# Patient Record
Sex: MALE | Race: WHITE | Employment: UNEMPLOYED | URBAN - METROPOLITAN AREA
[De-identification: names, ages, dates, MRNs, and addresses within clinical notes are randomized per-mention and may not be internally consistent; named-entity substitution may affect disease eponyms.]

---

## 2024-03-31 ENCOUNTER — APPOINTMENT (OUTPATIENT)
Dept: GENERAL RADIOLOGY | Facility: HOSPITAL | Age: 69
End: 2024-03-31
Attending: STUDENT IN AN ORGANIZED HEALTH CARE EDUCATION/TRAINING PROGRAM
Payer: MEDICARE

## 2024-03-31 ENCOUNTER — APPOINTMENT (OUTPATIENT)
Dept: CT IMAGING | Facility: HOSPITAL | Age: 69
End: 2024-03-31
Attending: STUDENT IN AN ORGANIZED HEALTH CARE EDUCATION/TRAINING PROGRAM
Payer: MEDICARE

## 2024-03-31 ENCOUNTER — HOSPITAL ENCOUNTER (INPATIENT)
Facility: HOSPITAL | Age: 69
LOS: 22 days | Discharge: NURSING, REHAB OR LONG-TERM CARE | End: 2024-04-22
Attending: STUDENT IN AN ORGANIZED HEALTH CARE EDUCATION/TRAINING PROGRAM | Admitting: HOSPITALIST
Payer: MEDICARE

## 2024-03-31 ENCOUNTER — HOSPITAL ENCOUNTER (INPATIENT)
Facility: HOSPITAL | Age: 69
End: 2024-03-31
Attending: STUDENT IN AN ORGANIZED HEALTH CARE EDUCATION/TRAINING PROGRAM | Admitting: HOSPITALIST
Payer: MEDICARE

## 2024-03-31 DIAGNOSIS — R29.818 NEUROLOGICAL DEFICIT PRESENT: ICD-10-CM

## 2024-03-31 DIAGNOSIS — R41.82 ALTERED MENTAL STATUS, UNSPECIFIED ALTERED MENTAL STATUS TYPE: Primary | ICD-10-CM

## 2024-03-31 PROBLEM — D72.829 LEUKOCYTOSIS: Status: RESOLVED | Noted: 2023-02-19 | Resolved: 2024-03-31

## 2024-03-31 PROBLEM — G40.89 OTHER SEIZURES (HCC): Status: RESOLVED | Noted: 2023-02-22 | Resolved: 2024-03-31

## 2024-03-31 PROBLEM — M1A.9XX0 GOUT, CHRONIC: Chronic | Status: RESOLVED | Noted: 2024-03-31 | Resolved: 2024-03-31

## 2024-03-31 PROBLEM — R31.0 GROSS HEMATURIA: Status: RESOLVED | Noted: 2024-03-31 | Resolved: 2024-03-31

## 2024-03-31 PROBLEM — F03.90 DEMENTIA (HCC): Chronic | Status: RESOLVED | Noted: 2023-02-01 | Resolved: 2024-03-31

## 2024-03-31 PROBLEM — G45.4 TRANSIENT GLOBAL AMNESIA: Status: RESOLVED | Noted: 2017-01-10 | Resolved: 2024-03-31

## 2024-03-31 PROBLEM — L40.8 OTHER PSORIASIS: Status: RESOLVED | Noted: 2023-02-22 | Resolved: 2024-03-31

## 2024-03-31 PROBLEM — D72.829 ELEVATED WHITE BLOOD CELL COUNT, UNSPECIFIED: Status: RESOLVED | Noted: 2023-02-22 | Resolved: 2024-03-31

## 2024-03-31 PROBLEM — R29.898 WEAKNESS OF LEFT UPPER EXTREMITY: Status: RESOLVED | Noted: 2022-08-20 | Resolved: 2024-03-31

## 2024-03-31 PROBLEM — G93.41 METABOLIC ENCEPHALOPATHY: Status: RESOLVED | Noted: 2023-02-22 | Resolved: 2024-03-31

## 2024-03-31 PROBLEM — F03.90 UNSPECIFIED DEMENTIA, UNSPECIFIED SEVERITY, WITHOUT BEHAVIORAL DISTURBANCE, PSYCHOTIC DISTURBANCE, MOOD DISTURBANCE, AND ANXIETY (HCC): Status: RESOLVED | Noted: 2023-02-22 | Resolved: 2024-03-31

## 2024-03-31 PROBLEM — K76.89 LIVER CYST: Status: RESOLVED | Noted: 2022-08-30 | Resolved: 2024-03-31

## 2024-03-31 PROBLEM — R41.841 COGNITIVE COMMUNICATION DEFICIT: Status: RESOLVED | Noted: 2023-03-14 | Resolved: 2024-03-31

## 2024-03-31 PROBLEM — C7A.1 NEUROENDOCRINE CARCINOMA, HIGH GRADE (HCC): Status: RESOLVED | Noted: 2019-04-17 | Resolved: 2024-03-31

## 2024-03-31 PROBLEM — R53.1 WEAKNESS: Status: RESOLVED | Noted: 2023-03-12 | Resolved: 2024-03-31

## 2024-03-31 PROBLEM — R26.89 OTHER ABNORMALITIES OF GAIT AND MOBILITY: Status: RESOLVED | Noted: 2023-03-12 | Resolved: 2024-03-31

## 2024-03-31 PROBLEM — E78.5 DYSLIPIDEMIA: Chronic | Status: RESOLVED | Noted: 2023-02-01 | Resolved: 2024-03-31

## 2024-03-31 PROBLEM — F06.1 CATATONIA: Status: RESOLVED | Noted: 2024-03-31 | Resolved: 2024-03-31

## 2024-03-31 PROBLEM — T88.4XXA DIFFICULT AIRWAY: Status: RESOLVED | Noted: 2023-02-02 | Resolved: 2024-03-31

## 2024-03-31 PROBLEM — N28.89 OTHER SPECIFIED DISORDERS OF KIDNEY AND URETER: Status: RESOLVED | Noted: 2023-03-11 | Resolved: 2024-03-31

## 2024-03-31 PROBLEM — G92.8 TOXIC METABOLIC ENCEPHALOPATHY: Status: RESOLVED | Noted: 2022-08-20 | Resolved: 2024-03-31

## 2024-03-31 PROBLEM — Q44.6 CYSTIC DISEASE OF LIVER: Status: RESOLVED | Noted: 2023-02-22 | Resolved: 2024-03-31

## 2024-03-31 PROBLEM — C07 CANCER OF PAROTID GLAND (HCC): Chronic | Status: RESOLVED | Noted: 2022-08-23 | Resolved: 2024-03-31

## 2024-03-31 PROBLEM — D49.0 PAROTID NEOPLASM: Status: RESOLVED | Noted: 2024-03-31 | Resolved: 2024-03-31

## 2024-03-31 PROBLEM — J45.909 ASTHMA IN ADULT (HCC): Chronic | Status: RESOLVED | Noted: 2023-02-22 | Resolved: 2024-03-31

## 2024-03-31 PROBLEM — K21.00 GASTRO-ESOPHAGEAL REFLUX DISEASE WITH ESOPHAGITIS, WITHOUT BLEEDING: Status: RESOLVED | Noted: 2023-02-22 | Resolved: 2024-03-31

## 2024-03-31 PROBLEM — F01.50 VASCULAR DEMENTIA, UNSPECIFIED SEVERITY, WITHOUT BEHAVIORAL DISTURBANCE, PSYCHOTIC DISTURBANCE, MOOD DISTURBANCE, AND ANXIETY (HCC): Status: RESOLVED | Noted: 2023-02-22 | Resolved: 2024-03-31

## 2024-03-31 PROBLEM — L40.9 PSORIASIS, UNSPECIFIED: Chronic | Status: RESOLVED | Noted: 2022-09-09 | Resolved: 2024-03-31

## 2024-03-31 PROBLEM — F01.50 VASCULAR DEMENTIA (HCC): Chronic | Status: RESOLVED | Noted: 2022-09-11 | Resolved: 2024-03-31

## 2024-03-31 PROBLEM — R93.89 ABNORMAL CT SCAN: Status: RESOLVED | Noted: 2024-03-31 | Resolved: 2024-03-31

## 2024-03-31 PROBLEM — M10.9 ACUTE GOUT INVOLVING TOE OF LEFT FOOT: Status: RESOLVED | Noted: 2023-02-22 | Resolved: 2024-03-31

## 2024-03-31 LAB
ANION GAP SERPL CALC-SCNC: 11 MMOL/L (ref 0–18)
ATRIAL RATE: 89 BPM
BASOPHILS # BLD AUTO: 0.04 X10(3) UL (ref 0–0.2)
BASOPHILS NFR BLD AUTO: 0.4 %
BILIRUB UR QL: NEGATIVE
BUN BLD-MCNC: 11 MG/DL (ref 9–23)
BUN/CREAT SERPL: 9.6 (ref 10–20)
CALCIUM BLD-MCNC: 9.8 MG/DL (ref 8.7–10.4)
CHLORIDE SERPL-SCNC: 105 MMOL/L (ref 98–112)
CLARITY UR: CLEAR
CO2 SERPL-SCNC: 24 MMOL/L (ref 21–32)
COLOR UR: YELLOW
CREAT BLD-MCNC: 1.15 MG/DL
DEPRECATED RDW RBC AUTO: 41.8 FL (ref 35.1–46.3)
EGFRCR SERPLBLD CKD-EPI 2021: 69 ML/MIN/1.73M2 (ref 60–?)
EOSINOPHIL # BLD AUTO: 0.05 X10(3) UL (ref 0–0.7)
EOSINOPHIL NFR BLD AUTO: 0.5 %
ERYTHROCYTE [DISTWIDTH] IN BLOOD BY AUTOMATED COUNT: 12.9 % (ref 11–15)
FLUAV + FLUBV RNA SPEC NAA+PROBE: NEGATIVE
FLUAV + FLUBV RNA SPEC NAA+PROBE: NEGATIVE
GLUCOSE BLD-MCNC: 86 MG/DL (ref 70–99)
GLUCOSE BLDC GLUCOMTR-MCNC: 89 MG/DL (ref 70–99)
GLUCOSE BLDC GLUCOMTR-MCNC: 91 MG/DL (ref 70–99)
GLUCOSE UR-MCNC: NORMAL MG/DL
HCT VFR BLD AUTO: 52.9 %
HGB BLD-MCNC: 17.5 G/DL
HGB UR QL STRIP.AUTO: NEGATIVE
IMM GRANULOCYTES # BLD AUTO: 0.03 X10(3) UL (ref 0–1)
IMM GRANULOCYTES NFR BLD: 0.3 %
KETONES UR-MCNC: 60 MG/DL
LEUKOCYTE ESTERASE UR QL STRIP.AUTO: NEGATIVE
LYMPHOCYTES # BLD AUTO: 1.01 X10(3) UL (ref 1–4)
LYMPHOCYTES NFR BLD AUTO: 9.1 %
MCH RBC QN AUTO: 29.4 PG (ref 26–34)
MCHC RBC AUTO-ENTMCNC: 33.1 G/DL (ref 31–37)
MCV RBC AUTO: 88.8 FL
MONOCYTES # BLD AUTO: 0.53 X10(3) UL (ref 0.1–1)
MONOCYTES NFR BLD AUTO: 4.8 %
NEUTROPHILS # BLD AUTO: 9.4 X10 (3) UL (ref 1.5–7.7)
NEUTROPHILS # BLD AUTO: 9.4 X10(3) UL (ref 1.5–7.7)
NEUTROPHILS NFR BLD AUTO: 84.9 %
NITRITE UR QL STRIP.AUTO: NEGATIVE
OSMOLALITY SERPL CALC.SUM OF ELEC: 289 MOSM/KG (ref 275–295)
P AXIS: 46 DEGREES
P-R INTERVAL: 178 MS
PH UR: 6 [PH] (ref 5–8)
PLATELET # BLD AUTO: 286 10(3)UL (ref 150–450)
POTASSIUM SERPL-SCNC: 3.4 MMOL/L (ref 3.5–5.1)
PROT UR-MCNC: 30 MG/DL
Q-T INTERVAL: 366 MS
QRS DURATION: 96 MS
QTC CALCULATION (BEZET): 445 MS
R AXIS: -21 DEGREES
RBC # BLD AUTO: 5.96 X10(6)UL
RSV RNA SPEC NAA+PROBE: NEGATIVE
SARS-COV-2 RNA RESP QL NAA+PROBE: NOT DETECTED
SODIUM SERPL-SCNC: 140 MMOL/L (ref 136–145)
SP GR UR STRIP: 1.03 (ref 1–1.03)
T AXIS: 38 DEGREES
TROPONIN I SERPL HS-MCNC: 8 NG/L
UROBILINOGEN UR STRIP-ACNC: NORMAL
VENTRICULAR RATE: 89 BPM
WBC # BLD AUTO: 11.1 X10(3) UL (ref 4–11)

## 2024-03-31 PROCEDURE — 99223 1ST HOSP IP/OBS HIGH 75: CPT | Performed by: OTHER

## 2024-03-31 PROCEDURE — 71045 X-RAY EXAM CHEST 1 VIEW: CPT | Performed by: STUDENT IN AN ORGANIZED HEALTH CARE EDUCATION/TRAINING PROGRAM

## 2024-03-31 PROCEDURE — 70450 CT HEAD/BRAIN W/O DYE: CPT | Performed by: STUDENT IN AN ORGANIZED HEALTH CARE EDUCATION/TRAINING PROGRAM

## 2024-03-31 PROCEDURE — 70496 CT ANGIOGRAPHY HEAD: CPT | Performed by: STUDENT IN AN ORGANIZED HEALTH CARE EDUCATION/TRAINING PROGRAM

## 2024-03-31 PROCEDURE — 70498 CT ANGIOGRAPHY NECK: CPT | Performed by: STUDENT IN AN ORGANIZED HEALTH CARE EDUCATION/TRAINING PROGRAM

## 2024-03-31 RX ORDER — SODIUM CHLORIDE 9 MG/ML
INJECTION, SOLUTION INTRAVENOUS CONTINUOUS
Status: DISCONTINUED | OUTPATIENT
Start: 2024-03-31 | End: 2024-04-05

## 2024-03-31 RX ORDER — ENOXAPARIN SODIUM 100 MG/ML
40 INJECTION SUBCUTANEOUS DAILY
Status: DISCONTINUED | OUTPATIENT
Start: 2024-03-31 | End: 2024-04-22

## 2024-03-31 RX ORDER — WATER 10 ML/10ML
INJECTION INTRAMUSCULAR; INTRAVENOUS; SUBCUTANEOUS
Status: DISPENSED
Start: 2024-03-31 | End: 2024-03-31

## 2024-03-31 RX ORDER — HALOPERIDOL 5 MG/ML
1 INJECTION INTRAMUSCULAR EVERY 4 HOURS PRN
Status: DISCONTINUED | OUTPATIENT
Start: 2024-03-31 | End: 2024-04-02

## 2024-03-31 RX ORDER — SODIUM CHLORIDE 9 MG/ML
125 INJECTION, SOLUTION INTRAVENOUS CONTINUOUS
Status: DISCONTINUED | OUTPATIENT
Start: 2024-03-31 | End: 2024-03-31

## 2024-03-31 RX ORDER — ACETAMINOPHEN 500 MG
500 TABLET ORAL EVERY 4 HOURS PRN
Status: DISCONTINUED | OUTPATIENT
Start: 2024-03-31 | End: 2024-04-02

## 2024-03-31 RX ORDER — OLANZAPINE 10 MG/2ML
INJECTION, POWDER, FOR SOLUTION INTRAMUSCULAR
Status: DISPENSED
Start: 2024-03-31 | End: 2024-03-31

## 2024-03-31 RX ORDER — LABETALOL HYDROCHLORIDE 5 MG/ML
10 INJECTION, SOLUTION INTRAVENOUS ONCE
Status: COMPLETED | OUTPATIENT
Start: 2024-03-31 | End: 2024-03-31

## 2024-03-31 RX ORDER — AMLODIPINE BESYLATE 5 MG/1
5 TABLET ORAL DAILY
Status: DISCONTINUED | OUTPATIENT
Start: 2024-03-31 | End: 2024-04-04

## 2024-03-31 RX ORDER — QUETIAPINE FUMARATE 25 MG/1
25 TABLET, FILM COATED ORAL NIGHTLY
Status: DISCONTINUED | OUTPATIENT
Start: 2024-03-31 | End: 2024-04-02

## 2024-03-31 RX ORDER — HYDRALAZINE HYDROCHLORIDE 20 MG/ML
10 INJECTION INTRAMUSCULAR; INTRAVENOUS EVERY 6 HOURS PRN
Status: DISCONTINUED | OUTPATIENT
Start: 2024-03-31 | End: 2024-04-01

## 2024-03-31 RX ORDER — RISPERIDONE 0.5 MG/1
0.5 TABLET ORAL 2 TIMES DAILY PRN
Status: DISCONTINUED | OUTPATIENT
Start: 2024-03-31 | End: 2024-04-02

## 2024-03-31 RX ORDER — HYDRALAZINE HYDROCHLORIDE 20 MG/ML
10 INJECTION INTRAMUSCULAR; INTRAVENOUS ONCE
Status: COMPLETED | OUTPATIENT
Start: 2024-03-31 | End: 2024-03-31

## 2024-03-31 NOTE — H&P
[facility] Hospitalist H&P       CC: left sided facial droop, weakness, agitation     PCP: No primary care provider on file.    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  Patient arrives via EMS from his independent living facility.  It appears that the patient had a visiting nurse and noted him to be sitting at the edge of the bed, confused.  EMS arrived and they found the patient to have left-sided weakness and left-sided facial droop.  The patient was not answering questions appropriately and was altered.  He was last known well sometime yesterday. Normally gets his care at [facility]. Has had admissions for altered mental status as well.     Review of Systems  Comprehensive ROS reviewed and negative except for what's stated above.     PMH  Metastatic small cell cancer with parotid involvment  Asthma  Dementia  Gout  Hypertension  Hx of Seizure  Hyperlipidemia    PSH  Unavailable at this time     ALL:  No major drug allergies noted    Home Medications:  Unclear medication list  Reviewed care everywhere records but unclear what is updated list  No list in ER via EMS    Soc Hx  Lives in independent living facility  Former smoker  Lives alone,   Patient was a     Fam Hx  Unavailable at this time     OBJECTIVE:  BP (!) 175/89   Pulse 79   Temp 98.1 °F (36.7 °C) (Oral)   Resp 22   Ht 5' 8\" (1.727 m)   Wt 177 lb 0.5 oz (80.3 kg)   SpO2 93%   BMI 26.92 kg/m²   General: Awake, dishevled, in restraints  Heart: RRR  Lungs: Clear  Extremities: both upper and lower in restraints  Neuro: does not follow commands but is awake, spontaneously moving all extremities. Speech seems to be dysarthric but not giving much in terms of speech, sensation intact in face and  extremities.     Diagnostic Data:    CBC/Chem  Recent Labs   Lab 03/31/24  1059   WBC 11.1*   HGB 17.5   MCV 88.8   .0       Recent Labs   Lab 03/31/24  1058      K 3.4*      CO2 24.0   BUN 11   CREATSERUM 1.15   GLU 86   CA 9.8     Radiology: XR CHEST AP PORTABLE  (CPT=71045)    Result Date: 3/31/2024  CONCLUSION: Linear opacities in both lung bases suggesting atelectasis.  Otherwise no acute cardiopulmonary abnormality.   Dictated by (CST): Jhoan Nunn MD on 3/31/2024 at 11:36 AM     Finalized by (CST): Jhoan Nunn MD on 3/31/2024 at 11:37 AM          CT STROKE CTA BRAIN/CTA NECK (W IV)(CPT=70496/60338)    Result Date: 3/31/2024  CONCLUSION:  1. CTA head: No hemodynamically significant stenosis, occlusion, or gross aneurysm in the anterior or posterior circulation. 2. CTA neck: No hemodynamically significant stenosis, occlusion, or dissection of the carotids or vertebrals. 3.  Advanced chronic appearing paranasal sinus inflammation.  Dictated by (CST): Jhoan Nunn MD on 3/31/2024 at 11:18 AM     Finalized by (CST): Jhoan Nunn MD on 3/31/2024 at 11:22 AM          CT STROKE BRAIN (NO IV)(CPT=70450)    Result Date: 3/31/2024  CONCLUSION:  1.  No acute intracranial process. 2.  There are moderate microvascular white matter ischemic changes, likely related to long-standing hypertension and/or diabetes.  Interval development of multiple lacunar infarcts most notably in the right caudate head and right basal ganglia, new since  2012 imaging. 3.  Atherosclerosis in the anterior and posterior circulations.  Exam discussed with Dr. Lynn on 3/31/24 at 11:17 a.m.  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     Altered mental status   Possible delirium and/or progression of dementia   Agitation   -unclear, ddx includes metabolic encephalopathy, infection, stroke, post ictal state? And more.   -neurology consulted  -initial CT imaging stable  -may need MRI and stroke work up   -monitor for fever or infection   -will need anti-psychotics PRN for agitation, will order IV haldol 1mg [id number] for now and schedule seroquel 25mg HS tonight.     Hx of Hypertension  -unclear which meds  -renal function stable  -can add oral meds once cleared from stroke standpoint     Possible left sided facial droop and weakness  -neurology consulted and stroke work up if needed based on their evaluation   -anti-platelets if recommended by neuro     Hx of seizure? Noted in outside records, unclear if on anti-epileptics     Gout  -likely on allopurinol but unclear    Fluids: low rate of fluids   Diet: NPO until cleared from speech/swallow  DVT prophylaxis: lovenox daily   Code status: unclear, not on record    Yobany Christianson [facility] Hospitalist

## 2024-03-31 NOTE — SLP NOTE
ADULT SWALLOWING EVALUATION    ASSESSMENT    ASSESSMENT/OVERALL IMPRESSION:    Orders received, chart reviewed, clinical bedside swallow evaluation complete as patient did not pass RN swallow screen due to possible L facial droop. Patient admitted from ILF/HA with AMS and PMH dementia. CXR and CTH 3/31/24 reviewed as below. No hx SLP service per this EMR review. RN authorizes visit, endorses patient received sedative medications due to agitation which may be acutely worsening AMS at this time.     Patient positioned upright in bed, afebrile, in NAD, flat affect, inconsistently responds to Y/N questions with head nods/shakes, states name as \"Jeremy\" but otherwise unable to elicit any verbal output; patient rolls eyes at me when instructed for oral motor commands with visual models and does not attempt. Patient self-administered two cup sips thin liquid water with oropharyngeal timing and control which appears fair and without overt signs aspiration/distress. Patient then declined/refused all other PO trials including water, juice, pudding, applesauce and solid crackers despite gentle encouragement. Patient remained with mouth clamped shut and head turned away.      At this time, unable to make accurate assessment of patient dysphagia and overall swallow safety due to limited patient participation. Anticipate patient will tolerate a diet (perhaps with some modifications) when mentation is clear/improved to allow improved participation in evaluation. Given this presentation, unable to make safe PO diet recommendation, will return 4/1/24 for re-assessment as available/appropriate. Defer to primary service to initiate PO diet if prior to re-evaluation. Plan and recommendations reviewed with patient and RN at bedside.         RECOMMENDATIONS   Diet Recommendations - Solids: NPO  Diet Recommendations - Liquids: NPO  **Or defer to primary service for diet due to limited patient participation today**  Medication Administration  Recommendations: One pill at a time with small sips of liquid as appropriate  Treatment Plan/Recommendations: Aspiration precautions;SLP to reassess    HISTORY   MEDICAL HISTORY  Reason for Referral: RN dysphagia screen    Problem List  Principal Problem:    Altered mental status, unspecified altered mental status type  Active Problems:    Neurological deficit present    Past Medical History  No past medical history on file.    Prior Living Situation: Assisted living;Independent living  Diet Prior to Admission: Unknown  Precautions: Aspiration    Patient/Family Goals: Not stated    SWALLOWING HISTORY  Current Diet Consistency: NPO  Dysphagia History: As above    Imaging Results:     [facility] 3/31/24:  CONCLUSION:   1.  No acute intracranial process.   2.  There are moderate microvascular white matter ischemic changes, likely related to long-standing hypertension and/or diabetes.  Interval development of multiple lacunar infarcts most notably in the right caudate head and right basal ganglia, new since    2012 imaging.   3.  Atherosclerosis in the anterior and posterior circulations.     CXR 3/31/24:  CONCLUSION: Linear opacities in both lung bases suggesting atelectasis. Otherwise no acute cardiopulmonary abnormality.     OBJECTIVE     Dentition: Natural  Symmetry: Unable to assess  Strength: Unable to assess  Tone: Unable to assess  Range of Motion: Unable to assess  Rate of Motion: Unable to assess    Voice Quality: Weak (Minimal verbal output)  Respiratory Status: Unlabored  Consistencies Trialed: Thin liquids  Method of Presentation: Self presentation;Cup  Patient Positioning: Upright;Midline    Oral Phase of Swallow: Within Functional Limits - timing and control appears adequate for two small sips of thin liquid via cup    Pharyngeal Phase of Swallow: Within Functional Limits - no overt signs aspiration/distress noted at bedside with two small cup sips thin liquid  (Please note: Silent aspiration cannot be evaluated  clinically. Videofluoroscopic Swallow Study is required to rule-out silent aspiration.)    Esophageal Phase of Swallow: No complaints consistent with possible esophageal involvement      GOALS  Goal #1 Patient will participate in ongoing clinical swallow evaluation as indicated/appropriate.     In Progress     FOLLOW UP  Treatment Plan/Recommendations: Aspiration precautions;SLP to reassess  Number of Visits to Meet Established Goals: 1  Follow Up Needed (Documentation Required): Yes  SLP Follow-up Date: 04/01/24    Thank you for your referral.   If you have any questions, please contact Apoorva Blanc, SLP  Apoorva Blanc M.S. CCC-SLP  Speech-Language Pathologist  [phone]

## 2024-03-31 NOTE — ED PROVIDER NOTES
History     Chief Complaint   Patient presents with    Stroke       HPI    68 year old male presents from independent living for evaluation of possible stroke.  Stroke alert called from the field.  EMS reports that patient had a visiting nurse check patient about 30 minutes prior to arrival and noted him to be sitting at the edge of the bed, confused, EMS found the patient to have left-sided weakness and left-sided facial droop.  Patient is not answering questions appropriately, seems confused.  EMS notes last known well sometime yesterday.    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  Seems patient has had prior admissions to [facility] for altered mentation, MRI showing possible normal pressure hydrocephalus    Patient review patient was Plavix, no other anticoagulation.  Does not appear patient is on antiepileptics          No past medical history on file.    No past surgical history on file.    No pertinent social history.                 Physical Exam     ED Triage Vitals   BP 03/31/24 1053 (!) 176/89   Pulse 03/31/24 1053 93   Resp 03/31/24 1053 24   Temp 03/31/24 1055 98.1 °F (36.7 °C)   Temp src 03/31/24 1055 Oral   SpO2 03/31/24 1053 94 %   O2 Device 03/31/24 1130 None (Room air)       Physical Exam  Constitutional:       General: He is not in acute distress.     Comments: Confused, looking about, intermittently answers questions   Eyes:      Extraocular Movements: Extraocular movements intact.   Cardiovascular:      Rate and Rhythm: Normal rate.      Pulses: Normal pulses.   Pulmonary:      Effort: Pulmonary effort is normal. No respiratory distress.   Musculoskeletal:      Cervical back: Normal range of motion.   Neurological:      Comments: Left-sided facial droop, left lower extremity weakness, there is drift of the left upper extremity with dysmetria.  Unable to assess visual field deficits.   Patient has an intention, not able to follow through with neurologic examination              ED Course     Labs Reviewed   BASIC METABOLIC PANEL (8) - Abnormal; Notable for the following components:       Result Value    Potassium 3.4 (*)     BUN/CREA Ratio 9.6 (*)     All other components within normal limits   URINALYSIS, ROUTINE - Abnormal; Notable for the following components:    Ketones Urine 60 (*)     Protein Urine 30 (*)     Bacteria Urine Rare (*)     Squamous Epi. Cells Few (*)     Transitional Cells Few (*)     All other components within normal limits   CBC W/ DIFFERENTIAL - Abnormal; Notable for the following components:    WBC 11.1 (*)     RBC 5.96 (*)     Neutrophil Absolute Prelim 9.40 (*)     Neutrophil Absolute 9.40 (*)     All other components within normal limits   TROPONIN I HIGH SENSITIVITY - Normal   POCT GLUCOSE - Normal   POCT GLUCOSE - Normal   SARS-COV-2/FLU A AND B/RSV BY PCR (GENEXPERT) - Normal    Narrative:     This test is intended for the qualitative detection and differentiation of SARS-CoV-2, influenza A, influenza B, and respiratory syncytial virus (RSV) viral RNA in nasopharyngeal or nares swabs from individuals suspected of respiratory viral infection consistent with COVID-19 by their healthcare provider. Signs and symptoms of respiratory viral infection due to SARS-CoV-2, influenza, and RSV can be similar.    Test performed using the Xpert Xpress SARS-CoV-2/FLU/RSV (real time RT-PCR)  assay on the GeneXpert instrument, [employer], [place] [address].   This test is being used under the Food and Drug Administration's Emergency Use Authorization.    The authorized Fact Sheet for Healthcare Providers for this assay is available upon request from the laboratory.   CBC WITH DIFFERENTIAL WITH PLATELET    Narrative:     The following orders were created for panel order CBC With Differential With Platelet.  Procedure                               Abnormality         Status                      ---------                               -----------         ------                     CBC W/ DIFFERENTIAL[[id number]]          Abnormal            Final result                 Please view results for these tests on the individual orders.   RAINBOW DRAW LAVENDER   RAINBOW DRAW LIGHT GREEN   RAINBOW DRAW GOLD   RAINBOW DRAW BLUE     XR CHEST AP PORTABLE  (CPT=71045)    Result Date: 3/31/2024  CONCLUSION: Linear opacities in both lung bases suggesting atelectasis.  Otherwise no acute cardiopulmonary abnormality.   Dictated by (CST): Jhoan Nunn MD on 3/31/2024 at 11:36 AM     Finalized by (CST): Jhoan Nunn MD on 3/31/2024 at 11:37 AM          CT STROKE CTA BRAIN/CTA NECK (W IV)(CPT=70496/23817)    Result Date: 3/31/2024  CONCLUSION:  1. CTA head: No hemodynamically significant stenosis, occlusion, or gross aneurysm in the anterior or posterior circulation. 2. CTA neck: No hemodynamically significant stenosis, occlusion, or dissection of the carotids or vertebrals. 3.  Advanced chronic appearing paranasal sinus inflammation.  Dictated by (CST): Jhoan Nunn MD on 3/31/2024 at 11:18 AM     Finalized by (CST): Jhoan Nunn MD on 3/31/2024 at 11:22 AM          CT STROKE BRAIN (NO IV)(CPT=70450)    Result Date: 3/31/2024  CONCLUSION:  1.  No acute intracranial process. 2.  There are moderate microvascular white matter ischemic changes, likely related to long-standing hypertension and/or diabetes.  Interval development of multiple lacunar infarcts most notably in the right caudate head and right basal ganglia, new since  2012 imaging. 3.  Atherosclerosis in the anterior and posterior circulations.  Exam discussed with Dr. Lynn on 3/31/24 at 11:17 a.m.  Dictated by (CST): Jhoan Nunn MD on 3/31/2024 at 11:14 AM     Finalized by (CST): Jhoan Nunn MD on 3/31/2024 at 11:18 AM               MDM     Vitals:    03/31/24 1130 03/31/24 1215 03/31/24 1241 03/31/24 1311   BP: (!) 175/89 (!) 177/97 (!) 176/89    BP  Location:   Right arm    Pulse: 79 73 80    Resp: 22 20 18    Temp:   98.8 °F (37.1 °C)    TempSrc:   Oral    SpO2: 93% 94% 95%    Weight:    82.1 kg   Height:           Large NIH stroke scale, unknown last normal, patient is not a tnk candidate.  Will proceed with CT stroke protocol to evaluate for large vessel occlusion.  Consulted with neurology, evaluated patient at bedside  Will allow for permissive hypertension at this time.  Differential could also include metabolic encephalopathy, delirium, dementia    ED Course as of 03/31/24 1329  ------------------------------------------------------------  Time: 03/31 1102  Comment: EKG interpretation by me: EKG sinus rhythm at a rate of 89, axis nomal, no concerning acute ischemic ST changes  ------------------------------------------------------------  Time: 03/31 1116  Comment: My interpretation of CT head without hemorrhage, large ventricles are noted  ------------------------------------------------------------  Time: 03/31 1128  Comment: CTA neg for LVO  ------------------------------------------------------------  Time: 03/31 1139  Comment: Xr read with atelectasis  ------------------------------------------------------------  Time: 03/31 1139  Comment: Patient becoming increasingly agitated and aggressive, punching and kicking staff.  Requiring sedation and restraints.  ------------------------------------------------------------  Time: 03/31 1210  Comment: Labs are unrevealing.  Urinalysis without evidence of infection.  Patient will be admitted for further workup and care     Critical Care Note  30 minutes of my time was spent engaged in work directly related to patient care, exclusive of other procedures, to prevent further deterioration of patient's condition.  Addressed impending deterioration including: airway, respiratory, cardiovascular, metabolic  Interpretation of CXR, cardiac output, EKG, BP  Response to treatments and reassessment of patient  Speaking  with consultants  Performed by self      Disposition and Plan     Clinical Impression:  1. Altered mental status, unspecified altered mental status type    2. Neurological deficit present        Disposition:  Admit    Follow-up:  No follow-up provider specified.    Medications Prescribed:  There are no discharge medications for this patient.      Hospital Problems       Present on Admission             ICD-10-CM Noted POA    * (Principal) Altered mental status, unspecified altered mental status type R41.82 3/31/2024 Yes    Neurological deficit present R29.818 3/31/2024 Yes

## 2024-03-31 NOTE — ED QUICK NOTES
Orders for admission, patient is aware of plan and ready to go upstairs. Any questions, please call ED RN Kasandra at extension [phone].     Patient Covid vaccination status: Fully vaccinated     COVID Test Ordered in ED: SARS-CoV-2/Flu A and B/RSV by PCR (GeneXpert)    COVID Suspicion at Admission: N/A    Running Infusions:    sodium chloride      None    Mental Status/LOC at time of transport: A&Ox2, combative with staff.     Other pertinent information:   CIWA score: N/A   NIH score:  8

## 2024-03-31 NOTE — ED INITIAL ASSESSMENT (HPI)
Arrived via ems from independent living for AMS , left sided facial droop/weakness, and increased agitation. Baseline is A&Ox4. Currently A&Ox2.

## 2024-03-31 NOTE — CONSULTS
[facility]  [address] [place] [address], [address]  [facility] [address]  [phone]          STROKE  CONSULTATION NOTE  [facility]  part of [facility]    Report of Consultation    Carlo Ng Patient Status:  Inpatient     1955 MRN [id number]    Location [facility] 3W/SW Attending Yobany Huggins DO    Hosp Day # 0 PCP No primary care provider on file.      Date of Admission:  3/31/2024  Date of Consult:  3/31/2024  Reason for Admission/Consultation:  stroke alert     Requested by: Yobany Huggins DO  Name of Outside Hospital if Transferred: N/A  Time of patient arrival: 10:49 AM   on 3/31/24      _________________________________________________________________________________________    Chief Complaint:   Chief Complaint   Patient presents with    Stroke       HPI:  Carlo Ng is a 68 year old  male w/ a pmhx sig. 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  He was  not answering  questions appropriately and seemed confused.  Per EMS last known well was reportedly yesterday.  It is unclear where they obtain this information from.    Patient was seen in the emergency department with a stroke alert was called.  He was noted to have cognitive slowing/bradyphrenia.  He would regard.  He had an increased response latency.  + Abulia.  He could name.  He could repeat.   + Hypophonic.  He was disoriented.  When asked his age he said he was \"in my early 30s.\"  At first he was able to tell us that he was at [facility].  Later when asked location again he said he was in [place]\" [place].  Then appeared  embarrassed and said [facility]..\"    Majority of the history is obtained through chart review, particularly [facility] messages that were sent to the patient's neurologist at [facility].  The patient has a guardian.  Her guardian sent a message to his neurologist with concerns that the patient's executive function and cognition was worsening.  Then after reviewing all of the outside documentation from his admissions to [facility] and admissions to outside care facility/assisted living facilities it appears that patient has had a progressive decline since the fall 2023.  It is in the background of this global decline the patient has had intermittent episodes of significant decompensation with return to his new baseline.    Patient is awaiting his follow-up visit with Dr. Cabrera at [facility].  He is scheduled to see him in May 2024.  Update: Patient's daughter states that the patient is getting his care at [facility] and follows with a neurologist there, Dr. Ramirez Keith.  She specifically wanted to know if the patient can get an MRI with CSF flow studies as well as a high-volume lumbar puncture with opening pressure to evaluate for normal pressure hydrocephalus.    Prior stroke/TIAs (date, description):       Date  Topography/symptoms  Etiology  9/10/12  left hemisphere        Review and summation of prior records  Carlo Ng is a 66yo male who neurology was consulted for new onset aphasia and R sided weakness. PMHx notable for metastatic small cell cancer w/ parotid involvement, asthma, possible dementia, gout, HTN, seizures who presented with AMS and is being treated for suspected gout flair     Impression:  Given patient's rapid return to baseline with focal symptoms at  the time of examination being related to pain, low suspicion for vascular etiology of patient's presentation. Given AMS, prolonged hospital stay, and fluctuating mental status, suspect toxic metabolic encephalopathy with superimposed pain limited examination may have led to patient's presentation.     RECOMMENDATIONS:  -No need for MRI brain unless patient's symptoms persist  -Consider restarting home clopidogrel and rosuvastatin for secondary prevention if no contraindication from upcoming procedure or high bleeding risk     Thank you for allowing us to assist in the care of your patient. Please let us know if you have any additional questions or concerns.     We will sign off    CURRENT ISSUES (or current complaints)    Seen then in hospital by Neurology With last note (by Dr Angelo and Dr Lacey as below)  ASSESSMENT AND PLAN   Carlo Ng is a 68yo male who neurology was consulted for new onset aphasia and R sided weakness. PMHx notable for metastatic small cell cancer w/ parotid involvement, asthma, possible dementia, gout, HTN, seizures who presented with AMS and is being treated for suspected gout flair     Impression:  Given patient's rapid return to baseline with focal symptoms at the time of examination being related to pain, low suspicion for vascular etiology of patient's presentation. Given AMS, prolonged hospital stay, and fluctuating mental status, suspect toxic metabolic encephalopathy with superimposed pain limited examination may have led to patient's presentation.     RECOMMENDATIONS:  -No need for MRI brain unless patient's symptoms persist  -Consider restarting home clopidogrel and rosuvastatin for secondary prevention if no contraindication from upcoming procedure or high bleeding risk     Thank you for allowing us to assist in the care of your patient. Please let us know if you have any additional questions or concerns.     We will sign off    Patient not sure why he is here> MRI shows some  signs of ventriculomegaly but also atrophy.  Accompanied by a friend but no family members or POA with him  No complaints of a=gait dysfunction or urinary dysfunction  Very irritable and poor historian mood and memory are poor    Medications:   Current Outpatient Medications   Medication    acetaminophen (TYLENOL) 325 MG tablet    albuterol 90 mcg base/puff Aero Soln    colchicine 0.6 MG tablet    fluticasone-salmeterol (Wixela Inhub) 250-50 MCG/ACT diskus inhaler    rosuvastatin (CRESTOR) 20 MG tablet    Spacer/Aero-Holding Chambers (VALVED HOLDING CHAMBER) Device     No current facility-administered medications for this visit.     Allergies:     Allergies   Allergen Reactions    Codeine Nausea     Other medical/surgical/family/social issues since last visit: None known    ROS:  Constitution no new complaints  Hematologic/Lymphatic/Immunologic no new complaints   Eyes:no new complaints  Ears/Nose/Throat: no new complaints  Respiratory:no new complaints  Cardiac: no new complaints  Gastrointestinal:no new complaints  Genitourinary:no new complaints  Musculoskeletal: no new complaints  Neurologic/Psychiatric: no new complaints or as per HPI    Physical examination:   /90 (BP Location: Left arm, BP Pt Position: Sitting, BP Cuff: adult large) Comment: AOBP  Pulse 76  Temp 99.5 °F (37.5 °C) (Temporal) Comment: pt denies any symptoms  Resp 20  Ht 1.778 m (5' 10\")  Wt 77.3 kg (170 lb 6.4 oz)  SpO2 96%  BMI 24.45 kg/m²     No cervical bruits and normal rate and rhythm. No gross murmurs     On neurological examination, the patient was alert and oriented to person,place but not time. Tangential thinking. Irritable and uncooperative to some degree with exam. The discs were poorly visualized. The patient had normal extraocular movements, pupil symmetry size and reaction and normal lid position. Visual fields were normal. There was normal facial strength and sensation. There was normal hearing to voice and low  frequency sounds. There was normal palate, pharynx, tongue and shoulder movement/function. The patient also had normal extremity strength, tone, coordination and bulk. The patient had normal gait There was no tremor or involuntary movements. There were normal reflexes with downgoing toes. There was normal sensation to pin, temperature, position, light touch, vibration and double simultaneous stimulation     Test Results    Results  MRI HEAD W AND WO IV CONTRAST (Accession [id number]) (Order [id number])  Result Information    Status: Final result (Exam End: 2023 22:02) Provider Status: Open     Signed by    Signed Time Phone Pager   Henna Ramos MD 2023 23:01 [phone]      Neurology note from 9/10/2012: \"DATE OF CONSULTATION:  09/10/12     This patient is a 56-year-old right-handed white male with a   history of  hypertension and hypercholesterolemia who complains of confusion   and  aphasia.  The patient was out with friends in the evening two days   ago, but  then friends were unable to contact him yesterday.  He sent a   garbled text  message at 4 a.m. today, but then he did not show up for work or   show up  for appearance in court as a , which is his profession.    Coworkers  went to his home and found him to be confused with inability to  communicate.  He was transported to the emergency room and he   continues to  have moderate receptive and expressive aphasia....FAMILY HISTORY:  The patient's father had hypertension and  of   renal  insufficiency due to hypertension. The patient's mother had a   stroke.  Patient's brother has had multiple strokes from the age of 50.    Another  brother has leukemia.     REVIEW OF SYSTEMS:  As stated above, including mild confusion and  expressive receptive aphasia.  He denied upper respiratory   complaints of  sinus congestion, sore throat, rhinorrhea, sore throat, or cough.    He  denied pulmonary complaints of chest congestion, shortness of    breath, chest  pain, or productive cough.  He denied cardiovascular complaints of   chest  pain, shortness of breath, leg swelling.  He denied abdominal   complaints of  nausea, emesis, diarrhea, constipation, or abdominal pain.    Neurological  complaints are as described above, but he denied difficulty with   vision,  denied weakness, denied tingling, denied numbness and denied   balance  difficulties.     EXAMINATION:   Patient was a comfortable-appearing middle-aged   white male  with moderate language difficulties.     VITAL SIGNS:  Temperature was 99.3 degrees, respirations were 16   per  minute, pulse was 87 per minute, and blood pressure was 163/90.     HEENT:   Normocephalic, atraumatic, his pupils were equally round   and  reactive to light and his extraocular movements were intact.  His   ocular  fundi were fairly normal with sharp discs and normal cup-to-disc   ratios  bilaterally, but he had a moderate degree of copper wiring   appearance to  the retinal arteries.  His oropharynx was normal.     NECK:  Supple, without lymph nodes, without masses, and without   bruits.     LUNGS:  Clear to auscultation with good breath sounds throughout   and no  rales, rhonchi, or wheezes.     HEART:  Normal respiratory rate and rhythm with normal S1 and S2   sounds and  no appreciable murmur.     ABDOMEN:  Soft, nontender, nondistended, normally active bowel   sounds, and  no palpable masses.     EXTREMITIES:  Normal peripheral pulses in all extremities without   cyanosis,  clubbing, or edema.     NEUROLOGICAL:     MENTAL STATUS:  The patient was stable, he appeared to be alert   and  oriented to person, place, and time.  He had moderate difficulty  understanding and following commands, he had very subfluent speech   with  moderate to severe word finding difficulty.  He did not have any  dysarthria.     CRANIAL NERVES:  The patient had minimal right sided facial droop,   but  otherwise had full visual fields  bilaterally, normal facial   sensation, good  palatopharyngeal elevation, midline tongue, good strength on head   turning  bilaterally, and equal bilateral shoulder shrugs.     MOTOR:  The patient had minimal degree of right upper extremity   weakness or  4+/5 on hand , elbow flexion, elbow extension, and shoulder   abduction.  Strength was otherwise 5/5 throughout the left upper extremity and  bilateral lower extremities.  He had mild right sided pronator   drift and he  had a positive Babinski's sign on the right foot.     SENSATION:  Intact to touch, light touch, pinprick, temperature   and  vibratory sensations throughout, although it was questionable   whether the  patient completely understood the questions he was being asked   about  sensory stimulation.     COORDINATION:  Patient had mild ataxia and dysmetria in the right   upper  extremity on finger-to-nose testing.  He had no ataxia or   dysmetria on left  finger-to-nose testing or on bilateral heel-to-shin.  He stood up   easily  and he had no Romberg sign.  He was able to perform a fair tandem   gait.     LABORATORIES:  CBC revealed a mildly elevated white blood count of   12.5,  normal hemoglobin 13.6, normal hematocrit 40.8, and normal   platelet count  of 276.  Electrolyte panel revealed normal sodium of 137, mildly   low  potassium of 3.8, normal chloride of 103, bicarbonate 24, BUN 9,   creatinine  1.1 and nonfasting glucose of 136.  Metabolic panel was   unremarkable with  AST of 32, ALT of 23, alkaline phosphatase 49, troponin 0.01.  PT   was  normal at 12.8 seconds and PTT was normal at 26.4 seconds.     Chest x-ray performed on September 10, 2012 was unremarkable.     CT scan of the brain performed on September 10, 2012 revealed   marked  cerebral atrophy and volume loss that was very advanced for the   patient's  age.  There were moderate to severe chronic deep white matter   ischemic  changes.     MRI scan of the brain performed on  September 10, 2012 once again   revealed  advanced chronic ischemic changes of the deep cerebral white   matter.  Personal observation revealed that this was worse in regions near   Broca's  area and frontal and temporal regions.  There was no acute infarct   or  hemorrhage demonstrated.     ASSESSMENT:  This patient has had a subacute stroke with increased subcortical   damage in  the left frontal and temporal regions against a background of   advanced  small vessel ischemic changes and atrophy and a family history of   early  onset strokes.     RECOMMENDATIONS AND PLAN:  1. Aspirin 81 milligrams per day.  2. MRA of the brain and neck.  3. Laboratory workup including sedimentation rate, B12 levels,   folate,     protein C, protein S, factor V Leiden, and other   hypercoagulability     factors.\"      3.  MyChart message from patient's power of  to [facility] neurologist: \"Good afternoon, Dr. Cabrera.      This is Carlo Sher's Power of .  I am reaching out to you following your appointment with Jeremy on 5/30.      I am concerned that Jeremy is regressing in the past few weeks.  As recent as three weeks ago, he was able to coherently discuss his healthcare, previous work as an , and certain cases that we worked on together.  However, it seems that in these past few weeks, he has become increasingly confused and incoherent.  This resembles his state the few weeks prior to his most recent admission to the hospital in February of 2023.         I am available to discuss with you at any time, I apologize I was not able to attend his 5/30 doctors appointment.      Thank you,   Pat Reddy - [phone]     guardianship might be appropriate  Testing pending     Ramirez Cabrera MD  Professor of Neurology and Neurosurgery\"      4.  Reviewed general surgery note from 9/12/2012.  After the patient was admitted for his left MCA stroke.  He had a seizure.  He was being evaluated by  general surgery to treat his bleeding hemorrhoids.  He then had a seizure the night before.  He was seen by Dr. Gallagher.  He was started on Dilantin.  He has been seizure-free since then.  He did have a routine EEG.    ROS:  Pertinent positive and negatives per HPI.  All others were reviewed and negative.    Past Medical History:   Diagnosis Date    Abnormal CT scan 03/31/2024    Acute gout involving toe of left foot 02/22/2023    Altered mental status 03/31/2024    Anemia 11/18/2009    Asthma in adult (HCC) 02/22/2023    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   Resection 2019    Catatonia 03/31/2024    Cognitive communication deficit 03/14/2023    Cystic disease of liver 02/22/2023    Dementia (HCC) 02/01/2023    Difficult airway 02/02/2023    Dyslipidemia 02/01/2023    Elevated white blood cell count, unspecified 02/22/2023    Encephalopathy 09/19/2012    Essential hypertension     Gastro-esophageal reflux disease with esophagitis, without bleeding 02/22/2023    Gout, chronic 03/31/2024    Gross hematuria 03/31/2024    Hypercholesterolemia 06/25/2015    Hyperlipidemia 09/19/2012    Leukocytosis 02/19/2023    Liver cyst 08/30/2022    Metabolic encephalopathy 02/22/2023    Neuroendocrine carcinoma, 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   Poorly differentiated small cell carcinoma R tail of parotid gland:   1)  3/6/19 - FNA tail of R parotid gland -> poorly differentiated carcinoma with neuroendocrine features   2)  3/28/19 - R total parotidectomy with R neck dissection -> poorly differentiated small cell carcinoma involving intraparotid LNs x 3 and extending into surr    Primary  hypertension 11/18/2009    Psoriasis, unspecified 09/09/2022    Seizure (HCC) 09/19/2012    Formatting of this note might be different from the original.   Keppra    Seizure disorder ([facility])     Toxic metabolic encephalopathy 08/20/2022    Transient global amnesia 01/10/2017    Formatting of this note might be different from the original.   Brief -Plavix    Unspecified dementia, unspecified severity, without behavioral disturbance, psychotic disturbance, mood disturbance, and anxiety ([facility]) 02/22/2023    Vascular dementia ([facility]) 09/11/2022    Vascular dementia, unspecified severity, without behavioral disturbance, psychotic disturbance, mood disturbance, and anxiety ([facility]) 02/22/2023    Weakness 03/12/2023    Weakness of left upper extremity 08/20/2022       No past surgical history on file.    No family history on file.    Social History     Socioeconomic History    Marital status: Single     Spouse name: Not on file    Number of children: Not on file    Years of education: Not on file    Highest education level: Not on file   Occupational History    Not on file   Tobacco Use    Smoking status: Not on file    Smokeless tobacco: Not on file   Substance and Sexual Activity    Alcohol use: Not on file    Drug use: Not on file    Sexual activity: Not on file   Other Topics Concern    Not on file   Social History Narrative    Not on file     Social Determinants of Health     Financial Resource Strain: Not on file   Food Insecurity: Not on file   Transportation Needs: Not on file   Physical Activity: Not on file   Stress: Not on file   Social Connections: Not on file   Housing Stability: Not on file       Outpatient Medications  No current outpatient medications     Inpatient Medications  No current outpatient medications on file.        OLANZapine        sterile water for injection (PF)        enoxaparin  40 mg Subcutaneous Daily    QUEtiapine  25 mg Oral Nightly       OLANZapine, sterile water for injection (PF),  acetaminophen, haloperidol lactate    Objective:  Last vitals and weight :  Vitals:    03/31/24 1241   BP: (!) 176/89   Pulse: 80   Resp: 18   Temp: 98.8 °F (37.1 °C)       Exam:  - General: appears stated age and no distress  - CV: symmetric pulses   Carotids:   - Pulmonary: no signs of respiratory distress. Normal excursion of the chest.   Neurologic Exam  - Mental Status: Alert and attentive. person.       cognitive slowing/bradyphrenia.  He would regard.  He had an increased response latency.  + Abulia.  He could name.  He could repeat.  + Hypophonic.  He was disoriented.  When asked his age he said he was \"in my early 30s.\"  At first he was able to tell us that he was at [facility].  Later when asked location again he said he was in [place]\" repeatedly.  Then appeared  embarrassed and said [facility]..\"  May have had?  Left hemispatial neglect.  Initially was interacting with examiner stimulus of the bed.  However he then took his left arm extended out to the right side of the bed, as if he was shaking someone's hand.  Kept his hand out in extension, and began speaking to this author as if I was on the other side of the bed   - Cranial Nerves: ? R gaze preference.  Blink to threat is intact.  No nystagmus. No ptosis.  .No pathological facial asymmetry. No flattening of the nasolabial fold. .  Hearing grossly intact.     Hypomimia.  VF.  Hypophonia.Hypomimia     - Motor:  increased tone,  decreased l bulk. ? interosseous wasting.         Right Left     Motor Strength      Knee extensors 3 5      Pronator drift: No pronator drift   Index Rolling: No orbiting.   Finger Taps: Finger taps are symmetric in rate and amplitude.    Rapid movements: Rapid/fine movements are symmetric. As expected their dominant hand is slightly faster.   Leg Drift: R LE drifts    Foot Taps: Foot taps are symmetric.      Asterixis: No asterixis noted.   Tremor:      - Sensory:   Light touch: normal     - Cerebellum: No truncal  ataxia. No titubations. No dysmetria, no dysdiadochokinesis. No overshoot.        NIH Stroke Scale  Person Administering Scale: Blayne Peguero DO  1a  Level of consciousness: 0 = Alert keenly responsive    1b. LOC questions:  2 = Answers neither questions correctly   1c. LOC commands: 1 = Performs one task correctly     2.  Best Gaze: 0 = Normal    3.  Visual: 0 = No visual loss     4. Facial Palsy: 0 = Normal symmetrical movement     5a.  Motor left arm: 0 = No drift; limb holds 90º(or 45º) for full 10 seconds   5b.  Motor right arm: 0 = No drift; limb holds 90º(or 45º) for full 10 seconds   6a. motor left le = No drift; leg holds 30º for full 5 seconds     6b  Motor right le = Some effort against gravity, falls to bed w/in 5 sec     7. Limb Ataxia: 0 = Absent     8.  Sensory: 0 = Normal, no sensory loss     9. Best Language:  1 = Mild / moderate aphasia; some loss of fluency / comprehension, without limitation of expression of ideas. (can identify what is happening in picture)     10. Dysarthria: 0 = Normal articulation   11. Extinction and Inattention: 0 = No abnormality     Total:   6     Modified Erna Score: 3 - Moderate disability. Requires some help, but able to walk unassisted.                                Data reviewed    ECG findings by monitor or 12-lead:  Ecg:   Results for orders placed or performed during the hospital encounter of 24   EKG 12 Lead   Result Value Ref Range    Ventricular rate 89 BPM    Atrial rate 89 BPM    P-R Interval 178 ms    QRS Duration 96 ms    Q-T Interval 366 ms    QTC Calculation (Bezet) 445 ms    P Axis 46 degrees    R Axis -21 degrees    T Axis 38 degrees       Test results/Imaging:   No results found for: \"CHOL\", \"TRIG\", \"HDL\", \"LDL\", \"CHOLHDL\"  Recent Labs   Lab 24  1059   WBC 11.1*   HGB 17.5   HCT 52.9   .0     Recent Labs   Lab 24  1058      K 3.4*      CO2 24.0   BUN 11     No results found for: \"A1C\"  Last A1c value was   % done  .           CT STROKE CTA BRAIN/CTA NECK (W IV)(CPT=70496/51941)    Result Date: 3/31/2024  CONCLUSION:  1. CTA head: No hemodynamically significant stenosis, occlusion, or gross aneurysm in the anterior or posterior circulation. 2. CTA neck: No hemodynamically significant stenosis, occlusion, or dissection of the carotids or vertebrals. 3.  Advanced chronic appearing paranasal sinus inflammation.  Dictated by (CST): Jhoan Nunn MD on 3/31/2024 at 11:18 AM     Finalized by (CST): Jhoan Nunn MD on 3/31/2024 at 11:22 AM          CT STROKE BRAIN (NO IV)(CPT=70450)    Result Date: 3/31/2024  CONCLUSION:  1.  No acute intracranial process. 2.  There are moderate microvascular white matter ischemic changes, likely related to long-standing hypertension and/or diabetes.  Interval development of multiple lacunar infarcts most notably in the right caudate head and right basal ganglia, new since  2012 imaging. 3.  Atherosclerosis in the anterior and posterior circulations.  Exam discussed with Dr. Lynn on 3/31/24 at 11:17 a.m.  Dictated by (CST): Jhoan Nunn MD on 3/31/2024 at 11:14 AM     Finalized by (CST): Jhoan Nunn MD on 3/31/2024 at 11:18 AM           Performed an independent visualization of: CT brain WO and CTA head/neck    Imaging revealed: Agree with radiology read.         Carlo Ng is a 68 year old  male w/ a pmhx sig. 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 seizures previously on Keppra, suspected vascular dementia.recent hospitalization at [facility] complicated by catatonia, and progressive neurological decline ongoing since the fall 2023, and suspected NPH,  who was brought in by EMS after the patient's visiting nurse found him sitting at the edge of the bed, encephalopathic, and with a left faciobrachial hemiparesis.  He was  not answering  questions  appropriately and seemed confused.  Per EMS last known well was reportedly yesterday.  It is unclear where they obtain this information from.    NIH stroke scale is 6.  Patient appears profoundly impaired/encephalopathic.  His cognition is clearly impaired.  He does have some insight into his deficits .low suspicion that this is an ischemic stroke.  I do not appreciate a left facial droop or any drift in his left arm at all, which were the original presenting symptoms.  Carlo kept his left arm up in the air for almost a minute.  He has a reported history of seizures.    Suspect his symptoms are due to   behavioral disturbances due to dementia.  Patient has not had a seizures in many years per chart review.  He has not been on antiseizure medication in years either.  It appears that the last antiseizure medication he was around was levetiracetam.  His last stroke was in    Not a candidate for tPA because of the long last known well and not a stroke.  No need for thrombectomy because no LVO.    Update: After extensive chart review it appears that the patient has progressively been declining.  His imaging is concerning for normal pressure hydrocephalus.  The patient's  daughter requests that he have an MRI with CSF flow studies and high-volume tap to evaluate for normal pressure hydrocephalus.  Unfortunately, the imaging study that she request is not available at this institution  as far as this author is aware.  Before the patient has a lumbar puncture, he needs to have his gait evaluated by physical therapy.  Ideally, this would be recorded.  Would need to see the patient make 3 consecutive 360 degree turns.  Ideally would count the number of steps he took back-and-forth as well as the time it took for him to complete the exercise.  Then after large-volume tap would repeat the study with physical therapy again.    Shunt placement for normal pressure hydrocephalus is only successful in patients who do not have  profound cognitive impairment, and has had gait  abnormalities/deficits for no more than 2 years.    1. Encephalopathy, reported left facial droop, left arm droop, progressive cognitive decline, episodes of fluctuating cognition and reported motor deficits  Differential Diagnosis:  Neurodegenerative process  this is  NOT an acute ischemic stroke or TIA.  Most likely due to dementia and behavioral disturbances          Reperfusion therapy eligibility: patient is not eligible because: out of the  time window  not a candidate for thrombectomy because no large vessel occlusion  not a candidate for thrombectomy because of premorbid level of disability.  Agent and time of first antithrombotic administration (or contraindication):   Aspirin 325 once or rectal aspirin 300 once. Starting tomorrow Aspirin 81 mg daily or rectal aspirin 300 mg daily if still n.p.o.  BP goal:   Trend towards normotension  Additional brain and vascular imaging ordered:   MRI brain WO -MRI brain to evaluate for any other process i.e. signs of?autoimmune encephalitis although this is also very unlikely.  Most likely answer is dementia  Cardiac imaging: Telemetry   Additional labs ordered:   Labs:   Dysphagia status:   DysphagiaNo acute facial droop; per RN swallow evaluation.  Fluids/nutrition:    Delirium precautions.      2.  Concern for normal pressure hydrocephalus  MRI at this institution was ordered.  However, patient's daughter is very specific requests about an MRI being done at [place] with CSF flow studies.  She states that the patient has established care with Dr. Ramirez Keith.  Carlo could follow-up with his neurologist at [place].  If his daughter wants this completed now, could consider transfer to [place].  Before the patient has a large-volume lumbar puncture, he will need a gait evaluation by physical therapy.      .3.?  Single symptomatic seizure on 9/11/2012 after patient had a left MCA stroke;  Unclear if patient has had a  seizure in years.  He is not on any antiseizure medications.     Education/Instructions given to: {Persons; family members:patient   Barriers to Learning:None  Content: Refer to note above.  Also provided education on recurrent stroke signs and symptoms, including but not limited to a facial droop, dysarthria, difficulty talking, word finding difficulties, weakness, falls, change in sensation. Pt should call 911 or be taken to the nearest emergency department if sx occur.  Evaluation/Outcome: Verbalized understanding    This document is not intended to support charting by exception.  Sections left blank in a completed note should be presumed not to have been done.    Level of complexity was based on - interviewing, examining the patient, establishing a plan of care, as well as review of all neuroimaging and cardiovascular studies.    Disclaimer:   This record was dictated using Dragon software. There may be errors due to voice recognition problems that were not realized and corrected during the completion of the note.       Thank you.  Blayne Peguero DO   [facility]

## 2024-03-31 NOTE — ED QUICK NOTES
Pt became combative with writer during straight cath. Pt was kicking and punching staff. Public safety called. MD at bedside. See new orders.

## 2024-03-31 NOTE — PLAN OF CARE
Pt alert and oriented x1. Max assist. IVF running at 75. NPO. Pt unable to follow commands. Potassium replaced per protocol. PRN Hydralazine given. Safety precautions in place. Call light within reach.    Problem: Delirium  Goal: Minimize duration of delirium  Description: Interventions:  - Encourage use of hearing aids, eye glasses  - Promote highest level of mobility daily  - Provide frequent reorientation  - Promote wakefulness i.e. lights on, blinds open  - Promote sleep, encourage patient's normal rest cycle i.e. lights off, TV off, minimize noise and interruptions  - Encourage family to assist in orientation and promotion of home routines  3/31/2024 1602 by Whit Maldonado RN  Outcome: Progressing  3/31/2024 1601 by Whit Maldonado RN  Outcome: Progressing     Problem: METABOLIC/FLUID AND ELECTROLYTES - ADULT  Goal: Electrolytes maintained within normal limits  Description: INTERVENTIONS:  - Monitor labs and rhythm and assess patient for signs and symptoms of electrolyte imbalances  - Administer electrolyte replacement as ordered  - Monitor response to electrolyte replacements, including rhythm and repeat lab results as appropriate  - Fluid restriction as ordered  - Instruct patient on fluid and nutrition restrictions as appropriate  3/31/2024 1602 by Whit Maldonado RN  Outcome: Progressing  3/31/2024 1601 by Whit Maldonado RN  Outcome: Progressing     Problem: MUSCULOSKELETAL - ADULT  Goal: Return mobility to safest level of function  Description: INTERVENTIONS:  - Assess patient stability and activity tolerance for standing, transferring and ambulating w/ or w/o assistive devices  - Assist with transfers and ambulation using safe patient handling equipment as needed  - Ensure adequate protection for wounds/incisions during mobilization  - Obtain PT/OT consults as needed  - Advance activity as appropriate  - Communicate ordered activity level and limitations with patient/family  3/31/2024 1602 by Whit Maldonado  RN  Outcome: Progressing  3/31/2024 1601 by Whit Maldonado RN  Outcome: Progressing     Problem: NEUROLOGICAL - ADULT  Goal: Achieves stable or improved neurological status  Description: INTERVENTIONS  - Assess for and report changes in neurological status  - Initiate measures to prevent increased intracranial pressure  - Maintain blood pressure and fluid volume within ordered parameters to optimize cerebral perfusion and minimize risk of hemorrhage  - Monitor temperature, glucose, and sodium. Initiate appropriate interventions as ordered  3/31/2024 1602 by Whit Maldonado RN  Outcome: Progressing  3/31/2024 1601 by Whit Maldonado RN  Outcome: Progressing  Goal: Absence of seizures  Description: INTERVENTIONS  - Monitor for seizure activity  - Administer anti-seizure medications as ordered  - Monitor neurological status  3/31/2024 1602 by Whit Maldonado RN  Outcome: Progressing  3/31/2024 1601 by Whit Maldonado RN  Outcome: Progressing  Goal: Remains free of injury related to seizure activity  Description: INTERVENTIONS:  - Maintain airway, patient safety  and administer oxygen as ordered  - Monitor patient for seizure activity, document and report duration and description of seizure to MD/LIP  - If seizure occurs, turn patient to side and suction secretions as needed  - Reorient patient post seizure  - Seizure pads on all 4 side rails  - Instruct patient/family to notify RN of any seizure activity  - Instruct patient/family to call for assistance with activity based on assessment  3/31/2024 1602 by Whit Maldonado RN  Outcome: Progressing  3/31/2024 1601 by Whit Maldonado RN  Outcome: Progressing  Goal: Achieves maximal functionality and self care  Description: INTERVENTIONS  - Monitor swallowing and airway patency with patient fatigue and changes in neurological status  - Encourage and assist patient to increase activity and self care with guidance from PT/OT  - Encourage visually impaired, hearing impaired and  aphasic patients to use assistive/communication devices  3/31/2024 1602 by Whit Maldonado, RN  Outcome: Progressing  3/31/2024 1601 by Whit Maldonado, RN  Outcome: Progressing

## 2024-03-31 NOTE — PROGRESS NOTES
Orders received and chart reviewed.  Pt receiving work up for stroke alert.  Pt is currently combative and agitated.  Will cont to follow once cleared by neuro

## 2024-04-01 ENCOUNTER — APPOINTMENT (OUTPATIENT)
Dept: CV DIAGNOSTICS | Facility: HOSPITAL | Age: 69
End: 2024-04-01
Attending: Other
Payer: MEDICARE

## 2024-04-01 PROBLEM — F01.C11 SEVERE VASCULAR DEMENTIA WITH AGITATION (HCC): Status: ACTIVE | Noted: 2024-04-01

## 2024-04-01 PROBLEM — R29.818 PROGRESSIVE NEUROLOGIC DECLINE: Status: ACTIVE | Noted: 2024-03-31

## 2024-04-01 PROBLEM — R29.90 STROKE-LIKE SYMPTOM: Status: ACTIVE | Noted: 2024-04-01

## 2024-04-01 LAB
ANION GAP SERPL CALC-SCNC: 9 MMOL/L (ref 0–18)
BASOPHILS # BLD AUTO: 0.05 X10(3) UL (ref 0–0.2)
BASOPHILS NFR BLD AUTO: 0.5 %
BUN BLD-MCNC: 12 MG/DL (ref 9–23)
BUN/CREAT SERPL: 11.3 (ref 10–20)
CALCIUM BLD-MCNC: 9.3 MG/DL (ref 8.7–10.4)
CHLORIDE SERPL-SCNC: 111 MMOL/L (ref 98–112)
CHOLEST SERPL-MCNC: 103 MG/DL (ref ?–200)
CO2 SERPL-SCNC: 20 MMOL/L (ref 21–32)
CREAT BLD-MCNC: 1.06 MG/DL
DEPRECATED RDW RBC AUTO: 40.8 FL (ref 35.1–46.3)
EGFRCR SERPLBLD CKD-EPI 2021: 76 ML/MIN/1.73M2 (ref 60–?)
EOSINOPHIL # BLD AUTO: 0.09 X10(3) UL (ref 0–0.7)
EOSINOPHIL NFR BLD AUTO: 0.8 %
ERYTHROCYTE [DISTWIDTH] IN BLOOD BY AUTOMATED COUNT: 13.1 % (ref 11–15)
EST. AVERAGE GLUCOSE BLD GHB EST-MCNC: 103 MG/DL (ref 68–126)
GLUCOSE BLD-MCNC: 75 MG/DL (ref 70–99)
HBA1C MFR BLD: 5.2 % (ref ?–5.7)
HCT VFR BLD AUTO: 47.1 %
HDLC SERPL-MCNC: 34 MG/DL (ref 40–59)
HGB BLD-MCNC: 16.4 G/DL
IMM GRANULOCYTES # BLD AUTO: 0.04 X10(3) UL (ref 0–1)
IMM GRANULOCYTES NFR BLD: 0.4 %
LDLC SERPL CALC-MCNC: 51 MG/DL (ref ?–100)
LYMPHOCYTES # BLD AUTO: 1.19 X10(3) UL (ref 1–4)
LYMPHOCYTES NFR BLD AUTO: 10.7 %
MCH RBC QN AUTO: 29.8 PG (ref 26–34)
MCHC RBC AUTO-ENTMCNC: 34.8 G/DL (ref 31–37)
MCV RBC AUTO: 85.5 FL
MONOCYTES # BLD AUTO: 0.91 X10(3) UL (ref 0.1–1)
MONOCYTES NFR BLD AUTO: 8.2 %
NEUTROPHILS # BLD AUTO: 8.83 X10 (3) UL (ref 1.5–7.7)
NEUTROPHILS # BLD AUTO: 8.83 X10(3) UL (ref 1.5–7.7)
NEUTROPHILS NFR BLD AUTO: 79.4 %
NONHDLC SERPL-MCNC: 69 MG/DL (ref ?–130)
OSMOLALITY SERPL CALC.SUM OF ELEC: 288 MOSM/KG (ref 275–295)
PLATELET # BLD AUTO: 290 10(3)UL (ref 150–450)
POTASSIUM SERPL-SCNC: 3.4 MMOL/L (ref 3.5–5.1)
POTASSIUM SERPL-SCNC: 3.4 MMOL/L (ref 3.5–5.1)
RBC # BLD AUTO: 5.51 X10(6)UL
SODIUM SERPL-SCNC: 140 MMOL/L (ref 136–145)
TRIGL SERPL-MCNC: 92 MG/DL (ref 30–149)
VLDLC SERPL CALC-MCNC: 13 MG/DL (ref 0–30)
WBC # BLD AUTO: 11.1 X10(3) UL (ref 4–11)

## 2024-04-01 PROCEDURE — 99232 SBSQ HOSP IP/OBS MODERATE 35: CPT | Performed by: OTHER

## 2024-04-01 RX ORDER — ATORVASTATIN CALCIUM 80 MG/1
80 TABLET, FILM COATED ORAL NIGHTLY
Status: DISCONTINUED | OUTPATIENT
Start: 2024-04-01 | End: 2024-04-22

## 2024-04-01 RX ORDER — ONDANSETRON 2 MG/ML
4 INJECTION INTRAMUSCULAR; INTRAVENOUS EVERY 6 HOURS PRN
Status: DISCONTINUED | OUTPATIENT
Start: 2024-04-01 | End: 2024-04-22

## 2024-04-01 RX ORDER — LABETALOL HYDROCHLORIDE 5 MG/ML
10 INJECTION, SOLUTION INTRAVENOUS EVERY 10 MIN PRN
Status: DISCONTINUED | OUTPATIENT
Start: 2024-04-01 | End: 2024-04-01

## 2024-04-01 RX ORDER — ACETAMINOPHEN 325 MG/1
650 TABLET ORAL EVERY 4 HOURS PRN
Status: DISCONTINUED | OUTPATIENT
Start: 2024-04-01 | End: 2024-04-22

## 2024-04-01 RX ORDER — ACETAMINOPHEN 650 MG/1
650 SUPPOSITORY RECTAL EVERY 4 HOURS PRN
Status: DISCONTINUED | OUTPATIENT
Start: 2024-04-01 | End: 2024-04-09

## 2024-04-01 RX ORDER — HYDRALAZINE HYDROCHLORIDE 20 MG/ML
10 INJECTION INTRAMUSCULAR; INTRAVENOUS EVERY 2 HOUR PRN
Status: DISCONTINUED | OUTPATIENT
Start: 2024-04-01 | End: 2024-04-01

## 2024-04-01 RX ORDER — ASPIRIN 81 MG/1
81 TABLET, CHEWABLE ORAL DAILY
Status: DISCONTINUED | OUTPATIENT
Start: 2024-04-01 | End: 2024-04-22

## 2024-04-01 NOTE — PLAN OF CARE
Problem: Risk for Violence-Violent Restraints/Seclusion  Goal: Patient will not express any violent or self-destructive behaviors  Description: Interventions:  - Apply the least restrictive restraint to prevent harm if patient strikes out and is not responding to redirection  - Notify patient and family of reasons restraints applied  - Assist the patient to identify the precipitating event  - Assist the patient to identify alternatives to physically acting out  - Assess for any contributing factors to violent behaviors (substances,  medications, history of trauma)  - Assess the patient's physical comfort, circulation, skin condition, hydration, nutrition and elimination needs   - Assess for the need to continue restraints  - Evaluate the need for an emergency medication  Outcome: Progressing     Problem: Patient Centered Care  Goal: Patient preferences are identified and integrated in the patient's plan of care  Description: Interventions:  - What would you like us to know as we care for you?   - Provide timely, complete, and accurate information to patient/family  - Incorporate patient and family knowledge, values, beliefs, and cultural backgrounds into the planning and delivery of care  - Encourage patient/family to participate in care and decision-making at the level they choose  - Honor patient and family perspectives and choices  Outcome: Progressing     Problem: Patient/Family Goals  Goal: Patient/Family Long Term Goal  Description: Patient's Long Term Goal: discharge    Interventions:  - Monitor vital signs  - Monitor neurological status  - Ambulate as tolerated  - See additional Care Plan goals for specific interventions  Outcome: Progressing  Goal: Patient/Family Short Term Goal  Description: Patient's Short Term Goal: feel better    Interventions:   - Verbalize needs and wants  - Antianxiety medications as needed  - See additional Care Plan goals for specific interventions  Outcome: Progressing      Problem: Delirium  Goal: Minimize duration of delirium  Description: Interventions:  - Encourage use of hearing aids, eye glasses  - Promote highest level of mobility daily  - Provide frequent reorientation  - Promote wakefulness i.e. lights on, blinds open  - Promote sleep, encourage patient's normal rest cycle i.e. lights off, TV off, minimize noise and interruptions  - Encourage family to assist in orientation and promotion of home routines  Outcome: Progressing     Problem: METABOLIC/FLUID AND ELECTROLYTES - ADULT  Goal: Electrolytes maintained within normal limits  Description: INTERVENTIONS:  - Monitor labs and rhythm and assess patient for signs and symptoms of electrolyte imbalances  - Administer electrolyte replacement as ordered  - Monitor response to electrolyte replacements, including rhythm and repeat lab results as appropriate  - Fluid restriction as ordered  - Instruct patient on fluid and nutrition restrictions as appropriate  Outcome: Progressing     Problem: MUSCULOSKELETAL - ADULT  Goal: Return mobility to safest level of function  Description: INTERVENTIONS:  - Assess patient stability and activity tolerance for standing, transferring and ambulating w/ or w/o assistive devices  - Assist with transfers and ambulation using safe patient handling equipment as needed  - Ensure adequate protection for wounds/incisions during mobilization  - Obtain PT/OT consults as needed  - Advance activity as appropriate  - Communicate ordered activity level and limitations with patient/family  Outcome: Progressing     Problem: NEUROLOGICAL - ADULT  Goal: Achieves stable or improved neurological status  Description: INTERVENTIONS  - Assess for and report changes in neurological status  - Initiate measures to prevent increased intracranial pressure  - Maintain blood pressure and fluid volume within ordered parameters to optimize cerebral perfusion and minimize risk of hemorrhage  - Monitor temperature, glucose, and  sodium. Initiate appropriate interventions as ordered  Outcome: Progressing  Goal: Absence of seizures  Description: INTERVENTIONS  - Monitor for seizure activity  - Administer anti-seizure medications as ordered  - Monitor neurological status  Outcome: Progressing  Goal: Remains free of injury related to seizure activity  Description: INTERVENTIONS:  - Maintain airway, patient safety  and administer oxygen as ordered  - Monitor patient for seizure activity, document and report duration and description of seizure to MD/LIP  - If seizure occurs, turn patient to side and suction secretions as needed  - Reorient patient post seizure  - Seizure pads on all 4 side rails  - Instruct patient/family to notify RN of any seizure activity  - Instruct patient/family to call for assistance with activity based on assessment  Outcome: Progressing  Goal: Achieves maximal functionality and self care  Description: INTERVENTIONS  - Monitor swallowing and airway patency with patient fatigue and changes in neurological status  - Encourage and assist patient to increase activity and self care with guidance from PT/OT  - Encourage visually impaired, hearing impaired and aphasic patients to use assistive/communication devices  Outcome: Progressing

## 2024-04-01 NOTE — PLAN OF CARE
Second attempt made to call POA to complete MRI checklist. No answer, unable to leave voicemail due to mailbox being full.

## 2024-04-01 NOTE — SLP NOTE
ADULT SWALLOWING EVALUATION    ASSESSMENT    ASSESSMENT/OVERALL IMPRESSION:  PPE REQUIRED. THIS THERAPIST WORE GLOVES, DROPLET MASK, AND GOGGLES FOR DURATION OF EVALUATION. HANDS WASHED UPON ENTRANCE/EXIT.    A repeat swallow evaluation warranted as pt remains NPO following failed BSSE on 3/31. Pt afebrile with weak vocal quality, on room air, with oxygen saturation at 97. Pt with no prior hx of dysphagia at [facility]. Pt positioned upright in bed. Pt with no complaints of pain, RN aware. Oral [facility] exam attempted, however, pt unable to follow commands. Pt with adequate oral acceptance and bilabial seal across all trials. Pt with increased DARYN. Pt's swallow response appears timely with reduced hyolaryngeal elevation/excursion. No clinical signs of aspiration (e.g., immediate/delayed throat clear, immediate/delayed cough, wet vocal quality, increased O2 effort) observed across all trials of puree and thin liquids. Pt declined advanced solid trials. Oral/buccal cavities clear of residue following all trials. Oxygen status remained >96 t/o the entire evaluation.     At this time, pt presents with mild-moderate oral dysphagia and probable pharyngeal dysfunction. Recommend a puree diet and thin liquids with strict adherence to safe swallowing compensatory strategies. Results and recommendations reviewed with RN, pt, and family. Pt's RN and family v/u to all results/recommendations. Recommendations remain written on whiteboard.     PLAN: SLP to f/u x3 meal assessments, monitor CXR, and VFSS if clinically warranted.     RECOMMENDATIONS   Diet Recommendations - Solids: Puree  Diet Recommendations - Liquids: Thin Liquids      Compensatory Strategies Recommended: No straws;Slow rate;Alternate consistencies;Small bites and sips  Aspiration Precautions: Upright position;Slow rate;Small bites and sips;No straw  Medication Administration Recommendations: Crushed in puree  Treatment Plan/Recommendations: Aspiration precautions    HISTORY    MEDICAL HISTORY  Reason for Referral: RN dysphagia screen    Problem List  Principal Problem:    Altered mental status, unspecified altered mental status type  Active Problems:    Neurological deficit present      Past Medical History  Past Medical History:   Diagnosis Date    Abnormal CT scan 03/31/2024    Acute gout involving toe of left foot 02/22/2023    Altered mental status 03/31/2024    Anemia 11/18/2009    Asthma in adult (HCC) 02/22/2023    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   Resection 2019    Catatonia 03/31/2024    Cognitive communication deficit 03/14/2023    Cystic disease of liver 02/22/2023    Dementia (HCC) 02/01/2023    Difficult airway 02/02/2023    Dyslipidemia 02/01/2023    Elevated white blood cell count, unspecified 02/22/2023    Encephalopathy 09/19/2012    Essential hypertension     Gastro-esophageal reflux disease with esophagitis, without bleeding 02/22/2023    Gout, chronic 03/31/2024    Gross hematuria 03/31/2024    Hypercholesterolemia 06/25/2015    Hyperlipidemia 09/19/2012    Leukocytosis 02/19/2023    Liver cyst 08/30/2022    Metabolic encephalopathy 02/22/2023    Neuroendocrine carcinoma, 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   Keppra    Seizure disorder (HCC)     Toxic metabolic encephalopathy 08/20/2022    Transient global amnesia 01/10/2017    Formatting of this note might be different from the original.   Brief -Plavix    Unspecified dementia, unspecified severity, without behavioral disturbance, psychotic disturbance, mood disturbance, and anxiety (HCC) 02/22/2023    Vascular dementia (HCC) 09/11/2022    Vascular dementia, unspecified severity, without behavioral disturbance, psychotic disturbance, mood disturbance, and anxiety (HCC) 02/22/2023    Weakness 03/12/2023    Weakness of left upper extremity 08/20/2022       Prior Living Situation: Assisted living;Independent living  Diet Prior to Admission: Unknown  Precautions: Aspiration    Patient/Family Goals: None stated    SWALLOWING HISTORY  Current Diet Consistency: NPO  Dysphagia History: None at [facility]    Imaging Results:     CXR 3/31/24:  CONCLUSION: Linear opacities in both lung bases suggesting atelectasis.  Otherwise no acute cardiopulmonary abnormality.         Dictated by (CST): Jhoan Nunn MD on 3/31/2024 at 11:36 AM       Finalized by (CST): Jhoan Nunn MD on 3/31/2024 at 11:37 AM     CT BRAIN 3/31/24:  CONCLUSION:   1.  No acute intracranial process.   2.  There are moderate microvascular white matter ischemic changes, likely related to long-standing hypertension and/or diabetes.  Interval development of multiple lacunar infarcts most notably in the right caudate head and right basal ganglia, new since    2012 imaging.   3.  Atherosclerosis in the anterior and posterior circulations.      Exam discussed with Dr. Lynn on 3/31/24 at 11:17 a.m.      Dictated by (CST): Jhoan Nunn MD on 3/31/2024 at 11:14 AM       Finalized by (CST): Jhoan Nunn MD on 3/31/2024 at 11:18 AM          OBJECTIVE   ORAL MOTOR EXAMINATION  Dentition: Natural  Symmetry: Unable to assess  Strength: Unable to  assess  Tone: Unable to assess  Range of Motion: Unable to assess  Rate of Motion: Unable to assess    Voice Quality: Weak (Minimal verbal output)  Respiratory Status: Unlabored  Consistencies Trialed: Thin liquids;Puree  Method of Presentation: Self presentation;Staff/Clinician assistance;Spoon;Single sips  Patient Positioning: Upright;Midline    Oral Phase of Swallow: Impaired  Bolus Retrieval: Intact  Bilabial Seal: Intact  Bolus Formation: Impaired  Bolus Propulsion: Impaired          Pharyngeal Phase of Swallow: Impaired  Laryngeal Elevation Timing: Appears intact  Laryngeal Elevation Strength: Appears impaired     (Please note: Silent aspiration cannot be evaluated clinically. Videofluoroscopic Swallow Study is required to rule-out silent aspiration.)    Esophageal Phase of Swallow: No complaints consistent with possible esophageal involvement    GOALS  Goal #1 Patient will participate in ongoing clinical swallow evaluation as indicated/appropriate.   Met   Goal #2 The patient will tolerate puree consistency and thin liquids without overt signs or symptoms of aspiration with 100 % accuracy over 2-3 session(s).    In Progress   Goal #3 The patient/family/caregiver will demonstrate understanding and implementation of aspiration precautions and swallow strategies independently over 3 session(s).  In Progress   Goal #4 The patient will tolerate trial upgrade of minced and moist consistency and thin liquids without overt signs or symptoms of aspiration with 100 % accuracy over 2-3 session(s).    In Progress   Goal #5 The patient will utilize compensatory strategies as outlined by  BSSE (clinical evaluation) including Slow rate, Small bites, Small sips, Alternate liquids/solids, No straws, Upright 90 degrees, Eliminate distractions, Feed patient with 1:1 feed assistance 100 % of the time across 2 sessions.    In Progress       FOLLOW UP  Treatment Plan/Recommendations: Aspiration precautions  Number of Visits to  Meet Established Goals: 3  Follow Up Needed (Documentation Required): Yes  SLP Follow-up Date: 04/02/24    Thank you for your referral.   If you have any questions, please contact ELVA Castañeda M.S. CCC-SLP  Speech Language Pathologist  Phone Number [phone]

## 2024-04-01 NOTE — PHYSICAL THERAPY NOTE
PT orders recd, chart reviewed, spoke with rn. Attempted to see for PT evaluation, Pt remains combative, easily agitated, yelling out at therapists \"what are you doing\" with any attempt at movement, very sensitive to touch.    Discussed with rn, will hold for today as pt not appropriate for therapy, will re attempt 4/2/24.

## 2024-04-01 NOTE — PROGRESS NOTES
[facility]  [address], [address]  [place] [address]  [phone]          INPATIENT NEUROLOGY   FOLLOW UP CONSULT NOTE       [facility]  part of [facility]    Report of Consultation    Carlo Ng Patient Status:  Inpatient     1955 MRN [id number]    Location [facility] 5SW/SE Attending Yobany Huggins,     Hosp Day # 1 PCP No primary care provider on file.      Date of Admission:  3/31/2024  Date of Consult Follow Up:  2024        INTERVAL HPI:   -EEG was attempted but patient kept grabbing the tech's hands and calling her profane names.         ?PHYSICAL EXAM:   BP (!) 114/91 (BP Location: Left arm)   Pulse 80   Temp 99 °F (37.2 °C) (Oral)   Resp 18   Ht 68\"   Wt 180 lb 14.4 oz (82.1 kg)   SpO2 97%   BMI 27.51 kg/m²   General appearance: Well appearing, and in no acute distress  Skin: skin color, texture normal.  No rashes or lesions.    Head: Normocephalic, atraumatic.    Neurological exam:  Sleeping but woke up to name.  Said his birthday was .  Did not know current date.  Guarded, flat affect, parse speech.       LABS/DATA:    Lab Results   Component Value Date    WBC 11.1 2024    HGB 16.4 2024    HCT 47.1 2024    .0 2024    CREATSERUM 1.06 2024    BUN 12 2024     2024    K 3.4 2024    K 3.4 2024     2024    CO2 20.0 2024    GLU 75 2024    CA 9.3 2024       HGBA1C:  No results found for: \"A1C\", \"EAG\"           IMAGING:  XR CHEST AP PORTABLE  (CPT=71045)    Result Date: 3/31/2024  CONCLUSION: Linear opacities in both lung bases suggesting atelectasis.  Otherwise no acute cardiopulmonary abnormality.   Dictated by (CST): Jhoan Nunn MD on 3/31/2024 at 11:36 AM     Finalized by (CST): Jhoan Nunn MD on 3/31/2024 at 11:37 AM          CT STROKE CTA BRAIN/CTA NECK (W IV)(CPT=70496/27344)    Result Date:  3/31/2024  CONCLUSION:  1. CTA head: No hemodynamically significant stenosis, occlusion, or gross aneurysm in the anterior or posterior circulation. 2. CTA neck: No hemodynamically significant stenosis, occlusion, or dissection of the carotids or vertebrals. 3.  Advanced chronic appearing paranasal sinus inflammation.  Dictated by (CST): Jhoan Nunn MD on 3/31/2024 at 11:18 AM     Finalized by (CST): Jhoan Nunn MD on 3/31/2024 at 11:22 AM          CT STROKE BRAIN (NO IV)(CPT=70450)    Result Date: 3/31/2024  CONCLUSION:  1.  No acute intracranial process. 2.  There are moderate microvascular white matter ischemic changes, likely related to long-standing hypertension and/or diabetes.  Interval development of multiple lacunar infarcts most notably in the right caudate head and right basal ganglia, new since  2012 imaging. 3.  Atherosclerosis in the anterior and posterior circulations.  Exam discussed with Dr. Lynn on 3/31/24 at 11:17 a.m.  Dictated by (CST): Jhoan Nunn MD on 3/31/2024 at 11:14 AM     Finalized by (CST): Jhoan Nunn MD on 3/31/2024 at 11:18 AM              ASSESSMENT:  The patient is a 68 year old man with past medical history of suspected left MCA stroke, vascular dementia, single seizure, recent admission for catatonia, parotid small cell carcinoma, tobacco use disorder in the past, hypertension, hyperlipidemia who is being worked up for progressive neurological decline and was found encephalopathic with a left face and upper extremity weakness.    Stroke-like symptoms ?TIA vs seizure   -Continue Aspirin 81 mg daily   -Continue Atorvastatin 80 mg daily   -MRI brain if able   -Echocardiogram if able   -LDL and hemoglobin A1c  -PT, OT and speech therapy   -Blood pressure goals: Normotension    -Nof further EEG attempts due to patient being profane and attempting to hurt EEG technician   -Due to risk posed to healthcare staff, the patient cannot have neuro checks done (also attempting to grab  RN's arms), so he can remain on remote telemetry off neuro stroke floor     STROKE RISK FACTORS:   *Hypertension - Target blood pressure <140/90, <130/85 for high risk, normal 120/80  *Physical inactivity - target exercise at least 3 times per week  *Obesity - target ideal body weight and girth <35\" for women, <40\" for men  *Diabetes - Target <6.5-7%   *Smoking - Target smoking cessation  *Hyperlipidemia - Target total cholesterol < 200, Target LDL <100, < 70 for high risk, Target HDL >45 for men, >55 for women, Target triglycerides <150    Progressive neurological decline and concern for normal pressure hydrocephalus  Vascular dementia with behavioral changes  - Recommend outpatient evaluation with his established neurologist - it is likely the patient would not be able to tolerate high volume LP and gait assessment due to aggressive behavior with healthcare assessments including testing and examinations     This note was prepared using Dragon Medical voice recognition dictation software and as a result, errors may occur. When identified, these errors have been corrected. While every attempt is made to correct errors during dictation, discrepancies may still exist    VITALY Barrett DO   Staff Neurologist   4/1/2024  1:00 PM

## 2024-04-01 NOTE — PLAN OF CARE
Monitoring vital signs- stable at this time. No acute changes noted throughout shift. Tolerating puree cardiac diet. Voiding with male purewick. Lovenox for DVT prophylaxis. Max assist. Fall precautions maintained- bed alarm on, bed locked in lowest position, call light and personal belongings within reach, non-skid socks in place to bilateral feet. Frequent rounding by nursing staff. Plan for discharge is Ind living once medically cleared by MD.    Quinndol @ 1602 patient trying to get out of bed agitation resolved once dose given.       Problem: Risk for Violence-Violent Restraints/Seclusion  Goal: Patient will not express any violent or self-destructive behaviors  Description: Interventions:  - Apply the least restrictive restraint to prevent harm if patient strikes out and is not responding to redirection  - Notify patient and family of reasons restraints applied  - Assist the patient to identify the precipitating event  - Assist the patient to identify alternatives to physically acting out  - Assess for any contributing factors to violent behaviors (substances,  medications, history of trauma)  - Assess the patient's physical comfort, circulation, skin condition, hydration, nutrition and elimination needs   - Assess for the need to continue restraints  - Evaluate the need for an emergency medication  Outcome: Progressing     Problem: Patient Centered Care  Goal: Patient preferences are identified and integrated in the patient's plan of care  Description: Interventions:  - What would you like us to know as we care for you?   - Provide timely, complete, and accurate information to patient/family  - Incorporate patient and family knowledge, values, beliefs, and cultural backgrounds into the planning and delivery of care  - Encourage patient/family to participate in care and decision-making at the level they choose  - Honor patient and family perspectives and choices  Outcome: Progressing     Problem:  Patient/Family Goals  Goal: Patient/Family Long Term Goal  Description: Patient's Long Term Goal: discharge    Interventions:  - Monitor vital signs  - Monitor neurological status  - Ambulate as tolerated  - See additional Care Plan goals for specific interventions  Outcome: Progressing  Goal: Patient/Family Short Term Goal  Description: Patient's Short Term Goal: feel better    Interventions:   - Verbalize needs and wants  - Antianxiety medications as needed  - See additional Care Plan goals for specific interventions  Outcome: Progressing     Problem: Delirium  Goal: Minimize duration of delirium  Description: Interventions:  - Encourage use of hearing aids, eye glasses  - Promote highest level of mobility daily  - Provide frequent reorientation  - Promote wakefulness i.e. lights on, blinds open  - Promote sleep, encourage patient's normal rest cycle i.e. lights off, TV off, minimize noise and interruptions  - Encourage family to assist in orientation and promotion of home routines  Outcome: Progressing     Problem: METABOLIC/FLUID AND ELECTROLYTES - ADULT  Goal: Electrolytes maintained within normal limits  Description: INTERVENTIONS:  - Monitor labs and rhythm and assess patient for signs and symptoms of electrolyte imbalances  - Administer electrolyte replacement as ordered  - Monitor response to electrolyte replacements, including rhythm and repeat lab results as appropriate  - Fluid restriction as ordered  - Instruct patient on fluid and nutrition restrictions as appropriate  Outcome: Progressing     Problem: MUSCULOSKELETAL - ADULT  Goal: Return mobility to safest level of function  Description: INTERVENTIONS:  - Assess patient stability and activity tolerance for standing, transferring and ambulating w/ or w/o assistive devices  - Assist with transfers and ambulation using safe patient handling equipment as needed  - Ensure adequate protection for wounds/incisions during mobilization  - Obtain PT/OT  consults as needed  - Advance activity as appropriate  - Communicate ordered activity level and limitations with patient/family  Outcome: Progressing     Problem: NEUROLOGICAL - ADULT  Goal: Achieves stable or improved neurological status  Description: INTERVENTIONS  - Assess for and report changes in neurological status  - Initiate measures to prevent increased intracranial pressure  - Maintain blood pressure and fluid volume within ordered parameters to optimize cerebral perfusion and minimize risk of hemorrhage  - Monitor temperature, glucose, and sodium. Initiate appropriate interventions as ordered  Outcome: Progressing  Goal: Absence of seizures  Description: INTERVENTIONS  - Monitor for seizure activity  - Administer anti-seizure medications as ordered  - Monitor neurological status  Outcome: Progressing  Goal: Remains free of injury related to seizure activity  Description: INTERVENTIONS:  - Maintain airway, patient safety  and administer oxygen as ordered  - Monitor patient for seizure activity, document and report duration and description of seizure to MD/LIP  - If seizure occurs, turn patient to side and suction secretions as needed  - Reorient patient post seizure  - Seizure pads on all 4 side rails  - Instruct patient/family to notify RN of any seizure activity  - Instruct patient/family to call for assistance with activity based on assessment  Outcome: Progressing  Goal: Achieves maximal functionality and self care  Description: INTERVENTIONS  - Monitor swallowing and airway patency with patient fatigue and changes in neurological status  - Encourage and assist patient to increase activity and self care with guidance from PT/OT  - Encourage visually impaired, hearing impaired and aphasic patients to use assistive/communication devices  Outcome: Progressing

## 2024-04-01 NOTE — PLAN OF CARE
Spoke with Dr. Barrett. Patient no longer transferring to stroke floor, patient to remain on unit. Discontinue all stroke protocol orders per Dr. Barrett. Plan for MRI and echo if patient able to tolerate. Apply remote tele per Dr. Barrett.    Dr. Huggins notified about high blood pressure (180/81) and agitation this evening. No PRNs for blood pressure at the moment per Dr. Huggins, ok to give PRN haldol and recheck blood pressure when patient settles down.

## 2024-04-01 NOTE — CM/SW NOTE
04/01/24 1600   CM/SW Referral Data   Referral Source Physician   Reason for Referral Discharge planning   Informant Daughter   Medical Hx   Does patient have an established PCP? No   Patient Info   Patient's Current Mental Status at Time of Assessment Confused or unable to complete assessment   Patient's Home Environment Condo/Apt with elevator   Patient lives with Alone   Patient Status Prior to Admission   Independent with ADLs and Mobility No   Pt. requires assistance with Housework;Driving;Meals;Medications;Finances   Discharge Needs   Anticipated D/C needs To be determined     Pt discussed during nursing rounds. Dxs AMS, agitation. From [facility], independent w/walker at baseline per CAROLYN Calderon. Pt does not have family per Yesenia Calderon who used to work with patient. PT/OT evals needed for dc recommendation once appropriate.    Plan: TBD    / to remain available for support and/or discharge planning.     JOSE MARIA Cornejo    [phone]

## 2024-04-01 NOTE — PLAN OF CARE
Attempted to call Yesenia LEON to fill out MRI checklist. No answer, voicemail box was full and unable to leave voicemail. Will reattempt.

## 2024-04-01 NOTE — OCCUPATIONAL THERAPY NOTE
OT order received, chart reviewed. RN stated patient okay to attempt. Patient received while lying in bed. Not appropriate for OT evaluation at this time s/t agitation and unable to consistently follow commands. Will re-attempt tomorrow.    Anita Black OTR/L  [facility]  #[id number]

## 2024-04-01 NOTE — PROGRESS NOTES
[facility]     CC: Hospital Follow up    PCP: No primary care provider on file.       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      Altered mental status   Possible delirium and/or progression of dementia   Agitation  -unclear, ddx includes metabolic encephalopathy, infection, stroke, post ictal state?   -neurology consulted  -initial CT imaging stable  -MRI brain pending   -monitor for fever or infection   -will need anti-psychotics PRN for agitation, IV haldol ordered if needed, as well as risperidone BIDPRN. Seroquel scheduled at night   -monitor off restraints today   -start PT/OT    Hx of Hypertension  -unclear which meds  -renal function stable  -started on amlodipine      Possible left sided facial droop and weakness  -neurology consulted and stroke work up if needed based on their evaluation   -anti-platelets if recommended by neuro   -discussed with neurology. Low suspicion for acute CVA. MRI pedning      Hx of seizure? Noted in outside records, unclear if on anti-epileptics      Gout  -likely on allopurinol but unclear     Fluids: low rate of fluids   DVT prophylaxis: lovenox daily   Code status: FULL   Dispo planning. Also need to monitor here today still for worsening symptoms, fever, etc. Unclear exactly what caused his presentation      Yobany Christianson [facility] Hospitalist        Subjective:     Resting in bed.     OBJECTIVE:    Blood pressure (!) 114/91, pulse 80, temperature 99 °F (37.2 °C), temperature source Oral, resp. rate 18, height 5' 8\" (1.727 m), weight 180 lb 14.4 oz (82.1 kg), SpO2 97%.    Temp:  [97.6 °F (36.4 °C)-99 °F (37.2 °C)] 99 °F (37.2 °C)  Pulse:  [60-93] 80  Resp:  [18-20] 18  BP: (114-191)/(73-99)  114/91  SpO2:  [94 %-100 %] 97 %      Intake/Output:    Intake/Output Summary (Last 24 hours) at 4/1/2024 1212  Last data filed at 4/1/2024 0708  Gross per 24 hour   Intake 1471 ml   Output 700 ml   Net 771 ml       Last 3 Weights   03/31/24 1311 180 lb 14.4 oz (82.1 kg)   03/31/24 1117 177 lb 0.5 oz (80.3 kg)       BP (!) 114/91 (BP Location: Left arm)   Pulse 80   Temp 99 °F (37.2 °C) (Oral)   Resp 18   Ht 5' 8\" (1.727 m)   Wt 180 lb 14.4 oz (82.1 kg)   SpO2 97%   BMI 27.51 kg/m²   General: Awake. Not agitated today when seen, off restraints  Heart: RRR  Lungs: Clear  Extremities: both upper and lower in restraints  Neuro: awake, following more commands today, spontaneously moving all extremities. speech is low volume, sensation intact in face and extremities.       Data Review:       Labs:     Recent Labs   Lab 03/31/24  1059 04/01/24  0601   RBC 5.96* 5.51   HGB 17.5 16.4   HCT 52.9 47.1   MCV 88.8 85.5   MCH 29.4 29.8   MCHC 33.1 34.8   RDW 12.9 13.1   NEPRELIM 9.40* 8.83*   WBC 11.1* 11.1*   .0 290.0         Recent Labs   Lab 03/31/24  1058 04/01/24  0601   GLU 86 75   BUN 11 12   CREATSERUM 1.15 1.06   EGFRCR 69 76   CA 9.8 9.3    140   K 3.4* 3.4*  3.4*    111   CO2 24.0 20.0*       No results for input(s): \"ALT\", \"AST\", \"ALB\", \"AMYLASE\", \"LIPASE\", \"LDH\" in the last 168 hours.    Invalid input(s): \"ALPHOS\", \"TBIL\", \"DBIL\", \"TPROT\"      Imaging:  XR CHEST AP PORTABLE  (CPT=71045)    Result Date: 3/31/2024  CONCLUSION: Linear opacities in both lung bases suggesting atelectasis.  Otherwise no acute cardiopulmonary abnormality.   Dictated by (CST): Jhoan Nunn MD on 3/31/2024 at 11:36 AM     Finalized by (CST): Jhoan Nunn MD on 3/31/2024 at 11:37 AM          CT STROKE CTA BRAIN/CTA NECK (W IV)(CPT=70496/52221)    Result Date: 3/31/2024  CONCLUSION:  1. CTA head: No hemodynamically significant stenosis, occlusion, or gross aneurysm in the anterior or posterior circulation. 2. CTA  neck: No hemodynamically significant stenosis, occlusion, or dissection of the carotids or vertebrals. 3.  Advanced chronic appearing paranasal sinus inflammation.  Dictated by (CST): Jhoan Nunn MD on 3/31/2024 at 11:18 AM     Finalized by (CST): Jhoan Nunn MD on 3/31/2024 at 11:22 AM          CT STROKE BRAIN (NO IV)(CPT=70450)    Result Date: 3/31/2024  CONCLUSION:  1.  No acute intracranial process. 2.  There are moderate microvascular white matter ischemic changes, likely related to long-standing hypertension and/or diabetes.  Interval development of multiple lacunar infarcts most notably in the right caudate head and right basal ganglia, new since  2012 imaging. 3.  Atherosclerosis in the anterior and posterior circulations.  Exam discussed with Dr. Lynn on 3/31/24 at 11:17 a.m.  Dictated by (CST): Jhoan Nunn MD on 3/31/2024 at 11:14 AM     Finalized by (CST): Jhoan Nunn MD on 3/31/2024 at 11:18 AM             Meds:      potassium chloride  40 mEq Intravenous Once    enoxaparin  40 mg Subcutaneous Daily    QUEtiapine  25 mg Oral Nightly    amLODIPine  5 mg Oral Daily      sodium chloride 75 mL/hr at 03/31/24 1336     acetaminophen, haloperidol lactate, hydrALAzine, risperiDONE

## 2024-04-02 ENCOUNTER — APPOINTMENT (OUTPATIENT)
Dept: CV DIAGNOSTICS | Facility: HOSPITAL | Age: 69
End: 2024-04-02
Attending: Other
Payer: MEDICARE

## 2024-04-02 ENCOUNTER — APPOINTMENT (OUTPATIENT)
Dept: MRI IMAGING | Facility: HOSPITAL | Age: 69
End: 2024-04-02
Attending: HOSPITALIST
Payer: MEDICARE

## 2024-04-02 ENCOUNTER — APPOINTMENT (OUTPATIENT)
Dept: GENERAL RADIOLOGY | Facility: HOSPITAL | Age: 69
End: 2024-04-02
Attending: HOSPITALIST
Payer: MEDICARE

## 2024-04-02 PROBLEM — F05 DELIRIUM DUE TO ANOTHER MEDICAL CONDITION: Status: ACTIVE | Noted: 2024-04-02

## 2024-04-02 PROBLEM — F39 EPISODIC MOOD DISORDER (HCC): Status: ACTIVE | Noted: 2024-04-02

## 2024-04-02 LAB
ADENOVIRUS PCR:: NOT DETECTED
B PARAPERT DNA SPEC QL NAA+PROBE: NOT DETECTED
B PERT DNA SPEC QL NAA+PROBE: NOT DETECTED
C PNEUM DNA SPEC QL NAA+PROBE: NOT DETECTED
CORONAVIRUS 229E PCR:: NOT DETECTED
CORONAVIRUS HKU1 PCR:: NOT DETECTED
CORONAVIRUS NL63 PCR:: NOT DETECTED
CORONAVIRUS OC43 PCR:: NOT DETECTED
FLUAV RNA SPEC QL NAA+PROBE: NOT DETECTED
FLUBV RNA SPEC QL NAA+PROBE: NOT DETECTED
METAPNEUMOVIRUS PCR:: NOT DETECTED
MYCOPLASMA PNEUMONIA PCR:: NOT DETECTED
PARAINFLUENZA 1 PCR:: NOT DETECTED
PARAINFLUENZA 2 PCR:: NOT DETECTED
PARAINFLUENZA 3 PCR:: NOT DETECTED
PARAINFLUENZA 4 PCR:: NOT DETECTED
POTASSIUM SERPL-SCNC: 3.6 MMOL/L (ref 3.5–5.1)
RHINOVIRUS/ENTERO PCR:: NOT DETECTED
RSV RNA SPEC QL NAA+PROBE: NOT DETECTED
SARS-COV-2 RNA NPH QL NAA+NON-PROBE: NOT DETECTED

## 2024-04-02 PROCEDURE — 71045 X-RAY EXAM CHEST 1 VIEW: CPT | Performed by: HOSPITALIST

## 2024-04-02 PROCEDURE — 90792 PSYCH DIAG EVAL W/MED SRVCS: CPT | Performed by: OTHER

## 2024-04-02 PROCEDURE — 70551 MRI BRAIN STEM W/O DYE: CPT | Performed by: HOSPITALIST

## 2024-04-02 RX ORDER — CARVEDILOL 12.5 MG/1
12.5 TABLET ORAL 2 TIMES DAILY WITH MEALS
Status: DISCONTINUED | OUTPATIENT
Start: 2024-04-02 | End: 2024-04-22

## 2024-04-02 RX ORDER — DIVALPROEX SODIUM 125 MG/1
250 CAPSULE, COATED PELLETS ORAL 2 TIMES DAILY
Status: DISCONTINUED | OUTPATIENT
Start: 2024-04-02 | End: 2024-04-05

## 2024-04-02 RX ORDER — IPRATROPIUM BROMIDE AND ALBUTEROL SULFATE 2.5; .5 MG/3ML; MG/3ML
3 SOLUTION RESPIRATORY (INHALATION)
Status: DISCONTINUED | OUTPATIENT
Start: 2024-04-02 | End: 2024-04-02

## 2024-04-02 RX ORDER — METHYLPREDNISOLONE SODIUM SUCCINATE 125 MG/2ML
60 INJECTION, POWDER, LYOPHILIZED, FOR SOLUTION INTRAMUSCULAR; INTRAVENOUS ONCE
Status: COMPLETED | OUTPATIENT
Start: 2024-04-02 | End: 2024-04-02

## 2024-04-02 RX ORDER — TAMSULOSIN HYDROCHLORIDE 0.4 MG/1
0.4 CAPSULE ORAL DAILY
Status: DISCONTINUED | OUTPATIENT
Start: 2024-04-02 | End: 2024-04-10

## 2024-04-02 RX ORDER — OLANZAPINE 2.5 MG/1
5 TABLET, FILM COATED ORAL NIGHTLY
Status: DISCONTINUED | OUTPATIENT
Start: 2024-04-02 | End: 2024-04-05

## 2024-04-02 RX ORDER — FLUTICASONE FUROATE AND VILANTEROL 200; 25 UG/1; UG/1
1 POWDER RESPIRATORY (INHALATION) DAILY
Status: DISCONTINUED | OUTPATIENT
Start: 2024-04-02 | End: 2024-04-22

## 2024-04-02 RX ORDER — ACETAMINOPHEN 325 MG/1
650 TABLET ORAL EVERY 6 HOURS PRN
COMMUNITY

## 2024-04-02 RX ORDER — COLCHICINE 0.6 MG/1
0.6 TABLET ORAL DAILY
Status: DISCONTINUED | OUTPATIENT
Start: 2024-04-03 | End: 2024-04-08

## 2024-04-02 RX ORDER — HALOPERIDOL 5 MG/ML
5 INJECTION INTRAMUSCULAR ONCE
Status: COMPLETED | OUTPATIENT
Start: 2024-04-02 | End: 2024-04-02

## 2024-04-02 RX ORDER — CARVEDILOL 12.5 MG/1
12.5 TABLET ORAL 2 TIMES DAILY WITH MEALS
COMMUNITY
Start: 2024-03-22

## 2024-04-02 RX ORDER — LORAZEPAM 2 MG/ML
1 INJECTION INTRAMUSCULAR ONCE
Status: DISCONTINUED | OUTPATIENT
Start: 2024-04-02 | End: 2024-04-03

## 2024-04-02 RX ORDER — IPRATROPIUM BROMIDE AND ALBUTEROL SULFATE 2.5; .5 MG/3ML; MG/3ML
3 SOLUTION RESPIRATORY (INHALATION)
Status: DISCONTINUED | OUTPATIENT
Start: 2024-04-02 | End: 2024-04-08

## 2024-04-02 RX ORDER — HALOPERIDOL 5 MG/ML
2 INJECTION INTRAMUSCULAR EVERY 2 HOUR PRN
Status: DISCONTINUED | OUTPATIENT
Start: 2024-04-02 | End: 2024-04-21

## 2024-04-02 RX ORDER — FLUTICASONE PROPIONATE AND SALMETEROL 250; 50 UG/1; UG/1
1 POWDER RESPIRATORY (INHALATION) 2 TIMES DAILY
COMMUNITY

## 2024-04-02 RX ORDER — OFLOXACIN 3 MG/ML
1 SOLUTION/ DROPS OPHTHALMIC 4 TIMES DAILY
Status: DISCONTINUED | OUTPATIENT
Start: 2024-04-02 | End: 2024-04-09

## 2024-04-02 RX ORDER — CLOPIDOGREL BISULFATE 75 MG/1
75 TABLET ORAL DAILY
COMMUNITY
Start: 2022-07-24

## 2024-04-02 RX ORDER — ALBUTEROL SULFATE 90 UG/1
2 AEROSOL, METERED RESPIRATORY (INHALATION) EVERY 4 HOURS PRN
COMMUNITY
Start: 2024-03-22

## 2024-04-02 RX ORDER — TAMSULOSIN HYDROCHLORIDE 0.4 MG/1
0.4 CAPSULE ORAL DAILY
COMMUNITY
Start: 2024-03-22

## 2024-04-02 RX ORDER — COLCHICINE 0.6 MG/1
1 TABLET ORAL DAILY
Status: ON HOLD | COMMUNITY
Start: 2022-02-15 | End: 2024-04-09 | Stop reason: CLARIF

## 2024-04-02 RX ORDER — LORAZEPAM 2 MG/ML
1 INJECTION INTRAMUSCULAR EVERY 6 HOURS PRN
Status: DISCONTINUED | OUTPATIENT
Start: 2024-04-02 | End: 2024-04-04

## 2024-04-02 RX ORDER — LORAZEPAM 2 MG/ML
1 INJECTION INTRAMUSCULAR ONCE
Status: COMPLETED | OUTPATIENT
Start: 2024-04-02 | End: 2024-04-02

## 2024-04-02 NOTE — PLAN OF CARE
Problem: Risk for Violence-Violent Restraints/Seclusion  Goal: Patient will not express any violent or self-destructive behaviors  Description: Interventions:  - Apply the least restrictive restraint to prevent harm if patient strikes out and is not responding to redirection  - Notify patient and family of reasons restraints applied  - Assist the patient to identify the precipitating event  - Assist the patient to identify alternatives to physically acting out  - Assess for any contributing factors to violent behaviors (substances,  medications, history of trauma)  - Assess the patient's physical comfort, circulation, skin condition, hydration, nutrition and elimination needs   - Assess for the need to continue restraints  - Evaluate the need for an emergency medication  Outcome: Progressing     Problem: Patient Centered Care  Goal: Patient preferences are identified and integrated in the patient's plan of care  Description: Interventions:  - What would you like us to know as we care for you? Patient POA is a friend   - Provide timely, complete, and accurate information to patient/family  - Incorporate patient and family knowledge, values, beliefs, and cultural backgrounds into the planning and delivery of care  - Encourage patient/family to participate in care and decision-making at the level they choose  - Honor patient and family perspectives and choices  Outcome: Progressing     Problem: Patient/Family Goals  Goal: Patient/Family Long Term Goal  Description: Patient's Long Term Goal: discharge    Interventions:  - Monitor vital signs  - Monitor neurological status  - Ambulate as tolerated  - See additional Care Plan goals for specific interventions  Outcome: Progressing  Goal: Patient/Family Short Term Goal  Description: Patient's Short Term Goal: feel better    Interventions:   - Verbalize needs and wants  - Antianxiety medications as needed  - See additional Care Plan goals for specific  interventions  Outcome: Progressing     Problem: Delirium  Goal: Minimize duration of delirium  Description: Interventions:  - Encourage use of hearing aids, eye glasses  - Promote highest level of mobility daily  - Provide frequent reorientation  - Promote wakefulness i.e. lights on, blinds open  - Promote sleep, encourage patient's normal rest cycle i.e. lights off, TV off, minimize noise and interruptions  - Encourage family to assist in orientation and promotion of home routines  Outcome: Progressing     Problem: METABOLIC/FLUID AND ELECTROLYTES - ADULT  Goal: Electrolytes maintained within normal limits  Description: INTERVENTIONS:  - Monitor labs and rhythm and assess patient for signs and symptoms of electrolyte imbalances  - Administer electrolyte replacement as ordered  - Monitor response to electrolyte replacements, including rhythm and repeat lab results as appropriate  - Fluid restriction as ordered  - Instruct patient on fluid and nutrition restrictions as appropriate  Outcome: Progressing     Problem: MUSCULOSKELETAL - ADULT  Goal: Return mobility to safest level of function  Description: INTERVENTIONS:  - Assess patient stability and activity tolerance for standing, transferring and ambulating w/ or w/o assistive devices  - Assist with transfers and ambulation using safe patient handling equipment as needed  - Ensure adequate protection for wounds/incisions during mobilization  - Obtain PT/OT consults as needed  - Advance activity as appropriate  - Communicate ordered activity level and limitations with patient/family  Outcome: Progressing     Problem: NEUROLOGICAL - ADULT  Goal: Achieves stable or improved neurological status  Description: INTERVENTIONS  - Assess for and report changes in neurological status  - Initiate measures to prevent increased intracranial pressure  - Maintain blood pressure and fluid volume within ordered parameters to optimize cerebral perfusion and minimize risk of  hemorrhage  - Monitor temperature, glucose, and sodium. Initiate appropriate interventions as ordered  Outcome: Progressing  Goal: Absence of seizures  Description: INTERVENTIONS  - Monitor for seizure activity  - Administer anti-seizure medications as ordered  - Monitor neurological status  Outcome: Progressing  Goal: Remains free of injury related to seizure activity  Description: INTERVENTIONS:  - Maintain airway, patient safety  and administer oxygen as ordered  - Monitor patient for seizure activity, document and report duration and description of seizure to MD/LIP  - If seizure occurs, turn patient to side and suction secretions as needed  - Reorient patient post seizure  - Seizure pads on all 4 side rails  - Instruct patient/family to notify RN of any seizure activity  - Instruct patient/family to call for assistance with activity based on assessment  Outcome: Progressing  Goal: Achieves maximal functionality and self care  Description: INTERVENTIONS  - Monitor swallowing and airway patency with patient fatigue and changes in neurological status  - Encourage and assist patient to increase activity and self care with guidance from PT/OT  - Encourage visually impaired, hearing impaired and aphasic patients to use assistive/communication devices  Outcome: Progressing     Monitoring patient's vitals, behaviors. Resting in bed quietly at this time. Took scheduled meds with encouragement. Noted with reddened sclera, conjunctiva. MD notified. Saline drops at his time until evaluated by attending. For MRI in AM. Call light within reach, frequent rounding by nursing staff.

## 2024-04-02 NOTE — PAYOR COMM NOTE
--------------  ADMISSION REVIEW     Payor: [employer]  Subscriber #:  [id number]  Authorization Number: [id number]    Admit date: 3/31/24  Admit time: 12:40 PM       HPI  68 year old male presents from independent living for evaluation of possible stroke.  Stroke alert called from the field.  EMS reports that patient had a visiting nurse check patient about 30 minutes prior to arrival and noted him to be sitting at the edge of the bed, confused, EMS found the patient to have left-sided weakness and left-sided facial droop.  Patient is not answering questions appropriately, seems confused.  EMS notes last known well sometime yesterday.    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  Seems patient has had prior admissions to [place] for altered mentation, MRI showing possible normal pressure hydrocephalus    Patient review patient was Plavix, no other anticoagulation.  Does not appear patient is on antiepileptics    ED Triage Vitals   BP 03/31/24 1053 (!) 176/89   Pulse 03/31/24 1053 93   Resp 03/31/24 1053 24   Temp 03/31/24 1055 98.1 °F (36.7 °C)   Temp src 03/31/24 1055 Oral   SpO2 03/31/24 1053 94 %   O2 Device 03/31/24 1130 None (Room air)     Physical Exam     Comments: Confused, looking about, intermittently answers questions   Eyes:      Extraocular Movements: Extraocular movements intact.   Cardiovascular:      Rate and Rhythm: Normal rate.      Pulses: Normal pulses.   Pulmonary:      Effort: Pulmonary effort is normal. No respiratory distress.   Musculoskeletal:      Cervical back: Normal range of motion.   Neurological:      Comments: Left-sided facial droop, left lower extremity weakness, there is drift of the left upper extremity with dysmetria.  Unable to assess visual field deficits.  Patient has an intention, not able to follow through with neurologic examination     Labs Reviewed   BASIC  METABOLIC PANEL (8) - Abnormal; Notable for the following components:       Result Value    Potassium 3.4 (*)     BUN/CREA Ratio 9.6 (*)     All other components within normal limits   URINALYSIS, ROUTINE - Abnormal; Notable for the following components:    Ketones Urine 60 (*)     Protein Urine 30 (*)     Bacteria Urine Rare (*)     Squamous Epi. Cells Few (*)     Transitional Cells Few (*)     All other components within normal limits   CBC W/ DIFFERENTIAL - Abnormal; Notable for the following components:    WBC 11.1 (*)     RBC 5.96 (*)     Neutrophil Absolute Prelim 9.40 (*)     Neutrophil Absolute 9.40 (*)      XR CHEST AP PORTABLE  (CPT=71045)  Result Date: 3/31/2024  CONCLUSION: Linear opacities in both lung bases suggesting atelectasis.  Otherwise no acute cardiopulmonary abnormality.   Dictated by (CST): Jhoan Nunn MD on 3/31/2024 at 11:36 AM     Finalized by (CST): Jhoan Nunn MD on 3/31/2024 at 11:37 AM        CT STROKE CTA BRAIN/CTA NECK (W IV)(CPT=70496/57643)  Result Date: 3/31/2024  CONCLUSION:  1. CTA head: No hemodynamically significant stenosis, occlusion, or gross aneurysm in the anterior or posterior circulation. 2. CTA neck: No hemodynamically significant stenosis, occlusion, or dissection of the carotids or vertebrals. 3.  Advanced chronic appearing paranasal sinus inflammation.  Dictated by (CST): Jhoan Nunn MD on 3/31/2024 at 11:18 AM     Finalized by (CST): Jhoan Nunn MD on 3/31/2024 at 11:22 AM        CT STROKE BRAIN (NO IV)(CPT=70450)  Result Date: 3/31/2024  CONCLUSION:  1.  No acute intracranial process. 2.  There are moderate microvascular white matter ischemic changes, likely related to long-standing hypertension and/or diabetes.  Interval development of multiple lacunar infarcts most notably in the right caudate head and right basal ganglia, new since  2012 imaging. 3.  Atherosclerosis in the anterior and posterior circulations.  Exam discussed with Dr. Lynn on 3/31/24 at  11:17 a.m.  Dictated by (CST): Jhoan Nunn MD on 3/31/2024 at 11:14 AM     Finalized by (CST): Jhoan Nunn MD on 3/31/2024 at 11:18 AM         Large NIH stroke scale, unknown last normal, patient is not a tnk candidate.  Will proceed with CT stroke protocol to evaluate for large vessel occlusion.  Consulted with neurology, evaluated patient at bedside  Will allow for permissive hypertension at this time.  Differential could also include metabolic encephalopathy, delirium, dementia    Disposition and Plan   Clinical Impression:  1. Altered mental status, unspecified altered mental status type    2. 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  Patient arrives via EMS from his independent living facility.  It appears that the patient had a visiting nurse and noted him to be sitting at the edge of the bed, confused.  EMS arrived and they found the patient to have left-sided weakness and left-sided facial droop.  The patient was not answering questions appropriately and was altered.  He was last known well sometime yesterday. Normally gets his care at [facility]. Has had admissions for altered mental status as well.     BP (!) 175/89   Pulse 79   Temp 98.1 °F (36.7 °C) (Oral)   Resp 22   Ht 5' 8\" (1.727 m)   Wt 177 lb 0.5 oz (80.3 kg)   SpO2 93%   BMI 26.92 kg/m²   General: Awake, dishevel  Heart: RRR  Lungs: Clear  Neuro: does not follow commands but is awake, spontaneously moving all extremities. Speech seems to be dysarthric but not giving much in terms of speech, sensation intact in face and extremities.   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      Altered mental status   Possible delirium and/or progression of dementia   Agitation   -unclear, ddx includes metabolic encephalopathy, infection, stroke, post ictal state? And more.   -neurology consulted  -initial CT imaging stable  -may need MRI and stroke work up   -monitor for fever or infection   -will need anti-psychotics PRN for agitation, will order IV haldol 1mg [id number] for now and schedule seroquel 25mg HS tonight.      Hx of Hypertension  -unclear which meds  -renal function stable  -can add oral meds once cleared from stroke standpoint      Possible left sided facial droop and weakness  -neurology consulted and stroke work up if needed based on their evaluation   -anti-platelets if recommended by neuro      Hx of seizure? Noted in outside records, unclear if on anti-epileptics      Gout  -likely on allopurinol but unclear     Fluids: low rate of fluids   Diet: NPO until cleared from speech/swallow  DVT prophylaxis: lovenox daily       4/1/24  BP (!) 114/91 (BP Location: Left arm)   Pulse 80   Temp 99 °F (37.2 °C) (Oral)   Resp 18   Ht 5' 8\" (1.727 m)   Wt 180 lb 14.4 oz (82.1 kg)   SpO2 97%   BMI 27.51 kg/m²     General: Awake. Not agitated today when seen, off restraints  Heart: RRR  Lungs: Clear  Extremities: both upper and lower in restraints  Neuro: awake, following more commands today, spontaneously moving all extremities. speech is low volume, sensation intact in face and extremities.      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      Altered mental status   Possible delirium and/or progression of dementia   Agitation  -unclear, ddx includes metabolic encephalopathy, infection, stroke, post ictal state?   -neurology consulted  -initial CT imaging stable  -MRI brain pending   -monitor for fever or infection   -will need anti-psychotics PRN for agitation, IV haldol ordered if needed, as well as risperidone BIDPRN. Seroquel scheduled at night   -monitor off restraints today   -start PT/OT     Hx of Hypertension  -unclear which meds  -renal function stable  -started on amlodipine      Possible left sided facial droop and weakness  -neurology consulted and stroke work up if needed based on their evaluation   -anti-platelets if recommended by neuro   -discussed with neurology. Low suspicion for acute CVA. MRI pedning      Hx of seizure? Noted in outside records, unclear if on anti-epileptics      Gout  -likely on allopurinol but unclear     Fluids: low rate of fluids   DVT prophylaxis: lovenox daily   Code status: FULL   Dispo planning. Also need to monitor here today still for worsening symptoms, fever, etc. Unclear exactly what caused his presentation        NEUROLOGY  INTERVAL HPI:   -EEG was attempted but patient kept grabbing the tech's hands and calling her profane names.       ?PHYSICAL EXAM:       BP (!) 114/91 (BP Location: Left arm)   Pulse 80   Temp 99 °F (37.2 °C) (Oral)   Resp 18   Ht 68\"   Wt 180 lb 14.4 oz (82.1 kg)   SpO2 97%   BMI 27.51 kg/m²    Skin: skin color, texture normal.  No rashes or lesions.    Head: Normocephalic, atraumatic.     Neurological exam:  Sleeping but woke up to name.  Said his birthday was [birth year].  Did not know current date.  Guarded, flat affect, parse speech.      ASSESSMENT:  The patient is a 68 year old man with past medical history of suspected left MCA stroke, vascular dementia, single seizure, recent admission for catatonia, parotid small cell carcinoma, tobacco use disorder in the past, hypertension, hyperlipidemia who is being worked up for progressive neurological decline and was found encephalopathic with a left face and upper extremity weakness.     Stroke-like symptoms ?TIA vs seizure   -Continue Aspirin 81 mg daily   -Continue Atorvastatin 80 mg daily   -MRI brain if able   -Echocardiogram if able   -LDL and hemoglobin A1c  -PT, OT and speech therapy   -Blood pressure goals: Normotension    -Nof further EEG attempts due to patient being profane and attempting to hurt EEG technician   -Due to risk posed to healthcare staff, the patient cannot have neuro checks done (also attempting to grab RN's arms), so he can remain on remote telemetry off neuro stroke floor      STROKE RISK FACTORS:   *Hypertension - Target blood pressure <140/90, <130/85 for high risk, normal 120/80  *Physical inactivity - target exercise at least 3 times per week  *Obesity - target ideal body weight and girth <35\" for women, <40\" for men  *Diabetes - Target <6.5-7%   *Smoking - Target smoking cessation  *Hyperlipidemia - Target total cholesterol < 200, Target LDL <100, < 70 for high risk, Target HDL >45 for men, >55 for women, Target triglycerides <150     Progressive neurological decline and concern for normal pressure hydrocephalus  Vascular dementia with behavioral changes  - Recommend outpatient evaluation with his established neurologist - it is likely the patient would not be able to tolerate high volume LP and gait assessment due to  aggressive behavior with healthcare assessments including testing and examinations        sodium chloride 0.9% infusion  Rate: 75 mL/hr Freq: Continuous Route: IV  Start: 03/31/24 1245     MEDICATIONS ADMINISTERED IN LAST 1 DAY:  aspirin chewable tab 81 mg       Date Action Dose Route User    4/1/2024 1543 Given 81 mg Oral Maira Multani          atorvastatin (Lipitor) tab 80 mg       Date Action Dose Route User    4/1/2024 2058 Given 80 mg Oral Saumya Luque RN          enoxaparin (Lovenox) 40 MG/0.4ML SUBQ injection 40 mg       Date Action Dose Route User    4/1/2024 0910 Given 40 mg Subcutaneous (Right Lower Abdomen) Evangelina Wooten RN          haloperidol lactate (Haldol) 5 MG/ML injection 1 mg       Date Action Dose Route User    4/1/2024 1602 Given 1 mg Intravenous FarnazorHaily blackwell RN          potassium chloride 40 mEq in 250mL sodium chloride 0.9% IVPB premix       Date Action Dose Route User    4/1/2024 0908 New Bag 40 mEq Intravenous Evangelina Wooten RN          QUEtiapine (SEROquel) tab 25 mg       Date Action Dose Route User    4/1/2024 2058 Given 25 mg Oral Saumya Luque RN          sodium chloride 0.9% infusion       Date Action Dose Route User    4/1/2024 2055 New Bag (none) Intravenous Saumya Luque RN            Vitals (last day)       Date/Time Temp Pulse Resp BP SpO2 Weight O2 Device O2 Flow Rate (L/min) [facility]    04/01/24 2055 98.4 °F (36.9 °C) 82 18 156/79 95 % -- None (Room air) -- CG    04/01/24 1533 98.3 °F (36.8 °C) 72 16 180/81 95 % -- None (Room air) -- JL    04/01/24 1413 98.1 °F (36.7 °C) 77 18 163/88 96 % -- None (Room air) -- EM    04/01/24 0900 99 °F (37.2 °C) 80 18 114/91 97 % -- None (Room air) -- EM    04/01/24 0447 98.2 °F (36.8 °C) 78 20 136/73 96 % -- None (Room air) -- AW

## 2024-04-02 NOTE — OCCUPATIONAL THERAPY NOTE
OCCUPATIONAL THERAPY EVALUATION - INPATIENT     Room Number: 561/561-A  Evaluation Date: 4/2/2024  Type of Evaluation: Initial  Presenting Problem: AMS    Physician Order: IP Consult to Occupational Therapy  Reason for Therapy: ADL/IADL Dysfunction and Discharge Planning    OCCUPATIONAL THERAPY ASSESSMENT   Patient is a 68 year old male admitted 3/31/2024 for AMS, progressive neurologic decline; PMH sig for dementia; pt with VAAC plan in place; pt can be combative, agitated, and verbally aggressive; security in room at time of assessment.  Prior to admission, patient's baseline is unknown; he is from ILF at [facility] [place]; from prior notes at [place] from their rehab dept, pt up and ambulatory but according to OT in 2023 pt was receiving assist for self care (per documentation, pt was not a reliable historian during this evaluation)    Patient is currently functioning likely near  baseline with mobility and self care.  Patient is requiring up to CGA/Min A as a result of the following impairments: confusion, agitation, cognition, attention to task, safety awareness and insight; pt is up and ambulatory with SBA/SPV but requiring CGA as he attempted to leave his room and get up multiple times; . Occupational Therapy will continue to follow for duration of hospitalization.    Patient will benefit from continued skilled OT Services. Undetermined at this time for pt's needs- currently 24 hour SPV is advised.     PLAN  OT Treatment Plan: Balance activities;Energy conservation/work simplification techniques;ADL training;Functional transfer training;Endurance training;Patient/Family education;Cognitive reorientation;Patient/Family training;Compensatory technique education  OT Device Recommendations: TBD    OCCUPATIONAL THERAPY MEDICAL/SOCIAL HISTORY   Problem List   Principal Problem:    Altered mental status, unspecified altered mental status type  Active Problems:    Progressive neurologic decline    Stroke-like  symptom    Severe vascular dementia with agitation (HCC)    HOME SITUATION  Type of Home: Independent living facility  Lives With: Alone    SUBJECTIVE  Im not doing that     OCCUPATIONAL THERAPY EXAMINATION   OBJECTIVE  Precautions: Bed/chair alarm  Fall Risk: High fall risk    WEIGHT BEARING RESTRICTION     PAIN ASSESSMENT  Ratin       COGNITION  Impaired     RANGE OF MOTION   Upper extremity ROM is within functional limits     STRENGTH ASSESSMENT  Upper extremity strength is within functional limits     ACTIVITIES OF DAILY LIVING ASSESSMENT  AM-PAC ‘6-Clicks’ Inpatient Daily Activity Short Form  How much help from another person does the patient currently need…  -   Putting on and taking off regular lower body clothing?: A Little  -   Bathing (including washing, rinsing, drying)?: A Little  -   Toileting, which includes using toilet, bedpan or urinal? : A Little  -   Putting on and taking off regular upper body clothing?: A Little  -   Taking care of personal grooming such as brushing teeth?: A Little  -   Eating meals?: A Little    AM-PAC Score:  Score: 18  Approx Degree of Impairment: 46.65%  Standardized Score (AM-PAC Scale): 38.66  CMS Modifier (G-Code): CK    FUNCTIONAL TRANSFER ASSESSMENT  Sit to Stand: Edge of Bed  Edge of Bed: Contact Guard Assist    BED MOBILITY  Rolling: Contact Guard Assist  Supine to Sit : Contact Guard Assist  Sit to Supine (OT): Contact Guard Assist  Scooting: CGA    BALANCE ASSESSMENT  Static Sitting: Stand-by Assist  Static Standing: Contact Guard Assist    FUNCTIONAL ADL ASSESSMENT  Eating: Stand-by Assist  Grooming Seated: Stand-by Assist  Bathing Seated: Stand-by Assist  UB Dressing Seated: Stand-by Assist  LB Dressing Seated: Contact Guard Assist  Toileting Seated: Contact Guard Assist       Skilled Therapy Provided: Patient up and ambulating around room with hand held assist; pt is self directed and confused; poor insight to current situation; he is requiring assist for  self care but is able to self feed and groom himself when setup; is not interested in engaging in self care but likely is able to complete dressing and bathing when he is motivated to do it and not when cued (by his own volition); he demonstrated fair balance and no LOB occurred; was able to get back to bed with CGA. Pt from a functional standpoint is likely reasonably close, however, cognitive level appears to be off from baseline as he is from  [facility]; Patient left in bed with RN and security present; Continue skilled occupational therapy while IP to maximize patient function and increase patient participation, safety, and independence with basic ADL and everyday activities.       EDUCATION PROVIDED  Patient: Role of Occupational Therapy; Plan of Care; Discharge Recommendations; Functional Transfer Techniques; Compensatory ADL Techniques  Patient's Response to Education: Demonstrates Disinterest    The patient's Approx Degree of Impairment: 46.65% has been calculated based on documentation in the [facility] '6 clicks' Inpatient Daily Activity Short Form.  Research supports that patients with this level of impairment may benefit from home and supervision; 24 hour supervision for safety based on current functioal/cognitive state.  Final disposition will be made by interdisciplinary medical team.    Patient End of Session: In bed;With  staff    OT Goals  Patient self-stated goal is: no goals stated; pt agitated and confused     Patient will complete LE dressing with supervision  Comment:     Patient will complete toilet transfer with supervision  Comment:     Patient will complete self care task at sink level with supervision    Comment:     Comment:         Goals  on:   Frequency:3x week    Patient Evaluation Complexity Level:   Occupational Profile/Medical History MODERATE - Expanded review of history including review of medical or therapy record   Specific performance deficits impacting engagement in ADL/IADL  MODERATE  3 - 5 performance deficits   Client Assessment/Performance Deficits MODERATE - Comorbidities and min to mod modifications of tasks    Clinical Decision Making MODERATE - Analysis of occupational profile, detailed assessments, several treatment options    Overall Complexity MODERATE     OT Session Time: 10 minutes  Self-Care Home Management: 10 minutes    Evgeny Simms, Occupational Therapist, OTR/L ext [phone]

## 2024-04-02 NOTE — DIETARY NOTE
BRIEF DIETITIAN NOTE      Patient Status 04/02/24: Pt screened for MST at risk 2/2 unsure weight loss.  Pt admitted for AMS. Visited pt today, sedated and on restraints. Discussed pt with RN, per family report pt was on regular diet and good po intake at home. Pt consuming 75% of  documented meals, and good po intakes of pureed diet per RN. Today he has been sedated so did not eat lunch; pt will go for MRI later today and per RN will assist him with feeds for dinner. SLP tried to re-evaluate but pt sedated. RN endorsed pt may have better intakes if/when diet is advanced. Weight relatively stable no noted weight loss, per Wt Hx on EHR, pt wt asf of last year was 180#, #.  Labs reviewed, K+ was low, replaced and now WNL. Will add ONS once pt preference is obtained, and monitoring for diet advancement/improved po intakes. Will follow per protocol. Please consult RD if earlier nutrition intervention is needed.     Recent Labs     03/31/24  1058 04/01/24  0601 04/02/24  0605   GLU 86 75  --    BUN 11 12  --    CREATSERUM 1.15 1.06  --    CA 9.8 9.3  --     140  --    K 3.4* 3.4*  3.4* 3.6    111  --    CO2 24.0 20.0*  --    OSMOCALC 289 288  --        Percent Meals Eaten (last 6 days)       Date/Time Percent Meals Eaten (%)    04/01/24 0708 0 %     Percent Meals Eaten (%): NPO at 04/01/24 0708    04/01/24 1843 0 %     Percent Meals Eaten (%): not hungry at 04/01/24 1843    04/02/24 0745 75 %             Patient Weight(s) for the past 336 hrs:   Weight   03/31/24 1311 82.1 kg (180 lb 14.4 oz)   03/31/24 1117 80.3 kg (177 lb 0.5 oz)        Wt Readings from Last 6 Encounters:   03/31/24 82.1 kg (180 lb 14.4 oz)        F/u per protocol or as appropriate.       Sarina Castro, ANTHONY, LDN  Clinical Dietitian  Office: [phone]

## 2024-04-02 NOTE — PLAN OF CARE
Patient was aggressive with staff this AM, hypertensive, agitated. VAAC initiated and restraints applied. Attempted to educate patient on restraints, no evidence of learning. Wheezing progressed at bedside, MD updated orders (see MAR), has since resolved. Stable on room air. Family updated and agreeable to restraints and current plan. Fall precautions in place, call light visible and within reach.    Problem: Risk for Violence-Violent Restraints/Seclusion  Goal: Patient will not express any violent or self-destructive behaviors  Description: Interventions:  - Apply the least restrictive restraint to prevent harm if patient strikes out and is not responding to redirection  - Notify patient and family of reasons restraints applied  - Assist the patient to identify the precipitating event  - Assist the patient to identify alternatives to physically acting out  - Assess for any contributing factors to violent behaviors (substances,  medications, history of trauma)  - Assess the patient's physical comfort, circulation, skin condition, hydration, nutrition and elimination needs   - Assess for the need to continue restraints  - Evaluate the need for an emergency medication  Outcome: Progressing     Problem: Patient Centered Care  Goal: Patient preferences are identified and integrated in the patient's plan of care  Description: Interventions:  - What would you like us to know as we care for you  - Provide timely, complete, and accurate information to patient/family  - Incorporate patient and family knowledge, values, beliefs, and cultural backgrounds into the planning and delivery of care  - Encourage patient/family to participate in care and decision-making at the level they choose  - Honor patient and family perspectives and choices  Outcome: Progressing     Problem: Patient/Family Goals  Goal: Patient/Family Long Term Goal  Description: Patient's Long Term Goal: discharge    Interventions:  - Monitor vital signs  -  Monitor neurological status  - Ambulate as tolerated  - See additional Care Plan goals for specific interventions  Outcome: Progressing  Goal: Patient/Family Short Term Goal  Description: Patient's Short Term Goal: feel better    Interventions:   - Verbalize needs and wants  - Antianxiety medications as needed  - See additional Care Plan goals for specific interventions  Outcome: Progressing     Problem: Delirium  Goal: Minimize duration of delirium  Description: Interventions:  - Encourage use of hearing aids, eye glasses  - Promote highest level of mobility daily  - Provide frequent reorientation  - Promote wakefulness i.e. lights on, blinds open  - Promote sleep, encourage patient's normal rest cycle i.e. lights off, TV off, minimize noise and interruptions  - Encourage family to assist in orientation and promotion of home routines  Outcome: Progressing     Problem: METABOLIC/FLUID AND ELECTROLYTES - ADULT  Goal: Electrolytes maintained within normal limits  Description: INTERVENTIONS:  - Monitor labs and rhythm and assess patient for signs and symptoms of electrolyte imbalances  - Administer electrolyte replacement as ordered  - Monitor response to electrolyte replacements, including rhythm and repeat lab results as appropriate  - Fluid restriction as ordered  - Instruct patient on fluid and nutrition restrictions as appropriate  Outcome: Progressing     Problem: MUSCULOSKELETAL - ADULT  Goal: Return mobility to safest level of function  Description: INTERVENTIONS:  - Assess patient stability and activity tolerance for standing, transferring and ambulating w/ or w/o assistive devices  - Assist with transfers and ambulation using safe patient handling equipment as needed  - Ensure adequate protection for wounds/incisions during mobilization  - Obtain PT/OT consults as needed  - Advance activity as appropriate  - Communicate ordered activity level and limitations with patient/family  Outcome: Progressing      Problem: NEUROLOGICAL - ADULT  Goal: Achieves stable or improved neurological status  Description: INTERVENTIONS  - Assess for and report changes in neurological status  - Initiate measures to prevent increased intracranial pressure  - Maintain blood pressure and fluid volume within ordered parameters to optimize cerebral perfusion and minimize risk of hemorrhage  - Monitor temperature, glucose, and sodium. Initiate appropriate interventions as ordered  Outcome: Progressing  Goal: Absence of seizures  Description: INTERVENTIONS  - Monitor for seizure activity  - Administer anti-seizure medications as ordered  - Monitor neurological status  Outcome: Progressing  Goal: Remains free of injury related to seizure activity  Description: INTERVENTIONS:  - Maintain airway, patient safety  and administer oxygen as ordered  - Monitor patient for seizure activity, document and report duration and description of seizure to MD/LIP  - If seizure occurs, turn patient to side and suction secretions as needed  - Reorient patient post seizure  - Seizure pads on all 4 side rails  - Instruct patient/family to notify RN of any seizure activity  - Instruct patient/family to call for assistance with activity based on assessment  Outcome: Progressing  Goal: Achieves maximal functionality and self care  Description: INTERVENTIONS  - Monitor swallowing and airway patency with patient fatigue and changes in neurological status  - Encourage and assist patient to increase activity and self care with guidance from PT/OT  - Encourage visually impaired, hearing impaired and aphasic patients to use assistive/communication devices  Outcome: Progressing     Problem: Safety Risk - Non-Violent Restraints  Goal: Patient will remain free from self-harm  Description: INTERVENTIONS:  - Apply the least restrictive restraint to prevent harm  - Notify patient and family of reasons restraints applied  - Assess for any contributing factors to confusion  (electrolyte disturbances, delirium, medications)  - Discontinue any unnecessary medical devices as soon as possible  - Assess the patient's physical comfort, circulation, skin condition, hydration, nutrition and elimination needs   - Reorient and redirection as needed  - Assess for the need to continue restraints  Outcome: Progressing

## 2024-04-02 NOTE — PLAN OF CARE
Patient A/Ox2 and agitated this morning. Security called to help escort patient back to bed. Nursing staff redirecting patient multiple times in AM. Patient currently resting in bed with call light in reach.   Problem: Risk for Violence-Violent Restraints/Seclusion  Goal: Patient will not express any violent or self-destructive behaviors  Description: Interventions:  - Apply the least restrictive restraint to prevent harm if patient strikes out and is not responding to redirection  - Notify patient and family of reasons restraints applied  - Assist the patient to identify the precipitating event  - Assist the patient to identify alternatives to physically acting out  - Assess for any contributing factors to violent behaviors (substances,  medications, history of trauma)  - Assess the patient's physical comfort, circulation, skin condition, hydration, nutrition and elimination needs   - Assess for the need to continue restraints  - Evaluate the need for an emergency medication  Outcome: Progressing     Problem: Patient Centered Care  Goal: Patient preferences are identified and integrated in the patient's plan of care  Description: Interventions:  - What would you like us to know as we care for you?   - Provide timely, complete, and accurate information to patient/family  - Incorporate patient and family knowledge, values, beliefs, and cultural backgrounds into the planning and delivery of care  - Encourage patient/family to participate in care and decision-making at the level they choose  - Honor patient and family perspectives and choices  Outcome: Progressing     Problem: Patient/Family Goals  Goal: Patient/Family Long Term Goal  Description: Patient's Long Term Goal: discharge    Interventions:  - Monitor vital signs  - Monitor neurological status  - Ambulate as tolerated  - See additional Care Plan goals for specific interventions  Outcome: Progressing  Goal: Patient/Family Short Term Goal  Description:  Patient's Short Term Goal: feel better    Interventions:   - Verbalize needs and wants  - Antianxiety medications as needed  - See additional Care Plan goals for specific interventions  Outcome: Progressing     Problem: Delirium  Goal: Minimize duration of delirium  Description: Interventions:  - Encourage use of hearing aids, eye glasses  - Promote highest level of mobility daily  - Provide frequent reorientation  - Promote wakefulness i.e. lights on, blinds open  - Promote sleep, encourage patient's normal rest cycle i.e. lights off, TV off, minimize noise and interruptions  - Encourage family to assist in orientation and promotion of home routines  Outcome: Progressing     Problem: METABOLIC/FLUID AND ELECTROLYTES - ADULT  Goal: Electrolytes maintained within normal limits  Description: INTERVENTIONS:  - Monitor labs and rhythm and assess patient for signs and symptoms of electrolyte imbalances  - Administer electrolyte replacement as ordered  - Monitor response to electrolyte replacements, including rhythm and repeat lab results as appropriate  - Fluid restriction as ordered  - Instruct patient on fluid and nutrition restrictions as appropriate  Outcome: Progressing     Problem: MUSCULOSKELETAL - ADULT  Goal: Return mobility to safest level of function  Description: INTERVENTIONS:  - Assess patient stability and activity tolerance for standing, transferring and ambulating w/ or w/o assistive devices  - Assist with transfers and ambulation using safe patient handling equipment as needed  - Ensure adequate protection for wounds/incisions during mobilization  - Obtain PT/OT consults as needed  - Advance activity as appropriate  - Communicate ordered activity level and limitations with patient/family  Outcome: Progressing     Problem: NEUROLOGICAL - ADULT  Goal: Achieves stable or improved neurological status  Description: INTERVENTIONS  - Assess for and report changes in neurological status  - Initiate measures to  prevent increased intracranial pressure  - Maintain blood pressure and fluid volume within ordered parameters to optimize cerebral perfusion and minimize risk of hemorrhage  - Monitor temperature, glucose, and sodium. Initiate appropriate interventions as ordered  Outcome: Progressing  Goal: Absence of seizures  Description: INTERVENTIONS  - Monitor for seizure activity  - Administer anti-seizure medications as ordered  - Monitor neurological status  Outcome: Progressing  Goal: Remains free of injury related to seizure activity  Description: INTERVENTIONS:  - Maintain airway, patient safety  and administer oxygen as ordered  - Monitor patient for seizure activity, document and report duration and description of seizure to MD/LIP  - If seizure occurs, turn patient to side and suction secretions as needed  - Reorient patient post seizure  - Seizure pads on all 4 side rails  - Instruct patient/family to notify RN of any seizure activity  - Instruct patient/family to call for assistance with activity based on assessment  Outcome: Progressing  Goal: Achieves maximal functionality and self care  Description: INTERVENTIONS  - Monitor swallowing and airway patency with patient fatigue and changes in neurological status  - Encourage and assist patient to increase activity and self care with guidance from PT/OT  - Encourage visually impaired, hearing impaired and aphasic patients to use assistive/communication devices  Outcome: Progressing

## 2024-04-02 NOTE — PROGRESS NOTES
[facility] Hospitalist Progress Note     CC: Hospital Follow up    PCP: No primary care provider on file.       Assessment/Plan:     Principal Problem:    Altered mental status, unspecified altered mental status type  Active Problems:    Progressive neurologic decline    Stroke-like symptom    Severe vascular dementia with agitation (HCC)    Mr. 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   -has been admitted 3 other times (last in 2/2023, at [facility]) with similar presentation, extensive work up without clear etiology   -neurology consulted  -initial CT imaging stable  -MRI brain pending   -aspirin and statin   -monitor for fever or infection   -[facility] G[facility]RIEL called on 4/2 in am, Psyc consulted  -start PT/OT  -discussed with neurology   -post ictal agitation     Pink eye / right eye radiation   - hs of right eye radiation, has had history of   - antibiotic drop started    Wheezing  - poss secondary to risperidone? Add to allergy list  - steroids, nebs, avoid IV benadryl given severe agitation  - CXR negative   - RVP neg     Hx of Hypertension  -unclear which meds  -renal function stable  -started on amlodipine   -coreg noted on home meds, resume      Possible left sided facial droop and weakness  -neurology consulted and stroke work up if needed based on their evaluation   -anti-platelets if recommended by neuro   -discussed with neurology. Low suspicion for acute CVA. MRI pedning      Hx of seizure? Noted in outside records,  - not on AED's  - poss post ictal    Hs of Small cell lung cancer   - parotid involvement, has had  radiation therapy   - follows at [facility]  -he's on colchicine, resumed     Fluids: low rate of fluids   DVT prophylaxis: lovenox daily   Code status: FULL     Discussed with Yesenia LEON on 4/2     Questions/concerns were discussed with patient and/or family by bedside.    Thank You,  Raheem Johnson MD    Hospitalist with [facility]     Subjective:     Agitated and confused, CODE GABRIEL called earlier this morning, also later in the morning with upper airway wheezing,      Denies chest pain or vision changes, no nausea, no sob, confused,     OBJECTIVE:    Blood pressure (!) 186/94, pulse 82, temperature 97.9 °F (36.6 °C), temperature source Axillary, resp. rate 22, height 5' 8\" (1.727 m), weight 180 lb 14.4 oz (82.1 kg), SpO2 97%.    Temp:  [97.9 °F (36.6 °C)-98.4 °F (36.9 °C)] 97.9 °F (36.6 °C)  Pulse:  [72-85] 82  Resp:  [16-22] 22  BP: (152-190)/(73-94) 186/94  SpO2:  [93 %-97 %] 97 %      Intake/Output:    Intake/Output Summary (Last 24 hours) at 4/2/2024 1156  Last data filed at 4/2/2024 0629  Gross per 24 hour   Intake 1999.25 ml   Output 130 ml   Net 1869.25 ml       Last 3 Weights   03/31/24 1311 180 lb 14.4 oz (82.1 kg)   03/31/24 1117 177 lb 0.5 oz (80.3 kg)       Exam    Gen: agitated, confused,   Heent: NC AT, neck supple, eyes injected, purulent drainage noted  Pulm: Lungs clear,    CV: Heart with regular rate and rhythm,   Abd: Abdomen soft, nontender, nondistended,   MSK: no clubbing, no cyanosis  Skin: no rashes or lesions  Neuro: moving all 4 ext, agitated and confused  Psyc: agitated and confused      Data Review:       Labs:     Recent Labs   Lab 03/31/24  1059 04/01/24  0601   RBC 5.96* 5.51   HGB 17.5 16.4   HCT 52.9 47.1   MCV 88.8 85.5   MCH 29.4 29.8   MCHC 33.1 34.8   RDW 12.9 13.1   NEPRELIM 9.40* 8.83*   WBC 11.1* 11.1*   .0 290.0         Recent Labs   Lab 03/31/24  1058 04/01/24  0601 04/02/24  0605   GLU 86 75  --    BUN 11 12  --    CREATSERUM 1.15 1.06  --    EGFRCR  69 76  --    CA 9.8 9.3  --     140  --    K 3.4* 3.4*  3.4* 3.6    111  --    CO2 24.0 20.0*  --        No results for input(s): \"ALT\", \"AST\", \"ALB\", \"AMYLASE\", \"LIPASE\", \"LDH\" in the last 168 hours.    Invalid input(s): \"ALPHOS\", \"TBIL\", \"DBIL\", \"TPROT\"      Imaging:  XR CHEST AP PORTABLE  (CPT=71045)    Result Date: 4/2/2024  CONCLUSION: No radiographic evidence of acute cardiopulmonary abnormality.  No evidence of aspiration.  No new focal consolidation.    Dictated by (CST): Ulysses Galindo MD on 4/02/2024 at 11:45 AM     Finalized by (CST): Ulysses Galindo MD on 4/02/2024 at 11:45 AM          XR CHEST AP PORTABLE  (CPT=71045)    Result Date: 3/31/2024  CONCLUSION: Linear opacities in both lung bases suggesting atelectasis.  Otherwise no acute cardiopulmonary abnormality.   Dictated by (CST): Jhoan Nunn MD on 3/31/2024 at 11:36 AM     Finalized by (CST): Jhoan Nunn MD on 3/31/2024 at 11:37 AM          CT STROKE CTA BRAIN/CTA NECK (W IV)(CPT=70496/38513)    Result Date: 3/31/2024  CONCLUSION:  1. CTA head: No hemodynamically significant stenosis, occlusion, or gross aneurysm in the anterior or posterior circulation. 2. CTA neck: No hemodynamically significant stenosis, occlusion, or dissection of the carotids or vertebrals. 3.  Advanced chronic appearing paranasal sinus inflammation.  Dictated by (CST): Jhoan Nunn MD on 3/31/2024 at 11:18 AM     Finalized by (CST): Jhoan Nunn MD on 3/31/2024 at 11:22 AM          CT STROKE BRAIN (NO IV)(CPT=70450)    Result Date: 3/31/2024  CONCLUSION:  1.  No acute intracranial process. 2.  There are moderate microvascular white matter ischemic changes, likely related to long-standing hypertension and/or diabetes.  Interval development of multiple lacunar infarcts most notably in the right caudate head and right basal ganglia, new since  2012 imaging. 3.  Atherosclerosis in the anterior and posterior circulations.  Exam discussed with Dr. Lynn on 3/31/24 at  11:17 a.m.  Dictated by (CST): Jhoan Nunn MD on 3/31/2024 at 11:14 AM     Finalized by (JAYESH): Jhoan Nunn MD on 3/31/2024 at 11:18 AM             Meds:      ofloxacin  1 drop Both Eyes QID    LORazepam  1 mg Intravenous Once    ipratropium-albuterol  3 mL Nebulization 4 times per day    aspirin  81 mg Oral Daily    atorvastatin  80 mg Oral Nightly    enoxaparin  40 mg Subcutaneous Daily    QUEtiapine  25 mg Oral Nightly    amLODIPine  5 mg Oral Daily      sodium chloride 75 mL/hr at 04/01/24 2055     acetaminophen **OR** acetaminophen, ondansetron, glycerin-hypromellose-, haloperidol lactate

## 2024-04-02 NOTE — PLAN OF CARE
Visitor at bedside, Cinthya, updated that patient is staying in room 561. She stated she will notify patient's POA, Yesenia Calderon.    Cinthya asked if she can try to feed patient tomorrow, requested for a house tray to be ordered around 3 pm tomorrow when she comes to visit patient if he is able to eat.

## 2024-04-02 NOTE — CONSULTS
[facility]  part of [facility]    Report of Consultation    Carlo Ng Patient Status:  Inpatient    1955 MRN [id number]   Location [facility] 5SW/SE Attending Raheem Johnson MD   Hosp Day # 2 PCP No primary care provider on file.     Date of Admission:  3/31/2024  Date of Consult:  2024   Reason for Consultation:   Patient presented with increased confusion, restlessness, agitation, Raheem Rose MD requested psychiatric consult for evaluation and advice.    Consult Duration     The patient seen for initial psychiatric consult evaluation.   Record reviewed, communication with attending, communication with RN and patient seen face to face evaluation.    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 The patient has been demonstrating confusion, restlessness, agitation. Patient indicated for psych consult for evaluation and advise.    Per chart review, the patient presented to the hospital by EMS from independent living for altered mental status, weakness, left sided facial droop and agitation.The patient was admitted to the hospital. He has been demonstrating confusion, restlessness, agitation.     The patient received the following psychotropic medications: Seroquel 25 mg nightly. Patient receiving risperidone 0.5 PRN    Labs and imaging reviewed: potassium 3.5, BUN 12, creatinine 1.06, WBC 11.1. CT head did not demonstrate any acute findings.    The patient seen today laying down in his bed given the staff hard time to having his nasal oxygen on.  Patient demonstrates shortness of breath, increased restlessness, anxiety and paranoia.  Patient has been demonstrating tangential thought process and unable to  sustain focus on 1 aspect.  Although the patient aware of the nasal oxygen is for oxygenation otherwise he refused within his nose and started screaming and pushing hands away.    The patient acknowledged for his anxiety and provide with assurance support otherwise that did not help.  Patient has been demonstrating response to internal stimuli with intense delusional ideation.    Although etiology of his disorientation has not been discovery patient have few admission to [facility] for similar reason.  Brain MRI indicated advanced changes of chronic small vessel disease but no acute infarction or hemorrhage and no metastasis observed.    The patient is alert and oriented to person and to the fact that he is in the hospital but not to the date or condition.  Patient demonstrating disorganized thought process.    The patient denied being depressed or anxious otherwise having difficulty with intense fluctuation in his emotion.   The patient himself denying any suicidal or homicidal ideation but  Patient denying any auditory visual hallucination.  Patient demonstrating response to internal stimuli.    The patient has been demonstrating delirium episode with alternation in mood and cognition with episodes of  increased confusion, restlessness, agitation and response to internal stimuli.       Past Psychiatric/Medication History:  1. Prior diagnoses: Previous delirium episode.  2. Past psychiatric inpatient: Multiple admission to medical inpatient for altered mental status.  3. Past outpatient history: None  4. Past suicide history: None  5. 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   Resection 2019    Catatonia 03/31/2024    Cognitive communication deficit 03/14/2023    Cystic disease of liver 02/22/2023    Dementia (HCC) 02/01/2023    Difficult airway 02/02/2023    Dyslipidemia 02/01/2023    Elevated white blood cell count, unspecified 02/22/2023    Encephalopathy 09/19/2012    Essential hypertension     Gastro-esophageal reflux disease with esophagitis, without bleeding 02/22/2023    Gout, chronic 03/31/2024    Gross hematuria 03/31/2024    Hypercholesterolemia 06/25/2015    Hyperlipidemia 09/19/2012    Leukocytosis 02/19/2023    Liver cyst 08/30/2022    Metabolic encephalopathy 02/22/2023    Neuroendocrine carcinoma, 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   Keppra    Seizure disorder (HCC)     Toxic metabolic encephalopathy 08/20/2022    Transient global amnesia 01/10/2017    Formatting of this note might be different from the original.   Brief -Plavix    Unspecified dementia, unspecified severity, without behavioral disturbance, psychotic disturbance, mood disturbance, and anxiety (HCC) 02/22/2023    Vascular  dementia ([facility]) 2022    Vascular dementia, unspecified severity, without behavioral disturbance, psychotic disturbance, mood disturbance, and anxiety ([facility]) 2023    Weakness 2023    Weakness of left upper extremity 2022       Past Surgical History  History reviewed. No pertinent surgical history.    Family History  No family history on file.    Social History  Social History     Socioeconomic History    Marital status: Single   Tobacco Use    Smoking status: Former     Packs/day: 1.00     Years: 25.00     Additional pack years: 0.00     Total pack years: 25.00     Types: Cigarettes     Quit date: 1990     Years since quittin.2    Tobacco comments:      Cigarettes 1 25 Quit:     Social Determinants of Health     Food Insecurity: Unknown (3/31/2024)    Food Insecurity     Food Insecurity: Patient unable to answer   Transportation Needs: Unknown (3/31/2024)    Transportation Needs     Lack of Transportation: Patient unable to answer   Housing Stability: Unknown (3/31/2024)    Housing Stability     Housing Instability: Patient unable to answer           Current Medications:  Current Facility-Administered Medications   Medication Dose Route Frequency    ofloxacin (Ocuflox) 0.3 % ophthalmic solution 1 drop  1 drop Both Eyes QID    LORazepam (Ativan) 2 mg/mL injection 1 mg  1 mg Intravenous Once    aspirin chewable tab 81 mg  81 mg Oral Daily    acetaminophen (Tylenol) tab 650 mg  650 mg Oral Q4H PRN    Or    acetaminophen (Tylenol) rectal suppository 650 mg  650 mg Rectal Q4H PRN    ondansetron (Zofran) 4 MG/2ML injection 4 mg  4 mg Intravenous Q6H PRN    atorvastatin (Lipitor) tab 80 mg  80 mg Oral Nightly    glycerin-hypromellose- (Artifical Tears) 0.2-0.2-1 % ophthalmic solution 1 drop  1 drop Both Eyes QID PRN    sodium chloride 0.9% infusion   Intravenous Continuous    enoxaparin (Lovenox) 40 MG/0.4ML SUBQ injection 40 mg  40 mg Subcutaneous Daily    acetaminophen (Tylenol Extra  Strength) tab 500 mg  500 mg Oral Q4H PRN    haloperidol lactate (Haldol) 5 MG/ML injection 1 mg  1 mg Intravenous Q4H PRN    QUEtiapine (SEROquel) tab 25 mg  25 mg Oral Nightly    risperiDONE (RisperDAL) tab 0.5 mg  0.5 mg Oral BID PRN    amLODIPine (Norvasc) tab 5 mg  5 mg Oral Daily     No medications prior to admission.       Allergies  Not on File    Review of Systems:   As by Admitting/Attending    Results:   Laboratory Data:  Lab Results   Component Value Date    WBC 11.1 (H) 04/01/2024    HGB 16.4 04/01/2024    HCT 47.1 04/01/2024    .0 04/01/2024    CREATSERUM 1.06 04/01/2024    BUN 12 04/01/2024     04/01/2024    K 3.6 04/02/2024     04/01/2024    CO2 20.0 (L) 04/01/2024    GLU 75 04/01/2024    CA 9.3 04/01/2024         Imaging:  XR CHEST AP PORTABLE  (CPT=71045)    Result Date: 3/31/2024  CONCLUSION: Linear opacities in both lung bases suggesting atelectasis.  Otherwise no acute cardiopulmonary abnormality.   Dictated by (CST): Jhoan Nunn MD on 3/31/2024 at 11:36 AM     Finalized by (CST): Jhoan Nunn MD on 3/31/2024 at 11:37 AM          CT STROKE CTA BRAIN/CTA NECK (W IV)(CPT=70496/43208)    Result Date: 3/31/2024  CONCLUSION:  1. CTA head: No hemodynamically significant stenosis, occlusion, or gross aneurysm in the anterior or posterior circulation. 2. CTA neck: No hemodynamically significant stenosis, occlusion, or dissection of the carotids or vertebrals. 3.  Advanced chronic appearing paranasal sinus inflammation.  Dictated by (CST): Jhoan Nunn MD on 3/31/2024 at 11:18 AM     Finalized by (CST): Jhoan Nunn MD on 3/31/2024 at 11:22 AM          CT STROKE BRAIN (NO IV)(CPT=70450)    Result Date: 3/31/2024  CONCLUSION:  1.  No acute intracranial process. 2.  There are moderate microvascular white matter ischemic changes, likely related to long-standing hypertension and/or diabetes.  Interval development of multiple lacunar infarcts most notably in the right caudate head and  right basal ganglia, new since  2012 imaging. 3.  Atherosclerosis in the anterior and posterior circulations.  Exam discussed with Dr. Lynn on 3/31/24 at 11:17 a.m.  Dictated by (CST): Jhoan Nunn MD on 3/31/2024 at 11:14 AM     Finalized by (CST): Jhoan Nunn MD on 3/31/2024 at 11:18 AM           Vital Signs:   Blood pressure (!) 190/92, pulse 82, temperature 97.9 °F (36.6 °C), temperature source Axillary, resp. rate 20, height 68\", weight 82.1 kg (180 lb 14.4 oz), SpO2 97%.    Mental Status Exam:   Appearance: Stated age male, in hospital gown, laying down in hospital bed.  Patient is very restless and refusing care.  Psychomotor: Patient has been demonstrating restlessness and agitation.  Patient getting angry and pushing staff hands away.  Orientation: Alert and oriented to person. Patient confused to date and condition  Gait: Not evaluated.  Attitude/Coorperation: limited cooperation due to confusion, restlessness, agitation  Behavior: Episodes of restlessness and agitation.  Speech: Regular rate and rhythm speech.  Scattered speech  Mood: Having difficulty expressing his emotion.  Affect: restricted but mostly anxious, paranoid and restless.  Thought process: Confused, disorganized  Thought content: No reports of  suicidal or homicidal ideation.  Perceptions: Patient has been demonstrating response to internal stimuli.  Concentration: Grossly impaired  Memory: Grossly impaired  Intellect: Average.  Judgment and Insight: Questionable.  Patient demonstrating cognitive impairment.    Impression:     Delirium due to other medical condition.  Episodic mood disorder.  Rule out vascular dementia with behavioral disturbance.  Altered mental status, unspecified altered mental status type    Progressive neurologic decline    Stroke-like symptom    Severe vascular dementia with agitation (HCC)      Patient is a 68 year old , single, male with past medical history of seizures, metastatic small cell cancer  with parotid involvement s/p chemo 2019, asthma,gout, htn, seizures who was admitted to the hospital for Altered mental status.  Although the etiology still not discovered, the patient had a few episodes of delirium in [facility].    The patient today has been demonstrating increased alternation in his mood, restlessness, irritability, delusional ideation, distractibility, disorientation and has been demonstrating delirious episode.  Imaging indicate vascular ischemic changes.    The maría discomfort ent has been demonstrating delirium episode with alternation in mood and cognition with episodes of  increased confusion, restlessness, agitation and response to internal stimuli.     Discussed risk and benefit, acknowledging the current symptom and severity.  At this point, I would recommend the following approach:     Focus on safety.  Restrained at time if indicated  Focus on education and support.  Focus on insight orientation helping the patient understand diagnosis and treatment plan.  Start Zyprexa 5 mg nightly.  Utilize Haldol 2 mg IV every 2 hours as needed for agitation.  Utilize Ativan 1 mg every 6 hours as needed for anxiety.  Check vitamin B12 and TSH  Processed with patient at length, the initiation of the above psychotropic medications I advised the patient of the risks, benefits, alternatives and potential side effects. The patient consents to administration of the medications and understands the right to refuse medications at any time. The patient verbalized understanding.   Coordinate plan with team    Orders This Visit:  Orders Placed This Encounter   Procedures    Basic Metabolic Panel (8)    CBC With Differential With Platelet    Troponin I (High Sensitivity)    Urinalysis, Routine    Basic Metabolic Panel (8)    CBC With Differential With Platelet    Potassium    Potassium    Lipid Panel    Hemoglobin A1C    SARS-CoV-2/Flu A and B/RSV by PCR (GeneXpert)       Meds This Visit:  Requested  Prescriptions      No prescriptions requested or ordered in this encounter       Anselmo Lawson MD  4/2/2024    Note to Patient: The 21st Century Cures Act makes medical notes like these available to patients in the interest of transparency. However, be advised this is a medical document. It is intended as peer to peer communication. It is written in medical language and may contain abbreviations or verbiage that are unfamiliar. It may appear blunt or direct. Medical documents are intended to carry relevant information, facts as evident, and the clinical opinion of the practitioner. This note may have been transcribed using a voice dictation system. Voice recognition errors may occur. This should not be taken to alter the content or meaning of this note.

## 2024-04-02 NOTE — PHYSICAL THERAPY NOTE
PHYSICAL THERAPY EVALUATION - INPATIENT     Room Number: 561/561-A  Evaluation Date: 4/2/2024  Type of Evaluation: Initial   Physician Order: PT Eval and Treat    Presenting Problem: AMS, agitation, work up in progress     Reason for Therapy: Mobility Dysfunction and Discharge Planning    PHYSICAL THERAPY ASSESSMENT   Patient is a 68 year old male admitted 3/31/2024 for AMS, brain CT (-) for acute process, MRI pending.  Prior to admission, patient's baseline is lives in [facility].  Patient is currently functioning below baseline with bed mobility, transfers, and gait.  Patient is requiring contact guard assist and minimal assist as a result of the following impairments: decreased functional strength, decreased endurance/aerobic capacity, and cognitive deficits (agitation, very difficult to redirect, lack of safety awareness, lack of insight into his deficits).  Physical Therapy will continue to follow for duration of hospitalization.    Patient will benefit from continued skilled PT Services. At this time, recommend 24 hour assist at dc, will depend on pt progress. At this time, patient's main limiting factors are cognitive, behavioral.    PLAN  PT Treatment Plan: Energy conservation;Gait training  Rehab Potential : Guarded  Frequency (Obs): 3x/week (trial)    PHYSICAL THERAPY MEDICAL/SOCIAL HISTORY   History related to current admission:  Copied from Dr Peguero's note:  Carlo Ng is a 68 year old  male w/ a pmhx sig. 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  He was  not answering  questions appropriately and seemed confused.  Per EMS last known well was reportedly yesterday.    Problem List  Principal Problem:    Altered mental status, unspecified altered mental status type  Active Problems:    Progressive neurologic decline    Stroke-like symptom    Severe vascular dementia with agitation (HCC)      HOME SITUATION  Home Situation  Type of Home: Independent living facility  Lives With: Alone     Prior Level of [place]: pt lives at [facility], pt is poor historian at this time    PHYSICAL THERAPY EXAMINATION   OBJECTIVE  Precautions: Bed/chair alarm  Fall Risk: High fall risk    WEIGHT BEARING RESTRICTION  Weight Bearing Restriction: None                PAIN ASSESSMENT  Rating: Unable to rate     Management Techniques: Activity promotion    COGNITION  Overall Cognitive Status:  Impaired  Attention Span:  very difficult to redirect, walking in room with lack of safety awareness, lack of insight into his deficits  Following Commands:  follows one-step commands inconsistently  Perseveration: perseverates during ADLs  Safety Judgement:  decreased awareness of need for assistance and decreased awareness of need for safety  Awareness of Errors:  decreased awareness of errors     RANGE OF MOTION AND STRENGTH ASSESSMENT  Upper extremity ROM and strength are within functional limits   Lower extremity ROM is within functional limits   Lower extremity strength is within functional limits     BALANCE  Static Sitting: Good  Dynamic Sitting: Good  Static Standing: Fair +  Dynamic Standing: Fair +      AM-PAC '6-Clicks' INPATIENT SHORT FORM - BASIC MOBILITY  How much difficulty does the patient currently have...  Patient Difficulty: Turning over in bed (including adjusting bedclothes, sheets and blankets)?: A Little   Patient  Difficulty: Sitting down on and standing up from a chair with arms (e.g., wheelchair, bedside commode, etc.): A Little   Patient Difficulty: Moving from lying on back to sitting on the side of the bed?: A Little   How much help from another person does the patient currently need...   Help from Another: Moving to and from a bed to a chair (including a wheelchair)?: A Little   Help from Another: Need to walk in hospital room?: A Little   Help from Another: Climbing 3-5 steps with a railing?: A Little     AM-PAC Score:  Raw Score: 18   Approx Degree of Impairment: 46.58%   Standardized Score (AM-PAC Scale): 43.63   CMS Modifier (G-Code): CK    FUNCTIONAL ABILITY STATUS  Functional Mobility/Gait Assessment  Gait Assistance: Minimum assistance  Distance (ft): 10  Assistive Device: None  Pattern: Shuffle  Supine to Sit: NT  Sit to Supine: contact guard assist  Sit to Stand: contact guard assist    Exercise/Education Provided:  Functional activity tolerated      The patient's Approx Degree of Impairment: 46.58% has been calculated based on documentation in the [facility] '6 clicks' Inpatient Basic Mobility Short Form.  Research supports that patients with this level of impairment may benefit from 24 hour supervision/assist.  At this time, pt's main limiting factor is cognitive/behavioral rather than physical/mobility.     Final disposition will be made by interdisciplinary medical team.    Patient End of Session: With  staff;Needs met;Call light within reach;RN aware of session/findings;In bed (with rn in room)    CURRENT GOALS  Goals to be met by: 4/10/24  Patient Goal Patient's self-stated goal is: did not state, resisting returning to bed   Goal #1 Patient is able to demonstrate supine - sit EOB @ level: supervision     Goal #1   Current Status    Goal #2 Patient is able to demonstrate transfers Sit to/from Stand at assistance level: supervision with none     Goal #2  Current Status    Goal #3 Patient is able to ambulate  100 feet with assist device: none at assistance level: supervision   Goal #3   Current Status      Patient Evaluation Complexity Level:  History High - 3 or more personal factors and/or co-morbidities   Examination of body systems High - addressing a total of 4 or more elements   Clinical Presentation  High - Unstable   Clinical Decision Making  High Complexity

## 2024-04-02 NOTE — PROGRESS NOTES
04/02/24 1158   VISIT TYPE   SLP Inpatient Visit Type (Documentation Required) Attempted Treatment   FOLLOW UP/PLAN   Follow Up Needed (Documentation Required) Yes   SLP Follow-up Date 04/03/24     SLP attempt this AM. RN at bedside administering medication. Pt in bilateral wrist restraints and new onset of shallow breathing with wheezing. SLP to defer tx at this time.     Thank you.    Cha Guevara M.S. CCC-SLP  Speech Language Pathologist  Phone Number Ext. [phone]

## 2024-04-03 ENCOUNTER — APPOINTMENT (OUTPATIENT)
Dept: CV DIAGNOSTICS | Facility: HOSPITAL | Age: 69
End: 2024-04-03
Attending: Other
Payer: MEDICARE

## 2024-04-03 PROBLEM — G91.2 NPH (NORMAL PRESSURE HYDROCEPHALUS) (HCC): Status: ACTIVE | Noted: 2024-04-03

## 2024-04-03 PROBLEM — R41.82 ALTERED MENTAL STATUS, UNSPECIFIED ALTERED MENTAL STATUS TYPE: Status: RESOLVED | Noted: 2024-03-31 | Resolved: 2024-04-03

## 2024-04-03 PROBLEM — R29.818 PROGRESSIVE NEUROLOGIC DECLINE: Status: RESOLVED | Noted: 2024-03-31 | Resolved: 2024-04-03

## 2024-04-03 PROBLEM — R29.90 STROKE-LIKE SYMPTOM: Status: RESOLVED | Noted: 2024-04-01 | Resolved: 2024-04-03

## 2024-04-03 PROBLEM — G45.9 TIA (TRANSIENT ISCHEMIC ATTACK): Status: ACTIVE | Noted: 2024-04-03

## 2024-04-03 LAB
ANION GAP SERPL CALC-SCNC: 8 MMOL/L (ref 0–18)
BASOPHILS # BLD AUTO: 0.01 X10(3) UL (ref 0–0.2)
BASOPHILS NFR BLD AUTO: 0.1 %
BUN BLD-MCNC: 19 MG/DL (ref 9–23)
BUN/CREAT SERPL: 16.7 (ref 10–20)
CALCIUM BLD-MCNC: 9.8 MG/DL (ref 8.7–10.4)
CHLORIDE SERPL-SCNC: 114 MMOL/L (ref 98–112)
CO2 SERPL-SCNC: 19 MMOL/L (ref 21–32)
CREAT BLD-MCNC: 1.14 MG/DL
DEPRECATED RDW RBC AUTO: 39.9 FL (ref 35.1–46.3)
EGFRCR SERPLBLD CKD-EPI 2021: 70 ML/MIN/1.73M2 (ref 60–?)
EOSINOPHIL # BLD AUTO: 0 X10(3) UL (ref 0–0.7)
EOSINOPHIL NFR BLD AUTO: 0 %
ERYTHROCYTE [DISTWIDTH] IN BLOOD BY AUTOMATED COUNT: 13 % (ref 11–15)
GLUCOSE BLD-MCNC: 142 MG/DL (ref 70–99)
GLUCOSE BLD-MCNC: 146 MG/DL (ref 70–99)
HCT VFR BLD AUTO: 48.4 %
HGB BLD-MCNC: 17.1 G/DL
IMM GRANULOCYTES # BLD AUTO: 0.08 X10(3) UL (ref 0–1)
IMM GRANULOCYTES NFR BLD: 0.6 %
LYMPHOCYTES # BLD AUTO: 0.64 X10(3) UL (ref 1–4)
LYMPHOCYTES NFR BLD AUTO: 5.2 %
MAGNESIUM SERPL-MCNC: 2 MG/DL (ref 1.6–2.6)
MCH RBC QN AUTO: 30.1 PG (ref 26–34)
MCHC RBC AUTO-ENTMCNC: 35.3 G/DL (ref 31–37)
MCV RBC AUTO: 85.2 FL
MONOCYTES # BLD AUTO: 0.46 X10(3) UL (ref 0.1–1)
MONOCYTES NFR BLD AUTO: 3.7 %
NEUTROPHILS # BLD AUTO: 11.16 X10 (3) UL (ref 1.5–7.7)
NEUTROPHILS # BLD AUTO: 11.16 X10(3) UL (ref 1.5–7.7)
NEUTROPHILS NFR BLD AUTO: 90.4 %
OSMOLALITY SERPL CALC.SUM OF ELEC: 297 MOSM/KG (ref 275–295)
PLATELET # BLD AUTO: 293 10(3)UL (ref 150–450)
POTASSIUM SERPL-SCNC: 4.1 MMOL/L (ref 3.5–5.1)
PROT SERPL-MCNC: 7 G/DL (ref 5.7–8.2)
RBC # BLD AUTO: 5.68 X10(6)UL
SODIUM SERPL-SCNC: 141 MMOL/L (ref 136–145)
TSI SER-ACNC: 3.93 MIU/ML (ref 0.55–4.78)
VIT B12 SERPL-MCNC: 470 PG/ML (ref 211–911)
WBC # BLD AUTO: 12.4 X10(3) UL (ref 4–11)

## 2024-04-03 PROCEDURE — 99232 SBSQ HOSP IP/OBS MODERATE 35: CPT | Performed by: OTHER

## 2024-04-03 PROCEDURE — 99233 SBSQ HOSP IP/OBS HIGH 50: CPT | Performed by: OTHER

## 2024-04-03 PROCEDURE — 93306 TTE W/DOPPLER COMPLETE: CPT | Performed by: OTHER

## 2024-04-03 RX ORDER — LORAZEPAM 2 MG/ML
1 INJECTION INTRAMUSCULAR ONCE
Status: COMPLETED | OUTPATIENT
Start: 2024-04-04 | End: 2024-04-04

## 2024-04-03 RX ORDER — HALOPERIDOL 5 MG/ML
2 INJECTION INTRAMUSCULAR ONCE
Status: DISCONTINUED | OUTPATIENT
Start: 2024-04-03 | End: 2024-04-03

## 2024-04-03 RX ORDER — MELATONIN
100 DAILY
Status: DISCONTINUED | OUTPATIENT
Start: 2024-04-03 | End: 2024-04-05

## 2024-04-03 RX ORDER — LORAZEPAM 2 MG/ML
1 INJECTION INTRAMUSCULAR ONCE
Status: DISCONTINUED | OUTPATIENT
Start: 2024-04-03 | End: 2024-04-03

## 2024-04-03 RX ORDER — HALOPERIDOL 5 MG/ML
5 INJECTION INTRAMUSCULAR ONCE
Status: DISCONTINUED | OUTPATIENT
Start: 2024-04-03 | End: 2024-04-03

## 2024-04-03 RX ORDER — HALOPERIDOL 5 MG/ML
5 INJECTION INTRAMUSCULAR ONCE
Status: COMPLETED | OUTPATIENT
Start: 2024-04-04 | End: 2024-04-04

## 2024-04-03 NOTE — SLP NOTE
SPEECH DAILY NOTE - INPATIENT    ASSESSMENT & PLAN   PPE REQUIRED. THIS THERAPIST WORE GLOVES, DROPLET MASK, AND GOGGLES FOR DURATION OF EVALUATION. HANDS WASHED UPON ENTRANCE/EXIT.    SLP f/u for ongoing dysphagia tx/meal assessment per recommendations of puree/thin per BSE. RN reports pt tolerates diet and medication well with no overt clinical s/s aspiration. Pt denies any swallowing challenges.     Pt positioned upright in bed with bilateral wrist restraints present. Pt afebrile, tolerating room air with oxygen status 97 prior to the start of oral trials. SLP reviewed aspiration precautions and safe swallowing compensatory strategies with the patient. Safe swallow guidelines remain written on the white board in purple. Diet recommendations remain on the whiteboard in the room. Patient v/u but demonstrates confusion and unable to carry over aspiration precautions. No family at bedside. Provided 1:1 feed assistance, pt tolerates puree and thin liquids via open cup with no overt clinical signs/symptoms of aspiration. Pt declined hard solid trials. Oxygen status remained >96 t/o the entire session. Oral/buccal cavities clear of residue following all trials.     MOST RECENT CXR 4/2/24:  CONCLUSION: No radiographic evidence of acute cardiopulmonary abnormality.  No evidence of aspiration.  No new focal consolidation.     Dictated by (CST): Ulysses Galindo MD on 4/02/2024 at 11:45 AM       Finalized by (CST): Ulysses Galindo MD on 4/02/2024 at 11:45 AM     PLAN: SLP to f/u x1-2 meal assessments, monitor CXR, and VFSS if clinically warranted.     Diet Recommendations - Solids: Puree  Diet Recommendations - Liquids: Thin Liquids    Compensatory Strategies Recommended: No straws;Slow rate;Alternate consistencies;Small bites and sips  Aspiration Precautions: Upright position;Slow rate;Small bites and sips;No straw  Medication Administration Recommendations: Crushed in puree    Patient Experiencing Pain: No      Treatment  Plan  Treatment Plan/Recommendations: Aspiration precautions    Interdisciplinary Communication: Discussed with RN  Recommendations posted at bedside            GOALS  Goal #1 Patient will participate in ongoing clinical swallow evaluation as indicated/appropriate.   Met   Goal #2 The patient will tolerate puree consistency and thin liquids without overt signs or symptoms of aspiration with 100 % accuracy over 2-3 session(s).    In Progress   Goal #3 The patient/family/caregiver will demonstrate understanding and implementation of aspiration precautions and swallow strategies independently over 3 session(s).  In Progress   Goal #4 The patient will tolerate trial upgrade of minced and moist consistency and thin liquids without overt signs or symptoms of aspiration with 100 % accuracy over 2-3 session(s).  Declined  In Progress   Goal #5 The patient will utilize compensatory strategies as outlined by  BSSE (clinical evaluation) including Slow rate, Small bites, Small sips, Alternate liquids/solids, No straws, Upright 90 degrees, Eliminate distractions, Feed patient with 1:1 feed assistance 100 % of the time across 2 sessions.    In Progress     FOLLOW UP  Follow Up Needed (Documentation Required): Yes  SLP Follow-up Date: 04/04/24  Number of Visits to Meet Established Goals:  (1-2)    Session: 1 following re-BSSE    If you have any questions, please contact ELVA Castañeda M.S. CCC-SLP  Speech Language Pathologist  Phone Number Ext. [phone]

## 2024-04-03 NOTE — PROGRESS NOTES
[facility]  [address] [place] [address], [address]  [facility] [address]  [phone]          INPATIENT NEUROLOGY   FOLLOW UP CONSULT NOTE       [facility]  part of [facility]    Report of Consultation    Carlo Ng Patient Status:  Inpatient     1955 MRN [id number]    Location [facility] 5SW/SE Attending Raheem Johnson MD    Hosp Day # 3 PCP No primary care provider on file.      Date of Admission:  3/31/2024  Date of Consult Follow Up:  4/3/2024        INTERVAL HPI:   -MRI brain done.  Very poor historian.         ?PHYSICAL EXAM:   BP (!) 172/88 (BP Location: Right arm)   Pulse 79   Temp 97.1 °F (36.2 °C) (Axillary)   Resp 18   Ht 68\"   Wt 180 lb 14.4 oz (82.1 kg)   SpO2 95%   BMI 27.51 kg/m²   General appearance: Well appearing, and in no acute distress  Skin: skin color, texture normal.  No rashes or lesions.    Head: Normocephalic, atraumatic.    Neurological exam:  Patient is alert, awake, almost no information can be exchanged due to his tangential and circumferential speech and impaired comprehension, speech is dysarthric and he is irritable and agitated, for instance when I introduced myself with my name and title (neurologist) and he asked for clarification, when I explained what a neurologist was, he yelled that he knew what a neurologist was.        LABS/DATA:    Lab Results   Component Value Date    WBC 12.4 2024    HGB 17.1 2024    HCT 48.4 2024    .0 2024    CREATSERUM 1.14 2024    BUN 19 2024     2024    K 4.1 2024     2024    CO2 19.0 2024     2024    CA 9.8 2024    TSH 3.927 2024    MG 2.0 2024    B12 470 2024       HGBA1C:    Lab Results   Component Value Date    A1C 5.2 2024     2024              IMAGING:  MRI BRAIN WO ACUTE (3) SEQUENCE (CPT=70551)    Result Date: 2024  CONCLUSION:  1. No acute  infarct or hemorrhage. 2. Advanced changes of chronic small vessel disease in both cerebral hemispheres. 3. Moderate changes of chronic small vessel disease in the saroj. 4. Ventricular prominence greater than the degree of cortical sulcal prominence.  Elevated Guevara index, and diminished corpus callosal angle are suggestive, but not specific for normal pressure hydrocephalus.  Findings could also be seen with central atrophy greater than degree of cortical atrophy.    Dictated by (CST): Romeo Cortes MD on 4/02/2024 at 4:16 PM     Finalized by (CST): Romeo Cortes MD on 4/02/2024 at 4:35 PM           I PERSONALLY REVIEWED THESE IMAGES.     ASSESSMENT:  The patient is a 68 year old  man with past medical history of suspected left MCA stroke, vascular dementia, single seizure, recent admission for catatonia, parotid small cell carcinoma, tobacco use disorder in the past, hypertension, hyperlipidemia who is being worked up for progressive neurological decline and was found encephalopathic with a left face and upper extremity weakness.  -MRI brain ventriculomegaly is worrisome for NPH, advanced chronic microvascular ischemic change  -Echocardiogram EF 60 to 65%, normal left atrial size  -LDL 51  and hemoglobin A1c 5.2     TIA   -Continue Aspirin 81 mg daily   -Continue Atorvastatin 80 mg daily   -PT, OT and speech therapy   -Blood pressure goals: Normotension      STROKE RISK FACTORS:   *Hypertension - Target blood pressure <140/90, <130/85 for high risk, normal 120/80  *Physical inactivity - target exercise at least 3 times per week  *Obesity - target ideal body weight and girth <35\" for women, <40\" for men  *Diabetes - Target <6.5-7%   *Smoking - Target smoking cessation  *Hyperlipidemia - Target total cholesterol < 200, Target LDL <100, < 70 for high risk, Target HDL >45 for men, >55 for women, Target triglycerides <150     History of vascular dementia with behavioral changes  Progressive neurological decline and  concern for normal pressure hydrocephalus has had an LP in the past but due to agitation they were only able to get 3 cc follow-up.  Reportedly improved after previous LP in his gait.  Per note, last year in May he had not had urinary incontinence or gait dysfunction but was noted to be irritable and poor historian.    -High-volume LP with opening and closing pressure, post LP PT gait assessment within 6 hours if he is able to participate  -Add Thiamine     This note was prepared using Dragon Medical voice recognition dictation software and as a result, errors may occur. When identified, these errors have been corrected. While every attempt is made to correct errors during dictation, discrepancies may still exist    VITALY Barrett DO   Staff Neurologist   4/3/2024  1:06 PM

## 2024-04-03 NOTE — PLAN OF CARE
No complaints overnight. Frequent rounding done, restraints on bilat wrists. Bed alarm on.  Problem: Risk for Violence-Violent Restraints/Seclusion  Goal: Patient will not express any violent or self-destructive behaviors  Description: Interventions:  - Apply the least restrictive restraint to prevent harm if patient strikes out and is not responding to redirection  - Notify patient and family of reasons restraints applied  - Assist the patient to identify the precipitating event  - Assist the patient to identify alternatives to physically acting out  - Assess for any contributing factors to violent behaviors (substances,  medications, history of trauma)  - Assess the patient's physical comfort, circulation, skin condition, hydration, nutrition and elimination needs   - Assess for the need to continue restraints  - Evaluate the need for an emergency medication  Outcome: Progressing     Problem: Patient Centered Care  Goal: Patient preferences are identified and integrated in the patient's plan of care  Description: Interventions:  - What would you like us to know as we care for you?   - Provide timely, complete, and accurate information to patient/family  - Incorporate patient and family knowledge, values, beliefs, and cultural backgrounds into the planning and delivery of care  - Encourage patient/family to participate in care and decision-making at the level they choose  - Honor patient and family perspectives and choices  Outcome: Progressing     Problem: Patient/Family Goals  Goal: Patient/Family Long Term Goal  Description: Patient's Long Term Goal: discharge    Interventions:  - Monitor vital signs  - Monitor neurological status  - Ambulate as tolerated  - See additional Care Plan goals for specific interventions  Outcome: Progressing  Goal: Patient/Family Short Term Goal  Description: Patient's Short Term Goal: feel better    Interventions:   - Verbalize needs and wants  - Antianxiety medications as needed  -  See additional Care Plan goals for specific interventions  Outcome: Progressing     Problem: Delirium  Goal: Minimize duration of delirium  Description: Interventions:  - Encourage use of hearing aids, eye glasses  - Promote highest level of mobility daily  - Provide frequent reorientation  - Promote wakefulness i.e. lights on, blinds open  - Promote sleep, encourage patient's normal rest cycle i.e. lights off, TV off, minimize noise and interruptions  - Encourage family to assist in orientation and promotion of home routines  Outcome: Progressing     Problem: METABOLIC/FLUID AND ELECTROLYTES - ADULT  Goal: Electrolytes maintained within normal limits  Description: INTERVENTIONS:  - Monitor labs and rhythm and assess patient for signs and symptoms of electrolyte imbalances  - Administer electrolyte replacement as ordered  - Monitor response to electrolyte replacements, including rhythm and repeat lab results as appropriate  - Fluid restriction as ordered  - Instruct patient on fluid and nutrition restrictions as appropriate  Outcome: Progressing     Problem: MUSCULOSKELETAL - ADULT  Goal: Return mobility to safest level of function  Description: INTERVENTIONS:  - Assess patient stability and activity tolerance for standing, transferring and ambulating w/ or w/o assistive devices  - Assist with transfers and ambulation using safe patient handling equipment as needed  - Ensure adequate protection for wounds/incisions during mobilization  - Obtain PT/OT consults as needed  - Advance activity as appropriate  - Communicate ordered activity level and limitations with patient/family  Outcome: Progressing     Problem: NEUROLOGICAL - ADULT  Goal: Achieves stable or improved neurological status  Description: INTERVENTIONS  - Assess for and report changes in neurological status  - Initiate measures to prevent increased intracranial pressure  - Maintain blood pressure and fluid volume within ordered parameters to optimize  cerebral perfusion and minimize risk of hemorrhage  - Monitor temperature, glucose, and sodium. Initiate appropriate interventions as ordered  Outcome: Progressing  Goal: Absence of seizures  Description: INTERVENTIONS  - Monitor for seizure activity  - Administer anti-seizure medications as ordered  - Monitor neurological status  Outcome: Progressing  Goal: Remains free of injury related to seizure activity  Description: INTERVENTIONS:  - Maintain airway, patient safety  and administer oxygen as ordered  - Monitor patient for seizure activity, document and report duration and description of seizure to MD/LIP  - If seizure occurs, turn patient to side and suction secretions as needed  - Reorient patient post seizure  - Seizure pads on all 4 side rails  - Instruct patient/family to notify RN of any seizure activity  - Instruct patient/family to call for assistance with activity based on assessment  Outcome: Progressing  Goal: Achieves maximal functionality and self care  Description: INTERVENTIONS  - Monitor swallowing and airway patency with patient fatigue and changes in neurological status  - Encourage and assist patient to increase activity and self care with guidance from PT/OT  - Encourage visually impaired, hearing impaired and aphasic patients to use assistive/communication devices  Outcome: Progressing     Problem: Safety Risk - Non-Violent Restraints  Goal: Patient will remain free from self-harm  Description: INTERVENTIONS:  - Apply the least restrictive restraint to prevent harm  - Notify patient and family of reasons restraints applied  - Assess for any contributing factors to confusion (electrolyte disturbances, delirium, medications)  - Discontinue any unnecessary medical devices as soon as possible  - Assess the patient's physical comfort, circulation, skin condition, hydration, nutrition and elimination needs   - Reorient and redirection as needed  - Assess for the need to continue  restraints  Outcome: Progressing     Problem: Risk for Violence/Aggression  Goal: Absence of Violence/Aggression  Description: INTERVENTIONS:   - Identify precipitating factors for behavior   - Notify Charge RN/Provider   - Consider decreasing stimulation   - Consider distraction measures   - Consider discussion with provider regarding prn meds    - Consider GABRIEL (Moderate Risk only)   - Consider Code Support (High Risk only)   - Consider room safety checks   - Consider restraints  Outcome: Progressing

## 2024-04-03 NOTE — PROGRESS NOTES
[facility] will defer [facility] plan review to findings of psychiatry consult.

## 2024-04-03 NOTE — PROGRESS NOTES
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 The patient has been demonstrating confusion, restlessness, agitation. Patient indicated for psych consult for evaluation and advise.  Consult Duration   The patient seen for over 50-minute, follow-up evaluation, over 50% counseling and coordinating care addressing confusion, restlessness.    Record reviewed, communication with attending, communication with RN and patient seen face to face evaluation.  History of Present Illness:      Communicating with team, no issues reported overnight. Patient continues to demonstrate some alternation in his cognition. He remains in soft wrist restraints.     The patient received the following psychotropic medications: Depakote 250 mg BID, Zyprexa 5 mg,     Labs and imaging reviewed: TSH 3.927, B12 470  CT head did not demonstrate any acute findings.    The patient seen today laying down in his bed.  Patient is in restraints and patient is disheveled.  Patient very restless mumbling with disorganized speech.  Patient was talking to himself responding to internal stimuli prior to my visit and upon leaving him.    The patient continue restricting restlessness and agitation with increased confusion.  Otherwise the patient has been taking his medication and according to the staff he slept slightly better last night.    Provided assurance to the patient with orientation indicating to him that he is in the hospital in a safe place.  Change the TV to relaxing channel.     The patient is alert and oriented to person and to the fact that he is in the hospital but not to the date or condition.  Patient demonstrating disorganized thought process.    The patient has been demonstrating alternation in his mood and  cognition with increased hallucination, delusional ideation, distractibility, inattentiveness and disorientation.    The patient has been demonstrating delirium episode with alternation in mood and cognition with episodes of  increased confusion, restlessness, agitation and response to internal stimuli.       Past Psychiatric/Medication History:  1. Prior diagnoses: Previous delirium episode.  2. Past psychiatric inpatient: Multiple admission to medical inpatient for altered mental status.  3. Past outpatient history: None  4. Past suicide history: None  5. 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   Resection 2019    Catatonia 03/31/2024    Cognitive communication deficit 03/14/2023    Cystic disease of liver 02/22/2023    Dementia (HCC) 02/01/2023    Difficult airway 02/02/2023    Dyslipidemia 02/01/2023    Elevated white blood cell count, unspecified 02/22/2023    Encephalopathy 09/19/2012    Essential hypertension     Gastro-esophageal reflux disease with esophagitis, without bleeding 02/22/2023    Gout, chronic 03/31/2024    Gross hematuria 03/31/2024    Hypercholesterolemia 06/25/2015    Hyperlipidemia 09/19/2012    Leukocytosis 02/19/2023    Liver cyst 08/30/2022    Metabolic encephalopathy 02/22/2023    Neuroendocrine carcinoma, 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   Keppra    Seizure disorder (HCC)     Toxic metabolic encephalopathy 2022    Transient global amnesia 01/10/2017    Formatting of this note might be different from the original.   Brief -Plavix    Unspecified dementia, unspecified severity, without behavioral disturbance, psychotic disturbance, mood disturbance, and anxiety ([facility]) 2023    Vascular dementia (HCC) 2022    Vascular dementia, unspecified severity, without behavioral disturbance, psychotic disturbance, mood disturbance, and anxiety ([facility]) 2023    Weakness 2023    Weakness of left upper extremity 2022       Past Surgical History  History reviewed. No pertinent surgical history.    Family History  No family history on file.    Social History  Social History     Socioeconomic History    Marital status: Single   Tobacco Use    Smoking status: Former     Packs/day: 1.00     Years: 25.00     Additional pack years: 0.00     Total pack years: 25.00     Types: Cigarettes     Quit date: 1990     Years since quittin.2    Tobacco comments:      Cigarettes 1 25 Quit:     Social Determinants of Health     Food Insecurity: Unknown (3/31/2024)    Food Insecurity     Food Insecurity: Patient unable to answer   Transportation Needs: Unknown (3/31/2024)    Transportation Needs     Lack of Transportation: Patient unable to answer   Housing Stability: Unknown (3/31/2024)     Housing Stability     Housing Instability: Patient unable to answer           Current Medications:      Medications Prior to Admission   Medication Sig    albuterol 108 (90 Base) MCG/ACT Inhalation Aero Soln Inhale 2 puffs into the lungs every 4 (four) hours as needed for Wheezing.    carvedilol 12.5 MG Oral Tab Take 1 tablet (12.5 mg total) by mouth 2 (two) times daily with meals.    clopidogrel 75 MG Oral Tab Take 1 tablet (75 mg total) by mouth daily.    colchicine 0.6 MG Oral Tab Take 1 tablet (0.6 mg total) by mouth daily.    tamsulosin 0.4 MG Oral Cap Take 1 capsule (0.4 mg total) by mouth daily.    acetaminophen 325 MG Oral Tab Take 2 tablets (650 mg total) by mouth every 6 (six) hours as needed for Pain.    fluticasone-salmeterol 250-50 MCG/ACT Inhalation Aerosol Powder, Breath Activated Inhale 1 puff into the lungs 2 (two) times daily.       Allergies  Allergies   Allergen Reactions    Risperidone WHEEZING       Review of Systems:   As by Admitting/Attending    Results:   Laboratory Data:  Lab Results   Component Value Date    WBC 12.4 (H) 04/03/2024    HGB 17.1 04/03/2024    HCT 48.4 04/03/2024    .0 04/03/2024    CREATSERUM 1.14 04/03/2024    BUN 19 04/03/2024     04/03/2024    K 4.1 04/03/2024     (H) 04/03/2024    CO2 19.0 (L) 04/03/2024     (H) 04/03/2024    CA 9.8 04/03/2024    TSH 3.927 04/03/2024    MG 2.0 04/03/2024    B12 470 04/03/2024         Imaging:  MRI BRAIN WO ACUTE (3) SEQUENCE (CPT=70551)    Result Date: 4/2/2024  CONCLUSION:  1. No acute infarct or hemorrhage. 2. Advanced changes of chronic small vessel disease in both cerebral hemispheres. 3. Moderate changes of chronic small vessel disease in the saroj. 4. Ventricular prominence greater than the degree of cortical sulcal prominence.  Elevated Guevara index, and diminished corpus callosal angle are suggestive, but not specific for normal pressure hydrocephalus.  Findings could also be seen with central atrophy  greater than degree of cortical atrophy.    Dictated by (CST): Romeo oCrtes MD on 4/02/2024 at 4:16 PM     Finalized by (CST): Romeo Cortes MD on 4/02/2024 at 4:35 PM          XR CHEST AP PORTABLE  (CPT=71045)    Result Date: 4/2/2024  CONCLUSION: No radiographic evidence of acute cardiopulmonary abnormality.  No evidence of aspiration.  No new focal consolidation.    Dictated by (CST): Ulysses Galindo MD on 4/02/2024 at 11:45 AM     Finalized by (CST): Ulysses Galindo MD on 4/02/2024 at 11:45 AM          XR CHEST AP PORTABLE  (CPT=71045)    Result Date: 3/31/2024  CONCLUSION: Linear opacities in both lung bases suggesting atelectasis.  Otherwise no acute cardiopulmonary abnormality.   Dictated by (CST): Jhoan Nunn MD on 3/31/2024 at 11:36 AM     Finalized by (CST): Jhoan Nunn MD on 3/31/2024 at 11:37 AM          CT STROKE CTA BRAIN/CTA NECK (W IV)(CPT=70496/33759)    Result Date: 3/31/2024  CONCLUSION:  1. CTA head: No hemodynamically significant stenosis, occlusion, or gross aneurysm in the anterior or posterior circulation. 2. CTA neck: No hemodynamically significant stenosis, occlusion, or dissection of the carotids or vertebrals. 3.  Advanced chronic appearing paranasal sinus inflammation.  Dictated by (CST): Jhoan Nunn MD on 3/31/2024 at 11:18 AM     Finalized by (CST): Jhoan Nunn MD on 3/31/2024 at 11:22 AM          CT STROKE BRAIN (NO IV)(CPT=70450)    Result Date: 3/31/2024  CONCLUSION:  1.  No acute intracranial process. 2.  There are moderate microvascular white matter ischemic changes, likely related to long-standing hypertension and/or diabetes.  Interval development of multiple lacunar infarcts most notably in the right caudate head and right basal ganglia, new since  2012 imaging. 3.  Atherosclerosis in the anterior and posterior circulations.  Exam discussed with Dr. Lynn on 3/31/24 at 11:17 a.m.  Dictated by (CST): Jhoan Nunn MD on 3/31/2024 at 11:14 AM     Finalized by (CST): Edouard  MD Jhoan on 3/31/2024 at 11:18 AM           Vital Signs:   Blood pressure (!) 172/88, pulse 79, temperature 97.1 °F (36.2 °C), temperature source Axillary, resp. rate 18, height 68\", weight 82.1 kg (180 lb 14.4 oz), SpO2 95%.    Mental Status Exam:   Appearance: Stated age male, in hospital gown, laying down in hospital bed, in soft restraints.  Patient is very restless and refusing care.  Psychomotor: Patient has been demonstrating restlessness and agitation.   Orientation: Alert and oriented to person. Patient confused to date and condition  Gait: Not evaluated.  Attitude/Coorperation: limited cooperation due to confusion, restlessness, agitation  Behavior: Episodes of restlessness and agitation.  Speech: Regular rate and rhythm speech.  Scattered speech  Mood: Having difficulty expressing his emotion.  Affect: restricted but mostly anxious, paranoid and restless.  Thought process: Confused, disorganized  Thought content: No reports of  suicidal or homicidal ideation.  Perceptions: Patient has been demonstrating response to internal stimuli hallucinating and talking to self..  Concentration: Grossly impaired  Memory: Grossly impaired  Intellect: Average.  Judgment and Insight: Impaired.  Patient demonstrating cognitive impairment.    Impression:     Delirium due to other medical condition.  Episodic mood disorder.  Rule out vascular dementia with behavioral disturbance.  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  Although the etiology still not discovered, the patient had a few episodes of delirium in [facility].    The patient today has  been demonstrating increased alternation in his mood, restlessness, irritability, delusional ideation, distractibility, disorientation and has been demonstrating delirious episode.  Imaging indicate vascular ischemic changes.    The patient has been demonstrating delirium episode with alternation in mood and cognition with episodes of  increased confusion, restlessness, agitation and response to internal stimuli.     4/3/2024: The patient today continues to demonstrate confusion, restlessness, agitation. He remains in soft restraints. Vitamin B12 and TSH are within normal limits.    Discussed risk and benefit, acknowledging the current symptom and severity.  At this point, I would recommend the following approach:     Focus on safety.  Restrained at time if indicated  Focus on education and support.  Focus on insight orientation helping the patient understand diagnosis and treatment plan.  Continue Zyprexa 5 mg nightly.  Continue Depakote 250 mg twice daily.  Utilize Haldol 2 mg IV every 2 hours as needed for agitation.  Utilize Ativan 1 mg every 6 hours as needed for anxiety.  Processed with patient at length, the initiation of the above psychotropic medications I advised the patient of the risks, benefits, alternatives and potential side effects. The patient consents to administration of the medications and understands the right to refuse medications at any time. The patient verbalized understanding.   Coordinate plan with team    Orders This Visit:  Orders Placed This Encounter   Procedures    Basic Metabolic Panel (8)    CBC With Differential With Platelet    Troponin I (High Sensitivity)    Urinalysis, Routine    Basic Metabolic Panel (8)    CBC With Differential With Platelet    Potassium    Potassium    Lipid Panel    Hemoglobin A1C    Basic Metabolic Panel (8)    CBC With Differential With Platelet    Magnesium    Vitamin B12    TSH W Reflex To Free T4    SARS-CoV-2/Flu A and B/RSV by PCR (GeneXpert)     $$$$Respiratory Flu Expanded Panel + Covid-19$$$$       Meds This Visit:  Requested Prescriptions      No prescriptions requested or ordered in this encounter       Anselmo Lawson MD  4/3/2024    Note to Patient: The 21st Century Cures Act makes medical notes like these available to patients in the interest of transparency. However, be advised this is a medical document. It is intended as peer to peer communication. It is written in medical language and may contain abbreviations or verbiage that are unfamiliar. It may appear blunt or direct. Medical documents are intended to carry relevant information, facts as evident, and the clinical opinion of the practitioner. This note may have been transcribed using a voice dictation system. Voice recognition errors may occur. This should not be taken to alter the content or meaning of this note.

## 2024-04-03 NOTE — PLAN OF CARE
Patient alert to self. Restraints continued. IVF dc'd per MD. Fall precautions in place. Call light within reach. Echo done today.   Problem: Risk for Violence-Violent Restraints/Seclusion  Goal: Patient will not express any violent or self-destructive behaviors  Description: Interventions:  - Apply the least restrictive restraint to prevent harm if patient strikes out and is not responding to redirection  - Notify patient and family of reasons restraints applied  - Assist the patient to identify the precipitating event  - Assist the patient to identify alternatives to physically acting out  - Assess for any contributing factors to violent behaviors (substances,  medications, history of trauma)  - Assess the patient's physical comfort, circulation, skin condition, hydration, nutrition and elimination needs   - Assess for the need to continue restraints  - Evaluate the need for an emergency medication  Outcome: Progressing     Problem: Patient Centered Care  Goal: Patient preferences are identified and integrated in the patient's plan of care  Description: Interventions:  - What would you like us to know as we care for you? From [place]  - Provide timely, complete, and accurate information to patient/family  - Incorporate patient and family knowledge, values, beliefs, and cultural backgrounds into the planning and delivery of care  - Encourage patient/family to participate in care and decision-making at the level they choose  - Honor patient and family perspectives and choices  Outcome: Progressing     Problem: Patient/Family Goals  Goal: Patient/Family Long Term Goal  Description: Patient's Long Term Goal: discharge    Interventions:  - Monitor vital signs  - Monitor neurological status  - Ambulate as tolerated  - See additional Care Plan goals for specific interventions  Outcome: Progressing  Goal: Patient/Family Short Term Goal  Description: Patient's Short Term Goal: feel better    Interventions:   - Verbalize needs  and wants  - Antianxiety medications as needed  - See additional Care Plan goals for specific interventions  Outcome: Progressing     Problem: Delirium  Goal: Minimize duration of delirium  Description: Interventions:  - Encourage use of hearing aids, eye glasses  - Promote highest level of mobility daily  - Provide frequent reorientation  - Promote wakefulness i.e. lights on, blinds open  - Promote sleep, encourage patient's normal rest cycle i.e. lights off, TV off, minimize noise and interruptions  - Encourage family to assist in orientation and promotion of home routines  Outcome: Progressing     Problem: METABOLIC/FLUID AND ELECTROLYTES - ADULT  Goal: Electrolytes maintained within normal limits  Description: INTERVENTIONS:  - Monitor labs and rhythm and assess patient for signs and symptoms of electrolyte imbalances  - Administer electrolyte replacement as ordered  - Monitor response to electrolyte replacements, including rhythm and repeat lab results as appropriate  - Fluid restriction as ordered  - Instruct patient on fluid and nutrition restrictions as appropriate  Outcome: Progressing     Problem: MUSCULOSKELETAL - ADULT  Goal: Return mobility to safest level of function  Description: INTERVENTIONS:  - Assess patient stability and activity tolerance for standing, transferring and ambulating w/ or w/o assistive devices  - Assist with transfers and ambulation using safe patient handling equipment as needed  - Ensure adequate protection for wounds/incisions during mobilization  - Obtain PT/OT consults as needed  - Advance activity as appropriate  - Communicate ordered activity level and limitations with patient/family  Outcome: Progressing     Problem: NEUROLOGICAL - ADULT  Goal: Achieves stable or improved neurological status  Description: INTERVENTIONS  - Assess for and report changes in neurological status  - Initiate measures to prevent increased intracranial pressure  - Maintain blood pressure and fluid  volume within ordered parameters to optimize cerebral perfusion and minimize risk of hemorrhage  - Monitor temperature, glucose, and sodium. Initiate appropriate interventions as ordered  Outcome: Progressing  Goal: Absence of seizures  Description: INTERVENTIONS  - Monitor for seizure activity  - Administer anti-seizure medications as ordered  - Monitor neurological status  Outcome: Progressing  Goal: Remains free of injury related to seizure activity  Description: INTERVENTIONS:  - Maintain airway, patient safety  and administer oxygen as ordered  - Monitor patient for seizure activity, document and report duration and description of seizure to MD/LIP  - If seizure occurs, turn patient to side and suction secretions as needed  - Reorient patient post seizure  - Seizure pads on all 4 side rails  - Instruct patient/family to notify RN of any seizure activity  - Instruct patient/family to call for assistance with activity based on assessment  Outcome: Progressing  Goal: Achieves maximal functionality and self care  Description: INTERVENTIONS  - Monitor swallowing and airway patency with patient fatigue and changes in neurological status  - Encourage and assist patient to increase activity and self care with guidance from PT/OT  - Encourage visually impaired, hearing impaired and aphasic patients to use assistive/communication devices  Outcome: Progressing     Problem: Safety Risk - Non-Violent Restraints  Goal: Patient will remain free from self-harm  Description: INTERVENTIONS:  - Apply the least restrictive restraint to prevent harm  - Notify patient and family of reasons restraints applied  - Assess for any contributing factors to confusion (electrolyte disturbances, delirium, medications)  - Discontinue any unnecessary medical devices as soon as possible  - Assess the patient's physical comfort, circulation, skin condition, hydration, nutrition and elimination needs   - Reorient and redirection as needed  -  Assess for the need to continue restraints  Outcome: Progressing     Problem: Risk for Violence/Aggression  Goal: Absence of Violence/Aggression  Description: INTERVENTIONS:   - Identify precipitating factors for behavior   - Notify Charge RN/Provider   - Consider decreasing stimulation   - Consider distraction measures   - Consider discussion with provider regarding prn meds    - Consider GABRIEL (Moderate Risk only)   - Consider Code Support (High Risk only)   - Consider room safety checks   - Consider restraints  Outcome: Progressing

## 2024-04-03 NOTE — PROGRESS NOTES
[facility], [place] Hospitalist Progress Note     CC: Hospital Follow up    PCP: No primary care provider on file.       Assessment/Plan:     Principal Problem:    Altered mental status, unspecified altered mental status type  Active Problems:    Progressive neurologic decline    Stroke-like symptom    Severe vascular dementia with agitation (HCC)    Delirium due to another medical condition    Episodic mood disorder (HCC)    Mr. 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   -has been admitted 3 other times (last in 2/2023, at [facility] [place]) with similar presentation, extensive work up without clear etiology   -neurology consulted  -initial CT imaging stable  -MRI brain without acute infarct advanced small vessel disease, poss enlargement of ventricles  -aspirin and statin   -monitor for fever or infection   -[facility] G[facility]EL called on 4/2 in am, Psyc consulted  - PT/OT  -discussed with neurology  -post ictal agitation  -poss NPH, will discuss LP with neurology     Pink eye / right eye radiation   - hs of right eye radiation, has had history of   - antibiotic drop started on 4/2,  - improved today    Wheezing- resolved  - poss secondary to risperidone? Add to allergy list  - steroids, nebs, avoid IV benadryl given severe agitation  - CXR negative   - RVP neg     Hx of Hypertension  -unclear which meds  -renal function stable  -started on amlodipine   -coreg noted on home meds, resume      Possible left sided facial droop and weakness  -neurology consulted and stroke work up if needed based on their evaluation   -anti-platelets if  recommended by neuro   -discussed with neurology. Low suspicion for acute CVA. MRI  negative for stroke      Hx of seizure? Noted in outside records,  - not on AED's  - poss post ictal    Hs of Small cell lung cancer   - parotid involvement, has had radiation therapy   - follows at [facility]  -he's on colchicine, resumed     Fluids: stop IVF  DVT prophylaxis: lovenox daily   Code status: FULL     Discussed with Yesenia LEON on 4/2, 4/3     Questions/concerns were discussed with patient and/or family by bedside.    Thank You,  Raheem Johnson MD    Hospitalist with [facility]     Subjective:     Calmer this morning, confused, AO*1, conversant, denies complaints     OBJECTIVE:    Blood pressure (!) 172/88, pulse 79, temperature 97.1 °F (36.2 °C), temperature source Axillary, resp. rate 18, height 5' 8\" (1.727 m), weight 180 lb 14.4 oz (82.1 kg), SpO2 95%.    Temp:  [97.1 °F (36.2 °C)-98.6 °F (37 °C)] 97.1 °F (36.2 °C)  Pulse:  [79-82] 79  Resp:  [18] 18  BP: (136-186)/(78-91) 172/88  SpO2:  [93 %-99 %] 95 %      Intake/Output:    Intake/Output Summary (Last 24 hours) at 4/3/2024 1142  Last data filed at 4/3/2024 1040  Gross per 24 hour   Intake 2667 ml   Output 100 ml   Net 2567 ml       Last 3 Weights   03/31/24 1311 180 lb 14.4 oz (82.1 kg)   03/31/24 1117 177 lb 0.5 oz (80.3 kg)       Exam    Gen: confused, calm  Heent: NC AT, neck supple, eyes injected much improved  Pulm: Lungs clear,    CV: Heart with regular rate and rhythm,   Abd: Abdomen soft, nontender, nondistended,   MSK: no clubbing, no cyanosis  Skin: no rashes or lesions  Neuro: moving all 4 ext, confused        Data Review:       Labs:     Recent Labs   Lab 03/31/24  1059 04/01/24  0601 04/03/24  0622   RBC 5.96* 5.51 5.68   HGB 17.5 16.4 17.1   HCT 52.9 47.1 48.4   MCV 88.8 85.5 85.2   MCH 29.4 29.8 30.1   MCHC 33.1 34.8 35.3   RDW 12.9 13.1 13.0   NEPRELIM 9.40* 8.83* 11.16*   WBC 11.1* 11.1* 12.4*   .0 290.0 293.0         Recent  Labs   Lab 03/31/24  1058 04/01/24  0601 04/02/24  0605 04/03/24  0622   GLU 86 75  --  146*   BUN 11 12  --  19   CREATSERUM 1.15 1.06  --  1.14   EGFRCR 69 76  --  70   CA 9.8 9.3  --  9.8    140  --  141   K 3.4* 3.4*  3.4* 3.6 4.1    111  --  114*   CO2 24.0 20.0*  --  19.0*       No results for input(s): \"ALT\", \"AST\", \"ALB\", \"AMYLASE\", \"LIPASE\", \"LDH\" in the last 168 hours.    Invalid input(s): \"ALPHOS\", \"TBIL\", \"DBIL\", \"TPROT\"      Imaging:  MRI BRAIN WO ACUTE (3) SEQUENCE (CPT=70551)    Result Date: 4/2/2024  CONCLUSION:  1. No acute infarct or hemorrhage. 2. Advanced changes of chronic small vessel disease in both cerebral hemispheres. 3. Moderate changes of chronic small vessel disease in the saroj. 4. Ventricular prominence greater than the degree of cortical sulcal prominence.  Elevated Guevara index, and diminished corpus callosal angle are suggestive, but not specific for normal pressure hydrocephalus.  Findings could also be seen with central atrophy greater than degree of cortical atrophy.    Dictated by (CST): Romeo Cortes MD on 4/02/2024 at 4:16 PM     Finalized by (CST): Romeo Cortes MD on 4/02/2024 at 4:35 PM          XR CHEST AP PORTABLE  (CPT=71045)    Result Date: 4/2/2024  CONCLUSION: No radiographic evidence of acute cardiopulmonary abnormality.  No evidence of aspiration.  No new focal consolidation.    Dictated by (CST): Ulysses Galindo MD on 4/02/2024 at 11:45 AM     Finalized by (CST): Ulysses Galindo MD on 4/02/2024 at 11:45 AM             Meds:      ofloxacin  1 drop Both Eyes QID    LORazepam  1 mg Intravenous Once    ipratropium-albuterol  3 mL Nebulization 4 times per day    OLANZapine  5 mg Oral Nightly    divalproex DR  250 mg Oral BID    carvedilol  12.5 mg Oral BID with meals    colchicine  0.6 mg Oral Daily    tamsulosin  0.4 mg Oral Daily    fluticasone furoate-vilanterol  1 puff Inhalation Daily    aspirin  81 mg Oral Daily    atorvastatin  80 mg Oral Nightly     enoxaparin  40 mg Subcutaneous Daily    amLODIPine  5 mg Oral Daily      sodium chloride 75 mL/hr at 04/01/24 2055     haloperidol lactate, LORazepam, acetaminophen **OR** acetaminophen, ondansetron, glycerin-hypromellose-

## 2024-04-04 ENCOUNTER — APPOINTMENT (OUTPATIENT)
Dept: GENERAL RADIOLOGY | Facility: HOSPITAL | Age: 69
End: 2024-04-04
Attending: Other
Payer: MEDICARE

## 2024-04-04 LAB
ANION GAP SERPL CALC-SCNC: 7 MMOL/L (ref 0–18)
BASOPHILS # BLD AUTO: 0.03 X10(3) UL (ref 0–0.2)
BASOPHILS NFR BLD AUTO: 0.2 %
BUN BLD-MCNC: 20 MG/DL (ref 9–23)
BUN/CREAT SERPL: 14.6 (ref 10–20)
CALCIUM BLD-MCNC: 10.3 MG/DL (ref 8.7–10.4)
CHLORIDE SERPL-SCNC: 117 MMOL/L (ref 98–112)
CO2 SERPL-SCNC: 21 MMOL/L (ref 21–32)
CREAT BLD-MCNC: 1.37 MG/DL
DEPRECATED RDW RBC AUTO: 41.7 FL (ref 35.1–46.3)
EGFRCR SERPLBLD CKD-EPI 2021: 56 ML/MIN/1.73M2 (ref 60–?)
EOSINOPHIL # BLD AUTO: 0.03 X10(3) UL (ref 0–0.7)
EOSINOPHIL NFR BLD AUTO: 0.2 %
ERYTHROCYTE [DISTWIDTH] IN BLOOD BY AUTOMATED COUNT: 13.4 % (ref 11–15)
GLUCOSE BLD-MCNC: 123 MG/DL (ref 70–99)
HCT VFR BLD AUTO: 52.9 %
HGB BLD-MCNC: 18.5 G/DL
IMM GRANULOCYTES # BLD AUTO: 0.1 X10(3) UL (ref 0–1)
IMM GRANULOCYTES NFR BLD: 0.6 %
LYMPHOCYTES # BLD AUTO: 0.74 X10(3) UL (ref 1–4)
LYMPHOCYTES NFR BLD AUTO: 4.2 %
MAGNESIUM SERPL-MCNC: 2.1 MG/DL (ref 1.6–2.6)
MCH RBC QN AUTO: 30.1 PG (ref 26–34)
MCHC RBC AUTO-ENTMCNC: 35 G/DL (ref 31–37)
MCV RBC AUTO: 86 FL
MONOCYTES # BLD AUTO: 1.05 X10(3) UL (ref 0.1–1)
MONOCYTES NFR BLD AUTO: 6 %
NEUTROPHILS # BLD AUTO: 15.49 X10 (3) UL (ref 1.5–7.7)
NEUTROPHILS # BLD AUTO: 15.49 X10(3) UL (ref 1.5–7.7)
NEUTROPHILS NFR BLD AUTO: 88.8 %
OSMOLALITY SERPL CALC.SUM OF ELEC: 304 MOSM/KG (ref 275–295)
PLATELET # BLD AUTO: 336 10(3)UL (ref 150–450)
POTASSIUM SERPL-SCNC: 3.4 MMOL/L (ref 3.5–5.1)
RBC # BLD AUTO: 6.15 X10(6)UL
SODIUM SERPL-SCNC: 145 MMOL/L (ref 136–145)
WBC # BLD AUTO: 17.4 X10(3) UL (ref 4–11)

## 2024-04-04 PROCEDURE — B01B1ZZ FLUOROSCOPY OF SPINAL CORD USING LOW OSMOLAR CONTRAST: ICD-10-PCS | Performed by: RADIOLOGY

## 2024-04-04 PROCEDURE — 62329 THER SPI PNXR CSF FLUOR/CT: CPT | Performed by: OTHER

## 2024-04-04 PROCEDURE — 99233 SBSQ HOSP IP/OBS HIGH 50: CPT | Performed by: OTHER

## 2024-04-04 PROCEDURE — 009U3ZX DRAINAGE OF SPINAL CANAL, PERCUTANEOUS APPROACH, DIAGNOSTIC: ICD-10-PCS | Performed by: RADIOLOGY

## 2024-04-04 PROCEDURE — 99231 SBSQ HOSP IP/OBS SF/LOW 25: CPT | Performed by: OTHER

## 2024-04-04 RX ORDER — POTASSIUM CHLORIDE 20 MEQ/1
40 TABLET, EXTENDED RELEASE ORAL ONCE
Status: COMPLETED | OUTPATIENT
Start: 2024-04-04 | End: 2024-04-04

## 2024-04-04 RX ORDER — AMLODIPINE BESYLATE 5 MG/1
5 TABLET ORAL ONCE
Status: COMPLETED | OUTPATIENT
Start: 2024-04-04 | End: 2024-04-04

## 2024-04-04 RX ORDER — HYDRALAZINE HYDROCHLORIDE 25 MG/1
25 TABLET, FILM COATED ORAL EVERY 8 HOURS PRN
Status: DISCONTINUED | OUTPATIENT
Start: 2024-04-04 | End: 2024-04-22

## 2024-04-04 RX ORDER — AMLODIPINE BESYLATE 10 MG/1
10 TABLET ORAL DAILY
Status: DISCONTINUED | OUTPATIENT
Start: 2024-04-05 | End: 2024-04-22

## 2024-04-04 NOTE — PROGRESS NOTES
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 The patient has been demonstrating confusion, restlessness, agitation. Patient indicated for psych consult for evaluation and advise.  Consult Duration   The patient seen for over 50-minute, follow-up evaluation, over 50% counseling and coordinating care addressing confusion, restlessness.    Record reviewed, communication with attending, communication with RN and patient seen face to face evaluation.  History of Present Illness:      Communicating with team, no issues reported overnight. Patient continues to demonstrate some alternation in his cognition. He remains in soft wrist restraints.     The patient received the following psychotropic medications: Depakote 250 mg BID, Zyprexa 5 mg. Patient received haldol 5 mg IV and ativan 1 mg IV this morning.     Labs and imaging reviewed: Glucose 123, sodium 145, potassium 3.4, BUN 20, creatinine 1.37, WBC 17.4    CT head did not demonstrate any acute findings.    The patient seen today laying down in his bed.  Patient is in restraints and patient is disheveled.  Patient very restless mumbling with disorganized speech.  Patient was talking to himself responding to internal stimuli prior to my visit and upon leaving him.    The patient continue restricting restlessness and agitation with increased confusion.  Otherwise the patient has been taking his medication and according to the staff he slept slightly better last night.    Provided assurance to the patient with orientation indicating to him that he is in the hospital in a safe place.  Change the TV to relaxing channel.     The patient is alert and oriented to person and to the fact that he is in the hospital but not to the date or condition.  Patient  demonstrating disorganized thought process.    The patient has been demonstrating alternation in his mood and cognition with increased hallucination, delusional ideation, distractibility, inattentiveness and disorientation.    The patient has been demonstrating delirium episode with alternation in mood and cognition with episodes of  increased confusion, restlessness, agitation and response to internal stimuli.       Past Psychiatric/Medication History:  1. Prior diagnoses: Previous delirium episode.  2. Past psychiatric inpatient: Multiple admission to medical inpatient for altered mental status.  3. Past outpatient history: None  4. Past suicide history: None  5. 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   Resection 2019    Catatonia 03/31/2024    Cognitive communication deficit 03/14/2023    Cystic disease of liver 02/22/2023    Dementia (HCC) 02/01/2023    Difficult airway 02/02/2023    Dyslipidemia 02/01/2023    Elevated white blood cell count, unspecified 02/22/2023    Encephalopathy 09/19/2012    Essential hypertension     Gastro-esophageal reflux disease with esophagitis, without bleeding 02/22/2023    Gout, chronic 03/31/2024    Gross hematuria 03/31/2024    Hypercholesterolemia 06/25/2015    Hyperlipidemia 09/19/2012    Leukocytosis 02/19/2023    Liver cyst 08/30/2022    Metabolic encephalopathy 02/22/2023    Neuroendocrine carcinoma, 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   Keppra    Seizure disorder (HCC)     Toxic metabolic encephalopathy 2022    Transient global amnesia 01/10/2017    Formatting of this note might be different from the original.   Brief -Plavix    Unspecified dementia, unspecified severity, without behavioral disturbance, psychotic disturbance, mood disturbance, and anxiety (HCC) 2023    Vascular dementia (HCC) 2022    Vascular dementia, unspecified severity, without behavioral disturbance, psychotic disturbance, mood disturbance, and anxiety (HCC) 2023    Weakness 2023    Weakness of left upper extremity 2022       Past Surgical History  History reviewed. No pertinent surgical history.    Family History  No family history on file.    Social History  Social History     Socioeconomic History    Marital status: Single   Tobacco Use    Smoking status: Former     Packs/day: 1.00     Years: 25.00     Additional pack years: 0.00     Total pack years: 25.00     Types: Cigarettes     Quit date: 1990     Years since quittin.2    Tobacco comments:      Cigarettes 1 25 Quit:     Social Determinants of Health     Food Insecurity: Unknown (3/31/2024)    Food Insecurity     Food Insecurity: Patient unable to answer   Transportation Needs: Unknown (3/31/2024)     Transportation Needs     Lack of Transportation: Patient unable to answer   Housing Stability: Unknown (3/31/2024)    Housing Stability     Housing Instability: Patient unable to answer           Current Medications:      Medications Prior to Admission   Medication Sig    albuterol 108 (90 Base) MCG/ACT Inhalation Aero Soln Inhale 2 puffs into the lungs every 4 (four) hours as needed for Wheezing.    carvedilol 12.5 MG Oral Tab Take 1 tablet (12.5 mg total) by mouth 2 (two) times daily with meals.    clopidogrel 75 MG Oral Tab Take 1 tablet (75 mg total) by mouth daily.    colchicine 0.6 MG Oral Tab Take 1 tablet (0.6 mg total) by mouth daily.    tamsulosin 0.4 MG Oral Cap Take 1 capsule (0.4 mg total) by mouth daily.    acetaminophen 325 MG Oral Tab Take 2 tablets (650 mg total) by mouth every 6 (six) hours as needed for Pain.    fluticasone-salmeterol 250-50 MCG/ACT Inhalation Aerosol Powder, Breath Activated Inhale 1 puff into the lungs 2 (two) times daily.       Allergies  Allergies   Allergen Reactions    Risperidone WHEEZING       Review of Systems:   As by Admitting/Attending    Results:   Laboratory Data:  Lab Results   Component Value Date    WBC 17.4 (H) 04/04/2024    HGB 18.5 (H) 04/04/2024    HCT 52.9 04/04/2024    .0 04/04/2024    CREATSERUM 1.37 (H) 04/04/2024    BUN 20 04/04/2024     04/04/2024    K 3.4 (L) 04/04/2024     (H) 04/04/2024    CO2 21.0 04/04/2024     (H) 04/04/2024    CA 10.3 04/04/2024    TP 7.0 04/03/2024    TSH 3.927 04/03/2024    MG 2.1 04/04/2024    B12 470 04/03/2024         Imaging:  MRI BRAIN WO ACUTE (3) SEQUENCE (CPT=70551)    Result Date: 4/2/2024  CONCLUSION:  1. No acute infarct or hemorrhage. 2. Advanced changes of chronic small vessel disease in both cerebral hemispheres. 3. Moderate changes of chronic small vessel disease in the saroj. 4. Ventricular prominence greater than the degree of cortical sulcal prominence.  Elevated Guevara index, and  diminished corpus callosal angle are suggestive, but not specific for normal pressure hydrocephalus.  Findings could also be seen with central atrophy greater than degree of cortical atrophy.    Dictated by (CST): Romeo Cortes MD on 4/02/2024 at 4:16 PM     Finalized by (CST): Romeo Cortes MD on 4/02/2024 at 4:35 PM          XR CHEST AP PORTABLE  (CPT=71045)    Result Date: 4/2/2024  CONCLUSION: No radiographic evidence of acute cardiopulmonary abnormality.  No evidence of aspiration.  No new focal consolidation.    Dictated by (CST): Ulysses Galindo MD on 4/02/2024 at 11:45 AM     Finalized by (CST): Ulysses Galindo MD on 4/02/2024 at 11:45 AM           Vital Signs:   Blood pressure (!) 166/101, pulse 103, temperature 98.1 °F (36.7 °C), temperature source Axillary, resp. rate 18, height 68\", weight 82.1 kg (180 lb 14.4 oz), SpO2 94%.    Mental Status Exam:   Appearance: Stated age male, in hospital gown, laying down in hospital bed, in soft restraints.  Patient is very restless and refusing care.  Psychomotor: Patient has been demonstrating restlessness and agitation.   Orientation: Alert and oriented to person. Patient confused to date and condition  Gait: Not evaluated.  Attitude/Coorperation: limited cooperation due to confusion, restlessness, agitation  Behavior: Episodes of restlessness and agitation.  Speech: Regular rate and rhythm speech.  Scattered speech  Mood: Having difficulty expressing his emotion.  Affect: restricted but mostly anxious, paranoid and restless.  Thought process: Confused, disorganized  Thought content: No reports of  suicidal or homicidal ideation.  Perceptions: Patient has been demonstrating response to internal stimuli hallucinating and talking to self..  Concentration: Grossly impaired  Memory: Grossly impaired  Intellect: Average.  Judgment and Insight: Impaired.  Patient demonstrating cognitive impairment.    Impression:     Delirium due to other medical condition.  Episodic mood  disorder.  Rule out vascular dementia with behavioral disturbance.  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  Although the etiology still not discovered, the patient had a few episodes of delirium in [facility].    The patient today has been demonstrating increased alternation in his mood, restlessness, irritability, delusional ideation, distractibility, disorientation and has been demonstrating delirious episode.  Imaging indicate vascular ischemic changes.    The patient has been demonstrating delirium episode with alternation in mood and cognition with episodes of  increased confusion, restlessness, agitation and response to internal stimuli.     4/3/2024: The patient today continues to demonstrate confusion, restlessness, agitation. He remains in soft restraints. Vitamin B12 and TSH are within normal limits.    4/4/2024: Patient titrating sedation.  Patient is anticipated to have LP today    Discussed risk and benefit, acknowledging the current symptom and severity.  At this point, I would recommend the following approach:     Focus on safety.  Restrained at time if indicated  Focus on education and support.  Focus on insight orientation helping the patient understand diagnosis and treatment plan.  Continue Zyprexa 5 mg nightly.  Continue Depakote 250 mg twice daily.  Utilize Haldol 2 mg IV every 2 hours as needed for agitation.  Utilize Ativan 1 mg every 6 hours as needed for anxiety.  Processed with patient at length, the initiation of the above psychotropic medications I advised the patient of the risks, benefits, alternatives and  potential side effects. The patient consents to administration of the medications and understands the right to refuse medications at any time. The patient verbalized understanding.   Coordinate plan with team    Orders This Visit:  Orders Placed This Encounter   Procedures    Basic Metabolic Panel (8)    CBC With Differential With Platelet    Troponin I (High Sensitivity)    Urinalysis, Routine    Basic Metabolic Panel (8)    CBC With Differential With Platelet    Potassium    Potassium    Lipid Panel    Hemoglobin A1C    Basic Metabolic Panel (8)    CBC With Differential With Platelet    Magnesium    Vitamin B12    TSH W Reflex To Free T4    CBC With Differential With Platelet    Basic Metabolic Panel (8)    Magnesium    Cell Count, CSF    Glucose, Serum    Protein, Total, Serum    Glucose, Cerebrospinal Fluid    Protein, Total, Csf    Meningitis/Encephalitis Panel by PCR    CJD 14-3-3 Protein Tau Total, Cerebrospinal Fluid    MD BLOOD SMEAR CONSULT    Basic Metabolic Panel (8)    CBC With Differential With Platelet    Magnesium    Potassium    Cytology fluids    SARS-CoV-2/Flu A and B/RSV by PCR (GeneXpert)    $$$$Respiratory Flu Expanded Panel + Covid-19$$$$    CSF culture    Fungus Culture and Stain       Meds This Visit:  Requested Prescriptions      No prescriptions requested or ordered in this encounter       Anselmo Lawson MD  4/4/2024    Note to Patient: The 21st Century Cures Act makes medical notes like these available to patients in the interest of transparency. However, be advised this is a medical document. It is intended as peer to peer communication. It is written in medical language and may contain abbreviations or verbiage that are unfamiliar. It may appear blunt or direct. Medical documents are intended to carry relevant information, facts as evident, and the clinical opinion of the practitioner. This note may have been transcribed using a voice dictation system. Voice recognition errors may  occur. This should not be taken to alter the content or meaning of this note.

## 2024-04-04 NOTE — PLAN OF CARE
Patient alert and oriented to self. No acute changes. Denies pain at this time, no needs. Restraints in place. Frequent rounding. Bed in lowest position with safety precautions in place.    Problem: Risk for Violence-Violent Restraints/Seclusion  Goal: Patient will not express any violent or self-destructive behaviors  Description: Interventions:  - Apply the least restrictive restraint to prevent harm if patient strikes out and is not responding to redirection  - Notify patient and family of reasons restraints applied  - Assist the patient to identify the precipitating event  - Assist the patient to identify alternatives to physically acting out  - Assess for any contributing factors to violent behaviors (substances,  medications, history of trauma)  - Assess the patient's physical comfort, circulation, skin condition, hydration, nutrition and elimination needs   - Assess for the need to continue restraints  - Evaluate the need for an emergency medication  Outcome: Progressing     Problem: Patient Centered Care  Goal: Patient preferences are identified and integrated in the patient's plan of care  Description: Interventions:  - What would you like us to know as we care for you? From [facility]  - Provide timely, complete, and accurate information to patient/family  - Incorporate patient and family knowledge, values, beliefs, and cultural backgrounds into the planning and delivery of care  - Encourage patient/family to participate in care and decision-making at the level they choose  - Honor patient and family perspectives and choices  Outcome: Progressing     Problem: Patient/Family Goals  Goal: Patient/Family Long Term Goal  Description: Patient's Long Term Goal: discharge    Interventions:  - Monitor vital signs  - Monitor neurological status  - Ambulate as tolerated  - See additional Care Plan goals for specific interventions  Outcome: Progressing  Goal: Patient/Family Short Term Goal  Description:  Patient's Short Term Goal: feel better    Interventions:   - Verbalize needs and wants  - Antianxiety medications as needed  - See additional Care Plan goals for specific interventions  Outcome: Progressing     Problem: Delirium  Goal: Minimize duration of delirium  Description: Interventions:  - Encourage use of hearing aids, eye glasses  - Promote highest level of mobility daily  - Provide frequent reorientation  - Promote wakefulness i.e. lights on, blinds open  - Promote sleep, encourage patient's normal rest cycle i.e. lights off, TV off, minimize noise and interruptions  - Encourage family to assist in orientation and promotion of home routines  Outcome: Progressing     Problem: METABOLIC/FLUID AND ELECTROLYTES - ADULT  Goal: Electrolytes maintained within normal limits  Description: INTERVENTIONS:  - Monitor labs and rhythm and assess patient for signs and symptoms of electrolyte imbalances  - Administer electrolyte replacement as ordered  - Monitor response to electrolyte replacements, including rhythm and repeat lab results as appropriate  - Fluid restriction as ordered  - Instruct patient on fluid and nutrition restrictions as appropriate  Outcome: Progressing     Problem: MUSCULOSKELETAL - ADULT  Goal: Return mobility to safest level of function  Description: INTERVENTIONS:  - Assess patient stability and activity tolerance for standing, transferring and ambulating w/ or w/o assistive devices  - Assist with transfers and ambulation using safe patient handling equipment as needed  - Ensure adequate protection for wounds/incisions during mobilization  - Obtain PT/OT consults as needed  - Advance activity as appropriate  - Communicate ordered activity level and limitations with patient/family  Outcome: Progressing     Problem: NEUROLOGICAL - ADULT  Goal: Achieves stable or improved neurological status  Description: INTERVENTIONS  - Assess for and report changes in neurological status  - Initiate measures to  prevent increased intracranial pressure  - Maintain blood pressure and fluid volume within ordered parameters to optimize cerebral perfusion and minimize risk of hemorrhage  - Monitor temperature, glucose, and sodium. Initiate appropriate interventions as ordered  Outcome: Progressing  Goal: Absence of seizures  Description: INTERVENTIONS  - Monitor for seizure activity  - Administer anti-seizure medications as ordered  - Monitor neurological status  Outcome: Progressing  Goal: Remains free of injury related to seizure activity  Description: INTERVENTIONS:  - Maintain airway, patient safety  and administer oxygen as ordered  - Monitor patient for seizure activity, document and report duration and description of seizure to MD/LIP  - If seizure occurs, turn patient to side and suction secretions as needed  - Reorient patient post seizure  - Seizure pads on all 4 side rails  - Instruct patient/family to notify RN of any seizure activity  - Instruct patient/family to call for assistance with activity based on assessment  Outcome: Progressing  Goal: Achieves maximal functionality and self care  Description: INTERVENTIONS  - Monitor swallowing and airway patency with patient fatigue and changes in neurological status  - Encourage and assist patient to increase activity and self care with guidance from PT/OT  - Encourage visually impaired, hearing impaired and aphasic patients to use assistive/communication devices  Outcome: Progressing     Problem: Safety Risk - Non-Violent Restraints  Goal: Patient will remain free from self-harm  Description: INTERVENTIONS:  - Apply the least restrictive restraint to prevent harm  - Notify patient and family of reasons restraints applied  - Assess for any contributing factors to confusion (electrolyte disturbances, delirium, medications)  - Discontinue any unnecessary medical devices as soon as possible  - Assess the patient's physical comfort, circulation, skin condition, hydration,  nutrition and elimination needs   - Reorient and redirection as needed  - Assess for the need to continue restraints  Outcome: Progressing     Problem: Risk for Violence/Aggression  Goal: Absence of Violence/Aggression  Description: INTERVENTIONS:   - Identify precipitating factors for behavior   - Notify Charge RN/Provider   - Consider decreasing stimulation   - Consider distraction measures   - Consider discussion with provider regarding prn meds    - Consider GABRIEL (Moderate Risk only)   - Consider Code Support (High Risk only)   - Consider room safety checks   - Consider restraints  Outcome: Progressing

## 2024-04-04 NOTE — PHYSICAL THERAPY NOTE
PHYSICAL THERAPY TREATMENT NOTE - INPATIENT     Room Number: 561/561-A       Presenting Problem: AMS, agitation, work up in progress       Problem List  Principal Problem:    NPH (normal pressure hydrocephalus) ([facility])  Active Problems:    Severe vascular dementia with agitation (HCC)    Delirium due to another medical condition    Episodic mood disorder (HCC)    TIA (transient ischemic attack)      PHYSICAL THERAPY ASSESSMENT   Patient demonstrates limited progress this session, goals  remain in progress.    Patient continues to function below baseline with bed mobility, transfers, and gait.  Contributing factors to remaining limitations include decreased functional strength, decreased endurance/aerobic capacity, and cognitive deficits (combative, agitated, aggressive tendencies,lack of safety awareness or insight into his current impairments ).  Next session anticipate patient to progress bed mobility and transfers.  Physical Therapy will continue to follow patient for duration of hospitalization.    Patient continues to benefit from continued skilled PT services: to be determined.    Pt scheduled to have LP today.  Pt currently requires max a for bed mobility, transfers, and standing(unable to come to full standing), and is unable to ambulate, and remains with significantly impaired cognition.     PLAN  PT Treatment Plan: Energy conservation;Gait training  Frequency (Obs): 3x/week (trial)    SUBJECTIVE  \"DON'T DO THAT!!!\" With most activity    OBJECTIVE  Precautions: Bed/chair alarm    WEIGHT BEARING RESTRICTION                PAIN ASSESSMENT   Rating: Unable to rate  Location: did not report pain  Management Techniques: Activity promotion    BALANCE  Static Sitting: Poor  Dynamic Sitting: Poor  Static Standing: Dependent  Dynamic Standing: Not tested    ACTIVITY TOLERANCE                          O2 WALK       AM-PAC '6-Clicks' INPATIENT SHORT FORM - BASIC MOBILITY  How much difficulty does the patient currently  have...  Patient Difficulty: Turning over in bed (including adjusting bedclothes, sheets and blankets)?: A Lot   Patient Difficulty: Sitting down on and standing up from a chair with arms (e.g., wheelchair, bedside commode, etc.): A Lot   Patient Difficulty: Moving from lying on back to sitting on the side of the bed?: A Lot   How much help from another person does the patient currently need...   Help from Another: Moving to and from a bed to a chair (including a wheelchair)?: Total   Help from Another: Need to walk in hospital room?: Total   Help from Another: Climbing 3-5 steps with a railing?: Total     AM-PAC Score:  Raw Score: 9   Approx Degree of Impairment: 81.38%   Standardized Score (AM-PAC Scale): 30.55   CMS Modifier (G-Code): CM    FUNCTIONAL ABILITY STATUS  Functional Mobility/Gait Assessment  Gait Assistance: Not tested (pt unable to come to full standing, non amb at this time. Pt stood with max a of two+pct for safety due to cont pt agitation and at times aggression)  Distance (ft): na  Assistive Device:  (na)  Pattern:  (na)  Rolling: maximum assist of two, assisted pct with pericare  Supine to Sit: maximum assist of two  Sit to Supine: maximum assist of two  Sit to Stand: maximum assistof two, unable to come to full standing    Additional information: Pt recd in supine, pct present for safety, then with additional therapist due to pt agitation and combativeness. Pt performed fxal mobility tasks indicated above.  Pt demonstrating very strong UE strength, additional assist to prevent pt from grabbing, hitting staff during session.  Pt returned to supine end of assessment, bilat wrist restraints in place, bed alarm activated.     The patient's Approx Degree of Impairment: 81.38% has been calculated based on documentation in the [facility] '6 clicks' Inpatient Daily Activity Short Form.  Research supports that patients with this level of impairment may benefit from TBD.  Final disposition will be made by  interdisciplinary medical team.      Patient End of Session: In bed;Needs met;Call light within reach;RN aware of session/findings;Alarm set;Restraints (bilat wrist restraints in place)    CURRENT GOALS   Goals to be met by: 4/10/24  Patient Goal Patient's self-stated goal is: did not state, resisting returning to bed   Goal #1 Patient is able to demonstrate supine - sit EOB @ level: supervision      Goal #1   Current Status     Goal #2 Patient is able to demonstrate transfers Sit to/from Stand at assistance level: supervision with none      Goal #2  Current Status     Goal #3 Patient is able to ambulate 100 feet with assist device: none at assistance level: supervision     Therapeutic Activity: 45 minutes

## 2024-04-04 NOTE — PROGRESS NOTES
[facility]  [address] [place] [address], [address]  [facility] [address]  [phone]          INPATIENT NEUROLOGY   FOLLOW UP CONSULT NOTE       [facility]  part of [facility]    Report of Consultation    Carlo Ng Patient Status:  Inpatient     1955 MRN [id number]    Location [facility] 5SW/SE Attending Raheem Johnson MD    Hosp Day # 4 PCP No primary care provider on file.      Date of Admission:  3/31/2024  Date of Consult Follow Up:  2024        INTERVAL HPI:   -Lumbar puncture was done.  Hiccuping.        ?PHYSICAL EXAM:     BP (!) 166/101 (BP Location: Right arm)   Pulse 103   Temp 98.1 °F (36.7 °C) (Axillary)   Resp 18   Ht 68\"   Wt 180 lb 14.4 oz (82.1 kg)   SpO2 94%   BMI 27.51 kg/m²   General appearance: Well appearing, and in no acute distress  Skin: skin color, texture normal.  No rashes or lesions.    Head: Normocephalic, atraumatic.    Neurological exam:  Laying upright in bed, eyes closed, hiccups every 5-7 seconds.  Does not open eyes or attempt to engage by speaking, answering questions.        LABS/DATA:    Lab Results   Component Value Date    WBC 17.4 2024    HGB 18.5 2024    HCT 52.9 2024    .0 2024    CREATSERUM 1.37 2024    BUN 20 2024     2024    K 3.4 2024     2024    CO2 21.0 2024     2024    CA 10.3 2024    MG 2.1 2024       HGBA1C:    Lab Results   Component Value Date    A1C 5.2 2024     2024              IMAGING:  MRI BRAIN WO ACUTE (3) SEQUENCE (CPT=70551)    Result Date: 2024  CONCLUSION:  1. No acute infarct or hemorrhage. 2. Advanced changes of chronic small vessel disease in both cerebral hemispheres. 3. Moderate changes of chronic small vessel disease in the saroj. 4. Ventricular prominence greater than the degree of cortical sulcal prominence.  Elevated Guevara index, and diminished corpus  callosal angle are suggestive, but not specific for normal pressure hydrocephalus.  Findings could also be seen with central atrophy greater than degree of cortical atrophy.    Dictated by (CST): Romeo Cortes MD on 4/02/2024 at 4:16 PM     Finalized by (CST): Romeo Cortes MD on 4/02/2024 at 4:35 PM          XR CHEST AP PORTABLE  (CPT=71045)    Result Date: 4/2/2024  CONCLUSION: No radiographic evidence of acute cardiopulmonary abnormality.  No evidence of aspiration.  No new focal consolidation.    Dictated by (CST): Ulysses Galindo MD on 4/02/2024 at 11:45 AM     Finalized by (CST): Ulysses Galindo MD on 4/02/2024 at 11:45 AM          XR CHEST AP PORTABLE  (CPT=71045)    Result Date: 3/31/2024  CONCLUSION: Linear opacities in both lung bases suggesting atelectasis.  Otherwise no acute cardiopulmonary abnormality.   Dictated by (CST): Jhoan Nunn MD on 3/31/2024 at 11:36 AM     Finalized by (CST): Jhoan Nunn MD on 3/31/2024 at 11:37 AM          CT STROKE CTA BRAIN/CTA NECK (W IV)(CPT=70496/89679)    Result Date: 3/31/2024  CONCLUSION:  1. CTA head: No hemodynamically significant stenosis, occlusion, or gross aneurysm in the anterior or posterior circulation. 2. CTA neck: No hemodynamically significant stenosis, occlusion, or dissection of the carotids or vertebrals. 3.  Advanced chronic appearing paranasal sinus inflammation.  Dictated by (CST): Jhoan Nunn MD on 3/31/2024 at 11:18 AM     Finalized by (CST): Jhoan Nunn MD on 3/31/2024 at 11:22 AM          CT STROKE BRAIN (NO IV)(CPT=70450)    Result Date: 3/31/2024  CONCLUSION:  1.  No acute intracranial process. 2.  There are moderate microvascular white matter ischemic changes, likely related to long-standing hypertension and/or diabetes.  Interval development of multiple lacunar infarcts most notably in the right caudate head and right basal ganglia, new since  2012 imaging. 3.  Atherosclerosis in the anterior and posterior circulations.  Exam discussed  with Dr. Lynn on 3/31/24 at 11:17 a.m.  Dictated by (CST): Jhoan Nunn MD on 3/31/2024 at 11:14 AM     Finalized by (CST): Jhoan Nunn MD on 3/31/2024 at 11:18 AM              ASSESSMENT:  The patient is a 68 year old man with past medical history of suspected left MCA stroke, vascular dementia, single seizure, recent admission for catatonia, parotid small cell carcinoma, tobacco use disorder in the past, hypertension, hyperlipidemia who is being worked up for progressive neurological decline and was found encephalopathic with a left face and upper extremity weakness (resolved).  -MRI brain ventriculomegaly is worrisome for NPH, advanced chronic microvascular ischemic change  -Echocardiogram EF 60 to 65%, normal left atrial size  -LDL 51  and hemoglobin A1c 5.2    He is likely lethargic from sedation today for LP.      TIA   -Continue Aspirin 81 mg daily   -Continue Atorvastatin 80 mg daily   -PT, OT and speech therapy   -Blood pressure goals: Normotension       STROKE RISK FACTORS:   *Hypertension - Target blood pressure <140/90, <130/85 for high risk, normal 120/80  *Physical inactivity - target exercise at least 3 times per week  *Obesity - target ideal body weight and girth <35\" for women, <40\" for men  *Diabetes - Target <6.5-7%   *Smoking - Target smoking cessation  *Hyperlipidemia - Target total cholesterol < 200, Target LDL <100, < 70 for high risk, Target HDL >45 for men, >55 for women, Target triglycerides <150     Vascular dementia with behavioral changes  Concern for normal pressure hydrocephalus has had an LP in the 2022 but due to agitation they were only able to get 3 cc follow-up.  Reportedly gait improved after previous LP.  Per note, last year in May he had not had urinary incontinence or gait dysfunction yet but was noted to be irritable and poor historian; the former 2 symptoms have developed in the interim.  Opening and closing pressure was 12 today.  -Post LP physical therapy assessment if  able to participate   - Neurosurgery evaluation  -CSF studies can be followed up as outpatient   -Add thiamine     No further inpatient neurological testing needed.  Follow up in 1 month with his established neurologist.  Please call w/ further questions.      This note was prepared using Dragon Medical voice recognition dictation software and as a result, errors may occur. When identified, these errors have been corrected. While every attempt is made to correct errors during dictation, discrepancies may still exist    VITALY aBrrett DO   Staff Neurologist   4/4/2024  1:26 PM

## 2024-04-04 NOTE — PROGRESS NOTES
[facility] Hospitalist Progress Note     CC: Hospital Follow up    PCP: No primary care provider on file.       Assessment/Plan:     Principal Problem:    NPH (normal pressure hydrocephalus) (HCC)  Active Problems:    Severe vascular dementia with agitation (HCC)    Delirium due to another medical condition    Episodic mood disorder (HCC)    TIA (transient ischemic attack)    Mr. 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   -has been admitted 3 other times (last in 2/2023, at [facility]) with similar presentation, extensive work up without clear etiology   -neurology consulted  -initial CT imaging stable  -MRI brain without acute infarct advanced small vessel disease, poss enlargement of ventricles  -aspirin and statin   -monitor for fever or infection   -CODE GABRIEL called on 4/2 in am, Psyc consulted  - PT/OT  -discussed with neurology  -post ictal agitation  -poss NPH, plan for high volume LP with opening pressures this am per neuro, PT done before with weakness and ataxia, plan for PT session this afternoon following LP    Pink eye / right eye radiation   - hs of right eye radiation, has had history of   - antibiotic drop started on 4/2,  - improving    Wheezing- resolved  - poss secondary to risperidone? Add to allergy list  - steroids, nebs, avoid IV benadryl given severe agitation  - CXR negative   - RVP neg    Leucocytosis   - poss secondary to IV steroids  - no fevers  - will continue to monitor  - steroid stopped     Hx of Hypertension  -unclear which meds  -renal function stable  -started on amlodipine    -coreg noted on home meds, resume      Possible left sided facial droop and weakness  -neurology consulted and stroke work up if needed based on their evaluation   -anti-platelets if recommended by neuro   -discussed with neurology. Low suspicion for acute CVA. MRI  negative for stroke      Hx of seizure? Noted in outside records,  - not on AED's  - poss post ictal    Hs of Small cell lung cancer   - parotid involvement, has had radiation therapy   - follows at [facility]  -he's on colchicine, resumed     Fluids: stop IVF  DVT prophylaxis: lovenox daily   Code status: FULL     Discussed with Yesenia LEON on 4/2, 4/3, 4/4    Questions/concerns were discussed with patient and/or family by bedside.    Thank You,  Raheem Johnson MD    Hospitalist with [facility]     Subjective:     Calm but confused, AO*1, conversant, denies complaints, tried to ambulate with therapy this am     OBJECTIVE:    Blood pressure (!) 166/101, pulse 103, temperature 98.1 °F (36.7 °C), temperature source Axillary, resp. rate 18, height 5' 8\" (1.727 m), weight 180 lb 14.4 oz (82.1 kg), SpO2 94%.    Temp:  [97.9 °F (36.6 °C)-98.2 °F (36.8 °C)] 98.1 °F (36.7 °C)  Pulse:  [] 103  Resp:  [18-22] 18  BP: (166-170)/() 166/101  SpO2:  [94 %-97 %] 94 %      Intake/Output:    Intake/Output Summary (Last 24 hours) at 4/4/2024 1112  Last data filed at 4/4/2024 0923  Gross per 24 hour   Intake 1698 ml   Output 500 ml   Net 1198 ml       Last 3 Weights   03/31/24 1311 180 lb 14.4 oz (82.1 kg)   03/31/24 1117 177 lb 0.5 oz (80.3 kg)       Exam    Gen: confused, calm  Heent: NC AT, neck supple, eyes injected much improved  Pulm: Lungs clear,    CV: Heart with regular rate and rhythm,   Abd: Abdomen soft, nontender, nondistended,   MSK: no clubbing, no cyanosis  Skin: no rashes or lesions  Neuro: moving all 4 ext, confused        Data Review:       Labs:     Recent Labs   Lab 04/01/24  0601 04/03/24  0622 04/04/24  0629   RBC 5.51 5.68  6.15*   HGB 16.4 17.1 18.5*   HCT 47.1 48.4 52.9   MCV 85.5 85.2 86.0   MCH 29.8 30.1 30.1   MCHC 34.8 35.3 35.0   RDW 13.1 13.0 13.4   NEPRELIM 8.83* 11.16* 15.49*   WBC 11.1* 12.4* 17.4*   .0 293.0 336.0         Recent Labs   Lab 04/01/24  0601 04/02/24  0605 04/03/24  0622 04/03/24  1240 04/04/24  0629   GLU 75  --  146* 142* 123*   BUN 12  --  19  --  20   CREATSERUM 1.06  --  1.14  --  1.37*   EGFRCR 76  --  70  --  56*   CA 9.3  --  9.8  --  10.3     --  141  --  145   K 3.4*  3.4* 3.6 4.1  --  3.4*     --  114*  --  117*   CO2 20.0*  --  19.0*  --  21.0       No results for input(s): \"ALT\", \"AST\", \"ALB\", \"AMYLASE\", \"LIPASE\", \"LDH\" in the last 168 hours.    Invalid input(s): \"ALPHOS\", \"TBIL\", \"DBIL\", \"TPROT\"      Imaging:  MRI BRAIN WO ACUTE (3) SEQUENCE (CPT=70551)    Result Date: 4/2/2024  CONCLUSION:  1. No acute infarct or hemorrhage. 2. Advanced changes of chronic small vessel disease in both cerebral hemispheres. 3. Moderate changes of chronic small vessel disease in the saroj. 4. Ventricular prominence greater than the degree of cortical sulcal prominence.  Elevated Guevara index, and diminished corpus callosal angle are suggestive, but not specific for normal pressure hydrocephalus.  Findings could also be seen with central atrophy greater than degree of cortical atrophy.    Dictated by (CST): Romeo Cortes MD on 4/02/2024 at 4:16 PM     Finalized by (CST): Romeo Cortes MD on 4/02/2024 at 4:35 PM          XR CHEST AP PORTABLE  (CPT=71045)    Result Date: 4/2/2024  CONCLUSION: No radiographic evidence of acute cardiopulmonary abnormality.  No evidence of aspiration.  No new focal consolidation.    Dictated by (CST): Ulysses Galindo MD on 4/02/2024 at 11:45 AM     Finalized by (CST): Ulysses Galindo MD on 4/02/2024 at 11:45 AM             Meds:      [START ON 4/5/2024] amLODIPine  10 mg Oral Daily    thiamine  100 mg Oral Daily    ofloxacin  1 drop Both Eyes QID    ipratropium-albuterol   3 mL Nebulization 4 times per day    OLANZapine  5 mg Oral Nightly    divalproex DR  250 mg Oral BID    carvedilol  12.5 mg Oral BID with meals    colchicine  0.6 mg Oral Daily    tamsulosin  0.4 mg Oral Daily    fluticasone furoate-vilanterol  1 puff Inhalation Daily    [Held by provider] aspirin  81 mg Oral Daily    atorvastatin  80 mg Oral Nightly    [Held by provider] enoxaparin  40 mg Subcutaneous Daily      sodium chloride 50 mL/hr at 04/04/24 0740     hydrALAZINE, haloperidol lactate, LORazepam, acetaminophen **OR** acetaminophen, ondansetron, glycerin-hypromellose-

## 2024-04-04 NOTE — PLAN OF CARE
Lp done today. PRNs for agitation given as ordered. Restraints in place. POA updated via phone of POC. Fall and isolation precautions in place. Call light within reach.  Problem: Risk for Violence-Violent Restraints/Seclusion  Goal: Patient will not express any violent or self-destructive behaviors  Description: Interventions:  - Apply the least restrictive restraint to prevent harm if patient strikes out and is not responding to redirection  - Notify patient and family of reasons restraints applied  - Assist the patient to identify the precipitating event  - Assist the patient to identify alternatives to physically acting out  - Assess for any contributing factors to violent behaviors (substances,  medications, history of trauma)  - Assess the patient's physical comfort, circulation, skin condition, hydration, nutrition and elimination needs   - Assess for the need to continue restraints  - Evaluate the need for an emergency medication  Outcome: Progressing     Problem: Patient Centered Care  Goal: Patient preferences are identified and integrated in the patient's plan of care  Description: Interventions:  - What would you like us to know as we care for you? From independent living  - Provide timely, complete, and accurate information to patient/family  - Incorporate patient and family knowledge, values, beliefs, and cultural backgrounds into the planning and delivery of care  - Encourage patient/family to participate in care and decision-making at the level they choose  - Honor patient and family perspectives and choices  Outcome: Progressing     Problem: Patient/Family Goals  Goal: Patient/Family Long Term Goal  Description: Patient's Long Term Goal: discharge    Interventions:  - Monitor vital signs  - Monitor neurological status  - Ambulate as tolerated  - See additional Care Plan goals for specific interventions  Outcome: Progressing  Goal: Patient/Family Short Term Goal  Description: Patient's Short Term  Goal: feel better    Interventions:   - Verbalize needs and wants  - Antianxiety medications as needed  - See additional Care Plan goals for specific interventions  Outcome: Progressing     Problem: Delirium  Goal: Minimize duration of delirium  Description: Interventions:  - Encourage use of hearing aids, eye glasses  - Promote highest level of mobility daily  - Provide frequent reorientation  - Promote wakefulness i.e. lights on, blinds open  - Promote sleep, encourage patient's normal rest cycle i.e. lights off, TV off, minimize noise and interruptions  - Encourage family to assist in orientation and promotion of home routines  Outcome: Progressing     Problem: METABOLIC/FLUID AND ELECTROLYTES - ADULT  Goal: Electrolytes maintained within normal limits  Description: INTERVENTIONS:  - Monitor labs and rhythm and assess patient for signs and symptoms of electrolyte imbalances  - Administer electrolyte replacement as ordered  - Monitor response to electrolyte replacements, including rhythm and repeat lab results as appropriate  - Fluid restriction as ordered  - Instruct patient on fluid and nutrition restrictions as appropriate  Outcome: Progressing     Problem: MUSCULOSKELETAL - ADULT  Goal: Return mobility to safest level of function  Description: INTERVENTIONS:  - Assess patient stability and activity tolerance for standing, transferring and ambulating w/ or w/o assistive devices  - Assist with transfers and ambulation using safe patient handling equipment as needed  - Ensure adequate protection for wounds/incisions during mobilization  - Obtain PT/OT consults as needed  - Advance activity as appropriate  - Communicate ordered activity level and limitations with patient/family  Outcome: Progressing     Problem: NEUROLOGICAL - ADULT  Goal: Achieves stable or improved neurological status  Description: INTERVENTIONS  - Assess for and report changes in neurological status  - Initiate measures to prevent increased  intracranial pressure  - Maintain blood pressure and fluid volume within ordered parameters to optimize cerebral perfusion and minimize risk of hemorrhage  - Monitor temperature, glucose, and sodium. Initiate appropriate interventions as ordered  Outcome: Progressing  Goal: Absence of seizures  Description: INTERVENTIONS  - Monitor for seizure activity  - Administer anti-seizure medications as ordered  - Monitor neurological status  Outcome: Progressing  Goal: Remains free of injury related to seizure activity  Description: INTERVENTIONS:  - Maintain airway, patient safety  and administer oxygen as ordered  - Monitor patient for seizure activity, document and report duration and description of seizure to MD/LIP  - If seizure occurs, turn patient to side and suction secretions as needed  - Reorient patient post seizure  - Seizure pads on all 4 side rails  - Instruct patient/family to notify RN of any seizure activity  - Instruct patient/family to call for assistance with activity based on assessment  Outcome: Progressing  Goal: Achieves maximal functionality and self care  Description: INTERVENTIONS  - Monitor swallowing and airway patency with patient fatigue and changes in neurological status  - Encourage and assist patient to increase activity and self care with guidance from PT/OT  - Encourage visually impaired, hearing impaired and aphasic patients to use assistive/communication devices  Outcome: Progressing     Problem: Safety Risk - Non-Violent Restraints  Goal: Patient will remain free from self-harm  Description: INTERVENTIONS:  - Apply the least restrictive restraint to prevent harm  - Notify patient and family of reasons restraints applied  - Assess for any contributing factors to confusion (electrolyte disturbances, delirium, medications)  - Discontinue any unnecessary medical devices as soon as possible  - Assess the patient's physical comfort, circulation, skin condition, hydration, nutrition and  elimination needs   - Reorient and redirection as needed  - Assess for the need to continue restraints  Outcome: Progressing     Problem: Risk for Violence/Aggression  Goal: Absence of Violence/Aggression  Description: INTERVENTIONS:   - Identify precipitating factors for behavior   - Notify Charge RN/Provider   - Consider decreasing stimulation   - Consider distraction measures   - Consider discussion with provider regarding prn meds    - Consider GABRIEL (Moderate Risk only)   - Consider Code Support (High Risk only)   - Consider room safety checks   - Consider restraints  Outcome: Progressing

## 2024-04-04 NOTE — PHYSICAL THERAPY NOTE
PHYSICAL THERAPY TREATMENT NOTE - INPATIENT     Room Number: 561/561-A       Presenting Problem: AMS, agitation, work up in progress       Problem List  Principal Problem:    NPH (normal pressure hydrocephalus) ([facility])  Active Problems:    Severe vascular dementia with agitation ([facility])    Delirium due to another medical condition    Episodic mood disorder ([facility])    TIA (transient ischemic attack)      PHYSICAL THERAPY ASSESSMENT   Patient demonstrates limited progress this session, goals  remain in progress.    Patient continues to function below baseline with bed mobility, transfers, gait, maintaining seated position, and standing prolonged periods.  Contributing factors to remaining limitations include decreased functional strength, decreased endurance/aerobic capacity, pain, impaired sitting balance, impaired motor planning, decreased muscular endurance, cognitive deficits (confused, slightly agitated), medical status, and BLE stiffness .     Patient following < 25 % of simple commands this session. Patient currently functioning at a [facility] level of care - anticipate that patient will benefit from 24 hour assist upon discharge. Next session anticipate patient to progress bed mobility and transfers. Physical Therapy will continue to follow patient for duration of hospitalization on a trial basis pending progress.    PLAN  PT Treatment Plan: Energy conservation;Gait training  Frequency (Obs): 3x/week (trial)    SUBJECTIVE  \"Yes\"    OBJECTIVE  Precautions: Bed/chair alarm;Restraints    PAIN ASSESSMENT   Rating: Unable to rate  Location: did not localize pain - groined during mobility  Management Techniques: Activity promotion;Body mechanics;Repositioning    BALANCE  Static Sitting: Dependent  Dynamic Sitting: Dependent  Static Standing: Not tested  Dynamic Standing: Not tested    ACTIVITY TOLERANCE  Pulse: 78  Heart Rate Source: Monitor     BP: 139/89  BP Location: Right arm  BP Method: Automatic  Patient Position:  Lying     O2 WALK  Oxygen Therapy  SPO2% on Room Air at Rest: 94  SPO2% Ambulation on Room Air: 95    AM-PAC '6-Clicks' INPATIENT SHORT FORM - BASIC MOBILITY  How much difficulty does the patient currently have...  Patient Difficulty: Turning over in bed (including adjusting bedclothes, sheets and blankets)?: Unable   Patient Difficulty: Sitting down on and standing up from a chair with arms (e.g., wheelchair, bedside commode, etc.): Unable   Patient Difficulty: Moving from lying on back to sitting on the side of the bed?: Unable   How much help from another person does the patient currently need...   Help from Another: Moving to and from a bed to a chair (including a wheelchair)?: Total   Help from Another: Need to walk in hospital room?: Total   Help from Another: Climbing 3-5 steps with a railing?: Total     AM-PAC Score:  Raw Score: 6   Approx Degree of Impairment: 100%   Standardized Score (AM-PAC Scale): 23.55   CMS Modifier (G-Code): CN    FUNCTIONAL ABILITY STATUS  Functional Mobility/Gait Assessment  Gait Assistance: Not tested  Distance (ft): na  Assistive Device:  (na)  Pattern:  (na)  Rolling: dependent x 2  Supine to Sit: dependent x 2  Sit to Supine: dependent x 2. Patient tolerated static sitting EOB for < 2 minutes with BUE support on bed rails and Total assist in order to maintain static sitting balance. Patient with posterior lean, requiring Total assist to correct.     Additional information: Patient very rigid in BLEs during all mobility tasks this session. Patient found to be incontinent of bowel movement - dependent x 2 to roll in order to provide total assist with pericare and placement of clean bed pads and linens. Further activity deferred due to decreased command following and decreased static sitting balance.    The patient's Approx Degree of Impairment: 100% has been calculated based on documentation in the [facility] '6 clicks' Inpatient Daily Activity Short Form.  Research supports that  patients with this level of impairment may benefit from LTAC.  Final disposition will be made by interdisciplinary medical team.    Patient in bed with restraints in place upon arrival. RN approved activity. Educated patient on POC and benefits of mobilization. Agreeable to participate. Patient did not localize pain, however, groaned in pain during mobility.  Patient End of Session: In bed;Call light within reach;Needs met;RN aware of session/findings;All patient questions and concerns addressed;Alarm set;Restraints    CURRENT GOALS   Goals to be met by: 4/10/24  Patient Goal Patient's self-stated goal is: did not state, resisting returning to bed   Goal #1 Patient is able to demonstrate supine - sit EOB @ level: supervision      Goal #1   Current Status  Dependent x 2   Goal #2 Patient is able to demonstrate transfers Sit to/from Stand at assistance level: supervision with none      Goal #2  Current Status  NT - dependent for static sitting balance EOB   Goal #3 Patient is able to ambulate 100 feet with assist device: none at assistance level: supervision   Goal #3   Current Status  NT     Therapeutic Activity: 25 minutes

## 2024-04-04 NOTE — PROGRESS NOTES
Resumed care at 15:00. Patient alert, easily agitated and irritated. All scheduled meds given per MAR. Patient requires full assistance w/ meals, good appetite noted.  External Cath maintained. 1 BM this shift, incontinence care provided. Bilateral soft wrist restraints continued for safety. Peripheral neurovascular assessment: WDL. All safety precautions maintained.

## 2024-04-04 NOTE — PROGRESS NOTES
04/04/24 1054   VISIT TYPE   SLP Inpatient Visit Type (Documentation Required) Attempted Treatment   FOLLOW UP/PLAN   Follow Up Needed (Documentation Required) Yes   SLP Follow-up Date 04/05/24     SLP attempt this AM. Pt currently in lumbar puncture. SLP to follow up as pt available and/or as schedule allows.    Thank you.    Cha Guevara M.S. CCC-SLP  Speech Language Pathologist  Phone Number [phone]

## 2024-04-04 NOTE — OCCUPATIONAL THERAPY NOTE
OCCUPATIONAL THERAPY TREATMENT NOTE - INPATIENT        Room Number: 561/561-A     Presenting Problem: AMS    Problem List  Principal Problem:    NPH (normal pressure hydrocephalus) ([facility])  Active Problems:    Severe vascular dementia with agitation (HCC)    Delirium due to another medical condition    Episodic mood disorder (HCC)    TIA (transient ischemic attack)      OCCUPATIONAL THERAPY ASSESSMENT   Patient demonstrates limited progress this session, goals remain in progress.    Patient continues to function below baseline with ADLs and fx mobility/transfers.   Contributing factors to remaining limitations include decreased functional strength, decreased endurance, impaired balance, decreased muscular endurance, cognitive deficits, and decreased compliance/participation.     Patient minimally verbal and with eyes closed throughout session; unable to follow commands and currently requiring long term level of care. Anticipate patient will require 24 hour care upon discharge. Occupational Therapy will continue to follow patient for duration of hospitalization and place on therapy trial pending progress. Next session anticipate patient to progress bed mobility, transfers, stating sitting balance, and maintaining seated position.    PLAN  OT Treatment Plan: Balance activities;Energy conservation/work simplification techniques;ADL training;Functional transfer training;Endurance training;Patient/Family education;Cognitive reorientation;Patient/Family training;Compensatory technique education  OT Device Recommendations: TBD    SUBJECTIVE  \"Okay.\"    OBJECTIVE  Precautions: Bed/chair alarm;Restraints    WEIGHT BEARING RESTRICTION  None    PAIN ASSESSMENT  Rating: -- (did not vocalize pain)    ACTIVITY TOLERANCE  Pulse: 78  Heart Rate Source: Monitor     BP: 139/89  BP Location: Left arm  BP Method: Automatic  Patient Position: Semi-Pickard    O2 SATURATIONS  Oxygen Therapy  SPO2% on Room Air at Rest: 95    ACTIVITIES OF  DAILY LIVING ASSESSMENT  AM-PAC ‘6-Clicks’ Inpatient Daily Activity Short Form  How much help from another person does the patient currently need…  -   Putting on and taking off regular lower body clothing?: Total  -   Bathing (including washing, rinsing, drying)?: Total  -   Toileting, which includes using toilet, bedpan or urinal? : Total  -   Putting on and taking off regular upper body clothing?: Total  -   Taking care of personal grooming such as brushing teeth?: Total  -   Eating meals?: Total    AM-PAC Score:  Score: 6  Approx Degree of Impairment: 100%  Standardized Score (AM-PAC Scale): 17.07  CMS Modifier (G-Code): CN    BED MOBILITY  Rolling: dependent x2  Supine <> Sit: dependent x2  Comments: Tolerated sitting at EOB <2 min with total assist for static sitting balance d/t posterior lean. B LE very rigid throughout mobility. R hand swollen.    FUNCTIONAL TRANSFER ASSESSMENT  Unsafe to progress fx mobility at this time.    FUNCTIONAL ADL ASSESSMENT  LB Dressing: total assist  Toileting: total assist    EDUCATION PROVIDED  Patient: Plan of Care; Role of Occupational Therapy; Discharge Recommendations; Fall Prevention; Posture/Positioning; Proper Body Mechanics  Patient's Response to Education: Does Not Demonstrate Skills Needed for Learning    The patient's Approx Degree of Impairment: 100% has been calculated based on documentation in the [facility] '6 clicks' Inpatient Daily Activity Short Form.  Research supports that patients with this level of impairment may benefit from 24 hour care.  Final disposition will be made by interdisciplinary medical team.    Patient End of Session: Needs met;Call light within reach;RN aware of session/findings;All patient questions and concerns addressed;In bed;Restraints;Alarm set    OT Goals:  Patient self-stated goal is: no goals stated; pt agitated and confused     Patient will complete LE dressing with supervision  Comment: not tested    Patient will complete toilet  transfer with supervision  Comment: not tested    Patient will complete self care task at sink level with supervision    Comment: not tested         Goals  on:   Frequency: 3x week    OT Session Time  Self-Care Home Management: 15 minutes  Therapeutic Activity: 8 minutes    PAUL Spann/L  [facility]  #[id number]     Simple: Patient demonstrates quick and easy understanding/Verbalized Understanding

## 2024-04-05 LAB
ANION GAP SERPL CALC-SCNC: 9 MMOL/L (ref 0–18)
BASOPHILS # BLD AUTO: 0.04 X10(3) UL (ref 0–0.2)
BASOPHILS NFR BLD AUTO: 0.3 %
BUN BLD-MCNC: 27 MG/DL (ref 9–23)
BUN/CREAT SERPL: 18.6 (ref 10–20)
CALCIUM BLD-MCNC: 9.1 MG/DL (ref 8.7–10.4)
CHLORIDE SERPL-SCNC: 120 MMOL/L (ref 98–112)
CO2 SERPL-SCNC: 22 MMOL/L (ref 21–32)
CREAT BLD-MCNC: 1.45 MG/DL
DEPRECATED RDW RBC AUTO: 45.1 FL (ref 35.1–46.3)
EGFRCR SERPLBLD CKD-EPI 2021: 52 ML/MIN/1.73M2 (ref 60–?)
EOSINOPHIL # BLD AUTO: 0.29 X10(3) UL (ref 0–0.7)
EOSINOPHIL NFR BLD AUTO: 2.1 %
ERYTHROCYTE [DISTWIDTH] IN BLOOD BY AUTOMATED COUNT: 13.9 % (ref 11–15)
GLUCOSE BLD-MCNC: 105 MG/DL (ref 70–99)
HCT VFR BLD AUTO: 46.6 %
HGB BLD-MCNC: 15.4 G/DL
IMM GRANULOCYTES # BLD AUTO: 0.07 X10(3) UL (ref 0–1)
IMM GRANULOCYTES NFR BLD: 0.5 %
LYMPHOCYTES # BLD AUTO: 1.19 X10(3) UL (ref 1–4)
LYMPHOCYTES NFR BLD AUTO: 8.8 %
MAGNESIUM SERPL-MCNC: 2.1 MG/DL (ref 1.6–2.6)
MCH RBC QN AUTO: 29.3 PG (ref 26–34)
MCHC RBC AUTO-ENTMCNC: 33 G/DL (ref 31–37)
MCV RBC AUTO: 88.8 FL
MONOCYTES # BLD AUTO: 0.97 X10(3) UL (ref 0.1–1)
MONOCYTES NFR BLD AUTO: 7.2 %
NEUTROPHILS # BLD AUTO: 10.99 X10 (3) UL (ref 1.5–7.7)
NEUTROPHILS # BLD AUTO: 10.99 X10(3) UL (ref 1.5–7.7)
NEUTROPHILS NFR BLD AUTO: 81.1 %
OSMOLALITY SERPL CALC.SUM OF ELEC: 317 MOSM/KG (ref 275–295)
PLATELET # BLD AUTO: 282 10(3)UL (ref 150–450)
POTASSIUM SERPL-SCNC: 3.6 MMOL/L (ref 3.5–5.1)
POTASSIUM SERPL-SCNC: 3.6 MMOL/L (ref 3.5–5.1)
RBC # BLD AUTO: 5.25 X10(6)UL
SODIUM SERPL-SCNC: 151 MMOL/L (ref 136–145)
WBC # BLD AUTO: 13.6 X10(3) UL (ref 4–11)

## 2024-04-05 PROCEDURE — 99233 SBSQ HOSP IP/OBS HIGH 50: CPT | Performed by: OTHER

## 2024-04-05 RX ORDER — THIAMINE HYDROCHLORIDE 100 MG/ML
200 INJECTION, SOLUTION INTRAMUSCULAR; INTRAVENOUS 3 TIMES DAILY
Status: DISCONTINUED | OUTPATIENT
Start: 2024-04-05 | End: 2024-04-09

## 2024-04-05 RX ORDER — DIVALPROEX SODIUM 125 MG/1
250 CAPSULE, COATED PELLETS ORAL
Status: DISCONTINUED | OUTPATIENT
Start: 2024-04-05 | End: 2024-04-08

## 2024-04-05 RX ORDER — CHLORPROMAZINE HYDROCHLORIDE 25 MG/1
25 TABLET, FILM COATED ORAL 3 TIMES DAILY PRN
Status: DISCONTINUED | OUTPATIENT
Start: 2024-04-05 | End: 2024-04-09

## 2024-04-05 RX ORDER — DEXTROSE AND SODIUM CHLORIDE 5; .45 G/100ML; G/100ML
INJECTION, SOLUTION INTRAVENOUS CONTINUOUS
Status: DISCONTINUED | OUTPATIENT
Start: 2024-04-05 | End: 2024-04-06

## 2024-04-05 RX ORDER — OLANZAPINE 2.5 MG/1
7.5 TABLET, FILM COATED ORAL NIGHTLY
Status: DISCONTINUED | OUTPATIENT
Start: 2024-04-05 | End: 2024-04-08

## 2024-04-05 NOTE — PROGRESS NOTES
[facility] Hospitalist Progress Note     CC: Hospital Follow up    PCP: No primary care provider on file.       Assessment/Plan:     Principal Problem:    NPH (normal pressure hydrocephalus) (HCC)  Active Problems:    Severe vascular dementia with agitation (HCC)    Delirium due to another medical condition    Episodic mood disorder (HCC)    TIA (transient ischemic attack)    Mr. 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 , hypertension, history of possible seizure, gout, metabolic encephalopathy with agitation without clear cause status post 3 hospitalizations, who presents with altered mental status weakness and agitation, neurology and psychiatry consulted, extensive workup including CT/MRI brain/lumbar puncture without clear etiology, workup ongoing     Altered mental status   Metabolic encephalopathy  Delirium in setting of likely vascular dementia  -unclear, ddx includes metabolic encephalopathy, infection, stroke, post ictal state on NPH, frontotemporal dementia?  -has been admitted 3 other times (last in 2/2023, at [facility]) with similar presentation, extensive work up without clear etiology  -Per POA cognitive status returned back to baseline and his prior hospitalizations  -neurology and psychiatry consulted  -UA neg, no signs of infection, CXR negative  -initial CT imaging stable  -TSH, B12 normal  -MRI brain without acute infarct advanced small vessel disease, poss enlargement of ventricles  -aspirin and statin   -monitor for fever or infection   -[facility] called on 4/2 in am, Ps[facility] consulted  -concern for NPH, s/p lumbar puncture on 4/4, had physical therapy preceding and post LP, opening pressure was normal (12) minute and cognitive state or functional state post lumbar puncture  -Discussed with neurology, and neurosurgery, unlikely that this is NPH given lack of improvement post LP, also atypical  presentation, neurosurgery recommending outpatient follow-up if suspicion is still high for NPH and can consider  shunt (unlikely to improve cognitive state)  -LP studies pending, (these are studies are all pending as CJD was sent, therefore all studies go to [facility])  -psychiatry adjusting medications to try decrease agitation    Pink eye / right eye radiation   - hs of right parotid radiation, has had history of recurrent eye infections since that time  - antibiotic drop started on 4/2,  - improving    Hypernatremia / DAWN  - likely secondary to poor oral intake  - , Cre 1.45  - change IVF to D5 1/2 NS  - trend NA    Wheezing- resolved  - poss secondary to risperidone? Add to allergy list  - steroids, nebs, avoid IV benadryl given severe agitation  - CXR negative   - RVP neg    Leucocytosis   - poss secondary to IV steroids  - no fevers  - will continue to monitor  - steroid stopped     Hx of Hypertension  -unclear which meds he takes at home  -renal function stable worse today  -started on amlodipine, dose increased  -coreg noted on home meds, resumed   -hold on ACE/ARB since renal function not improved     Possible left sided facial droop and weakness  -neurology consulted and stroke work up if needed based on their evaluation   -anti-platelets if recommended by neuro   -discussed with neurology. Low suspicion for acute CVA. MRI  negative for stroke      Hx of seizure? Noted in outside records,  - not on AED's  - poss post ictal?    Hs of Small cell lung cancer   - parotid involvement, has had radiation therapy   - follows at [facility]  -he's on colchicine, resumed     Fluids: stop IVF  DVT prophylaxis: lovenox daily   Code status: FULL     Discussed with Yesenia LEON on 4/2, 4/3, 4/4, 4/5     Questions/concerns were discussed with patient and/or family by bedside.    Thank You,  Raheem Johnson MD    Hospitalist with [facility]     Subjective:     Calm but confused, AO*1, conversant, denies  complaints,     OBJECTIVE:    Blood pressure (!) 173/89, pulse 98, temperature 98.3 °F (36.8 °C), temperature source Oral, resp. rate 16, height 5' 8\" (1.727 m), weight 180 lb 14.4 oz (82.1 kg), SpO2 96%.    Temp:  [98 °F (36.7 °C)-98.3 °F (36.8 °C)] 98.3 °F (36.8 °C)  Pulse:  [78-98] 98  Resp:  [16-18] 16  BP: (109-173)/(69-89) 173/89  SpO2:  [96 %] 96 %      Intake/Output:    Intake/Output Summary (Last 24 hours) at 4/5/2024 1201  Last data filed at 4/5/2024 0900  Gross per 24 hour   Intake 120 ml   Output 550 ml   Net -430 ml       Last 3 Weights   03/31/24 1311 180 lb 14.4 oz (82.1 kg)   03/31/24 1117 177 lb 0.5 oz (80.3 kg)       Exam    Gen: confused, calm  Heent: NC AT, neck supple, eyes injected but no purulent drainage  Pulm: Lungs clear,    CV: Heart with regular rate and rhythm,   Abd: Abdomen soft, nontender, nondistended,   MSK: no clubbing, no cyanosis  Skin: no rashes or lesions  Neuro: moving all 4 ext, confused        Data Review:       Labs:     Recent Labs   Lab 04/03/24  0622 04/04/24  0629 04/05/24  0546   RBC 5.68 6.15* 5.25   HGB 17.1 18.5* 15.4   HCT 48.4 52.9 46.6   MCV 85.2 86.0 88.8   MCH 30.1 30.1 29.3   MCHC 35.3 35.0 33.0   RDW 13.0 13.4 13.9   NEPRELIM 11.16* 15.49* 10.99*   WBC 12.4* 17.4* 13.6*   .0 336.0 282.0         Recent Labs   Lab 04/03/24  0622 04/03/24  1240 04/04/24  0629 04/05/24  0546   * 142* 123* 105*   BUN 19  --  20 27*   CREATSERUM 1.14  --  1.37* 1.45*   EGFRCR 70  --  56* 52*   CA 9.8  --  10.3 9.1     --  145 151*   K 4.1  --  3.4* 3.6  3.6   *  --  117* 120*   CO2 19.0*  --  21.0 22.0       No results for input(s): \"ALT\", \"AST\", \"ALB\", \"AMYLASE\", \"LIPASE\", \"LDH\" in the last 168 hours.    Invalid input(s): \"ALPHOS\", \"TBIL\", \"DBIL\", \"TPROT\"      Imaging:  XR LUMBAR PUNCTURE LARGE VOL, INCLD IMG (CPT=62329)    Result Date: 4/4/2024  CONCLUSION:  1. Fluoroscopic guided lumbar puncture at the L2 level. 2. Approximately 16 cc of spinal fluid  withdrawn.  The 1st 3 vials were clear.  Slight blood tinged pancreas spinal fluid contaminated the 4th specimen. 3. Low CSF pressure.  Opening pressure 12 cm H2O.  Closing pressure less than 12 cm H2O.     Dictated by (CST): Shon Miller MD on 4/04/2024 at 1:49 PM     Finalized by (CST): Shon Miller MD on 4/04/2024 at 1:55 PM          MRI BRAIN WO ACUTE (3) SEQUENCE (CPT=70551)    Result Date: 4/2/2024  CONCLUSION:  1. No acute infarct or hemorrhage. 2. Advanced changes of chronic small vessel disease in both cerebral hemispheres. 3. Moderate changes of chronic small vessel disease in the saroj. 4. Ventricular prominence greater than the degree of cortical sulcal prominence.  Elevated Guevara index, and diminished corpus callosal angle are suggestive, but not specific for normal pressure hydrocephalus.  Findings could also be seen with central atrophy greater than degree of cortical atrophy.    Dictated by (CST): Romeo Cortes MD on 4/02/2024 at 4:16 PM     Finalized by (CST): Romeo Cortes MD on 4/02/2024 at 4:35 PM             Meds:      amLODIPine  10 mg Oral Daily    thiamine  100 mg Oral Daily    ofloxacin  1 drop Both Eyes QID    ipratropium-albuterol  3 mL Nebulization 4 times per day    OLANZapine  5 mg Oral Nightly    divalproex DR  250 mg Oral BID    carvedilol  12.5 mg Oral BID with meals    colchicine  0.6 mg Oral Daily    tamsulosin  0.4 mg Oral Daily    fluticasone furoate-vilanterol  1 puff Inhalation Daily    [Held by provider] aspirin  81 mg Oral Daily    atorvastatin  80 mg Oral Nightly    [Held by provider] enoxaparin  40 mg Subcutaneous Daily      dextrose 5%-sodium chloride 0.45% 83 mL/hr at 04/05/24 0828     hydrALAZINE, haloperidol lactate, acetaminophen **OR** acetaminophen, ondansetron, glycerin-hypromellose-

## 2024-04-05 NOTE — PROGRESS NOTES
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 The patient has been demonstrating confusion, restlessness, agitation. Patient indicated for psych consult for evaluation and advise.    Consult Duration   The patient seen for over 50-minute, follow-up evaluation, over 50% counseling and coordinating care addressing confusion, restlessness.    Record reviewed, communication with attending, communication with RN and patient seen face to face evaluation.  History of Present Illness:      Communicating with team indicated that patient have LP yesterday and he continue to be restless, confused and agitated.  RN indicated that he still swinging his arm and indicate restraint.    The patient seen this afternoon laying down in his bed with nebulizer.  Patient with wrist restraints.  Patient was able to shake my hand reporting he is feeling better.  Patient continued demonstrating disorientation to the condition and date.  Patient continued demonstrate paranoia with difficulty trusting team and demonstrates some response to internal stimuli.    Otherwise he was less agitated with me this afternoon but continued to be an unpredictable.    Communicate with attending, Dr. Johnson who communicated with the patient partner.partner indicated attending that patient is higher function otherwise he been admitted for time to medical hospital for similar reason.  She doubt that he has dementia and reporting between the episode he was reasonably functioning as a ?    Review record again and review head CT scan which demonstrating intense atrophy, vascular ischemic changes and enlarged ventricles all indicating severe impairment in the intellectual process and indicate the high possibility of severe dementia.    Patient  continued demonstrating alternation in his mood and cognition.  Patient demonstrating disorganized thought process.    The patient has been demonstrating alternation in his mood and cognition with continuous hallucination, delusional ideation, distractibility, inattentiveness and disorientation.    The patient has been demonstrating delirium episode with alternation in mood and cognition with episodes of  increased confusion, restlessness, agitation and response to internal stimuli.       Past Psychiatric/Medication History:  1. Prior diagnoses: Previous delirium episode.  2. Past psychiatric inpatient: Multiple admission to medical inpatient for altered mental status.  3. Past outpatient history: None  4. Past suicide history: None  5. 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   Resection 2019    Catatonia 03/31/2024    Cognitive communication deficit 03/14/2023    Cystic disease of liver 02/22/2023    Dementia (HCC) 02/01/2023    Difficult airway 02/02/2023    Dyslipidemia 02/01/2023    Elevated white blood cell count, unspecified 02/22/2023    Encephalopathy 09/19/2012    Essential hypertension     Gastro-esophageal reflux disease with esophagitis, without bleeding 02/22/2023    Gout, chronic 03/31/2024    Gross hematuria 03/31/2024    Hypercholesterolemia 06/25/2015    Hyperlipidemia 09/19/2012    Leukocytosis 02/19/2023    Liver cyst 08/30/2022    Metabolic encephalopathy 02/22/2023    Neuroendocrine carcinoma, 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   Keppra    Seizure disorder (HCC)     Toxic metabolic encephalopathy 2022    Transient global amnesia 01/10/2017    Formatting of this note might be different from the original.   Brief -Plavix    Unspecified dementia, unspecified severity, without behavioral disturbance, psychotic disturbance, mood disturbance, and anxiety (HCC) 2023    Vascular dementia (HCC) 2022    Vascular dementia, unspecified severity, without behavioral disturbance, psychotic disturbance, mood disturbance, and anxiety (HCC) 2023    Weakness 2023    Weakness of left upper extremity 2022       Past Surgical History  History reviewed. No pertinent surgical history.    Family History  No family history on file.    Social History  Social History     Socioeconomic History    Marital status: Single   Tobacco Use    Smoking status: Former     Packs/day: 1.00     Years: 25.00     Additional pack years: 0.00     Total pack years: 25.00     Types: Cigarettes     Quit date: 1990     Years since quittin.2    Tobacco comments:      Cigarettes 1 25 Quit:     Social Determinants of Health     Food Insecurity: Unknown (3/31/2024)    Food Insecurity     Food Insecurity: Patient  unable to answer   Transportation Needs: Unknown (3/31/2024)    Transportation Needs     Lack of Transportation: Patient unable to answer   Housing Stability: Unknown (3/31/2024)    Housing Stability     Housing Instability: Patient unable to answer           Current Medications:      Medications Prior to Admission   Medication Sig    albuterol 108 (90 Base) MCG/ACT Inhalation Aero Soln Inhale 2 puffs into the lungs every 4 (four) hours as needed for Wheezing.    carvedilol 12.5 MG Oral Tab Take 1 tablet (12.5 mg total) by mouth 2 (two) times daily with meals.    clopidogrel 75 MG Oral Tab Take 1 tablet (75 mg total) by mouth daily.    colchicine 0.6 MG Oral Tab Take 1 tablet (0.6 mg total) by mouth daily.    tamsulosin 0.4 MG Oral Cap Take 1 capsule (0.4 mg total) by mouth daily.    acetaminophen 325 MG Oral Tab Take 2 tablets (650 mg total) by mouth every 6 (six) hours as needed for Pain.    fluticasone-salmeterol 250-50 MCG/ACT Inhalation Aerosol Powder, Breath Activated Inhale 1 puff into the lungs 2 (two) times daily.       Allergies  Allergies   Allergen Reactions    Risperidone WHEEZING       Review of Systems:   As by Admitting/Attending    Results:   Laboratory Data:  Lab Results   Component Value Date    WBC 13.6 (H) 04/05/2024    HGB 15.4 04/05/2024    HCT 46.6 04/05/2024    .0 04/05/2024    CREATSERUM 1.45 (H) 04/05/2024    BUN 27 (H) 04/05/2024     (H) 04/05/2024    K 3.6 04/05/2024    K 3.6 04/05/2024     (H) 04/05/2024    CO2 22.0 04/05/2024     (H) 04/05/2024    CA 9.1 04/05/2024    TP 7.0 04/03/2024    TSH 3.927 04/03/2024    MG 2.1 04/05/2024    B12 470 04/03/2024         Imaging:  XR LUMBAR PUNCTURE LARGE VOL, INCLD IMG (CPT=62329)    Result Date: 4/4/2024  CONCLUSION:  1. Fluoroscopic guided lumbar puncture at the L2 level. 2. Approximately 16 cc of spinal fluid withdrawn.  The 1st 3 vials were clear.  Slight blood tinged pancreas spinal fluid contaminated the 4th  specimen. 3. Low CSF pressure.  Opening pressure 12 cm H2O.  Closing pressure less than 12 cm H2O.     Dictated by (CST): Shon Miller MD on 4/04/2024 at 1:49 PM     Finalized by (CST): Shon Miller MD on 4/04/2024 at 1:55 PM           Vital Signs:   Blood pressure 159/71, pulse 84, temperature 98.3 °F (36.8 °C), temperature source Axillary, resp. rate 16, height 68\", weight 82.1 kg (180 lb 14.4 oz), SpO2 95%.    Mental Status Exam:   Appearance: Stated age male, in hospital gown, laying down in hospital bed, in soft restraints.  Patient is restless but able to shake hand.    Psychomotor: Patient has been demonstrating restlessness and agitation.   Orientation: Alert and oriented to person. Patient confused to date and condition  Gait: Not evaluated.  Attitude/Coorperation: Patient made an effort to cooperate otherwise continue to be distracted and response to internal stimuli.  Behavior: Episodes of restlessness and agitation.  Speech: Regular rate and rhythm speech.  Less scattered speech  Mood: Having difficulty expressing his emotion.  Affect: restricted but mostly anxious, paranoid and restless.  Thought process: Confused, disorganized  Thought content: No reports of  suicidal or homicidal ideation.  Perceptions: Patient has been demonstrating response to internal stimuli hallucinating.   Concentration: Grossly impaired  Memory: Grossly impaired  Intellect: Average.  Judgment and Insight: Impaired.  Patient demonstrating cognitive impairment.    Impression:     Delirium due to other medical condition.  Episodic mood disorder.  Rule out vascular dementia with behavioral disturbance.  Altered mental status, unspecified altered mental status type    Progressive neurologic decline    Stroke-like symptom    Severe vascular dementia with agitation (HCC)      Patient is a 68 year old , single, male with past medical history of seizures, metastatic small cell cancer with parotid involvement s/p  chemo 2019, asthma,gout, htn, seizures who was admitted to the hospital for Altered mental status.  Although the etiology still not discovered, the patient had a few episodes of delirium in [facility].    The patient today has been demonstrating increased alternation in his mood, restlessness, irritability, delusional ideation, distractibility, disorientation and has been demonstrating delirious episode.  Imaging indicate vascular ischemic changes.    The patient has been demonstrating delirium episode with alternation in mood and cognition with episodes of  increased confusion, restlessness, agitation and response to internal stimuli.     4/3/2024: The patient today continues to demonstrate confusion, restlessness, agitation. He remains in soft restraints. Vitamin B12 and TSH are within normal limits.    4/4/2024: Patient titrating sedation.  Patient is anticipated to have LP today    4/5/2024: The patient with multiple episodes of psychosis, agitation and deterioration in his cognition continued demonstrate alternation in his mood and interaction.      Discussed risk and benefit, acknowledging the current symptom and severity.  At this point, I would recommend the following approach:     Focus on safety.  Restrained at time if indicated  Focus on education and support.  Focus on insight orientation helping the patient understand diagnosis and treatment plan.  Increase Zyprexa to 7.5 mg nightly.  Increase Depakote to 250 mg 3 times daily.  Start thiamine 200 mg IV 3 times daily.  Work with the team to enhance orientation and provide support to eliminate restraint.  Utilize Haldol 2 mg IV every 2 hours as needed for agitation.  Utilize Ativan 1 mg every 6 hours as needed for anxiety.  Processed with patient at length, the initiation of the above psychotropic medications I advised the patient of the risks, benefits, alternatives and potential side effects. The patient consents to administration of the  medications and understands the right to refuse medications at any time. The patient verbalized understanding.   Coordinate plan with team    Orders This Visit:  Orders Placed This Encounter   Procedures    Basic Metabolic Panel (8)    CBC With Differential With Platelet    Troponin I (High Sensitivity)    Urinalysis, Routine    Basic Metabolic Panel (8)    CBC With Differential With Platelet    Potassium    Potassium    Lipid Panel    Hemoglobin A1C    Basic Metabolic Panel (8)    CBC With Differential With Platelet    Magnesium    Vitamin B12    TSH W Reflex To Free T4    CBC With Differential With Platelet    Basic Metabolic Panel (8)    Magnesium    Glucose, Serum    Protein, Total, Serum    CJD 14-3-3 Protein Tau Total, Cerebrospinal Fluid    MD BLOOD SMEAR CONSULT    Basic Metabolic Panel (8)    CBC With Differential With Platelet    Magnesium    Potassium    Miscellaneous Testing    Miscellaneous Testing    Miscellaneous Testing    Miscellaneous Testing    Miscellaneous Testing    Miscellaneous Testing    Miscellaneous Testing    Miscellaneous Testing    CBC With Differential With Platelet    Basic Metabolic Panel (8)    Magnesium    Cytology fluids    SARS-CoV-2/Flu A and B/RSV by PCR (GeneXpert)    $$$$Respiratory Flu Expanded Panel + Covid-19$$$$       Meds This Visit:  Requested Prescriptions      No prescriptions requested or ordered in this encounter       Anselmo Lawson MD  4/5/2024    Note to Patient: The 21st Century Cures Act makes medical notes like these available to patients in the interest of transparency. However, be advised this is a medical document. It is intended as peer to peer communication. It is written in medical language and may contain abbreviations or verbiage that are unfamiliar. It may appear blunt or direct. Medical documents are intended to carry relevant information, facts as evident, and the clinical opinion of the practitioner. This note may have been transcribed using a  voice dictation system. Voice recognition errors may occur. This should not be taken to alter the content or meaning of this note.

## 2024-04-05 NOTE — PLAN OF CARE
A&O x 0-1. PRN haldol given. Q2 restraint documentation done.  Problem: Patient Centered Care  Goal: Patient preferences are identified and integrated in the patient's plan of care  Description: Interventions:  - What would you like us to know as we care for you?   - Provide timely, complete, and accurate information to patient/family  - Incorporate patient and family knowledge, values, beliefs, and cultural backgrounds into the planning and delivery of care  - Encourage patient/family to participate in care and decision-making at the level they choose  - Honor patient and family perspectives and choices  4/5/2024 0611 by Parvez Webster RN  Outcome: Progressing  4/4/2024 2203 by Parvez Webster RN  Outcome: Progressing     Problem: Patient/Family Goals  Goal: Patient/Family Long Term Goal  Description: Patient's Long Term Goal: discharge    Interventions:  - Monitor vital signs  - Monitor neurological status  - Ambulate as tolerated  - See additional Care Plan goals for specific interventions  4/5/2024 0611 by Parvez Webster RN  Outcome: Progressing  4/4/2024 2203 by Parvez Webster RN  Outcome: Progressing  Goal: Patient/Family Short Term Goal  Description: Patient's Short Term Goal: feel better    Interventions:   - Verbalize needs and wants  - Antianxiety medications as needed  - See additional Care Plan goals for specific interventions  4/5/2024 0611 by Parvez Webster RN  Outcome: Progressing  4/4/2024 2203 by Parvez Webster RN  Outcome: Progressing     Problem: Delirium  Goal: Minimize duration of delirium  Description: Interventions:  - Encourage use of hearing aids, eye glasses  - Promote highest level of mobility daily  - Provide frequent reorientation  - Promote wakefulness i.e. lights on, blinds open  - Promote sleep, encourage patient's normal rest cycle i.e. lights off, TV off, minimize noise and interruptions  - Encourage family to assist in orientation and  promotion of home routines  4/5/2024 0611 by Parvez Webster RN  Outcome: Progressing  4/4/2024 2203 by Parvez Webster RN  Outcome: Progressing     Problem: METABOLIC/FLUID AND ELECTROLYTES - ADULT  Goal: Electrolytes maintained within normal limits  Description: INTERVENTIONS:  - Monitor labs and rhythm and assess patient for signs and symptoms of electrolyte imbalances  - Administer electrolyte replacement as ordered  - Monitor response to electrolyte replacements, including rhythm and repeat lab results as appropriate  - Fluid restriction as ordered  - Instruct patient on fluid and nutrition restrictions as appropriate  4/5/2024 0611 by Parvez Webster RN  Outcome: Progressing  4/4/2024 2203 by Parvez Webster RN  Outcome: Progressing     Problem: MUSCULOSKELETAL - ADULT  Goal: Return mobility to safest level of function  Description: INTERVENTIONS:  - Assess patient stability and activity tolerance for standing, transferring and ambulating w/ or w/o assistive devices  - Assist with transfers and ambulation using safe patient handling equipment as needed  - Ensure adequate protection for wounds/incisions during mobilization  - Obtain PT/OT consults as needed  - Advance activity as appropriate  - Communicate ordered activity level and limitations with patient/family  4/5/2024 0611 by Parvez Webster RN  Outcome: Progressing  4/4/2024 2203 by Parvez Webster RN  Outcome: Progressing     Problem: NEUROLOGICAL - ADULT  Goal: Achieves stable or improved neurological status  Description: INTERVENTIONS  - Assess for and report changes in neurological status  - Initiate measures to prevent increased intracranial pressure  - Maintain blood pressure and fluid volume within ordered parameters to optimize cerebral perfusion and minimize risk of hemorrhage  - Monitor temperature, glucose, and sodium. Initiate appropriate interventions as ordered  4/5/2024 0611 by Parvez Webster  RN  Outcome: Progressing  4/4/2024 2203 by Parvez Webster RN  Outcome: Progressing  Goal: Absence of seizures  Description: INTERVENTIONS  - Monitor for seizure activity  - Administer anti-seizure medications as ordered  - Monitor neurological status  4/5/2024 0611 by Parvez Webster RN  Outcome: Progressing  4/4/2024 2203 by Parvez Webster RN  Outcome: Progressing  Goal: Remains free of injury related to seizure activity  Description: INTERVENTIONS:  - Maintain airway, patient safety  and administer oxygen as ordered  - Monitor patient for seizure activity, document and report duration and description of seizure to MD/LIP  - If seizure occurs, turn patient to side and suction secretions as needed  - Reorient patient post seizure  - Seizure pads on all 4 side rails  - Instruct patient/family to notify RN of any seizure activity  - Instruct patient/family to call for assistance with activity based on assessment  4/5/2024 0611 by Parvez Webster RN  Outcome: Progressing  4/4/2024 2203 by Parvez Webster RN  Outcome: Progressing  Goal: Achieves maximal functionality and self care  Description: INTERVENTIONS  - Monitor swallowing and airway patency with patient fatigue and changes in neurological status  - Encourage and assist patient to increase activity and self care with guidance from PT/OT  - Encourage visually impaired, hearing impaired and aphasic patients to use assistive/communication devices  4/5/2024 0611 by Parvez Webster RN  Outcome: Progressing  4/4/2024 2203 by Parvez Webster RN  Outcome: Progressing     Problem: Risk for Violence/Aggression  Goal: Absence of Violence/Aggression  Description: INTERVENTIONS:   - Identify precipitating factors for behavior   - Notify Charge RN/Provider   - Consider decreasing stimulation   - Consider distraction measures   - Consider discussion with provider regarding prn meds    - Consider GABRIEL (Moderate Risk only)   - Consider  Code Support (High Risk only)   - Consider room safety checks   - Consider restraints  4/5/2024 0611 by Parvez Webster RN  Outcome: Progressing  4/5/2024 0243 by Parvez Webster RN  Outcome: Progressing  4/4/2024 2203 by Parvez Webster RN  Outcome: Progressing     Problem: Safety Risk - Non-Violent Restraints  Goal: Patient will remain free from self-harm  Description: INTERVENTIONS:  - Apply the least restrictive restraint to prevent harm  - Notify patient and family of reasons restraints applied  - Assess for any contributing factors to confusion (electrolyte disturbances, delirium, medications)  - Discontinue any unnecessary medical devices as soon as possible  - Assess the patient's physical comfort, circulation, skin condition, hydration, nutrition and elimination needs   - Reorient and redirection as needed  - Assess for the need to continue restraints  4/5/2024 0611 by Parvez Webster RN  Outcome: Not Progressing  4/4/2024 2203 by Parvez Webster RN  Outcome: Not Progressing

## 2024-04-05 NOTE — PLAN OF CARE
IVF. Restraints discontinued, trialing sitter at bedside for safety. Fall and isolation precautions in place. Call light within reach.   Problem: Patient Centered Care  Goal: Patient preferences are identified and integrated in the patient's plan of care  Description: Interventions:  - What would you like us to know as we care for you?   - Provide timely, complete, and accurate information to patient/family  - Incorporate patient and family knowledge, values, beliefs, and cultural backgrounds into the planning and delivery of care  - Encourage patient/family to participate in care and decision-making at the level they choose  - Honor patient and family perspectives and choices  Outcome: Progressing     Problem: Patient/Family Goals  Goal: Patient/Family Long Term Goal  Description: Patient's Long Term Goal: discharge    Interventions:  - Monitor vital signs  - Monitor neurological status  - Ambulate as tolerated  - See additional Care Plan goals for specific interventions  Outcome: Progressing  Goal: Patient/Family Short Term Goal  Description: Patient's Short Term Goal: feel better    Interventions:   - Verbalize needs and wants  - Antianxiety medications as needed  - See additional Care Plan goals for specific interventions  Outcome: Progressing     Problem: Delirium  Goal: Minimize duration of delirium  Description: Interventions:  - Encourage use of hearing aids, eye glasses  - Promote highest level of mobility daily  - Provide frequent reorientation  - Promote wakefulness i.e. lights on, blinds open  - Promote sleep, encourage patient's normal rest cycle i.e. lights off, TV off, minimize noise and interruptions  - Encourage family to assist in orientation and promotion of home routines  Outcome: Progressing     Problem: METABOLIC/FLUID AND ELECTROLYTES - ADULT  Goal: Electrolytes maintained within normal limits  Description: INTERVENTIONS:  - Monitor labs and rhythm and assess patient for signs and  symptoms of electrolyte imbalances  - Administer electrolyte replacement as ordered  - Monitor response to electrolyte replacements, including rhythm and repeat lab results as appropriate  - Fluid restriction as ordered  - Instruct patient on fluid and nutrition restrictions as appropriate  Outcome: Progressing     Problem: MUSCULOSKELETAL - ADULT  Goal: Return mobility to safest level of function  Description: INTERVENTIONS:  - Assess patient stability and activity tolerance for standing, transferring and ambulating w/ or w/o assistive devices  - Assist with transfers and ambulation using safe patient handling equipment as needed  - Ensure adequate protection for wounds/incisions during mobilization  - Obtain PT/OT consults as needed  - Advance activity as appropriate  - Communicate ordered activity level and limitations with patient/family  Outcome: Progressing     Problem: NEUROLOGICAL - ADULT  Goal: Achieves stable or improved neurological status  Description: INTERVENTIONS  - Assess for and report changes in neurological status  - Initiate measures to prevent increased intracranial pressure  - Maintain blood pressure and fluid volume within ordered parameters to optimize cerebral perfusion and minimize risk of hemorrhage  - Monitor temperature, glucose, and sodium. Initiate appropriate interventions as ordered  Outcome: Progressing  Goal: Absence of seizures  Description: INTERVENTIONS  - Monitor for seizure activity  - Administer anti-seizure medications as ordered  - Monitor neurological status  Outcome: Progressing  Goal: Remains free of injury related to seizure activity  Description: INTERVENTIONS:  - Maintain airway, patient safety  and administer oxygen as ordered  - Monitor patient for seizure activity, document and report duration and description of seizure to MD/LIP  - If seizure occurs, turn patient to side and suction secretions as needed  - Reorient patient post seizure  - Seizure pads on all 4  side rails  - Instruct patient/family to notify RN of any seizure activity  - Instruct patient/family to call for assistance with activity based on assessment  Outcome: Progressing  Goal: Achieves maximal functionality and self care  Description: INTERVENTIONS  - Monitor swallowing and airway patency with patient fatigue and changes in neurological status  - Encourage and assist patient to increase activity and self care with guidance from PT/OT  - Encourage visually impaired, hearing impaired and aphasic patients to use assistive/communication devices  Outcome: Progressing     Problem: Safety Risk - Non-Violent Restraints  Goal: Patient will remain free from self-harm  Description: INTERVENTIONS:  - Apply the least restrictive restraint to prevent harm  - Notify patient and family of reasons restraints applied  - Assess for any contributing factors to confusion (electrolyte disturbances, delirium, medications)  - Discontinue any unnecessary medical devices as soon as possible  - Assess the patient's physical comfort, circulation, skin condition, hydration, nutrition and elimination needs   - Reorient and redirection as needed  - Assess for the need to continue restraints  Outcome: Progressing     Problem: Risk for Violence/Aggression  Goal: Absence of Violence/Aggression  Description: INTERVENTIONS:   - Identify precipitating factors for behavior   - Notify Charge RN/Provider   - Consider decreasing stimulation   - Consider distraction measures   - Consider discussion with provider regarding prn meds    - Consider GABRIEL (Moderate Risk only)   - Consider Code Support (High Risk only)   - Consider room safety checks   - Consider restraints  Outcome: Progressing     Problem: Risk for Violence-Violent Restraints/Seclusion  Goal: Patient will not express any violent or self-destructive behaviors  Description: Interventions:  - Apply the least restrictive restraint to prevent harm if patient strikes out and is not  responding to redirection  - Notify patient and family of reasons restraints applied  - Assist the patient to identify the precipitating event  - Assist the patient to identify alternatives to physically acting out  - Assess for any contributing factors to violent behaviors (substances,  medications, history of trauma)  - Assess the patient's physical comfort, circulation, skin condition, hydration, nutrition and elimination needs   - Assess for the need to continue restraints  - Evaluate the need for an emergency medication  Outcome: Progressing

## 2024-04-06 LAB
ANION GAP SERPL CALC-SCNC: 8 MMOL/L (ref 0–18)
BASOPHILS # BLD AUTO: 0.04 X10(3) UL (ref 0–0.2)
BASOPHILS NFR BLD AUTO: 0.3 %
BUN BLD-MCNC: 18 MG/DL (ref 9–23)
BUN/CREAT SERPL: 14.4 (ref 10–20)
CALCIUM BLD-MCNC: 9.2 MG/DL (ref 8.7–10.4)
CHLORIDE SERPL-SCNC: 117 MMOL/L (ref 98–112)
CO2 SERPL-SCNC: 22 MMOL/L (ref 21–32)
CREAT BLD-MCNC: 1.25 MG/DL
DEPRECATED RDW RBC AUTO: 44.4 FL (ref 35.1–46.3)
EGFRCR SERPLBLD CKD-EPI 2021: 62 ML/MIN/1.73M2 (ref 60–?)
EOSINOPHIL # BLD AUTO: 0.34 X10(3) UL (ref 0–0.7)
EOSINOPHIL NFR BLD AUTO: 2.2 %
ERYTHROCYTE [DISTWIDTH] IN BLOOD BY AUTOMATED COUNT: 13.8 % (ref 11–15)
GLUCOSE BLD-MCNC: 109 MG/DL (ref 70–99)
HCT VFR BLD AUTO: 45.6 %
HGB BLD-MCNC: 15.3 G/DL
IMM GRANULOCYTES # BLD AUTO: 0.09 X10(3) UL (ref 0–1)
IMM GRANULOCYTES NFR BLD: 0.6 %
LYMPHOCYTES # BLD AUTO: 0.96 X10(3) UL (ref 1–4)
LYMPHOCYTES NFR BLD AUTO: 6.1 %
MAGNESIUM SERPL-MCNC: 2 MG/DL (ref 1.6–2.6)
MCH RBC QN AUTO: 29.3 PG (ref 26–34)
MCHC RBC AUTO-ENTMCNC: 33.6 G/DL (ref 31–37)
MCV RBC AUTO: 87.4 FL
MONOCYTES # BLD AUTO: 0.81 X10(3) UL (ref 0.1–1)
MONOCYTES NFR BLD AUTO: 5.2 %
NEUTROPHILS # BLD AUTO: 13.38 X10 (3) UL (ref 1.5–7.7)
NEUTROPHILS # BLD AUTO: 13.38 X10(3) UL (ref 1.5–7.7)
NEUTROPHILS NFR BLD AUTO: 85.6 %
OSMOLALITY SERPL CALC.SUM OF ELEC: 306 MOSM/KG (ref 275–295)
PLATELET # BLD AUTO: 285 10(3)UL (ref 150–450)
POTASSIUM SERPL-SCNC: 3.4 MMOL/L (ref 3.5–5.1)
RBC # BLD AUTO: 5.22 X10(6)UL
SODIUM SERPL-SCNC: 147 MMOL/L (ref 136–145)
WBC # BLD AUTO: 15.6 X10(3) UL (ref 4–11)

## 2024-04-06 RX ORDER — POTASSIUM CHLORIDE 20 MEQ/1
40 TABLET, EXTENDED RELEASE ORAL ONCE
Status: COMPLETED | OUTPATIENT
Start: 2024-04-06 | End: 2024-04-06

## 2024-04-06 RX ORDER — CALCIUM CARBONATE 500 MG/1
1000 TABLET, CHEWABLE ORAL 3 TIMES DAILY
Status: DISCONTINUED | OUTPATIENT
Start: 2024-04-06 | End: 2024-04-22

## 2024-04-06 RX ORDER — DEXTROSE MONOHYDRATE 50 MG/ML
INJECTION, SOLUTION INTRAVENOUS CONTINUOUS
Status: DISCONTINUED | OUTPATIENT
Start: 2024-04-06 | End: 2024-04-08

## 2024-04-06 NOTE — PROGRESS NOTES
[facility] Hospitalist Progress Note     CC: Hospital Follow up    PCP: No primary care provider on file.       Assessment/Plan:   Mr. 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 , hypertension, history of possible seizure, gout, metabolic encephalopathy with agitation without clear cause status post 3 hospitalizations, who presents with altered mental status weakness and agitation, neurology and psychiatry consulted, extensive workup including CT/MRI brain/lumbar puncture without clear etiology, workup ongoing     Altered mental status   Metabolic encephalopathy  Delirium in setting of likely vascular dementia  -unclear, ddx includes metabolic encephalopathy, infection, stroke, post ictal state on NPH, frontotemporal dementia?  -has been admitted 3 other times (last in 2/2023, at [facility]) with similar presentation, extensive work up   -Per POA cognitive status returned back to baseline and his prior hospitalizations  -neurology and psychiatry consulted  -UA neg, no signs of infection, CXR negative  -initial CT imaging stable  -TSH, B12 normal  -MRI brain without acute infarct advanced small vessel disease, poss enlargement of ventricles  -aspirin and statin   -monitor for fever or infection   -CODE GABRIEL called on 4/2 in am, Psyc consulted  -concern for NPH, s/p lumbar puncture on 4/4, had physical therapy preceding and post LP, opening pressure was normal (12) minute and cognitive state or functional state post lumbar puncture  -Discussed with neurology, and neurosurgery, unlikely that this is NPH given lack of improvement post LP, also atypical presentation, neurosurgery recommending outpatient follow-up if suspicion is still high for NPH  -LP studies reviewed  -psychiatry adjusting medications    Pink eye / right eye radiation   - hs of right parotid radiation, has had history of recurrent eye infections since that  time  - antibiotic drop started on 4/2,  - improving    Hypernatremia / DAWN  - likely secondary to poor oral intake  - change IVF today to D5w    Wheezing- resolved  - poss secondary to risperidone? Add to allergy list  - steroids, nebs, avoid IV benadryl given severe agitation  - CXR negative   - RVP neg    Leucocytosis   - poss secondary to IV steroids  - no fevers  - will continue to monitor  - steroid stopped     Hx of Hypertension  -started on amlodipine  -coreg noted on home meds, resumed   -hold on ACE/ARB since renal function not improved     Possible left sided facial droop and weakness  -neurology consulted and stroke work up if needed based on their evaluation   -anti-platelets if recommended by neuro   -discussed with neurology. Low suspicion for acute CVA. MRI  negative for stroke      Hx of seizure? Noted in outside records,  - not on AED's  - poss post ictal?    Hs of Small cell lung cancer   - parotid involvement, has had radiation therapy   - follows at [facility]  -he's on colchicine, resumed     Fluids: change to D5W  DVT prophylaxis: lovenox daily   Code status: FULL     Can discuss with Yesenia LEON this weekend    [facility]   Answering service [phone]       Subjective:     More alert then when I saw him earlier in week.     OBJECTIVE:    Blood pressure (!) 151/100, pulse 91, temperature 100 °F (37.8 °C), temperature source Oral, resp. rate 18, height 5' 8\" (1.727 m), weight 180 lb 14.4 oz (82.1 kg), SpO2 93%.    Temp:  [97.7 °F (36.5 °C)-100 °F (37.8 °C)] 100 °F (37.8 °C)  Pulse:  [] 91  Resp:  [16-18] 18  BP: (151-168)/() 151/100  SpO2:  [93 %-95 %] 93 %      Intake/Output:    Intake/Output Summary (Last 24 hours) at 4/6/2024 1417  Last data filed at 4/6/2024 1242  Gross per 24 hour   Intake 966.6 ml   Output 1180 ml   Net -213.4 ml       Last 3 Weights   03/31/24 1311 180 lb 14.4 oz (82.1 kg)   03/31/24 1117 177 lb 0.5 oz (80.3 kg)        Exam    Gen:  calm  Pulm: Lungs clear  CV: Heart with regular rate and rhythm  Abd: Abdomen soft  Neuro: moving all 4 ext, confused    Data Review:       Labs:     Recent Labs   Lab 04/04/24 0629 04/05/24  0546 04/06/24  0656   RBC 6.15* 5.25 5.22   HGB 18.5* 15.4 15.3   HCT 52.9 46.6 45.6   MCV 86.0 88.8 87.4   MCH 30.1 29.3 29.3   MCHC 35.0 33.0 33.6   RDW 13.4 13.9 13.8   NEPRELIM 15.49* 10.99* 13.38*   WBC 17.4* 13.6* 15.6*   .0 282.0 285.0         Recent Labs   Lab 04/04/24 0629 04/05/24  0546 04/06/24  0656   * 105* 109*   BUN 20 27* 18   CREATSERUM 1.37* 1.45* 1.25   EGFRCR 56* 52* 62   CA 10.3 9.1 9.2    151* 147*   K 3.4* 3.6  3.6 3.4*   * 120* 117*   CO2 21.0 22.0 22.0       No results for input(s): \"ALT\", \"AST\", \"ALB\", \"AMYLASE\", \"LIPASE\", \"LDH\" in the last 168 hours.    Invalid input(s): \"ALPHOS\", \"TBIL\", \"DBIL\", \"TPROT\"      Imaging:  XR LUMBAR PUNCTURE LARGE VOL, INCLD IMG (CPT=62329)    Result Date: 4/4/2024  CONCLUSION:  1. Fluoroscopic guided lumbar puncture at the L2 level. 2. Approximately 16 cc of spinal fluid withdrawn.  The 1st 3 vials were clear.  Slight blood tinged pancreas spinal fluid contaminated the 4th specimen. 3. Low CSF pressure.  Opening pressure 12 cm H2O.  Closing pressure less than 12 cm H2O.     Dictated by (CST): Shon Miller MD on 4/04/2024 at 1:49 PM     Finalized by (CST): Shon Miller MD on 4/04/2024 at 1:55 PM             Meds:      calcium carbonate  1,000 mg Oral TID    divalproex DR  250 mg Oral TID (Nitrates)    OLANZapine  7.5 mg Oral Nightly    thiamine  200 mg Intravenous TID    amLODIPine  10 mg Oral Daily    ofloxacin  1 drop Both Eyes QID    ipratropium-albuterol  3 mL Nebulization 4 times per day    carvedilol  12.5 mg Oral BID with meals    [Held by provider] colchicine  0.6 mg Oral Daily    tamsulosin  0.4 mg Oral Daily    fluticasone furoate-vilanterol  1 puff Inhalation Daily    aspirin  81 mg Oral Daily     atorvastatin  80 mg Oral Nightly    enoxaparin  40 mg Subcutaneous Daily      dextrose 75 mL/hr at 04/06/24 1029     chlorproMAZINE, hydrALAZINE, haloperidol lactate, acetaminophen **OR** acetaminophen, ondansetron, glycerin-hypromellose-

## 2024-04-06 NOTE — PLAN OF CARE
Problem: Risk for Violence-Violent Restraints/Seclusion  Goal: Patient will not express any violent or self-destructive behaviors  Description: Interventions:  - Apply the least restrictive restraint to prevent harm if patient strikes out and is not responding to redirection  - Notify patient and family of reasons restraints applied  - Assist the patient to identify the precipitating event  - Assist the patient to identify alternatives to physically acting out  - Assess for any contributing factors to violent behaviors (substances,  medications, history of trauma)  - Assess the patient's physical comfort, circulation, skin condition, hydration, nutrition and elimination needs   - Assess for the need to continue restraints  - Evaluate the need for an emergency medication  Outcome: Progressing     Problem: Patient Centered Care  Goal: Patient preferences are identified and integrated in the patient's plan of care  Description: Interventions:  - What would you like us to know as we care for you?from [facility].  - Provide timely, complete, and accurate information to patient/family  - Incorporate patient and family knowledge, values, beliefs, and cultural backgrounds into the planning and delivery of care  - Encourage patient/family to participate in care and decision-making at the level they choose  - Honor patient and family perspectives and choices  Outcome: Progressing     Problem: Patient/Family Goals  Goal: Patient/Family Long Term Goal  Description: Patient's Long Term Goal: discharge    Interventions:  - Monitor vital signs  - Monitor neurological status  - Ambulate as tolerated  - See additional Care Plan goals for specific interventions  Outcome: Progressing  Goal: Patient/Family Short Term Goal  Description: Patient's Short Term Goal: feel better    Interventions:   - Verbalize needs and wants  - Antianxiety medications as needed  - See additional Care Plan goals for specific  interventions  Outcome: Progressing     Problem: Delirium  Goal: Minimize duration of delirium  Description: Interventions:  - Encourage use of hearing aids, eye glasses  - Promote highest level of mobility daily  - Provide frequent reorientation  - Promote wakefulness i.e. lights on, blinds open  - Promote sleep, encourage patient's normal rest cycle i.e. lights off, TV off, minimize noise and interruptions  - Encourage family to assist in orientation and promotion of home routines  Outcome: Progressing     Problem: METABOLIC/FLUID AND ELECTROLYTES - ADULT  Goal: Electrolytes maintained within normal limits  Description: INTERVENTIONS:  - Monitor labs and rhythm and assess patient for signs and symptoms of electrolyte imbalances  - Administer electrolyte replacement as ordered  - Monitor response to electrolyte replacements, including rhythm and repeat lab results as appropriate  - Fluid restriction as ordered  - Instruct patient on fluid and nutrition restrictions as appropriate  Outcome: Progressing     Problem: MUSCULOSKELETAL - ADULT  Goal: Return mobility to safest level of function  Description: INTERVENTIONS:  - Assess patient stability and activity tolerance for standing, transferring and ambulating w/ or w/o assistive devices  - Assist with transfers and ambulation using safe patient handling equipment as needed  - Ensure adequate protection for wounds/incisions during mobilization  - Obtain PT/OT consults as needed  - Advance activity as appropriate  - Communicate ordered activity level and limitations with patient/family  Outcome: Progressing     Problem: NEUROLOGICAL - ADULT  Goal: Achieves stable or improved neurological status  Description: INTERVENTIONS  - Assess for and report changes in neurological status  - Initiate measures to prevent increased intracranial pressure  - Maintain blood pressure and fluid volume within ordered parameters to optimize cerebral perfusion and minimize risk of  hemorrhage  - Monitor temperature, glucose, and sodium. Initiate appropriate interventions as ordered  Outcome: Progressing  Goal: Absence of seizures  Description: INTERVENTIONS  - Monitor for seizure activity  - Administer anti-seizure medications as ordered  - Monitor neurological status  Outcome: Progressing  Goal: Remains free of injury related to seizure activity  Description: INTERVENTIONS:  - Maintain airway, patient safety  and administer oxygen as ordered  - Monitor patient for seizure activity, document and report duration and description of seizure to MD/LIP  - If seizure occurs, turn patient to side and suction secretions as needed  - Reorient patient post seizure  - Seizure pads on all 4 side rails  - Instruct patient/family to notify RN of any seizure activity  - Instruct patient/family to call for assistance with activity based on assessment  Outcome: Progressing  Goal: Achieves maximal functionality and self care  Description: INTERVENTIONS  - Monitor swallowing and airway patency with patient fatigue and changes in neurological status  - Encourage and assist patient to increase activity and self care with guidance from PT/OT  - Encourage visually impaired, hearing impaired and aphasic patients to use assistive/communication devices  Outcome: Progressing     Problem: Risk for Violence/Aggression  Goal: Absence of Violence/Aggression  Description: INTERVENTIONS:   - Identify precipitating factors for behavior   - Notify Charge RN/Provider   - Consider decreasing stimulation   - Consider distraction measures   - Consider discussion with provider regarding prn meds    - Consider GABRIEL (Moderate Risk only)   - Consider Code Support (High Risk only)   - Consider room safety checks   - Consider restraints  Outcome: Progressing     -Patient slept well during the night.. Noted also to have frequent hiccups.  Off restraints. Has a 1:1 sitter at the bedside.  On room air. Noted patient had expiratory wheezing.  Neb treatment care of  RT administered. Bed locked and in low position. Call light within reach.

## 2024-04-06 NOTE — PLAN OF CARE
No acute changes. Patient had hiccups throughout the day, PRN thorazine given. Calcium carbonate ordered by Dr. Lawson for hiccups. 1:1 sitter at bedside for safety. Safety precautions in place.    Problem: Patient Centered Care  Goal: Patient preferences are identified and integrated in the patient's plan of care  Description: Interventions:  - What would you like us to know as we care for you? From [facility]  - Provide timely, complete, and accurate information to patient/family  - Incorporate patient and family knowledge, values, beliefs, and cultural backgrounds into the planning and delivery of care  - Encourage patient/family to participate in care and decision-making at the level they choose  - Honor patient and family perspectives and choices  Outcome: Progressing     Problem: Patient/Family Goals  Goal: Patient/Family Long Term Goal  Description: Patient's Long Term Goal: discharge    Interventions:  - Monitor vital signs  - Monitor neurological status  - Ambulate as tolerated  - See additional Care Plan goals for specific interventions  Outcome: Progressing  Goal: Patient/Family Short Term Goal  Description: Patient's Short Term Goal: feel better    Interventions:   - Verbalize needs and wants  - Antianxiety medications as needed  - See additional Care Plan goals for specific interventions  Outcome: Progressing     Problem: Risk for Violence-Violent Restraints/Seclusion  Goal: Patient will not express any violent or self-destructive behaviors  Description: Interventions:  - Apply the least restrictive restraint to prevent harm if patient strikes out and is not responding to redirection  - Notify patient and family of reasons restraints applied  - Assist the patient to identify the precipitating event  - Assist the patient to identify alternatives to physically acting out  - Assess for any contributing factors to violent behaviors (substances,  medications, history of  trauma)  - Assess the patient's physical comfort, circulation, skin condition, hydration, nutrition and elimination needs   - Assess for the need to continue restraints  - Evaluate the need for an emergency medication  Outcome: Progressing     Problem: Delirium  Goal: Minimize duration of delirium  Description: Interventions:  - Encourage use of hearing aids, eye glasses  - Promote highest level of mobility daily  - Provide frequent reorientation  - Promote wakefulness i.e. lights on, blinds open  - Promote sleep, encourage patient's normal rest cycle i.e. lights off, TV off, minimize noise and interruptions  - Encourage family to assist in orientation and promotion of home routines  Outcome: Progressing     Problem: MUSCULOSKELETAL - ADULT  Goal: Return mobility to safest level of function  Description: INTERVENTIONS:  - Assess patient stability and activity tolerance for standing, transferring and ambulating w/ or w/o assistive devices  - Assist with transfers and ambulation using safe patient handling equipment as needed  - Ensure adequate protection for wounds/incisions during mobilization  - Obtain PT/OT consults as needed  - Advance activity as appropriate  - Communicate ordered activity level and limitations with patient/family  Outcome: Progressing     Problem: METABOLIC/FLUID AND ELECTROLYTES - ADULT  Goal: Electrolytes maintained within normal limits  Description: INTERVENTIONS:  - Monitor labs and rhythm and assess patient for signs and symptoms of electrolyte imbalances  - Administer electrolyte replacement as ordered  - Monitor response to electrolyte replacements, including rhythm and repeat lab results as appropriate  - Fluid restriction as ordered  - Instruct patient on fluid and nutrition restrictions as appropriate  Outcome: Progressing     Problem: NEUROLOGICAL - ADULT  Goal: Achieves stable or improved neurological status  Description: INTERVENTIONS  - Assess for and report changes in  neurological status  - Initiate measures to prevent increased intracranial pressure  - Maintain blood pressure and fluid volume within ordered parameters to optimize cerebral perfusion and minimize risk of hemorrhage  - Monitor temperature, glucose, and sodium. Initiate appropriate interventions as ordered  Outcome: Progressing  Goal: Absence of seizures  Description: INTERVENTIONS  - Monitor for seizure activity  - Administer anti-seizure medications as ordered  - Monitor neurological status  Outcome: Progressing  Goal: Remains free of injury related to seizure activity  Description: INTERVENTIONS:  - Maintain airway, patient safety  and administer oxygen as ordered  - Monitor patient for seizure activity, document and report duration and description of seizure to MD/LIP  - If seizure occurs, turn patient to side and suction secretions as needed  - Reorient patient post seizure  - Seizure pads on all 4 side rails  - Instruct patient/family to notify RN of any seizure activity  - Instruct patient/family to call for assistance with activity based on assessment  Outcome: Progressing  Goal: Achieves maximal functionality and self care  Description: INTERVENTIONS  - Monitor swallowing and airway patency with patient fatigue and changes in neurological status  - Encourage and assist patient to increase activity and self care with guidance from PT/OT  - Encourage visually impaired, hearing impaired and aphasic patients to use assistive/communication devices  Outcome: Progressing     Problem: Risk for Violence/Aggression  Goal: Absence of Violence/Aggression  Description: INTERVENTIONS:   - Identify precipitating factors for behavior   - Notify Charge RN/Provider   - Consider decreasing stimulation   - Consider distraction measures   - Consider discussion with provider regarding prn meds    - Consider GABRIEL (Moderate Risk only)   - Consider Code Support (High Risk only)   - Consider room safety checks   - Consider  restraints  Outcome: Progressing

## 2024-04-07 LAB — POTASSIUM SERPL-SCNC: 3.2 MMOL/L (ref 3.5–5.1)

## 2024-04-07 PROCEDURE — 99233 SBSQ HOSP IP/OBS HIGH 50: CPT | Performed by: OTHER

## 2024-04-07 NOTE — PLAN OF CARE
Frequent rounding. Patient educated on all medications administered. Bed in lowest position, bed alarm and i bed awareness activated, fall precautions in place and call light within reach at all times. All patients questions answered and needs addressed promptly. Sitter at bedside.       Problem: Patient Centered Care  Goal: Patient preferences are identified and integrated in the patient's plan of care  Description: Interventions:  - Provide timely, complete, and accurate information to patient/family  - Incorporate patient and family knowledge, values, beliefs, and cultural backgrounds into the planning and delivery of care  - Encourage patient/family to participate in care and decision-making at the level they choose  - Honor patient and family perspectives and choices  Outcome: Progressing     Problem: Patient/Family Goals  Goal: Patient/Family Long Term Goal  Description: Patient's Long Term Goal: discharge    Interventions:  - Monitor vital signs  - Monitor neurological status  - Ambulate as tolerated  - See additional Care Plan goals for specific interventions  Outcome: Progressing  Goal: Patient/Family Short Term Goal  Description: Patient's Short Term Goal: feel better    Interventions:   - Verbalize needs and wants  - Antianxiety medications as needed  - See additional Care Plan goals for specific interventions  Outcome: Progressing     Problem: Delirium  Goal: Minimize duration of delirium  Description: Interventions:  - Encourage use of hearing aids, eye glasses  - Promote highest level of mobility daily  - Provide frequent reorientation  - Promote wakefulness i.e. lights on, blinds open  - Promote sleep, encourage patient's normal rest cycle i.e. lights off, TV off, minimize noise and interruptions  - Encourage family to assist in orientation and promotion of home routines  Outcome: Progressing     Problem: METABOLIC/FLUID AND ELECTROLYTES - ADULT  Goal: Electrolytes maintained within normal  limits  Description: INTERVENTIONS:  - Monitor labs and rhythm and assess patient for signs and symptoms of electrolyte imbalances  - Administer electrolyte replacement as ordered  - Monitor response to electrolyte replacements, including rhythm and repeat lab results as appropriate  - Fluid restriction as ordered  - Instruct patient on fluid and nutrition restrictions as appropriate  Outcome: Progressing     Problem: MUSCULOSKELETAL - ADULT  Goal: Return mobility to safest level of function  Description: INTERVENTIONS:  - Assess patient stability and activity tolerance for standing, transferring and ambulating w/ or w/o assistive devices  - Assist with transfers and ambulation using safe patient handling equipment as needed  - Ensure adequate protection for wounds/incisions during mobilization  - Obtain PT/OT consults as needed  - Advance activity as appropriate  - Communicate ordered activity level and limitations with patient/family  Outcome: Progressing     Problem: NEUROLOGICAL - ADULT  Goal: Achieves stable or improved neurological status  Description: INTERVENTIONS  - Assess for and report changes in neurological status  - Initiate measures to prevent increased intracranial pressure  - Maintain blood pressure and fluid volume within ordered parameters to optimize cerebral perfusion and minimize risk of hemorrhage  - Monitor temperature, glucose, and sodium. Initiate appropriate interventions as ordered  Outcome: Progressing  Goal: Absence of seizures  Description: INTERVENTIONS  - Monitor for seizure activity  - Administer anti-seizure medications as ordered  - Monitor neurological status  Outcome: Progressing  Goal: Remains free of injury related to seizure activity  Description: INTERVENTIONS:  - Maintain airway, patient safety  and administer oxygen as ordered  - Monitor patient for seizure activity, document and report duration and description of seizure to MD/LIP  - If seizure occurs, turn patient to  side and suction secretions as needed  - Reorient patient post seizure  - Seizure pads on all 4 side rails  - Instruct patient/family to notify RN of any seizure activity  - Instruct patient/family to call for assistance with activity based on assessment  Outcome: Progressing  Goal: Achieves maximal functionality and self care  Description: INTERVENTIONS  - Monitor swallowing and airway patency with patient fatigue and changes in neurological status  - Encourage and assist patient to increase activity and self care with guidance from PT/OT  - Encourage visually impaired, hearing impaired and aphasic patients to use assistive/communication devices  Outcome: Progressing     Problem: Safety Risk - Non-Violent Restraints  Goal: Patient will remain free from self-harm  Description: INTERVENTIONS:  - Apply the least restrictive restraint to prevent harm  - Notify patient and family of reasons restraints applied  - Assess for any contributing factors to confusion (electrolyte disturbances, delirium, medications)  - Discontinue any unnecessary medical devices as soon as possible  - Assess the patient's physical comfort, circulation, skin condition, hydration, nutrition and elimination needs   - Reorient and redirection as needed  - Assess for the need to continue restraints  Outcome: Progressing     Problem: Risk for Violence/Aggression  Goal: Absence of Violence/Aggression  Description: INTERVENTIONS:   - Identify precipitating factors for behavior   - Notify Charge RN/Provider   - Consider decreasing stimulation   - Consider distraction measures   - Consider discussion with provider regarding prn meds    - Consider GABRIEL (Moderate Risk only)   - Consider Code Support (High Risk only)   - Consider room safety checks   - Consider restraints  Outcome: Progressing     Problem: Risk for Violence-Violent Restraints/Seclusion  Goal: Patient will not express any violent or self-destructive behaviors  Description: Interventions:  -  Apply the least restrictive restraint to prevent harm if patient strikes out and is not responding to redirection  - Notify patient and family of reasons restraints applied  - Assist the patient to identify the precipitating event  - Assist the patient to identify alternatives to physically acting out  - Assess for any contributing factors to violent behaviors (substances,  medications, history of trauma)  - Assess the patient's physical comfort, circulation, skin condition, hydration, nutrition and elimination needs   - Assess for the need to continue restraints  - Evaluate the need for an emergency medication  Outcome: Progressing

## 2024-04-07 NOTE — PLAN OF CARE
Problem: Risk for Violence-Violent Restraints/Seclusion  Goal: Patient will not express any violent or self-destructive behaviors  Description: Interventions:  - Apply the least restrictive restraint to prevent harm if patient strikes out and is not responding to redirection  - Notify patient and family of reasons restraints applied  - Assist the patient to identify the precipitating event  - Assist the patient to identify alternatives to physically acting out  - Assess for any contributing factors to violent behaviors (substances,  medications, history of trauma)  - Assess the patient's physical comfort, circulation, skin condition, hydration, nutrition and elimination needs   - Assess for the need to continue restraints  - Evaluate the need for an emergency medication  Outcome: Progressing     Problem: Patient Centered Care  Goal: Patient preferences are identified and integrated in the patient's plan of care  Description: Interventions:  - What would you like us to know as we care for you? From independent living  - Provide timely, complete, and accurate information to patient/family  - Incorporate patient and family knowledge, values, beliefs, and cultural backgrounds into the planning and delivery of care  - Encourage patient/family to participate in care and decision-making at the level they choose  - Honor patient and family perspectives and choices  Outcome: Progressing     Problem: Delirium  Goal: Minimize duration of delirium  Description: Interventions:  - Encourage use of hearing aids, eye glasses  - Promote highest level of mobility daily  - Provide frequent reorientation  - Promote wakefulness i.e. lights on, blinds open  - Promote sleep, encourage patient's normal rest cycle i.e. lights off, TV off, minimize noise and interruptions  - Encourage family to assist in orientation and promotion of home routines  Outcome: Progressing     Problem: METABOLIC/FLUID AND ELECTROLYTES - ADULT  Goal:  Electrolytes maintained within normal limits  Description: INTERVENTIONS:  - Monitor labs and rhythm and assess patient for signs and symptoms of electrolyte imbalances  - Administer electrolyte replacement as ordered  - Monitor response to electrolyte replacements, including rhythm and repeat lab results as appropriate  - Fluid restriction as ordered  - Instruct patient on fluid and nutrition restrictions as appropriate  Outcome: Progressing     Problem: MUSCULOSKELETAL - ADULT  Goal: Return mobility to safest level of function  Description: INTERVENTIONS:  - Assess patient stability and activity tolerance for standing, transferring and ambulating w/ or w/o assistive devices  - Assist with transfers and ambulation using safe patient handling equipment as needed  - Ensure adequate protection for wounds/incisions during mobilization  - Obtain PT/OT consults as needed  - Advance activity as appropriate  - Communicate ordered activity level and limitations with patient/family  Outcome: Progressing     Problem: NEUROLOGICAL - ADULT  Goal: Achieves stable or improved neurological status  Description: INTERVENTIONS  - Assess for and report changes in neurological status  - Initiate measures to prevent increased intracranial pressure  - Maintain blood pressure and fluid volume within ordered parameters to optimize cerebral perfusion and minimize risk of hemorrhage  - Monitor temperature, glucose, and sodium. Initiate appropriate interventions as ordered  Outcome: Progressing  Goal: Absence of seizures  Description: INTERVENTIONS  - Monitor for seizure activity  - Administer anti-seizure medications as ordered  - Monitor neurological status  Outcome: Progressing  Goal: Remains free of injury related to seizure activity  Description: INTERVENTIONS:  - Maintain airway, patient safety  and administer oxygen as ordered  - Monitor patient for seizure activity, document and report duration and description of seizure to MD/LIP  -  If seizure occurs, turn patient to side and suction secretions as needed  - Reorient patient post seizure  - Seizure pads on all 4 side rails  - Instruct patient/family to notify RN of any seizure activity  - Instruct patient/family to call for assistance with activity based on assessment  Outcome: Progressing  Goal: Achieves maximal functionality and self care  Description: INTERVENTIONS  - Monitor swallowing and airway patency with patient fatigue and changes in neurological status  - Encourage and assist patient to increase activity and self care with guidance from PT/OT  - Encourage visually impaired, hearing impaired and aphasic patients to use assistive/communication devices  Outcome: Progressing     Problem: Safety Risk - Non-Violent Restraints  Goal: Patient will remain free from self-harm  Description: INTERVENTIONS:  - Apply the least restrictive restraint to prevent harm  - Notify patient and family of reasons restraints applied  - Assess for any contributing factors to confusion (electrolyte disturbances, delirium, medications)  - Discontinue any unnecessary medical devices as soon as possible  - Assess the patient's physical comfort, circulation, skin condition, hydration, nutrition and elimination needs   - Reorient and redirection as needed  - Assess for the need to continue restraints  Outcome: Progressing     Problem: Risk for Violence/Aggression  Goal: Absence of Violence/Aggression  Description: INTERVENTIONS:   - Identify precipitating factors for behavior   - Notify Charge RN/Provider   - Consider decreasing stimulation   - Consider distraction measures   - Consider discussion with provider regarding prn meds    - Consider GABRIEL (Moderate Risk only)   - Consider Code Support (High Risk only)   - Consider room safety checks   - Consider restraints  Outcome: Progressing   Patient is A/O X1. Droplet/contact isolation was discontinued. Continues 24 hrs sitter 1:1. Vital signs stable.

## 2024-04-07 NOTE — PROGRESS NOTES
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 The patient has been demonstrating confusion, restlessness, agitation. Patient indicated for psych consult for evaluation and advise.    Consult Duration   The patient seen for over 50-minute, follow-up evaluation, over 50% counseling and coordinating care addressing confusion, restlessness.    Record reviewed, communication with attending, communication with RN and patient seen face to face evaluation.  History of Present Illness:      According to looking although the patient has not been confused to be restrained but the patient continue to be in a predictable and restless.    Patient today sleeping in his bed but positive for awake.  Patient demonstrating bizarre behavior and tangential thought process.  Patient reports pain has been feeling very well.  Patient indicated that she came because of impairment.  Patient continued demonstrating labile affect.  Otherwise he was less agitated with me this afternoon but continued to be an unpredictable.  Otherwise his visitations subsided.      Review record again and review head CT scan which demonstrating intense atrophy, vascular ischemic changes and enlarged ventricles all indicating severe impairment in the intellectual process and indicate the high possibility of severe dementia.    Patient continued demonstrating alternation in his mood and cognition.  Patient demonstrating disorganized thought process.    The patient has been demonstrating alternation in his mood and cognition with continuous hallucination, delusional ideation, distractibility, inattentiveness and disorientation.    The patient has been demonstrating delirium episode with alternation in mood and cognition with episodes of  increased  confusion, restlessness, agitation and response to internal stimuli.       Past Psychiatric/Medication History:  1. Prior diagnoses: Previous delirium episode.  2. Past psychiatric inpatient: Multiple admission to medical inpatient for altered mental status.  3. Past outpatient history: None  4. Past suicide history: None  5. 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   Resection 2019    Catatonia 03/31/2024    Cognitive communication deficit 03/14/2023    Cystic disease of liver 02/22/2023    Dementia (HCC) 02/01/2023    Difficult airway 02/02/2023    Dyslipidemia 02/01/2023    Elevated white blood cell count, unspecified 02/22/2023    Encephalopathy 09/19/2012    Essential hypertension     Gastro-esophageal reflux disease with esophagitis, without bleeding 02/22/2023    Gout, chronic 03/31/2024    Gross hematuria 03/31/2024    Hypercholesterolemia 06/25/2015    Hyperlipidemia 09/19/2012    Leukocytosis 02/19/2023    Liver cyst 08/30/2022    Metabolic encephalopathy 02/22/2023    Neuroendocrine carcinoma, 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   Keppra    Seizure disorder (HCC)     Toxic metabolic encephalopathy 2022    Transient global amnesia 01/10/2017    Formatting of this note might be different from the original.   Brief -Plavix    Unspecified dementia, unspecified severity, without behavioral disturbance, psychotic disturbance, mood disturbance, and anxiety (HCC) 2023    Vascular dementia (HCC) 2022    Vascular dementia, unspecified severity, without behavioral disturbance, psychotic disturbance, mood disturbance, and anxiety ([facility]) 2023    Weakness 2023    Weakness of left upper extremity 2022       Past Surgical History  History reviewed. No pertinent surgical history.    Family History  No family history on file.    Social History  Social History     Socioeconomic History    Marital status: Single   Tobacco Use    Smoking status: Former     Packs/day: 1.00     Years: 25.00     Additional pack years: 0.00     Total pack years: 25.00     Types: Cigarettes     Quit date: 1990     Years since quittin.2    Tobacco comments:      Cigarettes 1 25 Quit:     Social Determinants of Health     Food Insecurity: Unknown (3/31/2024)    Food Insecurity     Food Insecurity: Patient unable to answer   Transportation Needs: Unknown (3/31/2024)    Transportation Needs     Lack of Transportation: Patient unable to answer   Housing Stability: Unknown (3/31/2024)    Housing Stability     Housing Instability: Patient unable to answer           Current Medications:      Medications Prior to Admission   Medication Sig    albuterol 108 (90 Base) MCG/ACT Inhalation Aero Soln Inhale 2 puffs into the lungs  every 4 (four) hours as needed for Wheezing.    carvedilol 12.5 MG Oral Tab Take 1 tablet (12.5 mg total) by mouth 2 (two) times daily with meals.    clopidogrel 75 MG Oral Tab Take 1 tablet (75 mg total) by mouth daily.    colchicine 0.6 MG Oral Tab Take 1 tablet (0.6 mg total) by mouth daily.    tamsulosin 0.4 MG Oral Cap Take 1 capsule (0.4 mg total) by mouth daily.    acetaminophen 325 MG Oral Tab Take 2 tablets (650 mg total) by mouth every 6 (six) hours as needed for Pain.    fluticasone-salmeterol 250-50 MCG/ACT Inhalation Aerosol Powder, Breath Activated Inhale 1 puff into the lungs 2 (two) times daily.       Allergies  Allergies   Allergen Reactions    Risperidone WHEEZING       Review of Systems:   As by Admitting/Attending    Results:   Laboratory Data:  Lab Results   Component Value Date    WBC 15.6 (H) 04/06/2024    HGB 15.3 04/06/2024    HCT 45.6 04/06/2024    .0 04/06/2024    CREATSERUM 1.25 04/06/2024    BUN 18 04/06/2024     (H) 04/06/2024    K 3.4 (L) 04/06/2024     (H) 04/06/2024    CO2 22.0 04/06/2024     (H) 04/06/2024    CA 9.2 04/06/2024    TP 7.0 04/03/2024    TSH 3.927 04/03/2024    MG 2.0 04/06/2024    B12 470 04/03/2024         Imaging:  XR LUMBAR PUNCTURE LARGE VOL, INCLD IMG (CPT=62329)    Result Date: 4/4/2024  CONCLUSION:  1. Fluoroscopic guided lumbar puncture at the L2 level. 2. Approximately 16 cc of spinal fluid withdrawn.  The 1st 3 vials were clear.  Slight blood tinged pancreas spinal fluid contaminated the 4th specimen. 3. Low CSF pressure.  Opening pressure 12 cm H2O.  Closing pressure less than 12 cm H2O.     Dictated by (CST): Shon Miller MD on 4/04/2024 at 1:49 PM     Finalized by (CST): Shon Miller MD on 4/04/2024 at 1:55 PM           Vital Signs:   Blood pressure 134/70, pulse 86, temperature 98 °F (36.7 °C), temperature source Oral, resp. rate 16, height 68\", weight 82.1 kg (180 lb 14.4 oz), SpO2 96%.    Mental Status Exam:    Appearance: Stated age male, in hospital gown, laying down in hospital bed, in soft restraints.  Patient is restless but able to shake hand.    Psychomotor: Patient has been demonstrating restlessness and agitation.   Orientation: Alert and oriented to person. Patient confused to date and condition  Gait: Not evaluated.  Attitude/Coorperation: Patient made an effort to cooperate otherwise continue to be distracted and response to internal stimuli.  Behavior: Episodes of restlessness and agitation.  Speech: Regular rate and rhythm speech.  Less scattered speech  Mood: Having difficulty expressing his emotion.  Affect: restricted but mostly anxious, paranoid and restless.  Thought process: Confused, disorganized  Thought content: No reports of  suicidal or homicidal ideation.  Perceptions: Patient has been demonstrating response to internal stimuli hallucinating.   Concentration: Grossly impaired  Memory: Grossly impaired  Intellect: Average.  Judgment and Insight: Impaired.  Patient demonstrating cognitive impairment.    Impression:     Delirium due to other medical condition.  Episodic mood disorder.  Rule out vascular dementia with behavioral disturbance.  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  Although the etiology still not discovered, the patient had a few episodes of delirium in [facility].    The patient today has been demonstrating increased alternation in his mood, restlessness, irritability, delusional ideation, distractibility, disorientation and has been demonstrating delirious episode.  Imaging indicate vascular ischemic  changes.    The patient has been demonstrating delirium episode with alternation in mood and cognition with episodes of  increased confusion, restlessness, agitation and response to internal stimuli.     4/3/2024: The patient today continues to demonstrate confusion, restlessness, agitation. He remains in soft restraints. Vitamin B12 and TSH are within normal limits.    4/4/2024: Patient titrating sedation.  Patient is anticipated to have LP today    4/5/2024: The patient with multiple episodes of psychosis, agitation and deterioration in his cognition continued demonstrate alternation in his mood and interaction.      4/6/2024: The patient has been demonstrating less agitation and with no restraint.    Discussed risk and benefit, acknowledging the current symptom and severity.  At this point, I would recommend the following approach:     Focus on safety.  Restrained at time if indicated  Focus on education and support.  Focus on insight orientation helping the patient understand diagnosis and treatment plan.  Continue Zyprexa 7.5 mg nightly.  Continue Depakote 250 mg 3 times daily.  Start thiamine 200 mg IV 3 times daily.  Work with the team to enhance orientation and provide support to eliminate restraint.  Utilize Haldol 2 mg IV every 2 hours as needed for agitation.  Utilize Ativan 1 mg every 6 hours as needed for anxiety.  Processed with patient at length, the initiation of the above psychotropic medications I advised the patient of the risks, benefits, alternatives and potential side effects. The patient consents to administration of the medications and understands the right to refuse medications at any time. The patient verbalized understanding.   Coordinate plan with team    Orders This Visit:  Orders Placed This Encounter   Procedures    Basic Metabolic Panel (8)    CBC With Differential With Platelet    Troponin I (High Sensitivity)    Urinalysis, Routine    Basic Metabolic Panel (8)    CBC With  Differential With Platelet    Potassium    Potassium    Lipid Panel    Hemoglobin A1C    Basic Metabolic Panel (8)    CBC With Differential With Platelet    Magnesium    Vitamin B12    TSH W Reflex To Free T4    CBC With Differential With Platelet    Basic Metabolic Panel (8)    Magnesium    Glucose, Serum    Protein, Total, Serum    CJD 14-3-3 Protein Tau Total, Cerebrospinal Fluid    MD BLOOD SMEAR CONSULT    Basic Metabolic Panel (8)    CBC With Differential With Platelet    Magnesium    Potassium    Miscellaneous Testing    Miscellaneous Testing    Miscellaneous Testing    Miscellaneous Testing    Miscellaneous Testing    Miscellaneous Testing    Miscellaneous Testing    Miscellaneous Testing    CBC With Differential With Platelet    Basic Metabolic Panel (8)    Magnesium    Scan slide    Potassium    Cytology fluids    SARS-CoV-2/Flu A and B/RSV by PCR ([facility])    $$$$Respiratory Flu Expanded Panel + Covid-19$$$$       Meds This Visit:  Requested Prescriptions      No prescriptions requested or ordered in this encounter       Anselmo Lawson MD  4/6/2024    Note to Patient: The 21st Century Cures Act makes medical notes like these available to patients in the interest of transparency. However, be advised this is a medical document. It is intended as peer to peer communication. It is written in medical language and may contain abbreviations or verbiage that are unfamiliar. It may appear blunt or direct. Medical documents are intended to carry relevant information, facts as evident, and the clinical opinion of the practitioner. This note may have been transcribed using a voice dictation system. Voice recognition errors may occur. This should not be taken to alter the content or meaning of this note.

## 2024-04-07 NOTE — PROGRESS NOTES
[facility] Hospitalist Progress Note     CC: Hospital Follow up    PCP: No primary care provider on file.       Assessment/Plan:   Mr. 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 , hypertension, history of possible seizure, gout, metabolic encephalopathy with agitation without clear cause status post 3 hospitalizations, who presents with altered mental status weakness and agitation, neurology and psychiatry consulted, extensive workup including CT/MRI brain/lumbar puncture without clear etiology.      Altered mental status   Metabolic encephalopathy  Delirium in setting of likely vascular dementia  -unclear, ddx included metabolic encephalopathy, infection, stroke, post ictal state on NPH, frontotemporal dementia?  -has been admitted 3 other times (last in 2/2023, at [place]) with similar presentation, extensive work up   -Per POA cognitive status returned back to baseline and his prior hospitalizations  -neurology and psychiatry consulted  -UA neg, no signs of infection, CXR negative  -initial CT imaging stable  -TSH, B12 normal  -MRI brain without acute infarct advanced small vessel disease, poss enlargement of ventricles  -aspirin and statin   -monitor for fever or infection   -CODE GABRIEL called on 4/2 in am, Psyc consulted  -concern for NPH, s/p lumbar puncture on 4/4, had physical therapy preceding and post LP, opening pressure was normal (12) minute and cognitive state or functional state post lumbar puncture  -Discussed with neurology, and neurosurgery, unlikely that this is NPH given lack of improvement post LP, also atypical presentation, neurosurgery recommending outpatient follow-up if suspicion is still high for NPH  -LP studies reviewed  -psychiatry adjusting medications    Pink eye / right eye radiation   - hs of right parotid radiation, has had history of recurrent eye infections since that time  -  antibiotic drop started on 4/2,  - improving    Hypernatremia / DAWN  - likely secondary to poor oral intake  - changed IVF to D5w on 4/6. Repeat labs tomorrow     Wheezing- resolved  - poss secondary to risperidone? Add to allergy list  - steroids, nebs, avoid IV benadryl given severe agitation  - CXR negative   - RVP neg    Leucocytosis   - poss secondary to IV steroids  - no fevers  - will continue to monitor  - steroid stopped     Hx of Hypertension  -started on amlodipine  -coreg noted on home meds, resumed   -consider re-ordering ACE or ARB if renal function stable     Possible left sided facial droop and weakness  -neurology consulted and stroke work up if needed based on their evaluation   -anti-platelets if recommended by neuro   -discussed with neurology. Low suspicion for acute CVA. MRI  negative for stroke      Hx of seizure? Noted in outside records,  - not on AED's    Hs of Small cell lung cancer   - parotid involvement, has had radiation therapy   - follows at [facility]  -he's on colchicine, resumed     Fluids: change to D5W  DVT prophylaxis: lovenox daily   Code status: FULL     Yesenia Calderon is the [facility] Hospitalist      Subjective:     More alert then when I saw him earlier in week. Has appetite. Wants some lunch.     OBJECTIVE:    Blood pressure 144/72, pulse 81, temperature 98.7 °F (37.1 °C), temperature source Oral, resp. rate 22, height 5' 8\" (1.727 m), weight 180 lb 14.4 oz (82.1 kg), SpO2 95%.    Temp:  [97.8 °F (36.6 °C)-98.7 °F (37.1 °C)] 98.7 °F (37.1 °C)  Pulse:  [81-89] 81  Resp:  [16-22] 22  BP: (134-158)/() 144/72  SpO2:  [94 %-96 %] 95 %      Intake/Output:    Intake/Output Summary (Last 24 hours) at 4/7/2024 1332  Last data filed at 4/7/2024 0903  Gross per 24 hour   Intake 1188.75 ml   Output 1080 ml   Net 108.75 ml       Last 3 Weights   03/31/24 1311 180 lb 14.4 oz (82.1 kg)   03/31/24 1117 177 lb 0.5 oz (80.3 kg)       Exam    Gen:   calm  Pulm: Lungs clear  CV: Heart with regular rate and rhythm  Abd: Abdomen soft  Neuro: moving all 4 ext, confused    Data Review:       Labs:     Recent Labs   Lab 04/04/24  0629 04/05/24  0546 04/06/24  0656   RBC 6.15* 5.25 5.22   HGB 18.5* 15.4 15.3   HCT 52.9 46.6 45.6   MCV 86.0 88.8 87.4   MCH 30.1 29.3 29.3   MCHC 35.0 33.0 33.6   RDW 13.4 13.9 13.8   NEPRELIM 15.49* 10.99* 13.38*   WBC 17.4* 13.6* 15.6*   .0 282.0 285.0         Recent Labs   Lab 04/04/24 0629 04/05/24  0546 04/06/24  0656 04/07/24  0708   * 105* 109*  --    BUN 20 27* 18  --    CREATSERUM 1.37* 1.45* 1.25  --    EGFRCR 56* 52* 62  --    CA 10.3 9.1 9.2  --     151* 147*  --    K 3.4* 3.6  3.6 3.4* 3.2*   * 120* 117*  --    CO2 21.0 22.0 22.0  --        No results for input(s): \"ALT\", \"AST\", \"ALB\", \"AMYLASE\", \"LIPASE\", \"LDH\" in the last 168 hours.    Invalid input(s): \"ALPHOS\", \"TBIL\", \"DBIL\", \"TPROT\"      Imaging:  No results found.      Meds:      calcium carbonate  1,000 mg Oral TID    divalproex DR  250 mg Oral TID (Nitrates)    OLANZapine  7.5 mg Oral Nightly    thiamine  200 mg Intravenous TID    amLODIPine  10 mg Oral Daily    ofloxacin  1 drop Both Eyes QID    ipratropium-albuterol  3 mL Nebulization 4 times per day    carvedilol  12.5 mg Oral BID with meals    [Held by provider] colchicine  0.6 mg Oral Daily    tamsulosin  0.4 mg Oral Daily    fluticasone furoate-vilanterol  1 puff Inhalation Daily    aspirin  81 mg Oral Daily    atorvastatin  80 mg Oral Nightly    enoxaparin  40 mg Subcutaneous Daily      dextrose 75 mL/hr at 04/07/24 1143     chlorproMAZINE, hydrALAZINE, haloperidol lactate, acetaminophen **OR** acetaminophen, ondansetron, glycerin-hypromellose-

## 2024-04-08 LAB
ANION GAP SERPL CALC-SCNC: 8 MMOL/L (ref 0–18)
BILIRUB UR QL: NEGATIVE
BUN BLD-MCNC: 21 MG/DL (ref 9–23)
BUN/CREAT SERPL: 15.9 (ref 10–20)
CALCIUM BLD-MCNC: 9.4 MG/DL (ref 8.7–10.4)
CHLORIDE SERPL-SCNC: 111 MMOL/L (ref 98–112)
CO2 SERPL-SCNC: 24 MMOL/L (ref 21–32)
CREAT BLD-MCNC: 1.32 MG/DL
EGFRCR SERPLBLD CKD-EPI 2021: 58 ML/MIN/1.73M2 (ref 60–?)
GLUCOSE BLD-MCNC: 101 MG/DL (ref 70–99)
GLUCOSE UR-MCNC: NORMAL MG/DL
KETONES UR-MCNC: NEGATIVE MG/DL
LEUKOCYTE ESTERASE UR QL STRIP.AUTO: 500
NITRITE UR QL STRIP.AUTO: NEGATIVE
OSMOLALITY SERPL CALC.SUM OF ELEC: 299 MOSM/KG (ref 275–295)
PH UR: 6.5 [PH] (ref 5–8)
POTASSIUM SERPL-SCNC: 3 MMOL/L (ref 3.5–5.1)
POTASSIUM SERPL-SCNC: 3.9 MMOL/L (ref 3.5–5.1)
PROT UR-MCNC: NEGATIVE MG/DL
SODIUM SERPL-SCNC: 143 MMOL/L (ref 136–145)
SP GR UR STRIP: 1.01 (ref 1–1.03)
UROBILINOGEN UR STRIP-ACNC: NORMAL

## 2024-04-08 PROCEDURE — 99233 SBSQ HOSP IP/OBS HIGH 50: CPT | Performed by: OTHER

## 2024-04-08 RX ORDER — IPRATROPIUM BROMIDE AND ALBUTEROL SULFATE 2.5; .5 MG/3ML; MG/3ML
3 SOLUTION RESPIRATORY (INHALATION)
Status: DISCONTINUED | OUTPATIENT
Start: 2024-04-08 | End: 2024-04-11

## 2024-04-08 RX ORDER — DIVALPROEX SODIUM 125 MG/1
250 CAPSULE, COATED PELLETS ORAL 2 TIMES DAILY
Status: DISCONTINUED | OUTPATIENT
Start: 2024-04-09 | End: 2024-04-22

## 2024-04-08 RX ORDER — POLYETHYLENE GLYCOL 3350 17 G/17G
17 POWDER, FOR SOLUTION ORAL DAILY PRN
Status: DISCONTINUED | OUTPATIENT
Start: 2024-04-08 | End: 2024-04-22

## 2024-04-08 RX ORDER — POTASSIUM CHLORIDE 20 MEQ/1
40 TABLET, EXTENDED RELEASE ORAL EVERY 4 HOURS
Status: COMPLETED | OUTPATIENT
Start: 2024-04-08 | End: 2024-04-08

## 2024-04-08 RX ORDER — BISACODYL 10 MG
10 SUPPOSITORY, RECTAL RECTAL
Status: DISCONTINUED | OUTPATIENT
Start: 2024-04-08 | End: 2024-04-22

## 2024-04-08 RX ORDER — OLANZAPINE 2.5 MG/1
5 TABLET, FILM COATED ORAL NIGHTLY
Status: DISCONTINUED | OUTPATIENT
Start: 2024-04-09 | End: 2024-04-22

## 2024-04-08 RX ORDER — DOCUSATE SODIUM 100 MG/1
100 CAPSULE, LIQUID FILLED ORAL 2 TIMES DAILY
Status: DISCONTINUED | OUTPATIENT
Start: 2024-04-08 | End: 2024-04-22

## 2024-04-08 NOTE — PROGRESS NOTES
[facility] Hospitalist Progress Note     CC: Hospital Follow up    PCP: No primary care provider on file.       Assessment/Plan:   Mr. 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 , hypertension, history of possible seizure, gout, metabolic encephalopathy with agitation without clear cause status post 3 hospitalizations, who presents with altered mental status weakness and agitation, neurology and psychiatry consulted, extensive workup including CT/MRI brain/lumbar puncture without clear etiology.      Altered mental status   Metabolic encephalopathy  Delirium in setting of likely vascular dementia  -unclear, ddx included metabolic encephalopathy, infection, stroke, post ictal state on NPH, frontotemporal dementia?  -has been admitted 3 other times (last in 2/2023, at [place]) with similar presentation, extensive work up   -Per POA cognitive status returned back to baseline and his prior hospitalizations  -neurology and psychiatry consulted  -UA neg, no signs of infection, CXR negative  -initial CT imaging stable  -TSH, B12 normal  -MRI brain without acute infarct advanced small vessel disease, poss enlargement of ventricles  -aspirin and statin   -monitor for fever or infection   -CODE GABRIEL called on 4/2 in am, Psyc consulted  -concern for NPH, s/p lumbar puncture on 4/4, had physical therapy preceding and post LP, opening pressure was normal (12) minute and cognitive state or functional state post lumbar puncture  -Discussed with neurology, and neurosurgery, unlikely that this is NPH given lack of improvement post LP, also atypical presentation, neurosurgery recommending outpatient follow-up if suspicion is still high for NPH  -LP studies reviewed  -psychiatry adjusting medications    Pink eye / right eye radiation   - hs of right parotid radiation, has had history of recurrent eye infections since that time  -  antibiotic drop started on 4/2,  - improving    Hypernatremia / DAWN  Urinary retention  - likely secondary to poor oral intake  - changed IVF to D5w on 4/6. Repeat labs improving  - urinary retention, bladder scan with 1600, straight cath done    Wheezing- resolved  - poss secondary to risperidone? Add to allergy list  - steroids, nebs, avoid IV benadryl given severe agitation  - CXR negative   - RVP neg    Leucocytosis   - poss secondary to IV steroids  - no fevers  - will continue to monitor  - steroid stopped     Hx of Hypertension  -started on amlodipine  -coreg noted on home meds, resumed   -consider re-ordering ACE or ARB if renal function stable     Possible left sided facial droop and weakness  -neurology consulted and stroke work up if needed based on their evaluation   -anti-platelets if recommended by neuro   -discussed with neurology. Low suspicion for acute CVA. MRI  negative for stroke      Hx of seizure? Noted in outside records,  - not on AED's    Hs of Small cell lung cancer   - parotid involvement, has had radiation therapy   - follows at [facility]  -he's on colchicine, held with DAWN     Fluids: change to D5W  DVT prophylaxis: lovenox daily   Code status: FULL     Yesenia Yumiko is the POA     Raheem Johnson MD  [facility] Hospitalist      Subjective:     Alert and awake, calm, Alert to person and place, was not able to tell me the year, denied HA or neck stiffness     OBJECTIVE:    Blood pressure 160/81, pulse 65, temperature 97.4 °F (36.3 °C), temperature source Axillary, resp. rate 20, height 5' 8\" (1.727 m), weight 180 lb 14.4 oz (82.1 kg), SpO2 98%.    Temp:  [97.4 °F (36.3 °C)-98.6 °F (37 °C)] 97.4 °F (36.3 °C)  Pulse:  [55-81] 65  Resp:  [20] 20  BP: (122-160)/(79-81) 160/81  SpO2:  [94 %-98 %] 98 %      Intake/Output:    Intake/Output Summary (Last 24 hours) at 4/8/2024 1042  Last data filed at 4/8/2024 0700  Gross per 24 hour   Intake 1030 ml   Output 240 ml   Net 790 ml       Last 3  Weights   03/31/24 1311 180 lb 14.4 oz (82.1 kg)   03/31/24 1117 177 lb 0.5 oz (80.3 kg)       Exam    Gen:  calm  Pulm: Lungs clear  CV: Heart with regular rate and rhythm  Abd: Abdomen soft, lower abd pain/suprapubic pain with palpation  Neuro: moving all 4 ext, confused    Data Review:       Labs:     Recent Labs   Lab 04/04/24  0629 04/05/24  0546 04/06/24  0656   RBC 6.15* 5.25 5.22   HGB 18.5* 15.4 15.3   HCT 52.9 46.6 45.6   MCV 86.0 88.8 87.4   MCH 30.1 29.3 29.3   MCHC 35.0 33.0 33.6   RDW 13.4 13.9 13.8   NEPRELIM 15.49* 10.99* 13.38*   WBC 17.4* 13.6* 15.6*   .0 282.0 285.0         Recent Labs   Lab 04/05/24  0546 04/06/24  0656 04/07/24  0708 04/08/24  0654   * 109*  --  101*   BUN 27* 18  --  21   CREATSERUM 1.45* 1.25  --  1.32*   EGFRCR 52* 62  --  58*   CA 9.1 9.2  --  9.4   * 147*  --  143   K 3.6  3.6 3.4* 3.2* 3.0*   * 117*  --  111   CO2 22.0 22.0  --  24.0       No results for input(s): \"ALT\", \"AST\", \"ALB\", \"AMYLASE\", \"LIPASE\", \"LDH\" in the last 168 hours.    Invalid input(s): \"ALPHOS\", \"TBIL\", \"DBIL\", \"TPROT\"      Imaging:  No results found.      Meds:      ipratropium-albuterol  3 mL Nebulization Q6H WA    potassium chloride  40 mEq Oral Q4H    calcium carbonate  1,000 mg Oral TID    divalproex DR  250 mg Oral TID (Nitrates)    OLANZapine  7.5 mg Oral Nightly    thiamine  200 mg Intravenous TID    amLODIPine  10 mg Oral Daily    ofloxacin  1 drop Both Eyes QID    carvedilol  12.5 mg Oral BID with meals    tamsulosin  0.4 mg Oral Daily    fluticasone furoate-vilanterol  1 puff Inhalation Daily    aspirin  81 mg Oral Daily    atorvastatin  80 mg Oral Nightly    enoxaparin  40 mg Subcutaneous Daily      dextrose 75 mL/hr at 04/07/24 1143     chlorproMAZINE, hydrALAZINE, haloperidol lactate, acetaminophen **OR** acetaminophen, ondansetron, glycerin-hypromellose-

## 2024-04-08 NOTE — PLAN OF CARE
Frequent rounding. Patient educated on all medications administered. Bed in lowest position, bed alarm and i bed awareness activated, fall precautions in place and call light within reach at all times. All patients questions answered and needs addressed promptly.       Problem: Patient Centered Care  Goal: Patient preferences are identified and integrated in the patient's plan of care  Description: Interventions:  - Provide timely, complete, and accurate information to patient/family  - Incorporate patient and family knowledge, values, beliefs, and cultural backgrounds into the planning and delivery of care  - Encourage patient/family to participate in care and decision-making at the level they choose  - Honor patient and family perspectives and choices  Outcome: Progressing     Problem: Patient/Family Goals  Goal: Patient/Family Long Term Goal  Description: Patient's Long Term Goal: discharge    Interventions:  - Monitor vital signs  - Monitor neurological status  - Ambulate as tolerated  - See additional Care Plan goals for specific interventions  Outcome: Progressing  Goal: Patient/Family Short Term Goal  Description: Patient's Short Term Goal: feel better    Interventions:   - Verbalize needs and wants  - Antianxiety medications as needed  - See additional Care Plan goals for specific interventions  Outcome: Progressing     Problem: Delirium  Goal: Minimize duration of delirium  Description: Interventions:  - Encourage use of hearing aids, eye glasses  - Promote highest level of mobility daily  - Provide frequent reorientation  - Promote wakefulness i.e. lights on, blinds open  - Promote sleep, encourage patient's normal rest cycle i.e. lights off, TV off, minimize noise and interruptions  - Encourage family to assist in orientation and promotion of home routines  Outcome: Progressing     Problem: METABOLIC/FLUID AND ELECTROLYTES - ADULT  Goal: Electrolytes maintained within normal limits  Description:  INTERVENTIONS:  - Monitor labs and rhythm and assess patient for signs and symptoms of electrolyte imbalances  - Administer electrolyte replacement as ordered  - Monitor response to electrolyte replacements, including rhythm and repeat lab results as appropriate  - Fluid restriction as ordered  - Instruct patient on fluid and nutrition restrictions as appropriate  Outcome: Progressing     Problem: MUSCULOSKELETAL - ADULT  Goal: Return mobility to safest level of function  Description: INTERVENTIONS:  - Assess patient stability and activity tolerance for standing, transferring and ambulating w/ or w/o assistive devices  - Assist with transfers and ambulation using safe patient handling equipment as needed  - Ensure adequate protection for wounds/incisions during mobilization  - Obtain PT/OT consults as needed  - Advance activity as appropriate  - Communicate ordered activity level and limitations with patient/family  Outcome: Progressing     Problem: NEUROLOGICAL - ADULT  Goal: Achieves stable or improved neurological status  Description: INTERVENTIONS  - Assess for and report changes in neurological status  - Initiate measures to prevent increased intracranial pressure  - Maintain blood pressure and fluid volume within ordered parameters to optimize cerebral perfusion and minimize risk of hemorrhage  - Monitor temperature, glucose, and sodium. Initiate appropriate interventions as ordered  Outcome: Progressing  Goal: Absence of seizures  Description: INTERVENTIONS  - Monitor for seizure activity  - Administer anti-seizure medications as ordered  - Monitor neurological status  Outcome: Progressing  Goal: Remains free of injury related to seizure activity  Description: INTERVENTIONS:  - Maintain airway, patient safety  and administer oxygen as ordered  - Monitor patient for seizure activity, document and report duration and description of seizure to MD/LIP  - If seizure occurs, turn patient to side and suction  secretions as needed  - Reorient patient post seizure  - Seizure pads on all 4 side rails  - Instruct patient/family to notify RN of any seizure activity  - Instruct patient/family to call for assistance with activity based on assessment  Outcome: Progressing  Goal: Achieves maximal functionality and self care  Description: INTERVENTIONS  - Monitor swallowing and airway patency with patient fatigue and changes in neurological status  - Encourage and assist patient to increase activity and self care with guidance from PT/OT  - Encourage visually impaired, hearing impaired and aphasic patients to use assistive/communication devices  Outcome: Progressing     Problem: Safety Risk - Non-Violent Restraints  Goal: Patient will remain free from self-harm  Description: INTERVENTIONS:  - Apply the least restrictive restraint to prevent harm  - Notify patient and family of reasons restraints applied  - Assess for any contributing factors to confusion (electrolyte disturbances, delirium, medications)  - Discontinue any unnecessary medical devices as soon as possible  - Assess the patient's physical comfort, circulation, skin condition, hydration, nutrition and elimination needs   - Reorient and redirection as needed  - Assess for the need to continue restraints  Outcome: Progressing     Problem: Risk for Violence/Aggression  Goal: Absence of Violence/Aggression  Description: INTERVENTIONS:   - Identify precipitating factors for behavior   - Notify Charge RN/Provider   - Consider decreasing stimulation   - Consider distraction measures   - Consider discussion with provider regarding prn meds    - Consider GABRIEL (Moderate Risk only)   - Consider Code Support (High Risk only)   - Consider room safety checks   - Consider restraints  Outcome: Progressing     Problem: Risk for Violence-Violent Restraints/Seclusion  Goal: Patient will not express any violent or self-destructive behaviors  Description: Interventions:  - Apply the least  restrictive restraint to prevent harm if patient strikes out and is not responding to redirection  - Notify patient and family of reasons restraints applied  - Assist the patient to identify the precipitating event  - Assist the patient to identify alternatives to physically acting out  - Assess for any contributing factors to violent behaviors (substances,  medications, history of trauma)  - Assess the patient's physical comfort, circulation, skin condition, hydration, nutrition and elimination needs   - Assess for the need to continue restraints  - Evaluate the need for an emergency medication  Outcome: Progressing

## 2024-04-08 NOTE — PLAN OF CARE
Problem: Risk for Violence-Violent Restraints/Seclusion  Goal: Patient will not express any violent or self-destructive behaviors  Description: Interventions:  - Apply the least restrictive restraint to prevent harm if patient strikes out and is not responding to redirection  - Notify patient and family of reasons restraints applied  - Assist the patient to identify the precipitating event  - Assist the patient to identify alternatives to physically acting out  - Assess for any contributing factors to violent behaviors (substances,  medications, history of trauma)  - Assess the patient's physical comfort, circulation, skin condition, hydration, nutrition and elimination needs   - Assess for the need to continue restraints  - Evaluate the need for an emergency medication  Outcome: Progressing      1:1 sitter. Patient retaining urine, straight cathed per protocol Q 6 hours. Worked with PT/OT. Up in chair.

## 2024-04-08 NOTE — PHYSICAL THERAPY NOTE
PHYSICAL THERAPY TREATMENT NOTE - INPATIENT     Room Number: 561/561-A       Presenting Problem: AMS, agitation, work up in progress       Problem List  Principal Problem:    NPH (normal pressure hydrocephalus) (Formerly [facility])  Active Problems:    Severe vascular dementia with agitation (HCC)    Delirium due to another medical condition    Episodic mood disorder (HCC)    TIA (transient ischemic attack)      PHYSICAL THERAPY ASSESSMENT   Patient demonstrates good  progress this session, goals  remain in progress.  Chart reviewed < neurology and psychiatry on consult for AMS and agitation. Pt currently with sitter in room.  Therapy tech present for session.       Patient continues to function below baseline with bed mobility, transfers, gait, maintaining seated position, standing prolonged periods, and performing household tasks.  Contributing factors to remaining limitations include decreased functional strength, decreased endurance/aerobic capacity, impaired sit and standing  balance, impaired motor planning, decreased muscular endurance, cognitive deficits (alert oriented x 2 --following one step directions , sitter in room ), and medical status.  Next session anticipate patient to progress bed mobility, transfers, gait, maintaining seated position, and standing prolonged periods.  Physical Therapy will continue to follow patient for duration of hospitalization.    Patient continues to benefit from continued skilled PT services: to promote return to prior level of function and safety with continuous assistance and gradual rehabilitative therapy .    PLAN  PT Treatment Plan: Bed mobility;Body mechanics;Endurance;Energy conservation;Patient education;Family education;Gait training;Balance training;Transfer training;Strengthening  Frequency (Obs): 3-5x/week    SUBJECTIVE  Pt reports his last name aware in hospital , denies pain , agreeable to mobility . Pt reports his legs feel weak .   \" I like to joke around \"      OBJECTIVE  Precautions: Bed/chair alarm (sitter in room)    WEIGHT BEARING RESTRICTION   None              PAIN ASSESSMENT   Rating: Other (Comment)  Location: Denies pain  Management Techniques: Activity promotion;Body mechanics;Repositioning    BALANCE  Static Sitting: Poor +  Dynamic Sitting: Poor +  Static Standing: Poor +  Dynamic Standing: Poor    ACTIVITY TOLERANCE  Pulse: 74 (activity)        BP: 98/87 (post activity denies symptoms  resting BP pt moving 151/104 RN aware approve activity)              O2 WALK  Oxygen Therapy  SPO2% on Room Air at Rest: 96  SPO2% Ambulation on Room Air: 95    AM-PAC '6-Clicks' INPATIENT SHORT FORM - BASIC MOBILITY  How much difficulty does the patient currently have...  Patient Difficulty: Turning over in bed (including adjusting bedclothes, sheets and blankets)?: A Lot   Patient Difficulty: Sitting down on and standing up from a chair with arms (e.g., wheelchair, bedside commode, etc.): A Lot   Patient Difficulty: Moving from lying on back to sitting on the side of the bed?: A Lot   How much help from another person does the patient currently need...   Help from Another: Moving to and from a bed to a chair (including a wheelchair)?: A Lot   Help from Another: Need to walk in hospital room?: A Lot       AM-PAC Score:  Raw Score: 11   Approx Degree of Impairment: 72.57%   Standardized Score (AM-PAC Scale): 33.86   CMS Modifier (G-Code): CL    FUNCTIONAL ABILITY STATUS  Functional Mobility/Gait Assessment  Gait Assistance: Maximum assistance (3 A used for safety  therapist / therapy tech and sitter)  Distance (ft): SPT bed to chair  rest provided    20 ft x 1 with close chair follow  Assistive Device: Rolling walker  Pattern: R Steppage;L Steppage;R Foot flat;L Foot flat;Shuffle (flexed gait)  Rolling: moderate assist  Supine to Sit: moderate assist---pt initially with poor balance in sitting requiring hands on support at moderate level . Pt progressing to sitting at EOB  with close CGA / intermittent min assist with readiness for transfer training .    Sit to Supine: NA  Sit to Stand:  2A for sit to stand with RW cues needed .  Pt with decrease standing balance leaning posteriorly with cues and min assist of 2 able to stand upright progressing to ambulation .    Patient received supine in bed, agreeable to physical therapy. Vital signs monitored as noted above, no adverse symptoms and patient stable during session. Pt with significant drop in  SBP noted denies symptoms.   Pt denies pain and agreeable to participation.     Education with pt  provided verbally, via demonstration, and while practicing during session on Physical therapy plan of care, physiological benefits of out of bed mobility, and B AP , fxn mobility training and DC Planning  .     The patient's Approx Degree of Impairment: 72.57% has been calculated based on documentation in the [facility] '6 clicks' Inpatient Daily Activity Short Form.  Research supports that patients with this level of impairment may benefit from rehab facility .   Pt was cooperative , alert and following one step directions . Pt with improving status and cognition overall from last therapy note.   Pt remains with sitter in room.  Pt is alert oriented to person  and place.   Therapy recommending [facility] as next level of care .    Per SW notes pt lives alone in condo .    Pt requiring 24hr care and support with sitter present in room.   Pt does not appear with cognition / physical skilled to return to ILF .   Neuro and psych on consult for AMS with multiple prior admissions.       Final disposition will be made by interdisciplinary medical team.      Patient End of Session: Up in chair;Needs met;Call light within reach;RN aware of session/findings;All patient questions and concerns addressed;Alarm set (sitter in room with pt)    CURRENT GOALS   Goals to be met by: 4/10/24  Patient Goal Patient's self-stated goal is: did not state, resisting returning to bed    Goal #1 Patient is able to demonstrate supine - sit EOB @ level: supervision      Goal #1   Current Status  mod to max assist of one    Goal #2 Patient is able to demonstrate transfers Sit to/from Stand at assistance level: supervision with none      Goal #2  Current Status  as above    Goal #3 Patient is able to ambulate 100 feet with assist device: none at assistance level: supervision   Goal #3   Current Status As above       Therapy activity 2 units

## 2024-04-08 NOTE — PROGRESS NOTES
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 The patient has been demonstrating confusion, restlessness, agitation. Patient indicated for psych consult for evaluation and advise.    Consult Duration     The patient seen for over 50-minute, follow-up evaluation, over 50% counseling and coordinating care addressing confusion, restlessness.    Record reviewed, communication with attending, communication with RN and patient seen face to face evaluation.  History of Present Illness:      According to the team the patient has been pleasantly confused with no restraint and he has been responsive to take medication and food.    The patient he Responded to the current management of chlorpromazine and Tums.    The patient laying down in his bed this evening with eyes closed.  The patient was a pleasant upon waking him up and communicating with him.  The patient continued demonstrate confusion and disorientation reporting that he is in a friend's house otherwise he is not surprised to see me and he was cooperative.    The patient denying any auditory or visual hallucination denying any depression or anxiety.  Patient continue mistreating to occasional restlessness but his confusion has been permanent.  Patient presented last paranoid and pleasant.  No agitation has been reported.  Patient has been sleeping well on the current medicine.      Review record again and review head CT scan which demonstrating intense atrophy, vascular ischemic changes and enlarged ventricles all indicating severe impairment in the intellectual process and indicate the high possibility of severe dementia.      The patient has been demonstrating delirium episode superimposed on dementia with alternation in mood and cognition with  episodes of  increased confusion, restlessness, agitation and response to internal stimuli.       Past Psychiatric/Medication History:  1. Prior diagnoses: Previous delirium episode.  2. Past psychiatric inpatient: Multiple admission to medical inpatient for altered mental status.  3. Past outpatient history: None  4. Past suicide history: None  5. 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   Resection 2019    Catatonia 03/31/2024    Cognitive communication deficit 03/14/2023    Cystic disease of liver 02/22/2023    Dementia (HCC) 02/01/2023    Difficult airway 02/02/2023    Dyslipidemia 02/01/2023    Elevated white blood cell count, unspecified 02/22/2023    Encephalopathy 09/19/2012    Essential hypertension     Gastro-esophageal reflux disease with esophagitis, without bleeding 02/22/2023    Gout, chronic 03/31/2024    Gross hematuria 03/31/2024    Hypercholesterolemia 06/25/2015    Hyperlipidemia 09/19/2012    Leukocytosis 02/19/2023    Liver cyst 08/30/2022    Metabolic encephalopathy 02/22/2023    Neuroendocrine carcinoma, 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   Poorly differentiated small cell carcinoma R tail of parotid gland:   1)  3/6/19 - FNA tail of R parotid gland -> poorly differentiated carcinoma with  neuroendocrine features   2)  3/28/19 - R total parotidectomy with R neck dissection -> poorly differentiated small cell carcinoma involving intraparotid LNs x 3 and extending into surr    Primary hypertension 2009    Psoriasis, unspecified 2022    Seizure (HCC) 2012    Formatting of this note might be different from the original.   Keppra    Seizure disorder (HCC)     Toxic metabolic encephalopathy 2022    Transient global amnesia 01/10/2017    Formatting of this note might be different from the original.   Brief -Plavix    Unspecified dementia, unspecified severity, without behavioral disturbance, psychotic disturbance, mood disturbance, and anxiety (HCC) 2023    Vascular dementia (HCC) 2022    Vascular dementia, unspecified severity, without behavioral disturbance, psychotic disturbance, mood disturbance, and anxiety ([facility]) 2023    Weakness 2023    Weakness of left upper extremity 2022       Past Surgical History  History reviewed. No pertinent surgical history.    Family History  No family history on file.    Social History  Social History     Socioeconomic History    Marital status: Single   Tobacco Use    Smoking status: Former     Packs/day: 1.00     Years: 25.00     Additional pack years: 0.00     Total pack years: 25.00     Types: Cigarettes     Quit date: 1990     Years since quittin.2    Tobacco comments:      Cigarettes 1 25 Quit:     Social Determinants of Health     Food Insecurity: Unknown (3/31/2024)    Food Insecurity     Food Insecurity: Patient unable to answer   Transportation Needs: Unknown (3/31/2024)    Transportation Needs     Lack of Transportation: Patient unable to answer   Housing Stability: Unknown (3/31/2024)    Housing Stability     Housing Instability: Patient unable to answer           Current Medications:      Medications Prior to Admission   Medication Sig    albuterol 108 (90 Base) MCG/ACT Inhalation Aero Soln Inhale 2  puffs into the lungs every 4 (four) hours as needed for Wheezing.    carvedilol 12.5 MG Oral Tab Take 1 tablet (12.5 mg total) by mouth 2 (two) times daily with meals.    clopidogrel 75 MG Oral Tab Take 1 tablet (75 mg total) by mouth daily.    colchicine 0.6 MG Oral Tab Take 1 tablet (0.6 mg total) by mouth daily.    tamsulosin 0.4 MG Oral Cap Take 1 capsule (0.4 mg total) by mouth daily.    acetaminophen 325 MG Oral Tab Take 2 tablets (650 mg total) by mouth every 6 (six) hours as needed for Pain.    fluticasone-salmeterol 250-50 MCG/ACT Inhalation Aerosol Powder, Breath Activated Inhale 1 puff into the lungs 2 (two) times daily.       Allergies  Allergies   Allergen Reactions    Risperidone WHEEZING       Review of Systems:   As by Admitting/Attending    Results:   Laboratory Data:  Lab Results   Component Value Date    WBC 15.6 (H) 04/06/2024    HGB 15.3 04/06/2024    HCT 45.6 04/06/2024    .0 04/06/2024    CREATSERUM 1.25 04/06/2024    BUN 18 04/06/2024     (H) 04/06/2024    K 3.2 (L) 04/07/2024     (H) 04/06/2024    CO2 22.0 04/06/2024     (H) 04/06/2024    CA 9.2 04/06/2024    TP 7.0 04/03/2024    TSH 3.927 04/03/2024    MG 2.0 04/06/2024    B12 470 04/03/2024         Imaging:  No results found.    Vital Signs:   Blood pressure 122/79, pulse 55, temperature 98.4 °F (36.9 °C), temperature source Oral, resp. rate 20, height 68\", weight 82.1 kg (180 lb 14.4 oz), SpO2 96%.    Mental Status Exam:   Appearance: Stated age male, in hospital gown, laying down in hospital bed.  No restraint today and no hiccup.  Psychomotor: Patient has been demonstrating less restlessness and no agitation..   Orientation: Alert and oriented to person. Patient confused to location, date and condition  Gait: Not evaluated.  Attitude/Coorperation: Patient made an effort to cooperate otherwise continue to be distracted and response to internal stimuli.  Behavior: Episodes of restlessness but no agitation  reported in the last 24 hours.  Speech: Regular rate and rhythm speech.  Less scattered speech  Mood: Reporting feeling better.  Affect: Slightly blunted affect..  Thought process: Confused, disorganized  Thought content: No reports of  suicidal or homicidal ideation.  Perceptions: Patient has been demonstrating response to internal stimuli hallucinating.   Concentration: Grossly impaired  Memory: Grossly impaired  Intellect: Average.  Judgment and Insight: Impaired.  Patient demonstrating cognitive impairment.    Impression:     Delirium due to other medical condition.  Episodic mood disorder.  Rule out vascular dementia with behavioral disturbance.  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  Although the etiology still not discovered, the patient had a few episodes of delirium in [facility].    The patient today has been demonstrating increased alternation in his mood, restlessness, irritability, delusional ideation, distractibility, disorientation and has been demonstrating delirious episode.  Imaging indicate vascular ischemic changes.    The patient has been demonstrating delirium episode with alternation in mood and cognition with episodes of  increased confusion, restlessness, agitation and response to internal stimuli.     4/3/2024: The patient today continues to demonstrate confusion, restlessness, agitation. He remains in soft restraints. Vitamin B12 and TSH are within normal limits.    4/4/2024: Patient titrating sedation.  Patient is anticipated to have LP today    4/5/2024: The patient with multiple episodes of psychosis,  agitation and deterioration in his cognition continued demonstrate alternation in his mood and interaction.      4/6/2024: The patient has been demonstrating less agitation and with no restraint.    4/7/2024: The patient has been demonstrating improvement in his demeanor and mood with less agitation otherwise the patient with severe dementia and continues to be disoriented.    Discussed risk and benefit, acknowledging the current symptom and severity.  At this point, I would recommend the following approach:     Focus on safety.  Restrained at time if indicated  Focus on education and support.  Focus on insight orientation helping the patient understand diagnosis and treatment plan.  Continue Zyprexa 7.5 mg nightly.  Continue Depakote 250 mg 3 times daily.  Continue thiamine 200 mg IV 3 times daily.  Work with the team to enhance orientation and provide support to eliminate restraint.  Utilize Haldol 2 mg IV every 2 hours as needed for agitation.  Utilize Ativan 1 mg every 6 hours as needed for anxiety.  Processed with patient at length, the initiation of the above psychotropic medications I advised the patient of the risks, benefits, alternatives and potential side effects. The patient consents to administration of the medications and understands the right to refuse medications at any time. The patient verbalized understanding.   Coordinate plan with team    Orders This Visit:  Orders Placed This Encounter   Procedures    Basic Metabolic Panel (8)    CBC With Differential With Platelet    Troponin I (High Sensitivity)    Urinalysis, Routine    Basic Metabolic Panel (8)    CBC With Differential With Platelet    Potassium    Potassium    Lipid Panel    Hemoglobin A1C    Basic Metabolic Panel (8)    CBC With Differential With Platelet    Magnesium    Vitamin B12    TSH W Reflex To Free T4    CBC With Differential With Platelet    Basic Metabolic Panel (8)    Magnesium    Glucose, Serum    Protein, Total, Serum    CJD  14-3-3 Protein Tau Total, Cerebrospinal Fluid    MD BLOOD SMEAR CONSULT    Basic Metabolic Panel (8)    CBC With Differential With Platelet    Magnesium    Potassium    Miscellaneous Testing    Miscellaneous Testing    Miscellaneous Testing    Miscellaneous Testing    Miscellaneous Testing    Miscellaneous Testing    Miscellaneous Testing    Miscellaneous Testing    CBC With Differential With Platelet    Basic Metabolic Panel (8)    Magnesium    Scan slide    Potassium    Basic Metabolic Panel (8)    Cytology fluids    SARS-CoV-2/Flu A and B/RSV by PCR (GeneXpert)    $$$$Respiratory Flu Expanded Panel + Covid-19$$$$       Meds This Visit:  Requested Prescriptions      No prescriptions requested or ordered in this encounter       Anselmo Lawson MD  4/7/2024    Note to Patient: The 21st Century Cures Act makes medical notes like these available to patients in the interest of transparency. However, be advised this is a medical document. It is intended as peer to peer communication. It is written in medical language and may contain abbreviations or verbiage that are unfamiliar. It may appear blunt or direct. Medical documents are intended to carry relevant information, facts as evident, and the clinical opinion of the practitioner. This note may have been transcribed using a voice dictation system. Voice recognition errors may occur. This should not be taken to alter the content or meaning of this note.

## 2024-04-08 NOTE — SPIRITUAL CARE NOTE
Spiritual Care Visit Note    Patient Name: Carlo Ng Date of Spiritual Care Visit: 24   : 1955 Primary Dx: NPH (normal pressure hydrocephalus) (HCC)       Referred By: Referral From:     Spiritual Care Taxonomy:    Intended Effects: Demonstrate caring and concern    Methods: Collaborate with care team member;Offer support    Interventions: Active listening;Ask guided questions;Silent prayer    Visit Type/Summary:     - Spiritual Care: Consulted with RN prior to visit. Attempted visit. Patient is unavailable. Left a Spiritual Care contact information card.    Spiritual Care support can be requested via an Epic consult. For urgent/immediate needs, please contact the On Call  at: [place]: ext [phone]    YAMILE Moreno CAMII   [id number]

## 2024-04-09 ENCOUNTER — APPOINTMENT (OUTPATIENT)
Dept: GENERAL RADIOLOGY | Facility: HOSPITAL | Age: 69
End: 2024-04-09
Attending: Other
Payer: MEDICARE

## 2024-04-09 ENCOUNTER — APPOINTMENT (OUTPATIENT)
Dept: GENERAL RADIOLOGY | Facility: HOSPITAL | Age: 69
End: 2024-04-09
Attending: HOSPITALIST
Payer: MEDICARE

## 2024-04-09 LAB
ANION GAP SERPL CALC-SCNC: 7 MMOL/L (ref 0–18)
BASOPHILS # BLD AUTO: 0.05 X10(3) UL (ref 0–0.2)
BASOPHILS NFR BLD AUTO: 0.5 %
BUN BLD-MCNC: 21 MG/DL (ref 9–23)
BUN/CREAT SERPL: 16.5 (ref 10–20)
CALCIUM BLD-MCNC: 9.6 MG/DL (ref 8.7–10.4)
CHLORIDE SERPL-SCNC: 110 MMOL/L (ref 98–112)
CO2 SERPL-SCNC: 28 MMOL/L (ref 21–32)
CREAT BLD-MCNC: 1.27 MG/DL
DEPRECATED RDW RBC AUTO: 43.6 FL (ref 35.1–46.3)
EGFRCR SERPLBLD CKD-EPI 2021: 61 ML/MIN/1.73M2 (ref 60–?)
EOSINOPHIL # BLD AUTO: 0.51 X10(3) UL (ref 0–0.7)
EOSINOPHIL NFR BLD AUTO: 4.8 %
ERYTHROCYTE [DISTWIDTH] IN BLOOD BY AUTOMATED COUNT: 13.2 % (ref 11–15)
GLUCOSE BLD-MCNC: 75 MG/DL (ref 70–99)
HCT VFR BLD AUTO: 48.2 %
HGB BLD-MCNC: 15.6 G/DL
IMM GRANULOCYTES # BLD AUTO: 0.07 X10(3) UL (ref 0–1)
IMM GRANULOCYTES NFR BLD: 0.7 %
LYMPHOCYTES # BLD AUTO: 1 X10(3) UL (ref 1–4)
LYMPHOCYTES NFR BLD AUTO: 9.5 %
MAGNESIUM SERPL-MCNC: 1.7 MG/DL (ref 1.6–2.6)
MCH RBC QN AUTO: 28.9 PG (ref 26–34)
MCHC RBC AUTO-ENTMCNC: 32.4 G/DL (ref 31–37)
MCV RBC AUTO: 89.3 FL
MONOCYTES # BLD AUTO: 0.78 X10(3) UL (ref 0.1–1)
MONOCYTES NFR BLD AUTO: 7.4 %
NEUTROPHILS # BLD AUTO: 8.17 X10 (3) UL (ref 1.5–7.7)
NEUTROPHILS # BLD AUTO: 8.17 X10(3) UL (ref 1.5–7.7)
NEUTROPHILS NFR BLD AUTO: 77.1 %
OSMOLALITY SERPL CALC.SUM OF ELEC: 302 MOSM/KG (ref 275–295)
PLATELET # BLD AUTO: 321 10(3)UL (ref 150–450)
POTASSIUM SERPL-SCNC: 2.8 MMOL/L (ref 3.5–5.1)
POTASSIUM SERPL-SCNC: 3 MMOL/L (ref 3.5–5.1)
RBC # BLD AUTO: 5.4 X10(6)UL
SODIUM SERPL-SCNC: 145 MMOL/L (ref 136–145)
WBC # BLD AUTO: 10.6 X10(3) UL (ref 4–11)

## 2024-04-09 PROCEDURE — 71045 X-RAY EXAM CHEST 1 VIEW: CPT | Performed by: OTHER

## 2024-04-09 PROCEDURE — 99233 SBSQ HOSP IP/OBS HIGH 50: CPT | Performed by: OTHER

## 2024-04-09 PROCEDURE — 74018 RADEX ABDOMEN 1 VIEW: CPT | Performed by: HOSPITALIST

## 2024-04-09 RX ORDER — MAGNESIUM SULFATE HEPTAHYDRATE 40 MG/ML
2 INJECTION, SOLUTION INTRAVENOUS ONCE
Status: COMPLETED | OUTPATIENT
Start: 2024-04-09 | End: 2024-04-09

## 2024-04-09 RX ORDER — BACLOFEN 10 MG/1
5 TABLET ORAL 3 TIMES DAILY PRN
Status: DISCONTINUED | OUTPATIENT
Start: 2024-04-09 | End: 2024-04-11

## 2024-04-09 RX ORDER — POTASSIUM CHLORIDE 20 MEQ/1
40 TABLET, EXTENDED RELEASE ORAL EVERY 4 HOURS
Status: COMPLETED | OUTPATIENT
Start: 2024-04-09 | End: 2024-04-09

## 2024-04-09 RX ORDER — MELATONIN
200 2 TIMES DAILY
Status: DISCONTINUED | OUTPATIENT
Start: 2024-04-10 | End: 2024-04-22

## 2024-04-09 NOTE — DIETARY NOTE
BRIEF DIETITIAN NOTE      Patient Status 04/02/24: Pt screened for MST at risk 2/2 unsure weight loss.  Pt admitted for AMS. Visited pt today, sedated and on restraints. Discussed pt with RN, per family report pt was on regular diet and good po intake at home. Pt consuming 75% of  documented meals, and good po intakes of pureed diet per RN. Today he has been sedated so did not eat lunch; pt will go for MRI later today and per RN will assist him with feeds for dinner. SLP tried to re-evaluate but pt sedated. RN endorsed pt may have better intakes if/when diet is advanced. Weight relatively stable no noted weight loss, per Wt Hx on EHR, pt wt asf of last year was 180#, #.  Labs reviewed, K+ was low, replaced and now WNL. Will add ONS once pt preference is obtained, and monitoring for diet advancement/improved po intakes. Will follow per protocol. Please consult RD if earlier nutrition intervention is needed.      4/9/2024 Update:  Pt sleeping upon visit. Sitter at bedside who reported pt fairly eating meals except for hiccups. Ate 75% of breakfast today. Last BM documented on 4/7. No new wt to re-assess.   Likely at Low Nutrition risk but will determine nutrition risk status better pending requested new wt.   K and Mg being replaced with Kdur and Mg sulfate 2 g.    Liberalized diet ( lifted Cardiac diet) to further improve po intake. And, added Oral Nutrition Supplement ( ONS ) Ensure Enlive daily.     Will continue to monitor po intake and adequacy.       Dietary Orders       Start     Ordered    04/08/24 1245  Pureed; No Straw; Fluid Consistency: Thin Liquids   Is Patient on Accuchecks? No;   Is Patient on Suicide Precautions? Yes  Diet effective now        Comments: 1:1 feed   4/9 : Oral Nutrition Supplement ( ONS ) Ensure Enlive daily at Breakfast       04/08/24 1244                  Percent Meals Eaten (last 6 days)       Date/Time Percent Meals Eaten (%)    04/03/24 1800 75 %    04/04/24 0923 0 %      Percent Meals Eaten (%): NPO at 04/04/24 0923    04/04/24 1343 0 %    04/04/24 1800 0 %    04/05/24 0900 90 %    04/05/24 1351 15 %     Percent Meals Eaten (%): Ate all of pudding cup and soup, did not want mashed potatos or bread at 04/05/24 1351    04/05/24 1902 0 %    04/06/24 1001 100 %    04/06/24 1230 --     Percent Meals Eaten (%): few bites of soup/potatoes at 04/06/24 1230    04/07/24 0903 75 %    04/07/24 1310 30 %    04/07/24 1832 80 %    04/09/24 1039 75 %          Recent Labs     04/06/24  0656 04/07/24  0708 04/08/24  0654 04/08/24  1627 04/09/24  0822   *  --  101*  --  75   BUN 18  --  21  --  21   CREATSERUM 1.25  --  1.32*  --  1.27   CA 9.2  --  9.4  --  9.6   MG 2.0  --   --   --  1.7   *  --  143  --  145   K 3.4*   < > 3.0* 3.9 2.8*   *  --  111  --  110   CO2 22.0  --  24.0  --  28.0   OSMOCALC 306*  --  299*  --  302*    < > = values in this interval not displayed.       Patient Weight(s) for the past 336 hrs:   Weight   03/31/24 1311 82.1 kg (180 lb 14.4 oz)   03/31/24 1117 80.3 kg (177 lb 0.5 oz)        Wt Readings from Last 6 Encounters:   03/31/24 82.1 kg (180 lb 14.4 oz)        F/u per protocol or as appropriate.     Nisha Austin, RD, LDN, [facility]  Clinical Dietitian  [phone]

## 2024-04-09 NOTE — CONGREGATE LIVING REVIEW
[facility] Authorization    The [facility] Review Committee has reviewed this case and the patient IS APPROVED for discharge to a facility for Short Term Skilled once the following procedure is followed:     - The physician discharge instructions (contained within the ANTONIO note for SNF) must inlcude the below appropriate and approved COVID instructions to the facility    For questions regarding CLRC approval process, please contact the CM assigned to the case.  For questions regarding RN discharge workflow, please contact the unit Clinical Leader.

## 2024-04-09 NOTE — PROGRESS NOTES
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 The patient has been demonstrating confusion, restlessness, agitation. Patient indicated for psych consult for evaluation and advise.    Consult Duration     The patient seen for over 50-minute, follow-up evaluation, over 50% counseling and coordinating care addressing confusion, restlessness.    Record reviewed, communication with attending, communication with RN and patient seen face to face evaluation.  History of Present Illness:      The patient laying down in his bed demonstrating some tiredness otherwise able to communicate with the scattered speech and thought process.  No agitation has been reported.  Patient continued demonstrating confusion and cognitive impairment but no agitation.    Patient has been compliant to medication and has been sleeping well at night otherwise continue demonstrate some extended sedation during the day.    The patient continued demonstrate confusion and disorientation reporting that he is not in the hospital    Review record again and review head CT scan which demonstrating intense atrophy, vascular ischemic changes and enlarged ventricles all indicating severe impairment in the intellectual process and indicate the high possibility of severe dementia.      The patient has been demonstrating delirium episode superimposed on dementia with alternation in mood and cognition with episodes of  increased confusion, restlessness, agitation and response to internal stimuli.       Past Psychiatric/Medication History:  1. Prior diagnoses: Previous delirium episode.  2. Past psychiatric inpatient: Multiple admission to medical inpatient for altered mental status.  3. Past outpatient history: None  4. Past suicide history:  None  5. 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   Resection 2019    Catatonia 03/31/2024    Cognitive communication deficit 03/14/2023    Cystic disease of liver 02/22/2023    Dementia (HCC) 02/01/2023    Difficult airway 02/02/2023    Dyslipidemia 02/01/2023    Elevated white blood cell count, unspecified 02/22/2023    Encephalopathy 09/19/2012    Essential hypertension     Gastro-esophageal reflux disease with esophagitis, without bleeding 02/22/2023    Gout, chronic 03/31/2024    Gross hematuria 03/31/2024    Hypercholesterolemia 06/25/2015    Hyperlipidemia 09/19/2012    Leukocytosis 02/19/2023    Liver cyst 08/30/2022    Metabolic encephalopathy 02/22/2023    Neuroendocrine carcinoma, 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   Poorly differentiated small cell carcinoma R tail of parotid gland:   1)  3/6/19 - FNA tail of R parotid gland -> poorly differentiated carcinoma with neuroendocrine features   2)  3/28/19 - R total parotidectomy with R neck dissection -> poorly differentiated small cell carcinoma involving intraparotid LNs x 3 and extending into surr    Primary hypertension 11/18/2009    Psoriasis, unspecified 09/09/2022    Seizure (HCC) 09/19/2012    Formatting of this note might be different from the  original.   Keppra    Seizure disorder (HCC)     Toxic metabolic encephalopathy 2022    Transient global amnesia 01/10/2017    Formatting of this note might be different from the original.   Brief -Plavix    Unspecified dementia, unspecified severity, without behavioral disturbance, psychotic disturbance, mood disturbance, and anxiety (HCC) 2023    Vascular dementia (HCC) 2022    Vascular dementia, unspecified severity, without behavioral disturbance, psychotic disturbance, mood disturbance, and anxiety ([facility]) 2023    Weakness 2023    Weakness of left upper extremity 2022       Past Surgical History  History reviewed. No pertinent surgical history.    Family History  No family history on file.    Social History  Social History     Socioeconomic History    Marital status: Single   Tobacco Use    Smoking status: Former     Packs/day: 1.00     Years: 25.00     Additional pack years: 0.00     Total pack years: 25.00     Types: Cigarettes     Quit date: 1990     Years since quittin.2    Tobacco comments:      Cigarettes 1 25 Quit:     Social Determinants of Health     Food Insecurity: Unknown (3/31/2024)    Food Insecurity     Food Insecurity: Patient unable to answer   Transportation Needs: Unknown (3/31/2024)    Transportation Needs     Lack of Transportation: Patient unable to answer   Housing Stability: Unknown (3/31/2024)    Housing Stability     Housing Instability: Patient unable to answer           Current Medications:      Medications Prior to Admission   Medication Sig    albuterol 108 (90 Base) MCG/ACT Inhalation Aero Soln Inhale 2 puffs into the lungs every 4 (four) hours as needed for Wheezing.    carvedilol 12.5 MG Oral Tab Take 1 tablet (12.5 mg total) by mouth 2 (two) times daily with meals.    clopidogrel 75 MG Oral Tab Take 1 tablet (75 mg total) by mouth daily.    colchicine 0.6 MG Oral Tab Take 1 tablet (0.6 mg total) by mouth daily.    tamsulosin 0.4 MG  Oral Cap Take 1 capsule (0.4 mg total) by mouth daily.    acetaminophen 325 MG Oral Tab Take 2 tablets (650 mg total) by mouth every 6 (six) hours as needed for Pain.    fluticasone-salmeterol 250-50 MCG/ACT Inhalation Aerosol Powder, Breath Activated Inhale 1 puff into the lungs 2 (two) times daily.       Allergies  Allergies   Allergen Reactions    Risperidone WHEEZING       Review of Systems:   As by Admitting/Attending    Results:   Laboratory Data:  Lab Results   Component Value Date    WBC 15.6 (H) 04/06/2024    HGB 15.3 04/06/2024    HCT 45.6 04/06/2024    .0 04/06/2024    CREATSERUM 1.32 (H) 04/08/2024    BUN 21 04/08/2024     04/08/2024    K 3.9 04/08/2024     04/08/2024    CO2 24.0 04/08/2024     (H) 04/08/2024    CA 9.4 04/08/2024    TP 7.0 04/03/2024    TSH 3.927 04/03/2024    MG 2.0 04/06/2024    B12 470 04/03/2024         Imaging:  No results found.    Vital Signs:   Blood pressure 148/79, pulse 76, temperature 98.5 °F (36.9 °C), temperature source Oral, resp. rate 18, height 68\", weight 82.1 kg (180 lb 14.4 oz), SpO2 95%.    Mental Status Exam:   Appearance: Stated age male, in hospital gown, laying down in hospital bed.  Red eyes.  Psychomotor: Patient has been no agitation and no restlessness.  Orientation: Alert and oriented to person. Patient confused to location, date and condition  Gait: Not evaluated.  Attitude/Coorperation: Patient made an effort to cooperate otherwise continue to be distracted due to cognitive impairment.  Behavior: No agitation has been reported.  Speech: No dysarthria.  Mood: Reporting feeling better.  Affect: Slightly blunted affect..  Thought process: Confused, disorganized  Thought content: No reports of  suicidal or homicidal ideation.  Perceptions: Patient has been demonstrating response to internal stimuli hallucinating.   Concentration: Grossly impaired  Memory: Grossly impaired  Intellect: Average.  Judgment and Insight: Impaired.  Patient  demonstrating cognitive impairment.    Impression:     Delirium due to other medical condition.  Episodic mood disorder.  Rule out vascular dementia with behavioral disturbance.  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  Although the etiology still not discovered, the patient had a few episodes of delirium in [facility].    The patient today has been demonstrating increased alternation in his mood, restlessness, irritability, delusional ideation, distractibility, disorientation and has been demonstrating delirious episode.  Imaging indicate vascular ischemic changes.    The patient has been demonstrating delirium episode with alternation in mood and cognition with episodes of  increased confusion, restlessness, agitation and response to internal stimuli.     4/3/2024: The patient today continues to demonstrate confusion, restlessness, agitation. He remains in soft restraints. Vitamin B12 and TSH are within normal limits.    4/4/2024: Patient titrating sedation.  Patient is anticipated to have LP today    4/5/2024: The patient with multiple episodes of psychosis, agitation and deterioration in his cognition continued demonstrate alternation in his mood and interaction.      4/6/2024: The patient has been demonstrating less agitation and with no restraint.    4/7/2024: The patient has been demonstrating improvement in his demeanor and mood with less agitation otherwise the patient with severe dementia and continues to be disoriented.    4/8/2024: The patient has been demonstrating improvement in his demeanor and interaction but has been  demonstrating increased sedation.    Discussed risk and benefit, acknowledging the current symptom and severity.  At this point, I would recommend the following approach:     Focus on safety.  Restrained at time if indicated  Focus on education and support.  Focus on insight orientation helping the patient understand diagnosis and treatment plan.  Lower Zyprexa to 5 mg nightly.  Lower Depakote to 250 mg twice daily.  Continue thiamine 200 mg IV 3 times daily.  Work with the team to enhance orientation and provide support to eliminate restraint.  Utilize Haldol 2 mg IV every 2 hours as needed for agitation.  Utilize Ativan 1 mg every 6 hours as needed for anxiety.  Processed with patient at length, the initiation of the above psychotropic medications I advised the patient of the risks, benefits, alternatives and potential side effects. The patient consents to administration of the medications and understands the right to refuse medications at any time. The patient verbalized understanding.   Coordinate plan with team    Orders This Visit:  Orders Placed This Encounter   Procedures    Basic Metabolic Panel (8)    CBC With Differential With Platelet    Troponin I (High Sensitivity)    Urinalysis, Routine    Basic Metabolic Panel (8)    CBC With Differential With Platelet    Potassium    Potassium    Lipid Panel    Hemoglobin A1C    Basic Metabolic Panel (8)    CBC With Differential With Platelet    Magnesium    Vitamin B12    TSH W Reflex To Free T4    CBC With Differential With Platelet    Basic Metabolic Panel (8)    Magnesium    Glucose, Serum    Protein, Total, Serum    CJD 14-3-3 Protein Tau Total, Cerebrospinal Fluid    MD BLOOD SMEAR CONSULT    Basic Metabolic Panel (8)    CBC With Differential With Platelet    Magnesium    Potassium    Miscellaneous Testing    Miscellaneous Testing    Miscellaneous Testing    Miscellaneous Testing    Miscellaneous Testing    Miscellaneous Testing    Miscellaneous Testing     Miscellaneous Testing    CBC With Differential With Platelet    Basic Metabolic Panel (8)    Magnesium    Scan slide    Potassium    Basic Metabolic Panel (8)    Potassium    CBC With Differential With Platelet    Basic Metabolic Panel (8)    Magnesium    Urinalysis with Culture Reflex    Cytology fluids    SARS-CoV-2/Flu A and B/RSV by PCR (GeneXpert)    $$$$Respiratory Flu Expanded Panel + Covid-19$$$$    Urine Culture, Routine       Meds This Visit:  Requested Prescriptions      No prescriptions requested or ordered in this encounter       Anselmo Lawson MD  4/8/2024    Note to Patient: The 21st Century Cures Act makes medical notes like these available to patients in the interest of transparency. However, be advised this is a medical document. It is intended as peer to peer communication. It is written in medical language and may contain abbreviations or verbiage that are unfamiliar. It may appear blunt or direct. Medical documents are intended to carry relevant information, facts as evident, and the clinical opinion of the practitioner. This note may have been transcribed using a voice dictation system. Voice recognition errors may occur. This should not be taken to alter the content or meaning of this note.

## 2024-04-09 NOTE — PLAN OF CARE
Problem: Risk for Violence-Violent Restraints/Seclusion  Goal: Patient will not express any violent or self-destructive behaviors  Description: Interventions:  - Apply the least restrictive restraint to prevent harm if patient strikes out and is not responding to redirection  - Notify patient and family of reasons restraints applied  - Assist the patient to identify the precipitating event  - Assist the patient to identify alternatives to physically acting out  - Assess for any contributing factors to violent behaviors (substances,  medications, history of trauma)  - Assess the patient's physical comfort, circulation, skin condition, hydration, nutrition and elimination needs   - Assess for the need to continue restraints  - Evaluate the need for an emergency medication  Outcome: Not Progressing     Problem: Patient Centered Care  Goal: Patient preferences are identified and integrated in the patient's plan of care  Description: Interventions:  - What would you like us to know as we care for yo  - Provide timely, complete, and accurate information to patient/family  - Incorporate patient and family knowledge, values, beliefs, and cultural backgrounds into the planning and delivery of care  - Encourage patient/family to participate in care and decision-making at the level they choose  - Honor patient and family perspectives and choices  Outcome: Not Progressing     Problem: Patient/Family Goals  Goal: Patient/Family Long Term Goal  Description: Patient's Long Term Goal: discharge    Interventions:  - Monitor vital signs  - Monitor neurological status  - Ambulate as tolerated  - See additional Care Plan goals for specific interventions  Outcome: Not Progressing  Goal: Patient/Family Short Term Goal  Description: Patient's Short Term Goal: feel better    Interventions:   - Verbalize needs and wants  - Antianxiety medications as needed  - See additional Care Plan goals for specific interventions  Outcome: Not  Progressing     Problem: Delirium  Goal: Minimize duration of delirium  Description: Interventions:  - Encourage use of hearing aids, eye glasses  - Promote highest level of mobility daily  - Provide frequent reorientation  - Promote wakefulness i.e. lights on, blinds open  - Promote sleep, encourage patient's normal rest cycle i.e. lights off, TV off, minimize noise and interruptions  - Encourage family to assist in orientation and promotion of home routines  Outcome: Not Progressing     Problem: METABOLIC/FLUID AND ELECTROLYTES - ADULT  Goal: Electrolytes maintained within normal limits  Description: INTERVENTIONS:  - Monitor labs and rhythm and assess patient for signs and symptoms of electrolyte imbalances  - Administer electrolyte replacement as ordered  - Monitor response to electrolyte replacements, including rhythm and repeat lab results as appropriate  - Fluid restriction as ordered  - Instruct patient on fluid and nutrition restrictions as appropriate  Outcome: Not Progressing     Problem: MUSCULOSKELETAL - ADULT  Goal: Return mobility to safest level of function  Description: INTERVENTIONS:  - Assess patient stability and activity tolerance for standing, transferring and ambulating w/ or w/o assistive devices  - Assist with transfers and ambulation using safe patient handling equipment as needed  - Ensure adequate protection for wounds/incisions during mobilization  - Obtain PT/OT consults as needed  - Advance activity as appropriate  - Communicate ordered activity level and limitations with patient/family  Outcome: Not Progressing     Problem: NEUROLOGICAL - ADULT  Goal: Achieves stable or improved neurological status  Description: INTERVENTIONS  - Assess for and report changes in neurological status  - Initiate measures to prevent increased intracranial pressure  - Maintain blood pressure and fluid volume within ordered parameters to optimize cerebral perfusion and minimize risk of hemorrhage  - Monitor  temperature, glucose, and sodium. Initiate appropriate interventions as ordered  Outcome: Not Progressing  Goal: Absence of seizures  Description: INTERVENTIONS  - Monitor for seizure activity  - Administer anti-seizure medications as ordered  - Monitor neurological status  Outcome: Not Progressing  Goal: Remains free of injury related to seizure activity  Description: INTERVENTIONS:  - Maintain airway, patient safety  and administer oxygen as ordered  - Monitor patient for seizure activity, document and report duration and description of seizure to MD/LIP  - If seizure occurs, turn patient to side and suction secretions as needed  - Reorient patient post seizure  - Seizure pads on all 4 side rails  - Instruct patient/family to notify RN of any seizure activity  - Instruct patient/family to call for assistance with activity based on assessment  Outcome: Not Progressing  Goal: Achieves maximal functionality and self care  Description: INTERVENTIONS  - Monitor swallowing and airway patency with patient fatigue and changes in neurological status  - Encourage and assist patient to increase activity and self care with guidance from PT/OT  - Encourage visually impaired, hearing impaired and aphasic patients to use assistive/communication devices  Outcome: Not Progressing     Problem: Safety Risk - Non-Violent Restraints  Goal: Patient will remain free from self-harm  Description: INTERVENTIONS:  - Apply the least restrictive restraint to prevent harm  - Notify patient and family of reasons restraints applied  - Assess for any contributing factors to confusion (electrolyte disturbances, delirium, medications)  - Discontinue any unnecessary medical devices as soon as possible  - Assess the patient's physical comfort, circulation, skin condition, hydration, nutrition and elimination needs   - Reorient and redirection as needed  - Assess for the need to continue restraints  Outcome: Not Progressing     Problem: Risk for  Violence/Aggression  Goal: Absence of Violence/Aggression  Description: INTERVENTIONS:   - Identify precipitating factors for behavior   - Notify Charge RN/Provider   - Consider decreasing stimulation   - Consider distraction measures   - Consider discussion with provider regarding prn meds    - Consider GABRIEL (Moderate Risk only)   - Consider Code Support (High Risk only)   - Consider room safety checks   - Consider restraints  Outcome: Not Progressing     Alert and oriented to self. Irritable at times. Follows commands at times. + urinary retention. Bladder scan q6. Straight cath x2, tolerated poorly. Iv abx infused. Potassium, magnesium replaced per protocol. Repositioned frequently, lack of mobility/weakness. 1:1 feed. + constipation, will trial miralax. Discharge plans pending medical clearance

## 2024-04-09 NOTE — PLAN OF CARE
...Her blood pressure is well-controlled in the office today. She will continue beta blocker therpay .     Pt A/Ox1, Max assist. Iv fluids completed. Urine culture pending, UA resulted. Straight cathed per orders/protocol.     Problem: Patient Centered Care  Goal: Patient preferences are identified and integrated in the patient's plan of care  Description: Interventions:  - What would you like us to know as we care for you? I would like to return home.   - Provide timely, complete, and accurate information to patient/family  - Incorporate patient and family knowledge, values, beliefs, and cultural backgrounds into the planning and delivery of care  - Encourage patient/family to participate in care and decision-making at the level they choose  - Honor patient and family perspectives and choices  Outcome: Progressing     Problem: Patient/Family Goals  Goal: Patient/Family Long Term Goal  Description: Patient's Long Term Goal: discharge    Interventions:  - Monitor vital signs  - Monitor neurological status  - Ambulate as tolerated  - See additional Care Plan goals for specific interventions  Outcome: Progressing  Goal: Patient/Family Short Term Goal  Description: Patient's Short Term Goal: feel better    Interventions:   - Verbalize needs and wants  - Antianxiety medications as needed  - See additional Care Plan goals for specific interventions  Outcome: Progressing     Problem: Delirium  Goal: Minimize duration of delirium  Description: Interventions:  - Encourage use of hearing aids, eye glasses  - Promote highest level of mobility daily  - Provide frequent reorientation  - Promote wakefulness i.e. lights on, blinds open  - Promote sleep, encourage patient's normal rest cycle i.e. lights off, TV off, minimize noise and interruptions  - Encourage family to assist in orientation and promotion of home routines  Outcome: Progressing     Problem: METABOLIC/FLUID AND ELECTROLYTES - ADULT  Goal: Electrolytes maintained within normal limits  Description: INTERVENTIONS:  - Monitor labs and rhythm and assess patient for signs and  symptoms of electrolyte imbalances  - Administer electrolyte replacement as ordered  - Monitor response to electrolyte replacements, including rhythm and repeat lab results as appropriate  - Fluid restriction as ordered  - Instruct patient on fluid and nutrition restrictions as appropriate  Outcome: Progressing     Problem: MUSCULOSKELETAL - ADULT  Goal: Return mobility to safest level of function  Description: INTERVENTIONS:  - Assess patient stability and activity tolerance for standing, transferring and ambulating w/ or w/o assistive devices  - Assist with transfers and ambulation using safe patient handling equipment as needed  - Ensure adequate protection for wounds/incisions during mobilization  - Obtain PT/OT consults as needed  - Advance activity as appropriate  - Communicate ordered activity level and limitations with patient/family  Outcome: Progressing     Problem: NEUROLOGICAL - ADULT  Goal: Achieves stable or improved neurological status  Description: INTERVENTIONS  - Assess for and report changes in neurological status  - Initiate measures to prevent increased intracranial pressure  - Maintain blood pressure and fluid volume within ordered parameters to optimize cerebral perfusion and minimize risk of hemorrhage  - Monitor temperature, glucose, and sodium. Initiate appropriate interventions as ordered  Outcome: Progressing  Goal: Absence of seizures  Description: INTERVENTIONS  - Monitor for seizure activity  - Administer anti-seizure medications as ordered  - Monitor neurological status  Outcome: Progressing  Goal: Remains free of injury related to seizure activity  Description: INTERVENTIONS:  - Maintain airway, patient safety  and administer oxygen as ordered  - Monitor patient for seizure activity, document and report duration and description of seizure to MD/LIP  - If seizure occurs, turn patient to side and suction secretions as needed  - Reorient patient post seizure  - Seizure pads on all 4  side rails  - Instruct patient/family to notify RN of any seizure activity  - Instruct patient/family to call for assistance with activity based on assessment  Outcome: Progressing  Goal: Achieves maximal functionality and self care  Description: INTERVENTIONS  - Monitor swallowing and airway patency with patient fatigue and changes in neurological status  - Encourage and assist patient to increase activity and self care with guidance from PT/OT  - Encourage visually impaired, hearing impaired and aphasic patients to use assistive/communication devices  Outcome: Progressing     Problem: Safety Risk - Non-Violent Restraints  Goal: Patient will remain free from self-harm  Description: INTERVENTIONS:  - Apply the least restrictive restraint to prevent harm  - Notify patient and family of reasons restraints applied  - Assess for any contributing factors to confusion (electrolyte disturbances, delirium, medications)  - Discontinue any unnecessary medical devices as soon as possible  - Assess the patient's physical comfort, circulation, skin condition, hydration, nutrition and elimination needs   - Reorient and redirection as needed  - Assess for the need to continue restraints  Outcome: Progressing     Problem: Risk for Violence/Aggression  Goal: Absence of Violence/Aggression  Description: INTERVENTIONS:   - Identify precipitating factors for behavior   - Notify Charge RN/Provider   - Consider decreasing stimulation   - Consider distraction measures   - Consider discussion with provider regarding prn meds    - Consider GABRIEL (Moderate Risk only)   - Consider Code Support (High Risk only)   - Consider room safety checks   - Consider restraints  Outcome: Progressing     Problem: Risk for Violence-Violent Restraints/Seclusion  Goal: Patient will not express any violent or self-destructive behaviors  Description: Interventions:  - Apply the least restrictive restraint to prevent harm if patient strikes out and is not  responding to redirection  - Notify patient and family of reasons restraints applied  - Assist the patient to identify the precipitating event  - Assist the patient to identify alternatives to physically acting out  - Assess for any contributing factors to violent behaviors (substances,  medications, history of trauma)  - Assess the patient's physical comfort, circulation, skin condition, hydration, nutrition and elimination needs   - Assess for the need to continue restraints  - Evaluate the need for an emergency medication  Outcome: Progressing

## 2024-04-09 NOTE — CM/SW NOTE
Pt discussed in RN DC rounds. Anticipated therapy need: Gradual Rehabilitative Therapy. SW will send out [facility] referrals via [facility], [facility] requested. Pt will require insurance auth for [facility].     1057am- FRANSISCO called POA to provide update for discharge planning, no answer, left vm. FRANSISCO sent [facility] referrals via aidin, PASRR queued at this time due to SW faxing in .     218pm- PASRR level 1 screen submitted and completed and uploaded to [employer] referral      Plan: Pending medical clearance, DC to [facility], PAS[facility] completed, *list/choice,* insurance auth    SW/CM to remain available for support and/or discharge planning.     Cheli Hoyt, MSW, LSW   x [phone]

## 2024-04-09 NOTE — PROGRESS NOTES
[facility] Hospitalist Progress Note     CC: Hospital Follow up    PCP: No primary care provider on file.       Assessment/Plan:   Mr. 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      Altered mental status   Metabolic encephalopathy  Delirium in setting of likely vascular dementia  -unclear, ddx included metabolic encephalopathy, infection, stroke, post ictal state on NPH, frontotemporal dementia?  -has been admitted 3 other times (last in 2/2023, at [place]) with similar presentation, extensive work up   -Per POA cognitive status returned back to baseline and his prior hospitalizations  -neurology and psychiatry consulted  -UA neg, no signs of infection, CXR negative  -initial CT imaging stable  -TSH, B12 normal  -MRI brain without acute infarct advanced small vessel disease, poss enlargement of ventricles  -aspirin and statin   -monitor for fever or infection   -CODE GABRIEL called on 4/2 in am, Psyc consulted  -concern for NPH, s/p lumbar puncture on 4/4, had physical therapy preceding and post LP, opening pressure was normal (12) minute and cognitive state or functional state did not improve  -Discussed with neurology, and neurosurgery, unlikely that this is NPH given lack of improvement post LP, also atypical presentation, neurosurgery recommending outpatient follow-up if suspicion is still high for NPH  -LP studies still pending given CJD testing, encephalitis panel negative  -psychiatry adjusting medications    Hypernatremia / DAWN  Urinary retention  Poss UTI  - urinary retention, bladder scan with 1600,  straight Q6  - hold on you given high risk for pulling out  - continue flomax, bowel regimen, mobilize, minimizing contributing meds  - UA with sediment, IV ceftriaxone ordered     Pink eye / right eye radiation   - 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 Add to allergy list  - steroids, nebs, avoid IV benadryl given severe agitation  - CXR negative   - RVP neg    Leucocytosis   - poss secondary to IV steroids  - no fevers  - will continue to monitor  - steroid stopped     Hx of Hypertension  -started on amlodipine  -coreg noted on home meds, resumed   -consider re-ordering ACE or ARB if renal function stable  - BP improved     Possible left sided facial droop and weakness  -neurology consulted and stroke work up if needed based on their evaluation   -anti-platelets if recommended by neuro   -discussed with neurology. Low suspicion for acute CVA. MRI  negative for stroke      Hx of seizure? Noted in outside records,  - not on AED's    Hs of Small cell lung cancer   - parotid involvement, has had radiation therapy   - follows at [facility]  -he's on colchicine, held with DAWN     Fluids: stopped  DVT prophylaxis: lovenox daily   Code status: FULL     Yesenia Calderon is the POA, updated on 4/9    Raheem Johnson MD  [facility] Center[facility]      Subjective:     Alert and awake, calm, Alert to person, not agitated , denied HA or neck stiffness     OBJECTIVE:    Blood pressure 151/75, pulse 71, temperature 98.6 °F (37 °C), temperature source Oral, resp. rate 16, height 5' 8\" (1.727 m), weight 180 lb 14.4 oz (82.1 kg), SpO2 95%.    Temp:  [98 °F (36.7 °C)-98.6 °F (37 °C)] 98.6 °F (37 °C)  Pulse:  [69-79] 71  Resp:  [16-20] 16  BP: ()/(71-87) 151/75  SpO2:  [95 %-98 %] 95  %      Intake/Output:    Intake/Output Summary (Last 24 hours) at 4/9/2024 1052  Last data filed at 4/9/2024 0656  Gross per 24 hour   Intake 900 ml   Output 3250 ml   Net -2350 ml       Last 3 Weights   03/31/24 1311 180 lb 14.4 oz (82.1 kg)   03/31/24 1117 177 lb 0.5 oz (80.3 kg)       Exam    Gen:  calm  Pulm: Lungs clear  CV: Heart with regular rate and rhythm  Abd: Abdomen soft, lower abd pain/suprapubic pain with palpation  Ext with edema   Neuro: moving all 4 ext, confused    Data Review:       Labs:     Recent Labs   Lab 04/05/24  0546 04/06/24  0656 04/09/24  0822   RBC 5.25 5.22 5.40   HGB 15.4 15.3 15.6   HCT 46.6 45.6 48.2   MCV 88.8 87.4 89.3   MCH 29.3 29.3 28.9   MCHC 33.0 33.6 32.4   RDW 13.9 13.8 13.2   NEPRELIM 10.99* 13.38* 8.17*   WBC 13.6* 15.6* 10.6   .0 285.0 321.0         Recent Labs   Lab 04/06/24  0656 04/07/24  0708 04/08/24  0654 04/08/24  1627 04/09/24  0822   *  --  101*  --  75   BUN 18  --  21  --  21   CREATSERUM 1.25  --  1.32*  --  1.27   EGFRCR 62  --  58*  --  61   CA 9.2  --  9.4  --  9.6   *  --  143  --  145   K 3.4*   < > 3.0* 3.9 2.8*   *  --  111  --  110   CO2 22.0  --  24.0  --  28.0    < > = values in this interval not displayed.       No results for input(s): \"ALT\", \"AST\", \"ALB\", \"AMYLASE\", \"LIPASE\", \"LDH\" in the last 168 hours.    Invalid input(s): \"ALPHOS\", \"TBIL\", \"DBIL\", \"TPROT\"      Imaging:  No results found.      Meds:      magnesium sulfate  2 g Intravenous Once    potassium chloride  40 mEq Oral Q4H    cefTRIAXone  1 g Intravenous Q24H    ipratropium-albuterol  3 mL Nebulization Q6H WA    docusate sodium  100 mg Oral BID    divalproex DR  250 mg Oral BID    OLANZapine  5 mg Oral Nightly    calcium carbonate  1,000 mg Oral TID    thiamine  200 mg Intravenous TID    amLODIPine  10 mg Oral Daily    ofloxacin  1 drop Both Eyes QID    carvedilol  12.5 mg Oral BID with meals    tamsulosin  0.4 mg Oral Daily    fluticasone furoate-vilanterol   1 puff Inhalation Daily    aspirin  81 mg Oral Daily    atorvastatin  80 mg Oral Nightly    enoxaparin  40 mg Subcutaneous Daily         polyethylene glycol (PEG 3350), magnesium hydroxide, bisacodyl, chlorproMAZINE, hydrALAZINE, haloperidol lactate, acetaminophen **OR** acetaminophen, ondansetron, glycerin-hypromellose-

## 2024-04-10 LAB
ALBUMIN SERPL-MCNC: 3.8 G/DL (ref 3.2–4.8)
ALP LIVER SERPL-CCNC: 65 U/L
ALT SERPL-CCNC: 12 U/L
ANION GAP SERPL CALC-SCNC: 8 MMOL/L (ref 0–18)
AST SERPL-CCNC: 20 U/L (ref ?–34)
BASOPHILS # BLD AUTO: 0.04 X10(3) UL (ref 0–0.2)
BASOPHILS NFR BLD AUTO: 0.3 %
BILIRUB DIRECT SERPL-MCNC: 0.4 MG/DL (ref ?–0.3)
BILIRUB SERPL-MCNC: 0.9 MG/DL (ref 0.2–1.1)
BUN BLD-MCNC: 19 MG/DL (ref 9–23)
BUN/CREAT SERPL: 16.2 (ref 10–20)
CALCIUM BLD-MCNC: 9.7 MG/DL (ref 8.7–10.4)
CHLORIDE SERPL-SCNC: 107 MMOL/L (ref 98–112)
CO2 SERPL-SCNC: 28 MMOL/L (ref 21–32)
CREAT BLD-MCNC: 1.17 MG/DL
DEPRECATED RDW RBC AUTO: 40 FL (ref 35.1–46.3)
EGFRCR SERPLBLD CKD-EPI 2021: 67 ML/MIN/1.73M2 (ref 60–?)
EOSINOPHIL # BLD AUTO: 0.31 X10(3) UL (ref 0–0.7)
EOSINOPHIL NFR BLD AUTO: 2.3 %
ERYTHROCYTE [DISTWIDTH] IN BLOOD BY AUTOMATED COUNT: 12.6 % (ref 11–15)
GLUCOSE BLD-MCNC: 80 MG/DL (ref 70–99)
HCT VFR BLD AUTO: 44.5 %
HGB BLD-MCNC: 15.4 G/DL
IMM GRANULOCYTES # BLD AUTO: 0.08 X10(3) UL (ref 0–1)
IMM GRANULOCYTES NFR BLD: 0.6 %
LYMPHOCYTES # BLD AUTO: 0.96 X10(3) UL (ref 1–4)
LYMPHOCYTES NFR BLD AUTO: 7.1 %
MAGNESIUM SERPL-MCNC: 1.9 MG/DL (ref 1.6–2.6)
MAGNESIUM SERPL-MCNC: 1.9 MG/DL (ref 1.6–2.6)
MCH RBC QN AUTO: 30 PG (ref 26–34)
MCHC RBC AUTO-ENTMCNC: 34.6 G/DL (ref 31–37)
MCV RBC AUTO: 86.6 FL
MONOCYTES # BLD AUTO: 0.94 X10(3) UL (ref 0.1–1)
MONOCYTES NFR BLD AUTO: 6.9 %
NEUTROPHILS # BLD AUTO: 11.2 X10 (3) UL (ref 1.5–7.7)
NEUTROPHILS # BLD AUTO: 11.2 X10(3) UL (ref 1.5–7.7)
NEUTROPHILS NFR BLD AUTO: 82.8 %
OSMOLALITY SERPL CALC.SUM OF ELEC: 297 MOSM/KG (ref 275–295)
PLATELET # BLD AUTO: 348 10(3)UL (ref 150–450)
POTASSIUM SERPL-SCNC: 3.1 MMOL/L (ref 3.5–5.1)
PROT SERPL-MCNC: 6.6 G/DL (ref 5.7–8.2)
RBC # BLD AUTO: 5.14 X10(6)UL
SODIUM SERPL-SCNC: 143 MMOL/L (ref 136–145)
WBC # BLD AUTO: 13.5 X10(3) UL (ref 4–11)

## 2024-04-10 PROCEDURE — 99233 SBSQ HOSP IP/OBS HIGH 50: CPT | Performed by: OTHER

## 2024-04-10 RX ORDER — POTASSIUM CHLORIDE 20 MEQ/1
40 TABLET, EXTENDED RELEASE ORAL ONCE
Status: COMPLETED | OUTPATIENT
Start: 2024-04-10 | End: 2024-04-10

## 2024-04-10 RX ORDER — SENNOSIDES 8.6 MG
17.2 TABLET ORAL NIGHTLY
Status: DISCONTINUED | OUTPATIENT
Start: 2024-04-10 | End: 2024-04-22

## 2024-04-10 RX ORDER — POLYETHYLENE GLYCOL 3350 17 G/17G
17 POWDER, FOR SOLUTION ORAL DAILY
Status: DISCONTINUED | OUTPATIENT
Start: 2024-04-11 | End: 2024-04-22

## 2024-04-10 RX ORDER — TAMSULOSIN HYDROCHLORIDE 0.4 MG/1
0.8 CAPSULE ORAL DAILY
Status: DISCONTINUED | OUTPATIENT
Start: 2024-04-11 | End: 2024-04-22

## 2024-04-10 NOTE — PLAN OF CARE
Bladder scan warranted patient to have a straight catheterization overnight. Five staff members were needed to successfully perform this necessary part of patient care, for both staff and patient safety.   remains at bedside for monitoring.     Problem: Patient Centered Care  Goal: Patient preferences are identified and integrated in the patient's plan of care  Description: Interventions:  - What would you like us to know as we care for you? Patient unable to answer clearly at this time  - Provide timely, complete, and accurate information to patient/family  - Incorporate patient and family knowledge, values, beliefs, and cultural backgrounds into the planning and delivery of care  - Encourage patient/family to participate in care and decision-making at the level they choose  - Honor patient and family perspectives and choices  Outcome: Progressing     Problem: Delirium  Goal: Minimize duration of delirium  Description: Interventions:  - Encourage use of hearing aids, eye glasses  - Promote highest level of mobility daily  - Provide frequent reorientation  - Promote wakefulness i.e. lights on, blinds open  - Promote sleep, encourage patient's normal rest cycle i.e. lights off, TV off, minimize noise and interruptions  - Encourage family to assist in orientation and promotion of home routines  Outcome: Progressing     Problem: Risk for Violence/Aggression  Goal: Absence of Violence/Aggression  Description: INTERVENTIONS:   - Identify precipitating factors for behavior   - Notify Charge RN/Provider   - Consider decreasing stimulation   - Consider distraction measures   - Consider discussion with provider regarding prn meds    - Consider GABRIEL (Moderate Risk only)   - Consider Code Support (High Risk only)   - Consider room safety checks   - Consider restraints  Outcome: Progressing     Problem: Risk for Violence-Violent Restraints/Seclusion  Goal: Patient will not express any violent or self-destructive  behaviors  Description: Interventions:  - Apply the least restrictive restraint to prevent harm if patient strikes out and is not responding to redirection  - Notify patient and family of reasons restraints applied  - Assist the patient to identify the precipitating event  - Assist the patient to identify alternatives to physically acting out  - Assess for any contributing factors to violent behaviors (substances,  medications, history of trauma)  - Assess the patient's physical comfort, circulation, skin condition, hydration, nutrition and elimination needs   - Assess for the need to continue restraints  - Evaluate the need for an emergency medication  Outcome: Progressing

## 2024-04-10 NOTE — PROGRESS NOTES
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 The patient has been demonstrating confusion, restlessness, agitation. Patient indicated for psych consult for evaluation and advise.    Consult Duration     The patient seen for over 50-minute, follow-up evaluation, over 50% counseling and coordinating care addressing confusion, restlessness.    Record reviewed, communication with attending, communication with RN and patient seen face to face evaluation.  History of Present Illness:      According to the team the patient has been demonstrating improvement in his tiredness and engagement.    The patient today laying down in his bed aware of his surroundings and aware that he is in the hospital but disoriented to the date and the exact condition.    Otherwise the patient check my hand and was thankful for the care.  Patient reporting he is in the hospital because maybe he is not feeling well lately.    The patient has not been demonstrating any agitation for indicating any restraint and has been cooperative in the treatment plan and the care.    According to the medical team some mild urine retention has been observed today but the patient has been cooperative with cath.    The patient has been demonstrating improvement in his general function and interaction.  Patient denying any hallucination otherwise continue demonstrating some tangential thought process.    Past Psychiatric/Medication History:  1. Prior diagnoses: Previous delirium episode.  2. Past psychiatric inpatient: Multiple admission to medical inpatient for altered mental status.  3. Past outpatient history: None  4. Past suicide history: None  5. Medication history: None    Social History:   Patient living in independent living  facility    Family History:  Unknown  Medical History:   Past Medical History  Past Medical History:   Diagnosis Date    Abnormal CT scan 03/31/2024    Acute gout involving toe of left foot 02/22/2023    Altered mental status 03/31/2024    Anemia 11/18/2009    Asthma in adult (HCC) 02/22/2023    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   Resection 2019    Catatonia 03/31/2024    Cognitive communication deficit 03/14/2023    Cystic disease of liver 02/22/2023    Dementia (HCC) 02/01/2023    Difficult airway 02/02/2023    Dyslipidemia 02/01/2023    Elevated white blood cell count, unspecified 02/22/2023    Encephalopathy 09/19/2012    Essential hypertension     Gastro-esophageal reflux disease with esophagitis, without bleeding 02/22/2023    Gout, chronic 03/31/2024    Gross hematuria 03/31/2024    Hypercholesterolemia 06/25/2015    Hyperlipidemia 09/19/2012    Leukocytosis 02/19/2023    Liver cyst 08/30/2022    Metabolic encephalopathy 02/22/2023    Neuroendocrine carcinoma, 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   Keppra    Seizure disorder (HCC)     Toxic metabolic encephalopathy 08/20/2022     Transient global amnesia 01/10/2017    Formatting of this note might be different from the original.   Brief -Plavix    Unspecified dementia, unspecified severity, without behavioral disturbance, psychotic disturbance, mood disturbance, and anxiety ([facility]) 2023    Vascular dementia ([facility]) 2022    Vascular dementia, unspecified severity, without behavioral disturbance, psychotic disturbance, mood disturbance, and anxiety ([facility]) 2023    Weakness 2023    Weakness of left upper extremity 2022       Past Surgical History  History reviewed. No pertinent surgical history.    Family History  No family history on file.    Social History  Social History     Socioeconomic History    Marital status: Single   Tobacco Use    Smoking status: Former     Packs/day: 1.00     Years: 25.00     Additional pack years: 0.00     Total pack years: 25.00     Types: Cigarettes     Quit date: 1990     Years since quittin.2    Tobacco comments:      Cigarettes 1 25 Quit:     Social Determinants of Health     Food Insecurity: Unknown (3/31/2024)    Food Insecurity     Food Insecurity: Patient unable to answer   Transportation Needs: Unknown (3/31/2024)    Transportation Needs     Lack of Transportation: Patient unable to answer   Housing Stability: Unknown (3/31/2024)    Housing Stability     Housing Instability: Patient unable to answer           Current Medications:      Medications Prior to Admission   Medication Sig    albuterol 108 (90 Base) MCG/ACT Inhalation Aero Soln Inhale 2 puffs into the lungs every 4 (four) hours as needed for Wheezing.    carvedilol 12.5 MG Oral Tab Take 1 tablet (12.5 mg total) by mouth 2 (two) times daily with meals.    clopidogrel 75 MG Oral Tab Take 1 tablet (75 mg total) by mouth daily.    tamsulosin 0.4 MG Oral Cap Take 1 capsule (0.4 mg total) by mouth daily.    acetaminophen 325 MG Oral Tab Take 2 tablets (650 mg total) by mouth every 6 (six) hours as needed for Pain.     fluticasone-salmeterol 250-50 MCG/ACT Inhalation Aerosol Powder, Breath Activated Inhale 1 puff into the lungs 2 (two) times daily.       Allergies  Allergies   Allergen Reactions    Risperidone WHEEZING       Review of Systems:   As by Admitting/Attending    Results:   Laboratory Data:  Lab Results   Component Value Date    WBC 10.6 04/09/2024    HGB 15.6 04/09/2024    HCT 48.2 04/09/2024    .0 04/09/2024    CREATSERUM 1.27 04/09/2024    BUN 21 04/09/2024     04/09/2024    K 3.0 (L) 04/09/2024     04/09/2024    CO2 28.0 04/09/2024    GLU 75 04/09/2024    CA 9.6 04/09/2024    TP 7.0 04/03/2024    TSH 3.927 04/03/2024    MG 1.7 04/09/2024    B12 470 04/03/2024         Imaging:  XR ABDOMEN (1 VIEW) (CPT=74018)    Result Date: 4/9/2024  CONCLUSION:  1. Nonobstructive bowel gas pattern. 2. Severe colonic stool burden.  Correlate clinically for a history of constipation. 3. Lesser incidental findings as above.    Dictated by (CST): Stevan Martinez MD on 4/09/2024 at 5:31 PM     Finalized by (CST): Stevan Martinez MD on 4/09/2024 at 5:33 PM          XR CHEST AP PORTABLE  (CPT=71045)    Result Date: 4/9/2024  CONCLUSION:   No focal opacity, pleural effusion, or pneumothorax.   Dictated by (CST): Keith Partida MD on 4/09/2024 at 2:34 PM     Finalized by (CST): Keith Partida MD on 4/09/2024 at 2:35 PM           Vital Signs:   Blood pressure 127/82, pulse 60, temperature 98.3 °F (36.8 °C), temperature source Oral, resp. rate 18, height 68\", weight 82.1 kg (180 lb 14.4 oz), SpO2 93%.    Mental Status Exam:   Appearance: Stated age male, in hospital gown, laying down in hospital bed.    Psychomotor: Patient has been cooperative with no agitation or restlessness and able to shake and engage..  Orientation: Alert and oriented to self and location as hospital but not to date or exact condition.  Gait: Not evaluated.  Attitude/Coorperation: Patient made an effort to cooperate and presenting polite and  attentive.  Behavior: No agitation has been reported.  Speech: No dysarthria.  Mood: Reporting feeling better.  Affect: Slightly blunted affect..  Thought process: Tangential thought process.  Thought content: No reports of  suicidal or homicidal ideation.  Perceptions: Patient has been demonstrating response to internal stimuli hallucinating.   Concentration: Grossly impaired  Memory: Grossly impaired  Intellect: Average.  Judgment and Insight: Impaired.  Patient demonstrating cognitive impairment.    Impression:     Delirium due to other medical condition.  Episodic mood disorder.  Rule out vascular dementia with behavioral disturbance.  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  Although the etiology still not discovered, the patient had a few episodes of delirium in [facility].    The patient today has been demonstrating increased alternation in his mood, restlessness, irritability, delusional ideation, distractibility, disorientation and has been demonstrating delirious episode.  Imaging indicate vascular ischemic changes.    The patient has been demonstrating delirium episode with alternation in mood and cognition with episodes of  increased confusion, restlessness, agitation and response to internal stimuli.     4/3/2024: The patient today continues to demonstrate confusion, restlessness, agitation. He remains in soft restraints. Vitamin B12 and TSH are within normal limits.    4/4/2024: Patient titrating sedation.  Patient is anticipated to have LP today    4/5/2024: The patient with multiple episodes of psychosis, agitation and  deterioration in his cognition continued demonstrate alternation in his mood and interaction.      4/6/2024: The patient has been demonstrating less agitation and with no restraint.    4/7/2024: The patient has been demonstrating improvement in his demeanor and mood with less agitation otherwise the patient with severe dementia and continues to be disoriented.    4/8/2024: The patient has been demonstrating improvement in his demeanor and interaction but has been demonstrating increased sedation.    4/9/2024: The patient has been demonstrating improvement in his cognition and cooperation with no agitation otherwise continue demonstrate some disorientation, memory loss and impairment in his insight.    Discussed risk and benefit, acknowledging the current symptom and severity.  At this point, I would recommend the following approach:     Focus on safety.  Restrained at time if indicated  Focus on education and support.  Focus on insight orientation helping the patient understand diagnosis and treatment plan.  Continue Zyprexa to 5 mg nightly.  Continue Depakote to 250 mg twice daily.  Thiamine 200 mg twice daily orally.  Utilize Haldol 2 mg IV every 2 hours as needed for agitation.  Utilize Ativan 1 mg every 6 hours as needed for anxiety.  Coordinate plan with team    Orders This Visit:  Orders Placed This Encounter   Procedures    Basic Metabolic Panel (8)    CBC With Differential With Platelet    Troponin I (High Sensitivity)    Urinalysis, Routine    Basic Metabolic Panel (8)    CBC With Differential With Platelet    Potassium    Potassium    Lipid Panel    Hemoglobin A1C    Basic Metabolic Panel (8)    CBC With Differential With Platelet    Magnesium    Vitamin B12    TSH W Reflex To Free T4    CBC With Differential With Platelet    Basic Metabolic Panel (8)    Magnesium    Glucose, Serum    Protein, Total, Serum    CJD 14-3-3 Protein Tau Total, Cerebrospinal Fluid    MD BLOOD SMEAR CONSULT    Basic Metabolic  Panel (8)    CBC With Differential With Platelet    Magnesium    Potassium    Miscellaneous Testing    Miscellaneous Testing    Miscellaneous Testing    Miscellaneous Testing    Miscellaneous Testing    Miscellaneous Testing    Miscellaneous Testing    Miscellaneous Testing    CBC With Differential With Platelet    Basic Metabolic Panel (8)    Magnesium    Scan slide    Potassium    Basic Metabolic Panel (8)    Potassium    CBC With Differential With Platelet    Basic Metabolic Panel (8)    Magnesium    Urinalysis with Culture Reflex    Magnesium    Potassium    CBC With Differential With Platelet    Basic Metabolic Panel (8)    Magnesium    Hepatic Function Panel (7)    SARS-CoV-2/Flu A and B/RSV by PCR (GeneXpert)    $$$$Respiratory Flu Expanded Panel + Covid-19$$$$    Urine Culture, Routine       Meds This Visit:  Requested Prescriptions      No prescriptions requested or ordered in this encounter       Anselmo Lawson MD  4/9/2024    Note to Patient: The 21st Century Cures Act makes medical notes like these available to patients in the interest of transparency. However, be advised this is a medical document. It is intended as peer to peer communication. It is written in medical language and may contain abbreviations or verbiage that are unfamiliar. It may appear blunt or direct. Medical documents are intended to carry relevant information, facts as evident, and the clinical opinion of the practitioner. This note may have been transcribed using a voice dictation system. Voice recognition errors may occur. This should not be taken to alter the content or meaning of this note.

## 2024-04-10 NOTE — PROGRESS NOTES
[facility] Hospitalist Progress Note     CC: Hospital Follow up    PCP: No primary care provider on file.       Assessment/Plan:   Mr. 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, vascular dementia, HTN, history of possible seizure, gout, metabolic encephalopathy with agitation without clear cause status post 3 hospitalizations, who presents with altered mental status weakness and agitation, neurology and psychiatry consulted, extensive workup including CT/MRI brain/lumbar puncture without clear etiology, slow clinical improvement with cognitive state, plan for [facility]     Altered mental status   Metabolic encephalopathy  Delirium in setting of likely vascular dementia  -unclear, ddx included metabolic encephalopathy, infection, stroke, post ictal state on NPH, frontotemporal dementia?  -has been admitted 3 other times (last in 2/2023, at [place]) with similar presentation, extensive work up   -Per POA cognitive status returned back to baseline and his prior hospitalizations  -neurology and psychiatry consulted  -UA neg, no signs of infection, CXR negative  -initial CT imaging stable  -TSH, B12 normal  -MRI brain without acute infarct advanced small vessel disease, poss enlargement of ventricles  -aspirin and statin   -monitor for fever or infection   -[facility] called on 4/2 in am, [facility] consulted  -concern for NPH, s/p lumbar puncture on 4/4, had physical therapy preceding and post LP, opening pressure was normal (12) minute and cognitive state or functional state did not improve  -Discussed with neurology, and neurosurgery, unlikely that this is NPH given lack of improvement post LP, also atypical presentation, neurosurgery recommending outpatient follow-up if suspicion is still high for NPH  -LP studies still pending given CJD testing, encephalitis panel negative  -psychiatry adjusting medications    Hypernatremia / DAWN  Urinary  retention  Poss UTI  - urinary retention, bladder scan with 1600, straight Q6  - hold on you given high risk for pulling out  - continue flomax, bowel regimen, mobilize, minimizing contributing meds  - UA with sediment, IV ceftriaxone ordered, culture pending     Constipation  - KUB with large stool burden  - bowel regimen ordered    Pink eye / right eye radiation   - 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 Add to allergy list  - steroids, nebs, avoid IV benadryl given severe agitation  - CXR negative   - RVP neg    Leucocytosis   - poss secondary to IV steroids  - no fevers  - will continue to monitor  - steroid stopped  - improved      Hx of Hypertension  -started on amlodipine  -coreg noted on home meds, resumed   -consider re-ordering ACE or ARB if renal function stable  - BP improved     Possible left sided facial droop and weakness  -neurology consulted and stroke work up if needed based on their evaluation   -anti-platelets if recommended by neuro   -discussed with neurology. Low suspicion for acute CVA. MRI  negative for stroke      Hx of seizure? Noted in outside records,  - not on AED's    Hs of Small cell lung cancer   - parotid involvement, has had radiation therapy   - follows at [facility]  -he's on colchicine (stopped as OP), held with DAWN     Fluids: stopped  DVT prophylaxis: lovenox daily   Code status: FULL     Yesenia Calderon is the POA, updated on 4/9    Raheem Johnson MD  [facility] Hospitalist      Subjective:     Alert and awake, calm, Alert to person, not agitated , denied HA or neck stiffness , not alert to place or time    OBJECTIVE:    Blood pressure 124/64, pulse 76, temperature 97.8 °F (36.6 °C), temperature source Axillary, resp. rate 18,  height 5' 8\" (1.727 m), weight 180 lb 14.4 oz (82.1 kg), SpO2 92%.    Temp:  [97.1 °F (36.2 °C)-98.3 °F (36.8 °C)] 97.8 °F (36.6 °C)  Pulse:  [60-82] 76  Resp:  [16-18] 18  BP: (124-142)/(64-82) 124/64  SpO2:  [92 %-95 %] 92 %      Intake/Output:    Intake/Output Summary (Last 24 hours) at 4/10/2024 1327  Last data filed at 4/10/2024 1250  Gross per 24 hour   Intake 370 ml   Output 1950 ml   Net -1580 ml       Last 3 Weights   03/31/24 1311 180 lb 14.4 oz (82.1 kg)   03/31/24 1117 177 lb 0.5 oz (80.3 kg)       Exam    Gen:  calm  Pulm: Lungs clear  CV: Heart with regular rate and rhythm  Abd: Abdomen soft, lower abd pain/suprapubic pain with palpation  Ext with edema   Neuro: moving all 4 ext, confused    Data Review:       Labs:     Recent Labs   Lab 04/06/24  0656 04/09/24  0822 04/10/24  0554   RBC 5.22 5.40 5.14   HGB 15.3 15.6 15.4   HCT 45.6 48.2 44.5   MCV 87.4 89.3 86.6   MCH 29.3 28.9 30.0   MCHC 33.6 32.4 34.6   RDW 13.8 13.2 12.6   NEPRELIM 13.38* 8.17* 11.20*   WBC 15.6* 10.6 13.5*   .0 321.0 348.0         Recent Labs   Lab 04/08/24  0654 04/08/24  1627 04/09/24  0822 04/09/24  1804 04/10/24  0554   *  --  75  --  80   BUN 21  --  21  --  19   CREATSERUM 1.32*  --  1.27  --  1.17   EGFRCR 58*  --  61  --  67   CA 9.4  --  9.6  --  9.7     --  145  --  143   K 3.0*   < > 2.8* 3.0* 3.1*     --  110  --  107   CO2 24.0  --  28.0  --  28.0    < > = values in this interval not displayed.       Recent Labs   Lab 04/10/24  0554   ALT 12   AST 20   ALB 3.8         Imaging:  XR ABDOMEN (1 VIEW) (CPT=74018)    Result Date: 4/9/2024  CONCLUSION:  1. Nonobstructive bowel gas pattern. 2. Severe colonic stool burden.  Correlate clinically for a history of constipation. 3. Lesser incidental findings as above.    Dictated by (CST): Stevan Martinez MD on 4/09/2024 at 5:31 PM     Finalized by (CST): Stevan Martinez MD on 4/09/2024 at 5:33 PM          XR CHEST AP PORTABLE   (CPT=71045)    Result Date: 4/9/2024  CONCLUSION:   No focal opacity, pleural effusion, or pneumothorax.   Dictated by (CST): Keith Partida MD on 4/09/2024 at 2:34 PM     Finalized by (CST): Keith Partida MD on 4/09/2024 at 2:35 PM             Meds:      cefTRIAXone  1 g Intravenous Q24H    glycerin-hypromellose-  1 drop Both Eyes TID    thiamine  200 mg Oral BID    ipratropium-albuterol  3 mL Nebulization Q6H WA    docusate sodium  100 mg Oral BID    divalproex DR  250 mg Oral BID    OLANZapine  5 mg Oral Nightly    calcium carbonate  1,000 mg Oral TID    amLODIPine  10 mg Oral Daily    carvedilol  12.5 mg Oral BID with meals    tamsulosin  0.4 mg Oral Daily    fluticasone furoate-vilanterol  1 puff Inhalation Daily    aspirin  81 mg Oral Daily    atorvastatin  80 mg Oral Nightly    enoxaparin  40 mg Subcutaneous Daily           baclofen    polyethylene glycol (PEG 3350)    magnesium hydroxide    bisacodyl    hydrALAZINE    haloperidol lactate    acetaminophen **OR** [DISCONTINUED] acetaminophen    ondansetron

## 2024-04-10 NOTE — CM/SW NOTE
Pt discussed in RN DC Rounds. SW received VM for POA. SW return POA phone call. SW updated POA on Anticipated therapy need: Gradual Rehabilitative Therapy, pending insurance auth approval. POA is agreeable to [facility]. POA requesting [facility], SW informed that the referral was sent out, and [facility] initially denied. SW will ask them to reconsider.  SW will sent updated clinicals. POA aware that she may need an alternative choice if [facility] still declines.     Plan: Pending medical clearance, DC to [facility], [facility], *list/choice, *insurance auth    SW/CM to remain available for support and/or discharge planning.     Cheli Hoyt, MSW, LSW   x [phone]

## 2024-04-10 NOTE — OCCUPATIONAL THERAPY NOTE
OCCUPATIONAL THERAPY TREATMENT NOTE - INPATIENT        Room Number: 561/561-A     Presenting Problem: AMS    Problem List  Principal Problem:    NPH (normal pressure hydrocephalus) (formerly [facility])  Active Problems:    Severe vascular dementia with agitation (HCC)    Delirium due to another medical condition    Episodic mood disorder (HCC)    TIA (transient ischemic attack)      OCCUPATIONAL THERAPY ASSESSMENT   Patient demonstrates fair progress this session, goals remain in progress. Oriented to self only. Inconsistently following commands.    Patient continues to function below baseline with ADLs and fx mobility/transfers.   Contributing factors to remaining limitations include decreased functional strength, decreased functional reach, decreased endurance, impaired balance, decreased muscular endurance, cognitive deficits, decreased insight to deficits, and decreased safety awareness.  Next session anticipate patient to progress upper body dressing, grooming, eating, transfers, static standing balance, dynamic standing balance, and functional standing tolerance.  Occupational Therapy will continue to follow patient for duration of hospitalization.    Patient continues to benefit from continued skilled OT services: to promote return to prior level of function and safety with continuous assistance and gradual rehabilitative therapy .     PLAN  OT Treatment Plan: Balance activities;Energy conservation/work simplification techniques;ADL training;Functional transfer training;Endurance training;Patient/Family education;Cognitive reorientation;Patient/Family training;Compensatory technique education  OT Device Recommendations: TBD    SUBJECTIVE  Patient agreeable to OT treatment session.    OBJECTIVE  Precautions: Bed/chair alarm (sitter present)    WEIGHT BEARING RESTRICTION  None    PAIN ASSESSMENT  Ratin    ACTIVITIES OF DAILY LIVING ASSESSMENT  AM-PAC ‘6-Clicks’ Inpatient Daily Activity Short Form  How much help from  another person does the patient currently need…  -   Putting on and taking off regular lower body clothing?: A Lot  -   Bathing (including washing, rinsing, drying)?: A Lot  -   Toileting, which includes using toilet, bedpan or urinal? : Total  -   Putting on and taking off regular upper body clothing?: A Lot  -   Taking care of personal grooming such as brushing teeth?: A Lot  -   Eating meals?: A Lot    AM-PAC Score:  Score: 11  Approx Degree of Impairment: 70.42%  Standardized Score (AM-PAC Scale): 29.04  CMS Modifier (G-Code): CL    BED MOBILITY  Supine to Sit: Mod assist x2  Comments:  Demonstrated R side lean while lying in bed. R UE swollen and demonstrated difficulty with R shoulder flexion AROM.    FUNCTIONAL TRANSFER ASSESSMENT  Stand Pivot Transfer from EOB to Chair without a device: Mod assist x2  Comments:  Demonstrated R side lean while seated in chair; pillow positioned alongside patient's R trunk to facilitate upright posture.    FUNCTIONAL MOBILITY  Unable to safely progress mobility at this time    FUNCTIONAL ADL ASSESSMENT  Eating: max assist  LB Dressing: max assist  Toileting: total assist  Comments:  Patient demonstrated difficulty with self-feeding while seated in chair d/t cognitive deficits. Required max multi-modal (verbal/tactile and ultimately hand-over-hand) cues for task initiation as well as ongoing cues/assist for sequencing. Patient also demonstrated shakiness while holding heavier items with R UE likely d/t weakness and required assist to prevent spilling a hot beverage on himself.    EDUCATION PROVIDED  Patient: Role of Occupational Therapy; Plan of Care; Discharge Recommendations; Functional Transfer Techniques; Fall Prevention; Posture/Positioning; Energy Conservation; Proper Body Mechanics  Patient's Response to Education: Demonstrates Poor Carry Over to Information    The patient's Approx Degree of Impairment: 70.42% has been calculated based on documentation in the [facility] '6  clicks' Inpatient Daily Activity Short Form.  Research supports that patients with this level of impairment may benefit from rehab.  Final disposition will be made by interdisciplinary medical team.    Patient End of Session: Up in chair;Needs met;Call light within reach;RN aware of session/findings;All patient questions and concerns addressed;Alarm set (sitter present)    OT Goals:  Patient self-stated goal is: no goals stated; pt agitated and confused     Patient will complete LE dressing with supervision  Comment: ongoing    Patient will complete toilet transfer with supervision  Comment: ongoing    Patient will complete self care task at sink level with supervision    Comment: ongoing         Goals  on:   Frequency: 3x week    OT Session Time  Therapeutic Activity: 24 minutes    PAUL Spann/L  [facility]  #[id number]

## 2024-04-10 NOTE — PROGRESS NOTES
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 The patient has been demonstrating confusion, restlessness, agitation. Patient indicated for psych consult for evaluation and advise.    Consult Duration     The patient seen for over 50-minute, follow-up evaluation, over 50% counseling and coordinating care addressing confusion, restlessness.    Record reviewed, communication with attending, communication with RN and patient seen face to face evaluation.  History of Present Illness:      According to the team the patient has been demonstrating improvement in his tiredness and engagement.    The patient today laying down in his bed. Patient is alert and aware of his surroundings and that he is in the hospital but disoriented to the date and the exact condition. Patient was able to name the current president.     The patient has not been demonstrating any agitation for indicating any restraint and has been cooperative in the treatment plan and the care.    According to the medical team some mild urine retention has been observed today but the patient has been cooperative with cath.    The patient has been demonstrating improvement in his general function and interaction.  Patient denying any hallucination otherwise continue demonstrating some tangential thought process.    Past Psychiatric/Medication History:  1. Prior diagnoses: Previous delirium episode.  2. Past psychiatric inpatient: Multiple admission to medical inpatient for altered mental status.  3. Past outpatient history: None  4. Past suicide history: None  5. Medication history: None    Social History:   Patient living in independent living facility    Family History:  Unknown  Medical History:   Past Medical History  Past Medical History:     Abnormal CT scan    Acute gout involving toe of left foot    Altered mental status    Anemia    Asthma in adult (HCC)    Cancer of parotid gland (HCC)    Formatting of this note might be different from the original.   Resection 2019    Catatonia    Cognitive communication deficit    Cystic disease of liver    Dementia (HCC)    Difficult airway    Dyslipidemia    Elevated white blood cell count, unspecified    Encephalopathy    Essential hypertension    Gastro-esophageal reflux disease with esophagitis, without bleeding    Gout, chronic    Gross hematuria    Hypercholesterolemia    Hyperlipidemia    Leukocytosis    Liver cyst    Metabolic encephalopathy    Neuroendocrine carcinoma, high grade (HCC)    Other abnormalities of gait and mobility    Other psoriasis    Other seizures (HCC)    Other specified disorders of kidney and ureter    Parotid neoplasm    Formatting of this note might be different from the original.   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   Keppra    Seizure disorder (HCC)    Toxic metabolic encephalopathy    Transient global amnesia    Formatting of this note might be different from the original.   Brief -Plavix    Unspecified dementia, unspecified severity, without behavioral disturbance, psychotic disturbance, mood disturbance, and anxiety (HCC)    Vascular dementia (HCC)    Vascular dementia, unspecified severity, without behavioral disturbance, psychotic disturbance, mood disturbance, and anxiety (HCC)    Weakness    Weakness of left upper extremity       Past Surgical  History  History reviewed. No pertinent surgical history.    Family History  No family history on file.    Social History  Social History     Socioeconomic History    Marital status: Single   Tobacco Use    Smoking status: Former     Current packs/day: 0.00     Average packs/day: 1 pack/day for 25.0 years (25.0 ttl pk-yrs)     Types: Cigarettes     Start date: 1965     Quit date: 1990     Years since quittin.2    Tobacco comments:      Cigarettes 1 25 Quit:     Social Determinants of Health     Financial Resource Strain: Low Risk  (2021)    Received from [facility]    Overall Financial Resource Strain (CARDIA)     Difficulty of Paying Living Expenses: Not hard at all   Food Insecurity: Unknown (3/31/2024)    Food Insecurity     Food Insecurity: Patient unable to answer   Transportation Needs: Unknown (3/31/2024)    Transportation Needs     Lack of Transportation: Patient unable to answer    Received from [facility]    Social Connections   Housing Stability: Unknown (3/31/2024)    Housing Stability     Housing Instability: Patient unable to answer           Current Medications:  Current Facility-Administered Medications   Medication Dose Route Frequency    cefTRIAXone (Rocephin) 1 g in D5W 100 mL IVPB-ADD  1 g Intravenous Q24H    glycerin-hypromellose- (Artificial Tears) 0.2-0.2-1 % ophthalmic solution 1 drop  1 drop Both Eyes TID    baclofen (Lioresal) tab 5 mg  5 mg Oral TID PRN    thiamine (Vitamin B1) tab 200 mg  200 mg Oral BID    ipratropium-albuterol (Duoneb) 0.5-2.5 (3) MG/3ML inhalation solution 3 mL  3 mL Nebulization Q6H WA    docusate sodium (Colace) cap 100 mg  100 mg Oral BID    polyethylene glycol (PEG 3350) (Miralax) 17 g oral packet 17 g  17 g Oral Daily PRN    magnesium hydroxide (Milk of Magnesia) 400 MG/5ML oral suspension 30 mL  30 mL Oral Daily PRN    bisacodyl (Dulcolax) 10 MG rectal suppository 10 mg  10 mg Rectal Daily PRN     divalproex DR (Depakote Sprinkle) sprinkle cap 250 mg  250 mg Oral BID    OLANZapine (ZyPREXA) tab 5 mg  5 mg Oral Nightly    calcium carbonate (Tums) chewable tab 1,000 mg  1,000 mg Oral TID    hydrALAZINE (Apresoline) tab 25 mg  25 mg Oral Q8H PRN    amLODIPine (Norvasc) tab 10 mg  10 mg Oral Daily    haloperidol lactate (Haldol) 5 MG/ML injection 2 mg  2 mg Intravenous Q2H PRN    carvedilol (Coreg) tab 12.5 mg  12.5 mg Oral BID with meals    tamsulosin (Flomax) cap 0.4 mg  0.4 mg Oral Daily    fluticasone furoate-vilanterol (Breo Ellipta) 200-25 MCG/ACT inhaler 1 puff  1 puff Inhalation Daily    aspirin chewable tab 81 mg  81 mg Oral Daily    acetaminophen (Tylenol) tab 650 mg  650 mg Oral Q4H PRN    ondansetron (Zofran) 4 MG/2ML injection 4 mg  4 mg Intravenous Q6H PRN    atorvastatin (Lipitor) tab 80 mg  80 mg Oral Nightly    enoxaparin (Lovenox) 40 MG/0.4ML SUBQ injection 40 mg  40 mg Subcutaneous Daily       Medications Prior to Admission   Medication Sig    albuterol 108 (90 Base) MCG/ACT Inhalation Aero Soln Inhale 2 puffs into the lungs every 4 (four) hours as needed for Wheezing.    carvedilol 12.5 MG Oral Tab Take 1 tablet (12.5 mg total) by mouth 2 (two) times daily with meals.    clopidogrel 75 MG Oral Tab Take 1 tablet (75 mg total) by mouth daily.    tamsulosin 0.4 MG Oral Cap Take 1 capsule (0.4 mg total) by mouth daily.    acetaminophen 325 MG Oral Tab Take 2 tablets (650 mg total) by mouth every 6 (six) hours as needed for Pain.    fluticasone-salmeterol 250-50 MCG/ACT Inhalation Aerosol Powder, Breath Activated Inhale 1 puff into the lungs 2 (two) times daily.       Allergies  Allergies   Allergen Reactions    Risperidone WHEEZING       Review of Systems:   As by Admitting/Attending    Results:   Laboratory Data:  Lab Results   Component Value Date    WBC 13.5 (H) 04/10/2024    HGB 15.4 04/10/2024    HCT 44.5 04/10/2024    .0 04/10/2024    CREATSERUM 1.17 04/10/2024    BUN 19 04/10/2024      04/10/2024    K 3.1 (L) 04/10/2024     04/10/2024    CO2 28.0 04/10/2024    GLU 80 04/10/2024    CA 9.7 04/10/2024    ALB 3.8 04/10/2024    ALKPHO 65 04/10/2024    TP 6.6 04/10/2024    AST 20 04/10/2024    ALT 12 04/10/2024    TSH 3.927 04/03/2024    MG 1.9 04/10/2024    MG 1.9 04/10/2024    B12 470 04/03/2024         Imaging:  XR ABDOMEN (1 VIEW) (CPT=74018)    Result Date: 4/9/2024  CONCLUSION:  1. Nonobstructive bowel gas pattern. 2. Severe colonic stool burden.  Correlate clinically for a history of constipation. 3. Lesser incidental findings as above.    Dictated by (CST): Stevan Martinez MD on 4/09/2024 at 5:31 PM     Finalized by (CST): Stevan Martinez MD on 4/09/2024 at 5:33 PM          XR CHEST AP PORTABLE  (CPT=71045)    Result Date: 4/9/2024  CONCLUSION:   No focal opacity, pleural effusion, or pneumothorax.   Dictated by (CST): Keith Partida MD on 4/09/2024 at 2:34 PM     Finalized by (CST): Keith Partida MD on 4/09/2024 at 2:35 PM           Vital Signs:   Blood pressure 142/78, pulse 82, temperature 98.1 °F (36.7 °C), temperature source Oral, resp. rate 18, height 68\", weight 82.1 kg (180 lb 14.4 oz), SpO2 94%.    Mental Status Exam:   Appearance: Stated age male, in hospital gown, laying down in hospital bed.    Psychomotor: Patient has been cooperative with no agitation or restlessness and able to shake and engage.  Orientation: Alert and oriented to self and location as hospital but not to date or exact condition.  Gait: Not evaluated.  Attitude/Coorperation: Patient made an effort to cooperate and presenting polite and attentive.  Behavior: No agitation has been reported.  Speech: No dysarthria.  Mood: Reporting feeling better.  Affect: Slightly blunted affect. Patient demonstrated some smiling.  Thought process: Tangential thought process.  Thought content: No reports of  suicidal or homicidal ideation.  Perceptions: Patient has been demonstrating response to internal stimuli  hallucinating.   Concentration: Grossly impaired  Memory: Grossly impaired  Intellect: Average.  Judgment and Insight: Impaired.  Patient demonstrating cognitive impairment.    Impression:     Delirium due to other medical condition.  Episodic mood disorder.  Rule out vascular dementia with behavioral disturbance.  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  Although the etiology still not discovered, the patient had a few episodes of delirium in [facility].    The patient today has been demonstrating increased alternation in his mood, restlessness, irritability, delusional ideation, distractibility, disorientation and has been demonstrating delirious episode.  Imaging indicate vascular ischemic changes.    The patient has been demonstrating delirium episode with alternation in mood and cognition with episodes of  increased confusion, restlessness, agitation and response to internal stimuli.     4/3/2024: The patient today continues to demonstrate confusion, restlessness, agitation. He remains in soft restraints. Vitamin B12 and TSH are within normal limits.    4/4/2024: Patient titrating sedation.  Patient is anticipated to have LP today    4/5/2024: The patient with multiple episodes of psychosis, agitation and deterioration in his cognition continued demonstrate alternation in his mood and interaction.      4/6/2024: The patient has been demonstrating less agitation and with no restraint.    4/7/2024: The patient has been demonstrating improvement in his demeanor and mood with less agitation otherwise the patient with severe dementia and  continues to be disoriented.    4/8/2024: The patient has been demonstrating improvement in his demeanor and interaction but has been demonstrating increased sedation.    4/9/2024: The patient has been demonstrating improvement in his cognition and cooperation with no agitation otherwise continue demonstrate some disorientation, memory loss and impairment in his insight.    4/10/2024: The patient demonstrating improvement in mood, interaction, and cooperation otherwise continues to demonstrate disorientation.     Discussed risk and benefit, acknowledging the current symptom and severity.  At this point, I would recommend the following approach:     Focus on safety.  Restrained at time if indicated  Focus on education and support.  Focus on insight orientation helping the patient understand diagnosis and treatment plan.  Continue Zyprexa 5 mg nightly.  Continue Depakote 250 mg twice daily.  Thiamine 200 mg twice daily orally.  Utilize Haldol 2 mg IV every 2 hours as needed for agitation.  Utilize Ativan 1 mg every 6 hours as needed for anxiety.  Coordinate plan with team    Orders This Visit:  Orders Placed This Encounter   Procedures    Basic Metabolic Panel (8)    CBC With Differential With Platelet    Troponin I (High Sensitivity)    Urinalysis, Routine    Basic Metabolic Panel (8)    CBC With Differential With Platelet    Potassium    Potassium    Lipid Panel    Hemoglobin A1C    Basic Metabolic Panel (8)    CBC With Differential With Platelet    Magnesium    Vitamin B12    TSH W Reflex To Free T4    CBC With Differential With Platelet    Basic Metabolic Panel (8)    Magnesium    Glucose, Serum    Protein, Total, Serum    CJD 14-3-3 Protein Tau Total, Cerebrospinal Fluid    MD BLOOD SMEAR CONSULT    Basic Metabolic Panel (8)    CBC With Differential With Platelet    Magnesium    Potassium    Miscellaneous Testing    Miscellaneous Testing    Miscellaneous Testing    Miscellaneous Testing    Miscellaneous Testing     Miscellaneous Testing    Miscellaneous Testing    Miscellaneous Testing    CBC With Differential With Platelet    Basic Metabolic Panel (8)    Magnesium    Scan slide    Potassium    Basic Metabolic Panel (8)    Potassium    CBC With Differential With Platelet    Basic Metabolic Panel (8)    Magnesium    Urinalysis with Culture Reflex    Magnesium    Potassium    CBC With Differential With Platelet    Basic Metabolic Panel (8)    Magnesium    Hepatic Function Panel (7)    SARS-CoV-2/Flu A and B/RSV by PCR (GeneXpert)    $$$$Respiratory Flu Expanded Panel + Covid-19$$$$    Urine Culture, Routine       Meds This Visit:  Requested Prescriptions      No prescriptions requested or ordered in this encounter       Anselmo Lawson MD  4/10/2024    Note to Patient: The 21st Century Cures Act makes medical notes like these available to patients in the interest of transparency. However, be advised this is a medical document. It is intended as peer to peer communication. It is written in medical language and may contain abbreviations or verbiage that are unfamiliar. It may appear blunt or direct. Medical documents are intended to carry relevant information, facts as evident, and the clinical opinion of the practitioner. This note may have been transcribed using a voice dictation system. Voice recognition errors may occur. This should not be taken to alter the content or meaning of this note.

## 2024-04-10 NOTE — PHYSICAL THERAPY NOTE
PHYSICAL THERAPY TREATMENT NOTE - INPATIENT     Room Number: 561/561-A       Presenting Problem: AMS, agitation       Problem List  Principal Problem:    NPH (normal pressure hydrocephalus) ([facility])  Active Problems:    Severe vascular dementia with agitation ([facility])    Delirium due to another medical condition    Episodic mood disorder ([facility])    TIA (transient ischemic attack)      PHYSICAL THERAPY ASSESSMENT   Patient demonstrates limited progress this session, goals  remain in progress.    Patient continues to function below baseline with bed mobility, transfers, and gait.  Contributing factors to remaining limitations include decreased functional strength, decreased endurance/aerobic capacity, cognitive deficits (dementia), and medical status.  Next session anticipate patient to progress bed mobility, transfers, and gait.  Physical Therapy will continue to follow patient for duration of hospitalization.    Patient continues to benefit from continued skilled PT services: to promote return to prior level of function and safety with continuous assistance and gradual rehabilitative therapy .    PLAN  PT Treatment Plan: Bed mobility;Body mechanics;Endurance;Energy conservation;Patient education;Family education;Gait training;Balance training;Transfer training;Strengthening  Frequency (Obs): 3-5x/week    SUBJECTIVE  \"Oh hello.\"     OBJECTIVE  Precautions: Bed/chair alarm (sitter present)    WEIGHT BEARING RESTRICTION      No restrictions           PAIN ASSESSMENT   Rating: Other (Comment)  Location: Denies pain  Management Techniques: Activity promotion;Body mechanics;Repositioning    BALANCE  Static Sitting: Fair -  Dynamic Sitting: Poor +  Static Standing: Poor  Dynamic Standing: Poor -    AM-PAC '6-Clicks' INPATIENT SHORT FORM - BASIC MOBILITY  How much difficulty does the patient currently have...  Patient Difficulty: Turning over in bed (including adjusting bedclothes, sheets and blankets)?: A Lot   Patient Difficulty:  Sitting down on and standing up from a chair with arms (e.g., wheelchair, bedside commode, etc.): A Lot   Patient Difficulty: Moving from lying on back to sitting on the side of the bed?: A Lot   How much help from another person does the patient currently need...   Help from Another: Moving to and from a bed to a chair (including a wheelchair)?: A Lot   Help from Another: Need to walk in hospital room?: A Lot   Help from Another: Climbing 3-5 steps with a railing?: Total     AM-PAC Score:  Raw Score: 11   Approx Degree of Impairment: 72.57%   Standardized Score (AM-PAC Scale): 33.86   CMS Modifier (G-Code): CL    FUNCTIONAL ABILITY STATUS  Functional Mobility/Gait Assessment  Gait Assistance: Moderate assistance (mod x 2 SPT)  Distance (ft): SPT  Assistive Device: Rolling walker  Pattern: R Steppage;L Steppage;R Foot flat;L Foot flat;Shuffle (flexed gait)  Rolling: moderate assist  Supine to Sit: moderate assist  Sit to Supine: moderate assist  Sit to Stand: moderate assist    Additional information: mod x 2 SPT     The patient's Approx Degree of Impairment: 72.57% has been calculated based on documentation in the [facility] '6 clicks' Inpatient Daily Activity Short Form.  Research supports that patients with this level of impairment may benefit from MINNIE.  Final disposition will be made by interdisciplinary medical team.    THERAPEUTIC EXERCISES  Lower Extremity Alternating marching  Ankle pumps  LAQ     Position Sitting       Patient End of Session: Up in chair;Needs met;Call light within reach;With  staff;RN aware of session/findings    CURRENT GOALS   Goals to be met by: 4/10/24  Patient Goal Patient's self-stated goal is: did not state, resisting returning to bed   Goal #1 Patient is able to demonstrate supine - sit EOB @ level: supervision      Goal #1   Current Status  mod to max assist of one    Goal #2 Patient is able to demonstrate transfers Sit to/from Stand at assistance level: supervision with none       Goal #2  Current Status  as above    Goal #3 Patient is able to ambulate 100 feet with assist device: none at assistance level: supervision   Goal #3   Current Status As above      Therapeutic Activity: 24 minutes

## 2024-04-11 LAB
ANION GAP SERPL CALC-SCNC: 8 MMOL/L (ref 0–18)
BASOPHILS # BLD AUTO: 0.06 X10(3) UL (ref 0–0.2)
BASOPHILS NFR BLD AUTO: 0.5 %
BUN BLD-MCNC: 26 MG/DL (ref 9–23)
BUN/CREAT SERPL: 21.7 (ref 10–20)
CALCIUM BLD-MCNC: 9.6 MG/DL (ref 8.7–10.4)
CHLORIDE SERPL-SCNC: 109 MMOL/L (ref 98–112)
CO2 SERPL-SCNC: 27 MMOL/L (ref 21–32)
CREAT BLD-MCNC: 1.2 MG/DL
DEPRECATED RDW RBC AUTO: 41.9 FL (ref 35.1–46.3)
EGFRCR SERPLBLD CKD-EPI 2021: 65 ML/MIN/1.73M2 (ref 60–?)
EOSINOPHIL # BLD AUTO: 0.29 X10(3) UL (ref 0–0.7)
EOSINOPHIL NFR BLD AUTO: 2.6 %
ERYTHROCYTE [DISTWIDTH] IN BLOOD BY AUTOMATED COUNT: 12.9 % (ref 11–15)
GLUCOSE BLD-MCNC: 86 MG/DL (ref 70–99)
HCT VFR BLD AUTO: 41.9 %
HGB BLD-MCNC: 14.4 G/DL
IMM GRANULOCYTES # BLD AUTO: 0.07 X10(3) UL (ref 0–1)
IMM GRANULOCYTES NFR BLD: 0.6 %
LYMPHOCYTES # BLD AUTO: 1.41 X10(3) UL (ref 1–4)
LYMPHOCYTES NFR BLD AUTO: 12.5 %
MAGNESIUM SERPL-MCNC: 2 MG/DL (ref 1.6–2.6)
MCH RBC QN AUTO: 30.5 PG (ref 26–34)
MCHC RBC AUTO-ENTMCNC: 34.4 G/DL (ref 31–37)
MCV RBC AUTO: 88.8 FL
MONOCYTES # BLD AUTO: 0.75 X10(3) UL (ref 0.1–1)
MONOCYTES NFR BLD AUTO: 6.6 %
NEUTROPHILS # BLD AUTO: 8.71 X10 (3) UL (ref 1.5–7.7)
NEUTROPHILS # BLD AUTO: 8.71 X10(3) UL (ref 1.5–7.7)
NEUTROPHILS NFR BLD AUTO: 77.2 %
OSMOLALITY SERPL CALC.SUM OF ELEC: 302 MOSM/KG (ref 275–295)
PLATELET # BLD AUTO: 334 10(3)UL (ref 150–450)
POTASSIUM SERPL-SCNC: 3.3 MMOL/L (ref 3.5–5.1)
POTASSIUM SERPL-SCNC: 3.3 MMOL/L (ref 3.5–5.1)
RBC # BLD AUTO: 4.72 X10(6)UL
SODIUM SERPL-SCNC: 144 MMOL/L (ref 136–145)
WBC # BLD AUTO: 11.3 X10(3) UL (ref 4–11)

## 2024-04-11 PROCEDURE — 99232 SBSQ HOSP IP/OBS MODERATE 35: CPT | Performed by: OTHER

## 2024-04-11 RX ORDER — POTASSIUM CHLORIDE 20 MEQ/1
40 TABLET, EXTENDED RELEASE ORAL ONCE
Status: COMPLETED | OUTPATIENT
Start: 2024-04-11 | End: 2024-04-11

## 2024-04-11 RX ORDER — IPRATROPIUM BROMIDE AND ALBUTEROL SULFATE 2.5; .5 MG/3ML; MG/3ML
3 SOLUTION RESPIRATORY (INHALATION) EVERY 6 HOURS PRN
Status: DISCONTINUED | OUTPATIENT
Start: 2024-04-11 | End: 2024-04-22

## 2024-04-11 RX ORDER — CEPHALEXIN 500 MG/1
500 CAPSULE ORAL EVERY 8 HOURS SCHEDULED
Status: DISPENSED | OUTPATIENT
Start: 2024-04-12 | End: 2024-04-16

## 2024-04-11 NOTE — OCCUPATIONAL THERAPY NOTE
OCCUPATIONAL THERAPY TREATMENT NOTE - INPATIENT        Room Number: 561/561-A  Presenting Problem: ams    Problem List  Principal Problem:    NPH (normal pressure hydrocephalus) ([facility])  Active Problems:    Severe vascular dementia with agitation ([facility])    Delirium due to another medical condition    Episodic mood disorder ([facility])    TIA (transient ischemic attack)      OCCUPATIONAL THERAPY ASSESSMENT   Patient demonstrates limited progress this session, goals remain in progress.    Patient continues to function below baseline with  adls and functional mobility .   Contributing factors to remaining limitations include decreased functional strength, decreased endurance, decreased insight to deficits, and decreased safety awareness.  Next session anticipate patient to progress bed mobility, transfers, static standing balance, and dynamic standing balance.  Occupational Therapy will continue to follow patient for duration of hospitalization.    Patient continues to benefit from continued skilled OT services: to promote return to prior level of function and safety with continuous assistance and gradual rehabilitative therapy .     PLAN  OT Treatment Plan: Balance activities;Functional transfer training;Endurance training;Cognitive reorientation;Patient/Family education;Patient/Family training  OT Device Recommendations: TBD    SUBJECTIVE  Pt agreeable to oob activity    OBJECTIVE  Precautions: Bed/chair alarm (sitter present)    PAIN ASSESSMENT  Ratin    ACTIVITY TOLERANCE  fair    O2 SATURATIONS  Activity on room air    ACTIVITIES OF DAILY LIVING ASSESSMENT  AM-PAC ‘6-Clicks’ Inpatient Daily Activity Short Form  How much help from another person does the patient currently need…  -   Putting on and taking off regular lower body clothing?: A Lot  -   Bathing (including washing, rinsing, drying)?: A Lot  -   Toileting, which includes using toilet, bedpan or urinal? : Total  -   Putting on and taking off regular upper body  clothing?: A Lot  -   Taking care of personal grooming such as brushing teeth?: A Lot  -   Eating meals?: A Lot    AM-PAC Score:  Score: 11  Approx Degree of Impairment: 70.42%  Standardized Score (AM-PAC Scale): 29.04  CMS Modifier (G-Code): CL    FUNCTIONAL TRANSFER ASSESSMENT  Sit to Stand: Edge of Bed  Edge of Bed: Contact Guard Assist    BED MOBILITY  Rolling: Contact Guard Assist  Supine to Sit : Contact Guard Assist  Sit to Supine (OT): Contact Guard Assist  Scooting: CGA    BALANCE ASSESSMENT  Static Sitting: Stand-by Assist  Static Standing: Contact Guard Assist    FUNCTIONAL ADL ASSESSMENT  Eating: setup assist  Grooming: min assist  UB Dressing: min assist  LB Dressing: max assist  Toileting: na    Skilled Therapy Provided: RN contacted prior to start of care. Treatment coordinated w/ PT. Pt agreeable to participation in therapy. Gait belt used during dynamic activity. Pt received in bed, alert and oriented to self;sitter at bedside. Pt currently requires mod a to transfer supine<>sit at eob. Pt maintained unsupported sitting w/ cga. Pt required mod a x 2  to transfer sit<>stand from eob. Pt required mod a x 2 hha to transfer bed<>chair. Pt w/ flexed knees and poor standing balance w/ activity.    At end of session pt up in chair w/ all needs in reach and alarm on;sitter at bedside. RN aware of pt's status and performance in therapy      EDUCATION PROVIDED  Patient: Role of Occupational Therapy; Plan of Care; Functional Transfer Techniques; Fall Prevention  Patient's Response to Education: Requires Further Education; Demonstrates Poor Carry Over to Information    The patient's Approx Degree of Impairment: 70.42% has been calculated based on documentation in the [facility] '6 clicks' Inpatient Daily Activity Short Form.  Research supports that patients with this level of impairment may benefit from IRF.  Final disposition will be made by interdisciplinary medical team.    Patient End of Session: Up in  chair;Needs met;Call light within reach;Alarm set (sitter at bedside)    OT Goals:   Patient will complete LE dressing with supervision  Comment: pt required max a    Patient will complete toilet transfer with supervision  Comment: pt required mod a x 2 for bed<>chair transfer    Patient will complete self care task at sink level with supervision    Comment: na         Goals  on: 24  Frequency: 3x week    Therapeutic Activity: 15 minutes

## 2024-04-11 NOTE — PLAN OF CARE
Pt currently resting in bed, vitals stable, afebrile.  Pt was retaining >400cc urine by bladder scan and had 550cc output via straight cath.  Per other staff & MD pt seems to be slightly more neurologically intact.  Pt refuses hygiene, however, will become verbally and physically aggressive with attempts.  No BM thus far during shift.  Encouraging oral intake, maintains pureed diet, fair appetite.  Pt worked with PT and was up to chair for several hours.       Problem: Delirium  Goal: Minimize duration of delirium  Description: Interventions:  - Encourage use of hearing aids, eye glasses  - Promote highest level of mobility daily  - Provide frequent reorientation  - Promote wakefulness i.e. lights on, blinds open  - Promote sleep, encourage patient's normal rest cycle i.e. lights off, TV off, minimize noise and interruptions  - Encourage family to assist in orientation and promotion of home routines  Outcome: Progressing     Problem: METABOLIC/FLUID AND ELECTROLYTES - ADULT  Goal: Electrolytes maintained within normal limits  Description: INTERVENTIONS:  - Monitor labs and rhythm and assess patient for signs and symptoms of electrolyte imbalances  - Administer electrolyte replacement as ordered  - Monitor response to electrolyte replacements, including rhythm and repeat lab results as appropriate  - Fluid restriction as ordered  - Instruct patient on fluid and nutrition restrictions as appropriate  4/11/2024 1600 by Codie Wood, RN  Outcome: Progressing  4/11/2024 1559 by Codie Wood, RN  Outcome: Progressing     Problem: MUSCULOSKELETAL - ADULT  Goal: Return mobility to safest level of function  Description: INTERVENTIONS:  - Assess patient stability and activity tolerance for standing, transferring and ambulating w/ or w/o assistive devices  - Assist with transfers and ambulation using safe patient handling equipment as needed  - Ensure adequate protection for wounds/incisions during  mobilization  - Obtain PT/OT consults as needed  - Advance activity as appropriate  - Communicate ordered activity level and limitations with patient/family  4/11/2024 1600 by Codie Wood RN  Outcome: Progressing  4/11/2024 1559 by Codie Wood RN  Outcome: Progressing     Problem: NEUROLOGICAL - ADULT  Goal: Achieves stable or improved neurological status  Description: INTERVENTIONS  - Assess for and report changes in neurological status  - Initiate measures to prevent increased intracranial pressure  - Maintain blood pressure and fluid volume within ordered parameters to optimize cerebral perfusion and minimize risk of hemorrhage  - Monitor temperature, glucose, and sodium. Initiate appropriate interventions as ordered  4/11/2024 1600 by Codie Wood RN  Outcome: Progressing  4/11/2024 1559 by Codie Wood RN  Outcome: Progressing  Goal: Absence of seizures  Description: INTERVENTIONS  - Monitor for seizure activity  - Administer anti-seizure medications as ordered  - Monitor neurological status  4/11/2024 1600 by Codie Wood RN  Outcome: Progressing  4/11/2024 1559 by Codie Wood RN  Outcome: Progressing  Goal: Remains free of injury related to seizure activity  Description: INTERVENTIONS:  - Maintain airway, patient safety  and administer oxygen as ordered  - Monitor patient for seizure activity, document and report duration and description of seizure to MD/LIP  - If seizure occurs, turn patient to side and suction secretions as needed  - Reorient patient post seizure  - Seizure pads on all 4 side rails  - Instruct patient/family to notify RN of any seizure activity  - Instruct patient/family to call for assistance with activity based on assessment  4/11/2024 1600 by Codie Wood RN  Outcome: Progressing  4/11/2024 1559 by Codie Wood RN  Outcome: Progressing  Goal: Achieves maximal functionality and self care  Description: INTERVENTIONS  -  Monitor swallowing and airway patency with patient fatigue and changes in neurological status  - Encourage and assist patient to increase activity and self care with guidance from PT/OT  - Encourage visually impaired, hearing impaired and aphasic patients to use assistive/communication devices  4/11/2024 1600 by Codie Wood RN  Outcome: Progressing  4/11/2024 1559 by Codie Wood RN  Outcome: Progressing     Problem: Safety Risk - Non-Violent Restraints  Goal: Patient will remain free from self-harm  Description: INTERVENTIONS:  - Apply the least restrictive restraint to prevent harm  - Notify patient and family of reasons restraints applied  - Assess for any contributing factors to confusion (electrolyte disturbances, delirium, medications)  - Discontinue any unnecessary medical devices as soon as possible  - Assess the patient's physical comfort, circulation, skin condition, hydration, nutrition and elimination needs   - Reorient and redirection as needed  - Assess for the need to continue restraints  4/11/2024 1600 by Codie Wood RN  Outcome: Progressing  4/11/2024 1559 by Codie Wood RN  Outcome: Progressing     Problem: Risk for Violence-Violent Restraints/Seclusion  Goal: Patient will not express any violent or self-destructive behaviors  Description: Interventions:  - Apply the least restrictive restraint to prevent harm if patient strikes out and is not responding to redirection  - Notify patient and family of reasons restraints applied  - Assist the patient to identify the precipitating event  - Assist the patient to identify alternatives to physically acting out  - Assess for any contributing factors to violent behaviors (substances,  medications, history of trauma)  - Assess the patient's physical comfort, circulation, skin condition, hydration, nutrition and elimination needs   - Assess for the need to continue restraints  - Evaluate the need for an emergency  medication  4/11/2024 1600 by Codie Wood, RN  Outcome: Progressing  4/11/2024 1559 by Codie Wood, RN  Outcome: Progressing

## 2024-04-11 NOTE — PROGRESS NOTES
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 The patient has been demonstrating confusion, restlessness, agitation. Patient indicated for psych consult for evaluation and advise.    Consult Duration     The patient seen for over 25-minute, follow-up evaluation, over 50% counseling and coordinating care addressing confusion, restlessness.    Record reviewed, communication with attending, communication with RN and patient seen face to face evaluation.  History of Present Illness:      According to the team the patient has been demonstrating improvement in his tiredness and engagement.    The patient laying down in his bed and sitter at bedside.  The patient sleeping today otherwise was able to wake up and greeted me.  Patient has been demonstrating some improvement in his interaction with no agitation reported or observed.    The patient is aware that he is in the hospital.  Patient otherwise continue demonstrating disorganized speech reporting that he was in the court today working as a  for difficult case.    The patient has not been demonstrating any agitation for indicating any restraint and has been cooperative in the treatment plan and the care.    According to the medical team some mild urine retention has been observed today but the patient has been cooperative with cath.    The patient has been demonstrating improvement in his general function and interaction.  Patient denying any hallucination otherwise continue demonstrating some tangential thought process.    Past Psychiatric/Medication History:  1. Prior diagnoses: Previous delirium episode.  2. Past psychiatric inpatient: Multiple admission to medical inpatient for altered mental status.  3. Past outpatient history: None  4. Past  suicide history: None  5. Medication history: None    Social History:   Patient living in independent living facility    Family History:  Unknown  Medical History:   Past Medical History  Past Medical History:    Abnormal CT scan    Acute gout involving toe of left foot    Altered mental status    Anemia    Asthma in adult (HCC)    Cancer of parotid gland (HCC)    Formatting of this note might be different from the original.   Resection 2019    Catatonia    Cognitive communication deficit    Cystic disease of liver    Dementia (HCC)    Difficult airway    Dyslipidemia    Elevated white blood cell count, unspecified    Encephalopathy    Essential hypertension    Gastro-esophageal reflux disease with esophagitis, without bleeding    Gout, chronic    Gross hematuria    Hypercholesterolemia    Hyperlipidemia    Leukocytosis    Liver cyst    Metabolic encephalopathy    Neuroendocrine carcinoma, high grade (HCC)    Other abnormalities of gait and mobility    Other psoriasis    Other seizures (HCC)    Other specified disorders of kidney and ureter    Parotid neoplasm    Formatting of this note might be different from the original.   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   Keppra    Seizure disorder (HCC)    Toxic metabolic encephalopathy    Transient global amnesia    Formatting of this note might be different from the original.   Brief -Plavix    Unspecified dementia, unspecified severity, without behavioral disturbance, psychotic disturbance, mood disturbance, and anxiety (HCC)    Vascular dementia  (HCC)    Vascular dementia, unspecified severity, without behavioral disturbance, psychotic disturbance, mood disturbance, and anxiety (HCC)    Weakness    Weakness of left upper extremity       Past Surgical History  History reviewed. No pertinent surgical history.    Family History  No family history on file.    Social History  Social History     Socioeconomic History    Marital status: Single   Tobacco Use    Smoking status: Former     Current packs/day: 0.00     Average packs/day: 1 pack/day for 25.0 years (25.0 ttl pk-yrs)     Types: Cigarettes     Start date: 1965     Quit date: 1990     Years since quittin.2    Tobacco comments:      Cigarettes 1 25 Quit:     Social Determinants of Health     Financial Resource Strain: Low Risk  (2021)    Received from [facility]    Overall Financial Resource Strain (CARDIA)     Difficulty of Paying Living Expenses: Not hard at all   Food Insecurity: Unknown (3/31/2024)    Food Insecurity     Food Insecurity: Patient unable to answer   Transportation Needs: Unknown (3/31/2024)    Transportation Needs     Lack of Transportation: Patient unable to answer    Received from [facility]    Social Connections   Housing Stability: Unknown (3/31/2024)    Housing Stability     Housing Instability: Patient unable to answer           Current Medications:  Current Facility-Administered Medications   Medication Dose Route Frequency    melatonin cap/tab 5 mg  5 mg Oral Nightly    sennosides (Senokot) tab 17.2 mg  17.2 mg Oral Nightly    polyethylene glycol (PEG 3350) (Miralax) 17 g oral packet 17 g  17 g Oral Daily    tamsulosin (Flomax) cap 0.8 mg  0.8 mg Oral Daily    cefTRIAXone (Rocephin) 1 g in D5W 100 mL IVPB-ADD  1 g Intravenous Q24H    glycerin-hypromellose- (Artificial Tears) 0.2-0.2-1 % ophthalmic solution 1 drop  1 drop Both Eyes TID    baclofen (Lioresal) tab 5 mg  5 mg Oral TID PRN    thiamine (Vitamin B1) tab  200 mg  200 mg Oral BID    ipratropium-albuterol (Duoneb) 0.5-2.5 (3) MG/3ML inhalation solution 3 mL  3 mL Nebulization Q6H WA    docusate sodium (Colace) cap 100 mg  100 mg Oral BID    polyethylene glycol (PEG 3350) (Miralax) 17 g oral packet 17 g  17 g Oral Daily PRN    magnesium hydroxide (Milk of Magnesia) 400 MG/5ML oral suspension 30 mL  30 mL Oral Daily PRN    bisacodyl (Dulcolax) 10 MG rectal suppository 10 mg  10 mg Rectal Daily PRN    divalproex DR (Depakote Sprinkle) sprinkle cap 250 mg  250 mg Oral BID    OLANZapine (ZyPREXA) tab 5 mg  5 mg Oral Nightly    calcium carbonate (Tums) chewable tab 1,000 mg  1,000 mg Oral TID    hydrALAZINE (Apresoline) tab 25 mg  25 mg Oral Q8H PRN    amLODIPine (Norvasc) tab 10 mg  10 mg Oral Daily    haloperidol lactate (Haldol) 5 MG/ML injection 2 mg  2 mg Intravenous Q2H PRN    carvedilol (Coreg) tab 12.5 mg  12.5 mg Oral BID with meals    fluticasone furoate-vilanterol (Breo Ellipta) 200-25 MCG/ACT inhaler 1 puff  1 puff Inhalation Daily    aspirin chewable tab 81 mg  81 mg Oral Daily    acetaminophen (Tylenol) tab 650 mg  650 mg Oral Q4H PRN    ondansetron (Zofran) 4 MG/2ML injection 4 mg  4 mg Intravenous Q6H PRN    atorvastatin (Lipitor) tab 80 mg  80 mg Oral Nightly    enoxaparin (Lovenox) 40 MG/0.4ML SUBQ injection 40 mg  40 mg Subcutaneous Daily       Medications Prior to Admission   Medication Sig    albuterol 108 (90 Base) MCG/ACT Inhalation Aero Soln Inhale 2 puffs into the lungs every 4 (four) hours as needed for Wheezing.    carvedilol 12.5 MG Oral Tab Take 1 tablet (12.5 mg total) by mouth 2 (two) times daily with meals.    clopidogrel 75 MG Oral Tab Take 1 tablet (75 mg total) by mouth daily.    tamsulosin 0.4 MG Oral Cap Take 1 capsule (0.4 mg total) by mouth daily.    acetaminophen 325 MG Oral Tab Take 2 tablets (650 mg total) by mouth every 6 (six) hours as needed for Pain.    fluticasone-salmeterol 250-50 MCG/ACT Inhalation Aerosol Powder, Breath  Activated Inhale 1 puff into the lungs 2 (two) times daily.       Allergies  Allergies   Allergen Reactions    Risperidone WHEEZING       Review of Systems:   As by Admitting/Attending    Results:   Laboratory Data:  Lab Results   Component Value Date    WBC 11.3 (H) 04/11/2024    HGB 14.4 04/11/2024    HCT 41.9 04/11/2024    .0 04/11/2024    CREATSERUM 1.20 04/11/2024    BUN 26 (H) 04/11/2024     04/11/2024    K 3.3 (L) 04/11/2024    K 3.3 (L) 04/11/2024     04/11/2024    CO2 27.0 04/11/2024    GLU 86 04/11/2024    CA 9.6 04/11/2024    ALB 3.8 04/10/2024    ALKPHO 65 04/10/2024    TP 6.6 04/10/2024    AST 20 04/10/2024    ALT 12 04/10/2024    TSH 3.927 04/03/2024    MG 2.0 04/11/2024    B12 470 04/03/2024         Imaging:  XR ABDOMEN (1 VIEW) (CPT=74018)    Result Date: 4/9/2024  CONCLUSION:  1. Nonobstructive bowel gas pattern. 2. Severe colonic stool burden.  Correlate clinically for a history of constipation. 3. Lesser incidental findings as above.    Dictated by (CST): Stevan Martinez MD on 4/09/2024 at 5:31 PM     Finalized by (CST): Stevan Martinez MD on 4/09/2024 at 5:33 PM          XR CHEST AP PORTABLE  (CPT=71045)    Result Date: 4/9/2024  CONCLUSION:   No focal opacity, pleural effusion, or pneumothorax.   Dictated by (CST): Keith Partida MD on 4/09/2024 at 2:34 PM     Finalized by (CST): Keith Partida MD on 4/09/2024 at 2:35 PM           Vital Signs:   Blood pressure 129/69, pulse 79, temperature 97.9 °F (36.6 °C), temperature source Axillary, resp. rate 18, height 68\", weight 82.1 kg (180 lb 14.4 oz), SpO2 93%.    Mental Status Exam:   Appearance: Stated age male, in hospital gown, laying down in hospital bed.    Psychomotor: Patient has been cooperative with no agitation or restlessness and able to shake and engage.  Orientation: Alert and oriented to self and location as hospital but not to date or exact condition.  Gait: Not evaluated.  Attitude/Coorperation: Patient made  an effort to cooperate and presenting polite and attentive.  Behavior: No agitation has been reported.  Speech: No dysarthria.  Mood: Reporting feeling better.  Affect: With restricted affect.  Thought process: Tangential thought process.  Thought content: No reports of  suicidal or homicidal ideation.  Perceptions: Patient has been demonstrating response to internal stimuli hallucinating.   Concentration: Grossly impaired with tangential thought process.  Memory: Grossly impaired.  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  Intellect: Impaired due to vascular dementia.  Judgment and Insight: Impaired.  Patient demonstrating cognitive impairment.    Impression:     Delirium due to other medical condition.  Episodic mood disorder.  Rule out vascular dementia with behavioral disturbance.  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  Although the etiology still not discovered, the patient had a few episodes of delirium in [facility].    The patient today has been demonstrating increased alternation in his mood, restlessness, irritability, delusional ideation, distractibility, disorientation and has been demonstrating delirious episode.  Imaging indicate vascular ischemic changes.    The patient has been demonstrating delirium episode with alternation in mood and cognition with episodes of  increased confusion, restlessness, agitation and response to internal stimuli.     4/3/2024: The patient today continues to demonstrate confusion, restlessness, agitation. He remains in  soft restraints. Vitamin B12 and TSH are within normal limits.    4/4/2024: Patient titrating sedation.  Patient is anticipated to have LP today    4/5/2024: The patient with multiple episodes of psychosis, agitation and deterioration in his cognition continued demonstrate alternation in his mood and interaction.      4/6/2024: The patient has been demonstrating less agitation and with no restraint.    4/7/2024: The patient has been demonstrating improvement in his demeanor and mood with less agitation otherwise the patient with severe dementia and continues to be disoriented.    4/8/2024: The patient has been demonstrating improvement in his demeanor and interaction but has been demonstrating increased sedation.    4/9/2024: The patient has been demonstrating improvement in his cognition and cooperation with no agitation otherwise continue demonstrate some disorientation, memory loss and impairment in his insight.    4/10/2024: The patient demonstrating improvement in mood, interaction, and cooperation otherwise continues to demonstrate disorientation.     4/11/2024: Patient has been reasonably stable on the current medication.    Discussed risk and benefit, acknowledging the current symptom and severity.  At this point, I would recommend the following approach:     Focus on safety.  Restrained at time if indicated  Focus on education and support.  Focus on insight orientation helping the patient understand diagnosis and treatment plan.  Continue Zyprexa 5 mg nightly.  Continue Depakote 250 mg twice daily.  Thiamine 200 mg twice daily orally.  Utilize Haldol 2 mg IV every 2 hours as needed for agitation.  Utilize Ativan 1 mg every 6 hours as needed for anxiety.  Coordinate plan with team    Orders This Visit:  Orders Placed This Encounter   Procedures    Basic Metabolic Panel (8)    CBC With Differential With Platelet    Troponin I (High Sensitivity)    Urinalysis, Routine    Basic Metabolic Panel (8)    CBC With  Differential With Platelet    Potassium    Potassium    Lipid Panel    Hemoglobin A1C    Basic Metabolic Panel (8)    CBC With Differential With Platelet    Magnesium    Vitamin B12    TSH W Reflex To Free T4    CBC With Differential With Platelet    Basic Metabolic Panel (8)    Magnesium    Glucose, Serum    Protein, Total, Serum    CJD 14-3-3 Protein Tau Total, Cerebrospinal Fluid    MD BLOOD SMEAR CONSULT    Basic Metabolic Panel (8)    CBC With Differential With Platelet    Magnesium    Potassium    Miscellaneous Testing    Miscellaneous Testing    Miscellaneous Testing    Miscellaneous Testing    Miscellaneous Testing    Miscellaneous Testing    Miscellaneous Testing    Miscellaneous Testing    CBC With Differential With Platelet    Basic Metabolic Panel (8)    Magnesium    Scan slide    Potassium    Basic Metabolic Panel (8)    Potassium    CBC With Differential With Platelet    Basic Metabolic Panel (8)    Magnesium    Urinalysis with Culture Reflex    Magnesium    Potassium    CBC With Differential With Platelet    Basic Metabolic Panel (8)    Magnesium    Hepatic Function Panel (7)    Basic Metabolic Panel (8)    CBC With Differential With Platelet    Magnesium    Potassium    Basic Metabolic Panel (8)    CBC With Differential With Platelet    Magnesium    SARS-CoV-2/Flu A and B/RSV by PCR (GeneXpert)    $$$$Respiratory Flu Expanded Panel + Covid-19$$$$    Urine Culture, Routine       Meds This Visit:  Requested Prescriptions      No prescriptions requested or ordered in this encounter       Anselmo Lawson MD  4/11/2024    Note to Patient: The 21st Century Cures Act makes medical notes like these available to patients in the interest of transparency. However, be advised this is a medical document. It is intended as peer to peer communication. It is written in medical language and may contain abbreviations or verbiage that are unfamiliar. It may appear blunt or direct. Medical documents are intended to  carry relevant information, facts as evident, and the clinical opinion of the practitioner. This note may have been transcribed using a voice dictation system. Voice recognition errors may occur. This should not be taken to alter the content or meaning of this note.

## 2024-04-11 NOTE — PHYSICAL THERAPY NOTE
PHYSICAL THERAPY TREATMENT NOTE - INPATIENT     Room Number: 561/561-A       Presenting Problem: AMS, agitation       Problem List  Principal Problem:    NPH (normal pressure hydrocephalus) ([facility])  Active Problems:    Severe vascular dementia with agitation ([facility])    Delirium due to another medical condition    Episodic mood disorder ([facility])    TIA (transient ischemic attack)      PHYSICAL THERAPY ASSESSMENT   Patient demonstrates fair progress this session, goals  remain in progress.    Patient continues to function below baseline with bed mobility, transfers, and gait.  Contributing factors to remaining limitations include decreased functional strength, pain, and medical status.  Next session anticipate patient to progress bed mobility, transfers, and gait.  Physical Therapy will continue to follow patient for duration of hospitalization.    Patient continues to benefit from continued skilled PT services: to promote return to prior level of function and safety with continuous assistance and gradual rehabilitative therapy .    PLAN  PT Treatment Plan: Bed mobility;Body mechanics;Patient education;Transfer training;Balance training;Strengthening  Frequency (Obs): 3-5x/week    SUBJECTIVE  \"I can get up\"    OBJECTIVE  Precautions: Bed/chair alarm (sitter present)    PAIN ASSESSMENT   Rating: Unable to rate  Location: Denies pain  Management Techniques: Activity promotion;Body mechanics;Repositioning    BALANCE  Static Sitting: Fair +  Dynamic Sitting: Fair  Static Standing: Poor +  Dynamic Standing: Poor    AM-PAC '6-Clicks' INPATIENT SHORT FORM - BASIC MOBILITY  How much difficulty does the patient currently have...  Patient Difficulty: Turning over in bed (including adjusting bedclothes, sheets and blankets)?: A Lot   Patient Difficulty: Sitting down on and standing up from a chair with arms (e.g., wheelchair, bedside commode, etc.): A Lot   Patient Difficulty: Moving from lying on back to sitting on the side of the  bed?: A Lot   How much help from another person does the patient currently need...   Help from Another: Moving to and from a bed to a chair (including a wheelchair)?: A Lot   Help from Another: Need to walk in hospital room?: A Lot   Help from Another: Climbing 3-5 steps with a railing?: Total     AM-PAC Score:  Raw Score: 11   Approx Degree of Impairment: 72.57%   Standardized Score (AM-PAC Scale): 33.86   CMS Modifier (G-Code): CL    FUNCTIONAL ABILITY STATUS  Functional Mobility/Gait Assessment  Gait Assistance: Moderate assistance (assist of 2)  Distance (ft): steps to bedside chair  Assistive Device:  (bilateral hand held assist)  Pattern: R Steppage;L Steppage  Rolling:  not tested   Supine to Sit: moderate assist  Sit to Supine:  not tested   Sit to Stand: moderate assist and assist of 2    Additional information: Patient on room air. Patient currently with intense hiccups. Patient currently with mod A x 2 for mobility during the session. Patient able to sit edge of bed with CGA, then stand and take steps to bedside chair with mod A x 2, bilateral hand held assist, knees flexed bilaterally and flexed posture. The patient's Approx Degree of Impairment: 72.57% has been calculated based on documentation in the [facility] '6 clicks' Inpatient Daily Activity Short Form.  Research supports that patients with this level of impairment may benefit from rehab facility. Patient received semi-fowlers in bed, agreeable to physical therapy. Education with patient provided verbally on physical therapy plan of care and physiological benefits of out of bed mobility. Patient with fair carryover during session. Anticipated therapy needs remain appropriate based on the patient's performance, personal factors, and remaining functional impairments.   Final disposition will be made by interdisciplinary medical team.    Patient End of Session: Up in chair;Needs met;Call light within reach;RN aware of session/findings;All patient  questions and concerns addressed;Alarm set (sitter present in room)    CURRENT GOALS   Goals to be met by: 4/10/24  Patient Goal Patient's self-stated goal is: did not state, resisting returning to bed   Goal #1 Patient is able to demonstrate supine - sit EOB @ level: supervision      Goal #1   Current Status Mod A x 2   Goal #2 Patient is able to demonstrate transfers Sit to/from Stand at assistance level: supervision with none      Goal #2  Current Status Mod A x 2    Goal #3 Patient is able to ambulate 100 feet with assist device: none at assistance level: supervision   Goal #3   Current Status Steps to bedside chair mod A x 2     Therapeutic Activity: 15 minutes

## 2024-04-11 NOTE — CM/SW NOTE
FRANSISCO sent updated clinicals via aidin to [facility]. FRANSISCO asked JUDAH Jones to re-review and reconsider the patient per POA's request. FRANSISCO extended deadline for [facility]. FRANSISCO will need updated PT OT notes for auth, and end [facility] list to POAs' email.     352pm- FRANSISCO sent [facility] list to POA email,[email or web address]. FRANSISCO informed Poa that JUDAH Jones is reconsidering at this time.     Plan: Pending medical clearance, DC to [facility], *updated PT OT notes, *choice,* insurance auth    SW/CM to remain available for support and/or discharge planning.     Cheli Hoyt, MSW, LSW   x [phone]

## 2024-04-11 NOTE — PROGRESS NOTES
[facility] Hospitalist Progress Note     CC: Hospital Follow up    PCP: No primary care provider on file.       Assessment/Plan:   Mr. 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, vascular dementia, HTN, history of possible seizure, gout, metabolic encephalopathy with agitation without clear cause status post 3 hospitalizations, who presents with altered mental status weakness and agitation, neurology and psychiatry consulted, extensive workup including CT/MRI brain/lumbar puncture without clear etiology, slow clinical improvement with cognitive state, plan for [facility]     Altered mental status   Metabolic encephalopathy  Delirium in setting of likely vascular dementia  -unclear, ddx included metabolic encephalopathy, infection, stroke, post ictal state on NPH, frontotemporal dementia?  -has been admitted 3 other times (last in 2/2023, at [facility]) with similar presentation, extensive work up   -Per POA cognitive status returned back to baseline and his prior hospitalizations  -neurology and psychiatry consulted  -UA neg, no signs of infection, CXR negative  -initial CT imaging stable  -TSH, B12 normal  -MRI brain without acute infarct advanced small vessel disease, poss enlargement of ventricles  -aspirin and statin   -monitor for fever or infection   -CODE GABRIEL called on 4/2 in am, Psyc consulted  -concern for NPH, s/p lumbar puncture on 4/4, had physical therapy preceding and post LP, opening pressure was normal (12) minute and cognitive state or functional state did not improve  -Discussed with neurology, and neurosurgery, unlikely that this is NPH given lack of improvement post LP, also atypical presentation, neurosurgery recommending outpatient follow-up if suspicion is still high for NPH  -LP studies still pending given CJD testing, encephalitis panel negative  -psychiatry adjusting medications  - has been off restraints for several  days, does have safety sitter in room poss consider DC sitter in 1-2 days    Hypernatremia / DAWN  Urinary retention  Poss UTI  - urinary retention, bladder scan with 1600, straight Q6  - hold on you given high risk for pulling out  - continue flomax, bowel regimen, mobilize, minimizing contributing meds  - UA with sediment, IV ceftriaxone ordered, culture with poss contaminant, but has been straight cathed several times, will continue ab's to complete 7 day course (especially with leucocytosis, and suprapubic pain, now improved)  - ceftraixone (started on 4/9) -> transition to oral keflex for 4 more days.   - will increase flomax, had multiple BM on 4/10-11, improved mobility, offending medications (thorazine stopped, haldol minimized).  If persists will call urology     Constipation  - KUB with large stool burden  - bowel regimen ordered--> several BM on 4/10-11    Pink eye / right eye radiation   - 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 Add to allergy list  - steroids, nebs, avoid IV benadryl given severe agitation  - CXR negative   - RVP neg    Leucocytosis   - poss secondary to IV steroids  - no fevers  - will continue to monitor  - steroid stopped  - improved      Hx of Hypertension  -started on amlodipine  -coreg noted on home meds, resumed   -consider re-ordering ACE or ARB if renal function stable  - BP improved     Possible left sided facial droop and weakness  -neurology consulted and stroke work up if needed based on their evaluation   -anti-platelets if recommended by neuro   -discussed with neurology. Low suspicion for acute CVA. MRI  negative for stroke      Hx of seizure? Noted in outside records,  - not on AED's    Hs of Small cell lung cancer   - parotid  involvement, has had radiation therapy   - follows at [facility]  -he's  was on colchicine (stopped as OP), held with DAWN     Fluids: stopped  DVT prophylaxis: lovenox daily   Code status: FULL     Yesenia Calderon is the POA, updated on 4/9    Raheem Johnson MD  [facility] Hospitalist      Subjective:     Alert and awake, calm, Alert to person, not agitated , denied HA or neck stiffness , not alert to place or time    OBJECTIVE:    Blood pressure 129/69, pulse 79, temperature 97.9 °F (36.6 °C), temperature source Axillary, resp. rate 18, height 5' 8\" (1.727 m), weight 180 lb 14.4 oz (82.1 kg), SpO2 93%.    Temp:  [97.8 °F (36.6 °C)-98.7 °F (37.1 °C)] 97.9 °F (36.6 °C)  Pulse:  [68-84] 79  Resp:  [16-18] 18  BP: (106-134)/(64-83) 129/69  SpO2:  [92 %-94 %] 93 %      Intake/Output:    Intake/Output Summary (Last 24 hours) at 4/11/2024 1149  Last data filed at 4/11/2024 0830  Gross per 24 hour   Intake 430 ml   Output 1650 ml   Net -1220 ml       Last 3 Weights   03/31/24 1311 180 lb 14.4 oz (82.1 kg)   03/31/24 1117 177 lb 0.5 oz (80.3 kg)       Exam    Gen:  calm  Pulm: Lungs clear  CV: Heart with regular rate and rhythm  Abd: Abdomen soft, no abd pain, no pain with palpation  Ext with edema   Neuro: moving all 4 ext, confused    Data Review:       Labs:     Recent Labs   Lab 04/09/24  0822 04/10/24  0554 04/11/24  0708   RBC 5.40 5.14 4.72   HGB 15.6 15.4 14.4   HCT 48.2 44.5 41.9   MCV 89.3 86.6 88.8   MCH 28.9 30.0 30.5   MCHC 32.4 34.6 34.4   RDW 13.2 12.6 12.9   NEPRELIM 8.17* 11.20* 8.71*   WBC 10.6 13.5* 11.3*   .0 348.0 334.0         Recent Labs   Lab 04/09/24  0822 04/09/24  1804 04/10/24  0554 04/11/24  0708   GLU 75  --  80 86   BUN 21  --  19 26*   CREATSERUM 1.27  --  1.17 1.20   EGFRCR 61  --  67 65   CA 9.6  --  9.7 9.6     --  143 144   K 2.8* 3.0* 3.1* 3.3*  3.3*     --  107 109   CO2 28.0  --  28.0 27.0       Recent Labs   Lab 04/10/24  0554   ALT 12   AST 20   ALB 3.8          Imaging:  XR ABDOMEN (1 VIEW) (CPT=74018)    Result Date: 4/9/2024  CONCLUSION:  1. Nonobstructive bowel gas pattern. 2. Severe colonic stool burden.  Correlate clinically for a history of constipation. 3. Lesser incidental findings as above.    Dictated by (CST): Stevan Martinez MD on 4/09/2024 at 5:31 PM     Finalized by (CST): Stevan Martinez MD on 4/09/2024 at 5:33 PM          XR CHEST AP PORTABLE  (CPT=71045)    Result Date: 4/9/2024  CONCLUSION:   No focal opacity, pleural effusion, or pneumothorax.   Dictated by (CST): Keith Partida MD on 4/09/2024 at 2:34 PM     Finalized by (CST): Keith Partida MD on 4/09/2024 at 2:35 PM             Meds:      melatonin  5 mg Oral Nightly    sennosides  17.2 mg Oral Nightly    polyethylene glycol (PEG 3350)  17 g Oral Daily    tamsulosin  0.8 mg Oral Daily    cefTRIAXone  1 g Intravenous Q24H    glycerin-hypromellose-  1 drop Both Eyes TID    thiamine  200 mg Oral BID    ipratropium-albuterol  3 mL Nebulization Q6H WA    docusate sodium  100 mg Oral BID    divalproex DR  250 mg Oral BID    OLANZapine  5 mg Oral Nightly    calcium carbonate  1,000 mg Oral TID    amLODIPine  10 mg Oral Daily    carvedilol  12.5 mg Oral BID with meals    fluticasone furoate-vilanterol  1 puff Inhalation Daily    aspirin  81 mg Oral Daily    atorvastatin  80 mg Oral Nightly    enoxaparin  40 mg Subcutaneous Daily           baclofen    polyethylene glycol (PEG 3350)    magnesium hydroxide    bisacodyl    hydrALAZINE    haloperidol lactate    acetaminophen **OR** [DISCONTINUED] acetaminophen    ondansetron

## 2024-04-11 NOTE — PLAN OF CARE
Problem: Patient Centered Care  Goal: Patient preferences are identified and integrated in the patient's plan of care  Description: Interventions:  - What would you like us to know as we care for you?   - Provide timely, complete, and accurate information to patient/family  - Incorporate patient and family knowledge, values, beliefs, and cultural backgrounds into the planning and delivery of care  - Encourage patient/family to participate in care and decision-making at the level they choose  - Honor patient and family perspectives and choices  Outcome: Progressing     Problem: Patient/Family Goals  Goal: Patient/Family Long Term Goal  Description: Patient's Long Term Goal: discharge    Interventions:  - Monitor vital signs  - Monitor neurological status  - Ambulate as tolerated  - See additional Care Plan goals for specific interventions  Outcome: Progressing  Goal: Patient/Family Short Term Goal  Description: Patient's Short Term Goal: feel better    Interventions:   - Verbalize needs and wants  - Antianxiety medications as needed  - See additional Care Plan goals for specific interventions  Outcome: Progressing     Problem: Delirium  Goal: Minimize duration of delirium  Description: Interventions:  - Encourage use of hearing aids, eye glasses  - Promote highest level of mobility daily  - Provide frequent reorientation  - Promote wakefulness i.e. lights on, blinds open  - Promote sleep, encourage patient's normal rest cycle i.e. lights off, TV off, minimize noise and interruptions  - Encourage family to assist in orientation and promotion of home routines  Outcome: Progressing     Problem: METABOLIC/FLUID AND ELECTROLYTES - ADULT  Goal: Electrolytes maintained within normal limits  Description: INTERVENTIONS:  - Monitor labs and rhythm and assess patient for signs and symptoms of electrolyte imbalances  - Administer electrolyte replacement as ordered  - Monitor response to electrolyte replacements, including  rhythm and repeat lab results as appropriate  - Fluid restriction as ordered  - Instruct patient on fluid and nutrition restrictions as appropriate  Outcome: Progressing     Problem: MUSCULOSKELETAL - ADULT  Goal: Return mobility to safest level of function  Description: INTERVENTIONS:  - Assess patient stability and activity tolerance for standing, transferring and ambulating w/ or w/o assistive devices  - Assist with transfers and ambulation using safe patient handling equipment as needed  - Ensure adequate protection for wounds/incisions during mobilization  - Obtain PT/OT consults as needed  - Advance activity as appropriate  - Communicate ordered activity level and limitations with patient/family  Outcome: Progressing     Problem: NEUROLOGICAL - ADULT  Goal: Achieves stable or improved neurological status  Description: INTERVENTIONS  - Assess for and report changes in neurological status  - Initiate measures to prevent increased intracranial pressure  - Maintain blood pressure and fluid volume within ordered parameters to optimize cerebral perfusion and minimize risk of hemorrhage  - Monitor temperature, glucose, and sodium. Initiate appropriate interventions as ordered  Outcome: Progressing  Goal: Absence of seizures  Description: INTERVENTIONS  - Monitor for seizure activity  - Administer anti-seizure medications as ordered  - Monitor neurological status  Outcome: Progressing  Goal: Remains free of injury related to seizure activity  Description: INTERVENTIONS:  - Maintain airway, patient safety  and administer oxygen as ordered  - Monitor patient for seizure activity, document and report duration and description of seizure to MD/LIP  - If seizure occurs, turn patient to side and suction secretions as needed  - Reorient patient post seizure  - Seizure pads on all 4 side rails  - Instruct patient/family to notify RN of any seizure activity  - Instruct patient/family to call for assistance with activity based  on assessment  Outcome: Progressing  Goal: Achieves maximal functionality and self care  Description: INTERVENTIONS  - Monitor swallowing and airway patency with patient fatigue and changes in neurological status  - Encourage and assist patient to increase activity and self care with guidance from PT/OT  - Encourage visually impaired, hearing impaired and aphasic patients to use assistive/communication devices  Outcome: Progressing     Problem: Safety Risk - Non-Violent Restraints  Goal: Patient will remain free from self-harm  Description: INTERVENTIONS:  - Apply the least restrictive restraint to prevent harm  - Notify patient and family of reasons restraints applied  - Assess for any contributing factors to confusion (electrolyte disturbances, delirium, medications)  - Discontinue any unnecessary medical devices as soon as possible  - Assess the patient's physical comfort, circulation, skin condition, hydration, nutrition and elimination needs   - Reorient and redirection as needed  - Assess for the need to continue restraints  Outcome: Progressing     Problem: Risk for Violence/Aggression  Goal: Absence of Violence/Aggression  Description: INTERVENTIONS:   - Identify precipitating factors for behavior   - Notify Charge RN/Provider   - Consider decreasing stimulation   - Consider distraction measures   - Consider discussion with provider regarding prn meds    - Consider GABRIEL (Moderate Risk only)   - Consider Code Support (High Risk only)   - Consider room safety checks   - Consider restraints  Outcome: Progressing     Problem: Risk for Violence-Violent Restraints/Seclusion  Goal: Patient will not express any violent or self-destructive behaviors  Description: Interventions:  - Apply the least restrictive restraint to prevent harm if patient strikes out and is not responding to redirection  - Notify patient and family of reasons restraints applied  - Assist the patient to identify the precipitating event  - Assist  the patient to identify alternatives to physically acting out  - Assess for any contributing factors to violent behaviors (substances,  medications, history of trauma)  - Assess the patient's physical comfort, circulation, skin condition, hydration, nutrition and elimination needs   - Assess for the need to continue restraints  - Evaluate the need for an emergency medication  Outcome: Progressing     Patient is alert x 2. Vital signs taken and stable. Patient can have episodes of agitation and becomes combative. Tolerates diet. Sitter present at bedside at all times. Kept clean and dry. Prompt care is given to incontinence. Patient receives bladder scan every 6 hours. Straight cath is done as needed per parameters. Patient had 3 bowel movements on today after administration of dulcolax suppository.

## 2024-04-12 LAB
ANION GAP SERPL CALC-SCNC: 8 MMOL/L (ref 0–18)
BASOPHILS # BLD AUTO: 0.07 X10(3) UL (ref 0–0.2)
BASOPHILS NFR BLD AUTO: 0.6 %
BUN BLD-MCNC: 21 MG/DL (ref 9–23)
BUN/CREAT SERPL: 19.6 (ref 10–20)
CALCIUM BLD-MCNC: 9.4 MG/DL (ref 8.7–10.4)
CHLORIDE SERPL-SCNC: 109 MMOL/L (ref 98–112)
CO2 SERPL-SCNC: 26 MMOL/L (ref 21–32)
CREAT BLD-MCNC: 1.07 MG/DL
DEPRECATED RDW RBC AUTO: 41.5 FL (ref 35.1–46.3)
EGFRCR SERPLBLD CKD-EPI 2021: 75 ML/MIN/1.73M2 (ref 60–?)
EOSINOPHIL # BLD AUTO: 0.31 X10(3) UL (ref 0–0.7)
EOSINOPHIL NFR BLD AUTO: 2.7 %
ERYTHROCYTE [DISTWIDTH] IN BLOOD BY AUTOMATED COUNT: 12.7 % (ref 11–15)
GLUCOSE BLD-MCNC: 92 MG/DL (ref 70–99)
HCT VFR BLD AUTO: 46.4 %
HGB BLD-MCNC: 15 G/DL
IMM GRANULOCYTES # BLD AUTO: 0.07 X10(3) UL (ref 0–1)
IMM GRANULOCYTES NFR BLD: 0.6 %
LYMPHOCYTES # BLD AUTO: 1.29 X10(3) UL (ref 1–4)
LYMPHOCYTES NFR BLD AUTO: 11 %
MAGNESIUM SERPL-MCNC: 2 MG/DL (ref 1.6–2.6)
MCH RBC QN AUTO: 28.8 PG (ref 26–34)
MCHC RBC AUTO-ENTMCNC: 32.3 G/DL (ref 31–37)
MCV RBC AUTO: 89.2 FL
MONOCYTES # BLD AUTO: 0.63 X10(3) UL (ref 0.1–1)
MONOCYTES NFR BLD AUTO: 5.4 %
NEUTROPHILS # BLD AUTO: 9.32 X10 (3) UL (ref 1.5–7.7)
NEUTROPHILS # BLD AUTO: 9.32 X10(3) UL (ref 1.5–7.7)
NEUTROPHILS NFR BLD AUTO: 79.7 %
OSMOLALITY SERPL CALC.SUM OF ELEC: 299 MOSM/KG (ref 275–295)
PLATELET # BLD AUTO: 351 10(3)UL (ref 150–450)
POTASSIUM SERPL-SCNC: 3.5 MMOL/L (ref 3.5–5.1)
POTASSIUM SERPL-SCNC: 3.5 MMOL/L (ref 3.5–5.1)
RBC # BLD AUTO: 5.2 X10(6)UL
SODIUM SERPL-SCNC: 143 MMOL/L (ref 136–145)
WBC # BLD AUTO: 11.7 X10(3) UL (ref 4–11)

## 2024-04-12 NOTE — CM/SW NOTE
Pt discussed in RN DC Rounds. FRANSISCO followed up with [facility] in regards to reconsidering patient. [facility] informed [facility]CO that [facility] is unable to accept.     Plan: Pending medical clearance, DC to [facility], [facility], *choice, PT OT Notes, *insurance auth    SW/CM to remain available for support and/or discharge planning.     Cheli Hoyt, MSW, LSW   x [phone]

## 2024-04-12 NOTE — PLAN OF CARE
Pt was not irritable overnight, except for the straight cath intervention. He was compliant will all care, and friendly with staff. He presented with intermittent confusion but was pleasant during the given time. He acknowledged the plan of care, and was compliant. To perform the straight catheterization, we needed a five-member team due to his irritation. Afterwards, this RN again explained the reasoning its' necessity. Pt stated understanding and questioned himself as to why he is unable to urinate.     Problem: Patient Centered Care  Goal: Patient preferences are identified and integrated in the patient's plan of care  Description: Interventions:  - What would you like us to know as we care for you? Pt is from [facility]  - Provide timely, complete, and accurate information to patient/family  - Incorporate patient and family knowledge, values, beliefs, and cultural backgrounds into the planning and delivery of care  - Encourage patient/family to participate in care and decision-making at the level they choose  - Honor patient and family perspectives and choices  Outcome: Progressing     Problem: Delirium  Goal: Minimize duration of delirium  Description: Interventions:  - Encourage use of hearing aids, eye glasses  - Promote highest level of mobility daily  - Provide frequent reorientation  - Promote wakefulness i.e. lights on, blinds open  - Promote sleep, encourage patient's normal rest cycle i.e. lights off, TV off, minimize noise and interruptions  - Encourage family to assist in orientation and promotion of home routines  Outcome: Progressing     Problem: METABOLIC/FLUID AND ELECTROLYTES - ADULT  Goal: Electrolytes maintained within normal limits  Description: INTERVENTIONS:  - Monitor labs and rhythm and assess patient for signs and symptoms of electrolyte imbalances  - Administer electrolyte replacement as ordered  - Monitor response to electrolyte replacements, including rhythm and repeat lab results  as appropriate  - Fluid restriction as ordered  - Instruct patient on fluid and nutrition restrictions as appropriate  Outcome: Progressing     Problem: MUSCULOSKELETAL - ADULT  Goal: Return mobility to safest level of function  Description: INTERVENTIONS:  - Assess patient stability and activity tolerance for standing, transferring and ambulating w/ or w/o assistive devices  - Assist with transfers and ambulation using safe patient handling equipment as needed  - Ensure adequate protection for wounds/incisions during mobilization  - Obtain PT/OT consults as needed  - Advance activity as appropriate  - Communicate ordered activity level and limitations with patient/family  Outcome: Progressing     Problem: NEUROLOGICAL - ADULT  Goal: Achieves stable or improved neurological status  Description: INTERVENTIONS  - Assess for and report changes in neurological status  - Initiate measures to prevent increased intracranial pressure  - Maintain blood pressure and fluid volume within ordered parameters to optimize cerebral perfusion and minimize risk of hemorrhage  - Monitor temperature, glucose, and sodium. Initiate appropriate interventions as ordered  Outcome: Progressing  Goal: Absence of seizures  Description: INTERVENTIONS  - Monitor for seizure activity  - Administer anti-seizure medications as ordered  - Monitor neurological status  Outcome: Progressing  Goal: Remains free of injury related to seizure activity  Description: INTERVENTIONS:  - Maintain airway, patient safety  and administer oxygen as ordered  - Monitor patient for seizure activity, document and report duration and description of seizure to MD/LIP  - If seizure occurs, turn patient to side and suction secretions as needed  - Reorient patient post seizure  - Seizure pads on all 4 side rails  - Instruct patient/family to notify RN of any seizure activity  - Instruct patient/family to call for assistance with activity based on assessment  Outcome:  Progressing  Goal: Achieves maximal functionality and self care  Description: INTERVENTIONS  - Monitor swallowing and airway patency with patient fatigue and changes in neurological status  - Encourage and assist patient to increase activity and self care with guidance from PT/OT  - Encourage visually impaired, hearing impaired and aphasic patients to use assistive/communication devices  Outcome: Progressing     Problem: Risk for Violence/Aggression  Goal: Absence of Violence/Aggression  Description: INTERVENTIONS:   - Identify precipitating factors for behavior   - Notify Charge RN/Provider   - Consider decreasing stimulation   - Consider distraction measures   - Consider discussion with provider regarding prn meds    - Consider GABRIEL (Moderate Risk only)   - Consider Code Support (High Risk only)   - Consider room safety checks   - Consider restraints  Outcome: Progressing

## 2024-04-12 NOTE — PROGRESS NOTES
[facility] Hospitalist Progress Note     CC: Hospital Follow up    PCP: No primary care provider on file.       Assessment/Plan:   Mr. 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, vascular dementia, HTN, history of possible seizure, gout, metabolic encephalopathy with agitation without clear cause status post 3 hospitalizations, who presents with altered mental status weakness and agitation, neurology and psychiatry consulted, extensive workup including CT/MRI brain/lumbar puncture without clear etiology, slow clinical improvement with cognitive state, plan for MINNIE once urinary retention improved and cognitive state improves      Altered mental status   Metabolic encephalopathy  Delirium in setting of likely vascular dementia  -unclear, ddx included metabolic encephalopathy, infection, stroke, post ictal state on NPH, frontotemporal dementia?  -has been admitted 3 other times (last in 2/2023, at [facility]) with similar presentation, extensive work up   -Per POA cognitive status returned back to baseline and his prior hospitalizations  -neurology and psychiatry consulted  -UA neg, no signs of infection, CXR negative  -initial CT imaging stable  -TSH, B12 normal  -MRI brain without acute infarct advanced small vessel disease, poss enlargement of ventricles  -aspirin and statin   -monitor for fever or infection   -CODE GABRIEL called on 4/2 in am, Psyc consulted  -concern for NPH, s/p lumbar puncture on 4/4, had physical therapy preceding and post LP, opening pressure was normal (12) minute and cognitive state or functional state did not improve  -Discussed with neurology, and neurosurgery, unlikely that this is NPH given lack of improvement post LP, also atypical presentation, neurosurgery recommending outpatient follow-up if suspicion is still high for NPH  -LP studies still pending given CJD testing, encephalitis panel negative  -psychiatry  adjusting medications  - has been off restraints for several days, does have safety sitter in room poss consider DC sitter in 1-2 days    Hypernatremia / DAWN  Urinary retention  Poss UTI  - urinary retention, bladder scan with 1600, straight Q6  - hold on you given high risk for pulling out  - continue flomax, bowel regimen, mobilize, minimizing contributing meds  - UA with sediment, IV ceftriaxone ordered, culture with poss contaminant, but has been straight cathed several times, will continue ab's to complete 7 day course (especially with leucocytosis, and suprapubic pain, now improved)  - ceftraixone (started on 4/9) -> transition to oral keflex for 4 more days.   - will increase flomax, had multiple BM on 4/10-11, improved mobility, offending medications (thorazine stopped, haldol minimized).  - continued retention, will consult urology,   - would like to avoid you given risk of pulling this out    Constipation  - KUB with large stool burden  - bowel regimen ordered--> several BM on 4/10-11    Pink eye / right eye radiation   - 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 Add to allergy list  - steroids, nebs, avoid IV benadryl given severe agitation  - CXR negative   - RVP neg    Leucocytosis   - poss secondary to IV steroids  - no fevers  - will continue to monitor  - steroid stopped  - improved      Hx of Hypertension  -started on amlodipine  -coreg noted on home meds, resumed   -consider re-ordering ACE or ARB if renal function stable  - BP improved     Possible left sided facial droop and weakness  -neurology consulted and stroke work up if needed based on their evaluation   -anti-platelets if recommended by neuro   -discussed with neurology. Low suspicion for acute  CVA. MRI  negative for stroke      Hx of seizure? Noted in outside records,  - not on AED's    Hs of Small cell lung cancer   - parotid involvement, has had radiation therapy   - follows at [place] [facility]      Gout  -he's  was on colchicine (stopped as OP), no signs of gout     Fluids: stopped  DVT prophylaxis: lovenox daily   Code status: FULL     Yesenia Calderon is the POA, updated on 4/9, 4/12     Raheem Johnson MD  [facility] Hospitalist      Subjective:     Sleepy this morning, was calm, not agitated , denied HA or neck stiffness , not alert to place or time    OBJECTIVE:    Blood pressure 139/75, pulse 67, temperature 97.4 °F (36.3 °C), temperature source Axillary, resp. rate 16, height 5' 8\" (1.727 m), weight 180 lb 14.4 oz (82.1 kg), SpO2 96%.    Temp:  [97.4 °F (36.3 °C)-98.2 °F (36.8 °C)] 97.4 °F (36.3 °C)  Pulse:  [64-70] 67  Resp:  [16-18] 16  BP: (118-139)/(65-79) 139/75  SpO2:  [94 %-96 %] 96 %      Intake/Output:    Intake/Output Summary (Last 24 hours) at 4/12/2024 1418  Last data filed at 4/12/2024 0900  Gross per 24 hour   Intake 480 ml   Output 1350 ml   Net -870 ml       Last 3 Weights   03/31/24 1311 180 lb 14.4 oz (82.1 kg)   03/31/24 1117 177 lb 0.5 oz (80.3 kg)       Exam    Gen:  calm  Pulm: Lungs clear  CV: Heart with regular rate and rhythm  Abd: Abdomen soft, no abd pain, no pain with palpation  Ext with edema   Neuro: moving all 4 ext, confused    Data Review:       Labs:     Recent Labs   Lab 04/10/24  0554 04/11/24  0708 04/12/24  0648   RBC 5.14 4.72 5.20   HGB 15.4 14.4 15.0   HCT 44.5 41.9 46.4   MCV 86.6 88.8 89.2   MCH 30.0 30.5 28.8   MCHC 34.6 34.4 32.3   RDW 12.6 12.9 12.7   NEPRELIM 11.20* 8.71* 9.32*   WBC 13.5* 11.3* 11.7*   .0 334.0 351.0         Recent Labs   Lab 04/10/24  0554 04/11/24  0708 04/12/24  0648   GLU 80 86 92   BUN 19 26* 21   CREATSERUM 1.17 1.20 1.07   EGFRCR 67 65 75   CA 9.7 9.6 9.4    144 143   K 3.1* 3.3*  3.3* 3.5  3.5    109 109   CO2 28.0  27.0 26.0       Recent Labs   Lab 04/10/24  0554   ALT 12   AST 20   ALB 3.8         Imaging:  XR ABDOMEN (1 VIEW) (CPT=74018)    Result Date: 4/9/2024  CONCLUSION:  1. Nonobstructive bowel gas pattern. 2. Severe colonic stool burden.  Correlate clinically for a history of constipation. 3. Lesser incidental findings as above.    Dictated by (CST): Stevan Martinez MD on 4/09/2024 at 5:31 PM     Finalized by (CST): Stevan Martinez MD on 4/09/2024 at 5:33 PM             Meds:      cephalexin  500 mg Oral Q8H BHASKAR    melatonin  5 mg Oral Nightly    sennosides  17.2 mg Oral Nightly    polyethylene glycol (PEG 3350)  17 g Oral Daily    tamsulosin  0.8 mg Oral Daily    glycerin-hypromellose-  1 drop Both Eyes TID    thiamine  200 mg Oral BID    docusate sodium  100 mg Oral BID    divalproex DR  250 mg Oral BID    OLANZapine  5 mg Oral Nightly    calcium carbonate  1,000 mg Oral TID    amLODIPine  10 mg Oral Daily    carvedilol  12.5 mg Oral BID with meals    fluticasone furoate-vilanterol  1 puff Inhalation Daily    aspirin  81 mg Oral Daily    atorvastatin  80 mg Oral Nightly    enoxaparin  40 mg Subcutaneous Daily           ipratropium-albuterol    polyethylene glycol (PEG 3350)    magnesium hydroxide    bisacodyl    hydrALAZINE    haloperidol lactate    acetaminophen **OR** [DISCONTINUED] acetaminophen    ondansetron

## 2024-04-13 RX ORDER — FINASTERIDE 5 MG/1
5 TABLET, FILM COATED ORAL DAILY
Status: DISCONTINUED | OUTPATIENT
Start: 2024-04-13 | End: 2024-04-22

## 2024-04-13 NOTE — PLAN OF CARE
Patient is Aox2, confused at times, patient is ambulatory with assistance. Sitter in placed. Bladder scan done today and staight cath done according to orders. No complaints of pain noted. Frequent rounding done on pt and needs attended to.   Problem: Risk for Violence-Violent Restraints/Seclusion  Goal: Patient will not express any violent or self-destructive behaviors  Description: Interventions:  - Apply the least restrictive restraint to prevent harm if patient strikes out and is not responding to redirection  - Notify patient and family of reasons restraints applied  - Assist the patient to identify the precipitating event  - Assist the patient to identify alternatives to physically acting out  - Assess for any contributing factors to violent behaviors (substances,  medications, history of trauma)  - Assess the patient's physical comfort, circulation, skin condition, hydration, nutrition and elimination needs   - Assess for the need to continue restraints  - Evaluate the need for an emergency medication  Outcome: Progressing     Problem: Patient Centered Care  Goal: Patient preferences are identified and integrated in the patient's plan of care  Description: Interventions:  - Provide timely, complete, and accurate information to patient/family  - Incorporate patient and family knowledge, values, beliefs, and cultural backgrounds into the planning and delivery of care  - Encourage patient/family to participate in care and decision-making at the level they choose  - Honor patient and family perspectives and choices  Outcome: Progressing     Problem: Patient/Family Goals  Goal: Patient/Family Long Term Goal  Description: Patient's Long Term Goal: discharge    Interventions:  - Monitor vital signs  - Monitor neurological status  - Ambulate as tolerated  - See additional Care Plan goals for specific interventions  Outcome: Progressing  Goal: Patient/Family Short Term Goal  Description: Patient's Short Term Goal:  feel better    Interventions:   - Verbalize needs and wants  - Antianxiety medications as needed  - See additional Care Plan goals for specific interventions  Outcome: Progressing     Problem: Delirium  Goal: Minimize duration of delirium  Description: Interventions:  - Encourage use of hearing aids, eye glasses  - Promote highest level of mobility daily  - Provide frequent reorientation  - Promote wakefulness i.e. lights on, blinds open  - Promote sleep, encourage patient's normal rest cycle i.e. lights off, TV off, minimize noise and interruptions  - Encourage family to assist in orientation and promotion of home routines  Outcome: Progressing     Problem: METABOLIC/FLUID AND ELECTROLYTES - ADULT  Goal: Electrolytes maintained within normal limits  Description: INTERVENTIONS:  - Monitor labs and rhythm and assess patient for signs and symptoms of electrolyte imbalances  - Administer electrolyte replacement as ordered  - Monitor response to electrolyte replacements, including rhythm and repeat lab results as appropriate  - Fluid restriction as ordered  - Instruct patient on fluid and nutrition restrictions as appropriate  Outcome: Progressing     Problem: MUSCULOSKELETAL - ADULT  Goal: Return mobility to safest level of function  Description: INTERVENTIONS:  - Assess patient stability and activity tolerance for standing, transferring and ambulating w/ or w/o assistive devices  - Assist with transfers and ambulation using safe patient handling equipment as needed  - Ensure adequate protection for wounds/incisions during mobilization  - Obtain PT/OT consults as needed  - Advance activity as appropriate  - Communicate ordered activity level and limitations with patient/family  Outcome: Progressing     Problem: NEUROLOGICAL - ADULT  Goal: Achieves stable or improved neurological status  Description: INTERVENTIONS  - Assess for and report changes in neurological status  - Initiate measures to prevent increased  intracranial pressure  - Maintain blood pressure and fluid volume within ordered parameters to optimize cerebral perfusion and minimize risk of hemorrhage  - Monitor temperature, glucose, and sodium. Initiate appropriate interventions as ordered  Outcome: Progressing  Goal: Absence of seizures  Description: INTERVENTIONS  - Monitor for seizure activity  - Administer anti-seizure medications as ordered  - Monitor neurological status  Outcome: Progressing  Goal: Remains free of injury related to seizure activity  Description: INTERVENTIONS:  - Maintain airway, patient safety  and administer oxygen as ordered  - Monitor patient for seizure activity, document and report duration and description of seizure to MD/LIP  - If seizure occurs, turn patient to side and suction secretions as needed  - Reorient patient post seizure  - Seizure pads on all 4 side rails  - Instruct patient/family to notify RN of any seizure activity  - Instruct patient/family to call for assistance with activity based on assessment  Outcome: Progressing  Goal: Achieves maximal functionality and self care  Description: INTERVENTIONS  - Monitor swallowing and airway patency with patient fatigue and changes in neurological status  - Encourage and assist patient to increase activity and self care with guidance from PT/OT  - Encourage visually impaired, hearing impaired and aphasic patients to use assistive/communication devices  Outcome: Progressing     Problem: Safety Risk - Non-Violent Restraints  Goal: Patient will remain free from self-harm  Description: INTERVENTIONS:  - Apply the least restrictive restraint to prevent harm  - Notify patient and family of reasons restraints applied  - Assess for any contributing factors to confusion (electrolyte disturbances, delirium, medications)  - Discontinue any unnecessary medical devices as soon as possible  - Assess the patient's physical comfort, circulation, skin condition, hydration, nutrition and  elimination needs   - Reorient and redirection as needed  - Assess for the need to continue restraints  Outcome: Progressing     Problem: Risk for Violence/Aggression  Goal: Absence of Violence/Aggression  Description: INTERVENTIONS:   - Identify precipitating factors for behavior   - Notify Charge RN/Provider   - Consider decreasing stimulation   - Consider distraction measures   - Consider discussion with provider regarding prn meds    - Consider GABRIEL (Moderate Risk only)   - Consider Code Support (High Risk only)   - Consider room safety checks   - Consider restraints  Outcome: Progressing     Problem: Risk for Violence/Aggression  Goal: Absence of Violence/Aggression  Description: INTERVENTIONS:   - Identify precipitating factors for behavior   - Notify Charge RN/Provider   - Consider decreasing stimulation   - Consider distraction measures   - Consider discussion with provider regarding prn meds    - Consider GABRIEL (Moderate Risk only)   - Consider Code Support (High Risk only)   - Consider room safety checks   - Consider restraints  Outcome: Progressing

## 2024-04-13 NOTE — PLAN OF CARE
Problem: Patient Centered Care  Goal: Patient preferences are identified and integrated in the patient's plan of care  Description: Interventions:  - What would you like us to know as we care for you? \"I am from [facility].\"  - Provide timely, complete, and accurate information to patient/family  - Incorporate patient and family knowledge, values, beliefs, and cultural backgrounds into the planning and delivery of care  - Encourage patient/family to participate in care and decision-making at the level they choose  - Honor patient and family perspectives and choices  Outcome: Progressing     Problem: Patient/Family Goals  Goal: Patient/Family Long Term Goal  Description: Patient's Long Term Goal: discharge    Interventions:  - Monitor vital signs  - Monitor neurological status  - Ambulate as tolerated  - See additional Care Plan goals for specific interventions  Outcome: Progressing  Goal: Patient/Family Short Term Goal  Description: Patient's Short Term Goal: feel better    Interventions:   - Verbalize needs and wants  - Antianxiety medications as needed  - See additional Care Plan goals for specific interventions  Outcome: Progressing     Problem: Delirium  Goal: Minimize duration of delirium  Description: Interventions:  - Encourage use of hearing aids, eye glasses  - Promote highest level of mobility daily  - Provide frequent reorientation  - Promote wakefulness i.e. lights on, blinds open  - Promote sleep, encourage patient's normal rest cycle i.e. lights off, TV off, minimize noise and interruptions  - Encourage family to assist in orientation and promotion of home routines  Outcome: Progressing     Problem: METABOLIC/FLUID AND ELECTROLYTES - ADULT  Goal: Electrolytes maintained within normal limits  Description: INTERVENTIONS:  - Monitor labs and rhythm and assess patient for signs and symptoms of electrolyte imbalances  - Administer electrolyte replacement as ordered  - Monitor  response to electrolyte replacements, including rhythm and repeat lab results as appropriate  - Fluid restriction as ordered  - Instruct patient on fluid and nutrition restrictions as appropriate  Outcome: Progressing     Problem: MUSCULOSKELETAL - ADULT  Goal: Return mobility to safest level of function  Description: INTERVENTIONS:  - Assess patient stability and activity tolerance for standing, transferring and ambulating w/ or w/o assistive devices  - Assist with transfers and ambulation using safe patient handling equipment as needed  - Ensure adequate protection for wounds/incisions during mobilization  - Obtain PT/OT consults as needed  - Advance activity as appropriate  - Communicate ordered activity level and limitations with patient/family  Outcome: Progressing     Problem: NEUROLOGICAL - ADULT  Goal: Achieves stable or improved neurological status  Description: INTERVENTIONS  - Assess for and report changes in neurological status  - Initiate measures to prevent increased intracranial pressure  - Maintain blood pressure and fluid volume within ordered parameters to optimize cerebral perfusion and minimize risk of hemorrhage  - Monitor temperature, glucose, and sodium. Initiate appropriate interventions as ordered  Outcome: Progressing  Goal: Absence of seizures  Description: INTERVENTIONS  - Monitor for seizure activity  - Administer anti-seizure medications as ordered  - Monitor neurological status  Outcome: Progressing  Goal: Remains free of injury related to seizure activity  Description: INTERVENTIONS:  - Maintain airway, patient safety  and administer oxygen as ordered  - Monitor patient for seizure activity, document and report duration and description of seizure to MD/LIP  - If seizure occurs, turn patient to side and suction secretions as needed  - Reorient patient post seizure  - Seizure pads on all 4 side rails  - Instruct patient/family to notify RN of any seizure activity  - Instruct  patient/family to call for assistance with activity based on assessment  Outcome: Progressing  Goal: Achieves maximal functionality and self care  Description: INTERVENTIONS  - Monitor swallowing and airway patency with patient fatigue and changes in neurological status  - Encourage and assist patient to increase activity and self care with guidance from PT/OT  - Encourage visually impaired, hearing impaired and aphasic patients to use assistive/communication devices  Outcome: Progressing     Problem: Safety Risk - Non-Violent Restraints  Goal: Patient will remain free from self-harm  Description: INTERVENTIONS:  - Apply the least restrictive restraint to prevent harm  - Notify patient and family of reasons restraints applied  - Assess for any contributing factors to confusion (electrolyte disturbances, delirium, medications)  - Discontinue any unnecessary medical devices as soon as possible  - Assess the patient's physical comfort, circulation, skin condition, hydration, nutrition and elimination needs   - Reorient and redirection as needed  - Assess for the need to continue restraints  Outcome: Progressing     Problem: Risk for Violence/Aggression  Goal: Absence of Violence/Aggression  Description: INTERVENTIONS:   - Identify precipitating factors for behavior   - Notify Charge RN/Provider   - Consider decreasing stimulation   - Consider distraction measures   - Consider discussion with provider regarding prn meds    - Consider GABRIEL (Moderate Risk only)   - Consider Code Support (High Risk only)   - Consider room safety checks   - Consider restraints  Outcome: Progressing     Problem: Risk for Violence-Violent Restraints/Seclusion  Goal: Patient will not express any violent or self-destructive behaviors  Description: Interventions:  - Apply the least restrictive restraint to prevent harm if patient strikes out and is not responding to redirection  - Notify patient and family of reasons restraints applied  - Assist  the patient to identify the precipitating event  - Assist the patient to identify alternatives to physically acting out  - Assess for any contributing factors to violent behaviors (substances,  medications, history of trauma)  - Assess the patient's physical comfort, circulation, skin condition, hydration, nutrition and elimination needs   - Assess for the need to continue restraints  - Evaluate the need for an emergency medication  Outcome: Progressing     Patient is alert and oriented x 1-2, irritable, sitter at bedside for safety. He denies to be in any pain or discomfort at this time. Bladder scan done at 2000 showed 636, performed straight cath: 350. Bladder scan done at 0235, showed 372. Safety and fall precautions maintained, bed alarm on. Call light within reach. Frequent rounding by nursing staff.

## 2024-04-13 NOTE — PROGRESS NOTES
[facility] Hospitalist Progress Note     CC: Hospital Follow up    PCP: No primary care provider on file.       Assessment/Plan:   Mr. 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, vascular dementia, HTN, history of possible seizure, gout, metabolic encephalopathy with agitation without clear cause status post 3 hospitalizations, who presents with altered mental status weakness and agitation, neurology and psychiatry consulted, extensive workup including CT/MRI brain/lumbar puncture without clear etiology, slow clinical improvement with cognitive state, plan for MINNIE once urinary retention improved and cognitive state improves      Altered mental status   Metabolic encephalopathy  Delirium in setting of likely vascular dementia  -unclear, ddx included metabolic encephalopathy, infection, stroke, post ictal state on NPH, frontotemporal dementia?  -has been admitted 3 other times (last in 2/2023, at [place]) with similar presentation, extensive work up   -Per POA cognitive status returned back to baseline and his prior hospitalizations  -neurology and psychiatry consulted  -UA neg, no signs of infection, CXR negative  -initial CT imaging stable  -TSH, B12 normal  -MRI brain without acute infarct advanced small vessel disease, poss enlargement of ventricles  -aspirin and statin   -monitor for fever or infection   -CODE GABRIEL called on 4/2 in am, Psyc consulted  -concern for NPH, s/p lumbar puncture on 4/4, had physical therapy preceding and post LP, opening pressure was normal (12) minute and cognitive state or functional state did not improve  -Discussed with neurology, and neurosurgery, unlikely that this is NPH given lack of improvement post LP, also atypical presentation, neurosurgery recommending outpatient follow-up if suspicion is still high for NPH  -LP studies still pending given CJD testing, encephalitis panel negative  -psychiatry  adjusting medications  - has been off restraints for several days, does have safety sitter in room poss consider DC sitter in 1-2 days    Hypernatremia / DAWN  Urinary retention  Poss UTI  - urinary retention, bladder scan with 1600, straight Q6  - hold on you given high risk for pulling out  - continue flomax, bowel regimen, mobilize, minimizing contributing meds  - UA with sediment, IV ceftriaxone ordered, culture with poss contaminant, but has been straight cathed several times, will continue ab's to complete 7 day course (especially with leucocytosis, and suprapubic pain, now improved)  - ceftraixone (started on 4/9) -> transition to oral keflex for 4 more days.   - will increase flomax, had multiple BM on 4/10-11, improved mobility, offending medications (thorazine stopped, haldol minimized).  - continued retention, will consult urology, appreciate recommendations  - would like to avoid you given risk of pulling this out  - started on finasteride    Constipation  - KUB with large stool burden  - bowel regimen ordered--> several BM on 4/10-11    Pink eye / right eye radiation   - 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 Add to allergy list  - steroids, nebs, avoid IV benadryl given severe agitation  - CXR negative   - RVP neg    Leucocytosis   - poss secondary to IV steroids  - no fevers  - will continue to monitor  - steroid stopped  - improved      Hx of Hypertension  -started on amlodipine  -coreg noted on home meds, resumed   -consider re-ordering ACE or ARB if renal function stable  - BP improved     Possible left sided facial droop and weakness  -neurology consulted and stroke work up if needed based on their evaluation   -anti-platelets if recommended by neuro    -discussed with neurology. Low suspicion for acute CVA. MRI  negative for stroke      Hx of seizure? Noted in outside records,  - not on AED's    Hs of Small cell lung cancer   - parotid involvement, has had radiation therapy   - follows at [place]      Gout  -he's  was on colchicine (stopped as OP), no signs of gout     Fluids: stopped  DVT prophylaxis: lovenox daily   Code status: FULL       Willow AlfredDO Chiang [facility] Hospitalist      Subjective:   Patient awake, calm. Knows him name, could not state that he was in the hospital but when he was told he is he responded \" well I knew that\". Still appears to be confused, speech is tangential at times    OBJECTIVE:    Blood pressure 118/78, pulse 58, temperature 97.9 °F (36.6 °C), temperature source Oral, resp. rate 18, height 5' 8\" (1.727 m), weight 180 lb 14.4 oz (82.1 kg), SpO2 95%.    Temp:  [97.3 °F (36.3 °C)-98.6 °F (37 °C)] 97.9 °F (36.6 °C)  Pulse:  [58-76] 58  Resp:  [16-18] 18  BP: (118-139)/(64-78) 118/78  SpO2:  [95 %-96 %] 95 %      Intake/Output:    Intake/Output Summary (Last 24 hours) at 4/13/2024 0929  Last data filed at 4/12/2024 2253  Gross per 24 hour   Intake 360 ml   Output 351 ml   Net 9 ml       Last 3 Weights   03/31/24 1311 180 lb 14.4 oz (82.1 kg)   03/31/24 1117 177 lb 0.5 oz (80.3 kg)       Exam    Gen:  calm  Pulm: Lungs clear  CV: Heart with regular rate and rhythm  Abd: Abdomen soft, no abd pain, no pain with palpation  Ext with edema   Neuro: moving all 4 ext, confused    Data Review:       Labs:     Recent Labs   Lab 04/10/24  0554 04/11/24  0708 04/12/24  0648   RBC 5.14 4.72 5.20   HGB 15.4 14.4 15.0   HCT 44.5 41.9 46.4   MCV 86.6 88.8 89.2   MCH 30.0 30.5 28.8   MCHC 34.6 34.4 32.3   RDW 12.6 12.9 12.7   NEPRELIM 11.20* 8.71* 9.32*   WBC 13.5* 11.3* 11.7*   .0 334.0 351.0         Recent Labs   Lab 04/10/24  0554 04/11/24  0708 04/12/24  0648   GLU 80 86 92   BUN 19 26* 21   CREATSERUM 1.17 1.20 1.07    EGFRCR 67 65 75   CA 9.7 9.6 9.4    144 143   K 3.1* 3.3*  3.3* 3.5  3.5    109 109   CO2 28.0 27.0 26.0       Recent Labs   Lab 04/10/24  0554   ALT 12   AST 20   ALB 3.8         Imaging:  No results found.      Meds:      cephalexin  500 mg Oral Q8H BHASKAR    melatonin  5 mg Oral Nightly    sennosides  17.2 mg Oral Nightly    polyethylene glycol (PEG 3350)  17 g Oral Daily    tamsulosin  0.8 mg Oral Daily    glycerin-hypromellose-  1 drop Both Eyes TID    thiamine  200 mg Oral BID    docusate sodium  100 mg Oral BID    divalproex DR  250 mg Oral BID    OLANZapine  5 mg Oral Nightly    calcium carbonate  1,000 mg Oral TID    amLODIPine  10 mg Oral Daily    carvedilol  12.5 mg Oral BID with meals    fluticasone furoate-vilanterol  1 puff Inhalation Daily    aspirin  81 mg Oral Daily    atorvastatin  80 mg Oral Nightly    enoxaparin  40 mg Subcutaneous Daily           ipratropium-albuterol    polyethylene glycol (PEG 3350)    magnesium hydroxide    bisacodyl    hydrALAZINE    haloperidol lactate    acetaminophen **OR** [DISCONTINUED] acetaminophen    ondansetron

## 2024-04-13 NOTE — CM/SW NOTE
Per chart, [facility] unable to accept.    FRANSISCO sent updated MINNIE list to POA email,[email or web address].    PLAN: MINNIE - pending choice, updated PT/OT notes, ins auth & med clear      SW/CM to remain available for support and/or discharge planning.         Olimpia Bates, MSW, LSW [id number]

## 2024-04-13 NOTE — PLAN OF CARE
Problem: Patient Centered Care  Goal: Patient preferences are identified and integrated in the patient's plan of care  Description: Interventions:  - Provide timely, complete, and accurate information to patient/family  - Incorporate patient and family knowledge, values, beliefs, and cultural backgrounds into the planning and delivery of care  - Encourage patient/family to participate in care and decision-making at the level they choose  - Honor patient and family perspectives and choices  Outcome: Progressing     Problem: Patient/Family Goals  Goal: Patient/Family Long Term Goal  Description: Patient's Long Term Goal: discharge    Interventions:  - Monitor vital signs  - Monitor neurological status  - Ambulate as tolerated  - See additional Care Plan goals for specific interventions  Outcome: Progressing  Goal: Patient/Family Short Term Goal  Description: Patient's Short Term Goal: feel better    Interventions:   - Verbalize needs and wants  - Antianxiety medications as needed  - See additional Care Plan goals for specific interventions  Outcome: Progressing     Problem: Delirium  Goal: Minimize duration of delirium  Description: Interventions:  - Encourage use of hearing aids, eye glasses  - Promote highest level of mobility daily  - Provide frequent reorientation  - Promote wakefulness i.e. lights on, blinds open  - Promote sleep, encourage patient's normal rest cycle i.e. lights off, TV off, minimize noise and interruptions  - Encourage family to assist in orientation and promotion of home routines  Outcome: Progressing     Problem: METABOLIC/FLUID AND ELECTROLYTES - ADULT  Goal: Electrolytes maintained within normal limits  Description: INTERVENTIONS:  - Monitor labs and rhythm and assess patient for signs and symptoms of electrolyte imbalances  - Administer electrolyte replacement as ordered  - Monitor response to electrolyte replacements, including rhythm and repeat lab results as appropriate  - Fluid  restriction as ordered  - Instruct patient on fluid and nutrition restrictions as appropriate  Outcome: Progressing     Problem: MUSCULOSKELETAL - ADULT  Goal: Return mobility to safest level of function  Description: INTERVENTIONS:  - Assess patient stability and activity tolerance for standing, transferring and ambulating w/ or w/o assistive devices  - Assist with transfers and ambulation using safe patient handling equipment as needed  - Ensure adequate protection for wounds/incisions during mobilization  - Obtain PT/OT consults as needed  - Advance activity as appropriate  - Communicate ordered activity level and limitations with patient/family  Outcome: Progressing     Problem: NEUROLOGICAL - ADULT  Goal: Achieves stable or improved neurological status  Description: INTERVENTIONS  - Assess for and report changes in neurological status  - Initiate measures to prevent increased intracranial pressure  - Maintain blood pressure and fluid volume within ordered parameters to optimize cerebral perfusion and minimize risk of hemorrhage  - Monitor temperature, glucose, and sodium. Initiate appropriate interventions as ordered  Outcome: Progressing  Goal: Absence of seizures  Description: INTERVENTIONS  - Monitor for seizure activity  - Administer anti-seizure medications as ordered  - Monitor neurological status  Outcome: Progressing  Goal: Remains free of injury related to seizure activity  Description: INTERVENTIONS:  - Maintain airway, patient safety  and administer oxygen as ordered  - Monitor patient for seizure activity, document and report duration and description of seizure to MD/LIP  - If seizure occurs, turn patient to side and suction secretions as needed  - Reorient patient post seizure  - Seizure pads on all 4 side rails  - Instruct patient/family to notify RN of any seizure activity  - Instruct patient/family to call for assistance with activity based on assessment  Outcome: Progressing  Goal: Achieves  maximal functionality and self care  Description: INTERVENTIONS  - Monitor swallowing and airway patency with patient fatigue and changes in neurological status  - Encourage and assist patient to increase activity and self care with guidance from PT/OT  - Encourage visually impaired, hearing impaired and aphasic patients to use assistive/communication devices  Outcome: Progressing     Problem: Safety Risk - Non-Violent Restraints  Goal: Patient will remain free from self-harm  Description: INTERVENTIONS:  - Apply the least restrictive restraint to prevent harm  - Notify patient and family of reasons restraints applied  - Assess for any contributing factors to confusion (electrolyte disturbances, delirium, medications)  - Discontinue any unnecessary medical devices as soon as possible  - Assess the patient's physical comfort, circulation, skin condition, hydration, nutrition and elimination needs   - Reorient and redirection as needed  - Assess for the need to continue restraints  Outcome: Progressing     Problem: Risk for Violence/Aggression  Goal: Absence of Violence/Aggression  Description: INTERVENTIONS:   - Identify precipitating factors for behavior   - Notify Charge RN/Provider   - Consider decreasing stimulation   - Consider distraction measures   - Consider discussion with provider regarding prn meds    - Consider GABRIEL (Moderate Risk only)   - Consider Code Support (High Risk only)   - Consider room safety checks   - Consider restraints  Outcome: Progressing     Problem: Risk for Violence-Violent Restraints/Seclusion  Goal: Patient will not express any violent or self-destructive behaviors  Description: Interventions:  - Apply the least restrictive restraint to prevent harm if patient strikes out and is not responding to redirection  - Notify patient and family of reasons restraints applied  - Assist the patient to identify the precipitating event  - Assist the patient to identify alternatives to physically  acting out  - Assess for any contributing factors to violent behaviors (substances,  medications, history of trauma)  - Assess the patient's physical comfort, circulation, skin condition, hydration, nutrition and elimination needs   - Assess for the need to continue restraints  - Evaluate the need for an emergency medication  Outcome: Progressing

## 2024-04-14 NOTE — PLAN OF CARE
Significant Event - Fall Note    Date/Time of Fall: April 14, 2024 at 1430    Description of patient fall:     Patient fell from: Chair     Activity when fall occurred:  Playing with sheet, slid down from cahir into knees     Where did fall occur: Patient room     Was the fall assisted: Found on floor/unassisted to floor    Who witnessed the fall:  Sitter    Patient narrative of fall: Unable to state, patient Aox1    Staff narrative of fall: Patient was playing with sheets when suddenly he slid down the chair and hit his knees. Assesments done, patient stable able to move extremities with some pain but pain improved once patient was back in bed. Vitals stable.     Name of Provider notified of fall: cassidy    Family notification: Family notified    Factors contributing to fall:     Physical: Deconditioned     Psychological: Confused and Disoriented     Environmental: Equipment     Medications received in the past 8 hours:   Medication(s) Administered in past 8 Hours from 04/14/2024 0816 to 04/14/2024 1616       Date/Time Order Dose Route Action Action by Comments    04/14/2024 1002 CDT amLODIPine (Norvasc) tab 10 mg 10 mg Oral Given Kay Coppola, RN --    04/14/2024 1002 CDT aspirin chewable tab 81 mg 81 mg Oral Given Kay Coppola, RN --    04/14/2024 1002 CDT carvedilol (Coreg) tab 12.5 mg 12.5 mg Oral Given Kay Coppola, RN --    04/14/2024 0900 CDT divalproex DR (Depakote Sprinkle) sprinkle cap 250 mg 250 mg Oral Given Kay Coppola, RN --    04/14/2024 0930 CDT enoxaparin (Lovenox) 40 MG/0.4ML SUBQ injection 40 mg 40 mg Subcutaneous Given Kay Coppola RN --    04/14/2024 1002 CDT finasteride (Proscar) tab 5 mg 5 mg Oral Given Kay Coppola, RN --    04/14/2024 0900 CDT glycerin-hypromellose- (Artificial Tears) 0.2-0.2-1 % ophthalmic solution 1 drop 1 drop Both Eyes Given Kay Coppola RN --    04/14/2024 1002 CDT tamsulosin (Flomax) cap 0.8 mg 0.8 mg  Oral Given Kay Coppola, RN --    04/14/2024 1002 CDT thiamine (Vitamin B1) tab 200 mg 200 mg Oral Given Kay Coppola, RN --            Was patient identified as high fall risk prior to fall:                                What interventions were in place prior to fall: Call light within reach, Chair alarm, and [place] footwear    Interventions post fall: Bed alarm, Bed in lowest position, Call light within reach, Low bed ([place] only), and Rounding    Additional comments: Notified POA and doctor of fall.

## 2024-04-14 NOTE — PHYSICAL THERAPY NOTE
PHYSICAL THERAPY TREATMENT NOTE - INPATIENT     Room Number: 561/561-A       Presenting Problem: AMS, agitation       Problem List  Principal Problem:    NPH (normal pressure hydrocephalus) ([facility])  Active Problems:    Severe vascular dementia with agitation ([facility])    Delirium due to another medical condition    Episodic mood disorder ([facility])    TIA (transient ischemic attack)      PHYSICAL THERAPY ASSESSMENT   Patient demonstrates good  progress this session, goals  remain in progress.    Patient continues to function below baseline with bed mobility, transfers, gait, maintaining seated position, standing prolonged periods, and performing household tasks.  Contributing factors to remaining limitations include decreased functional strength, decreased endurance/aerobic capacity, impaired sit and standing  balance, impaired motor planning, decreased muscular endurance, cognitive deficits (sitter in room psych and neuro team following ), and medical status.  Next session anticipate patient to progress bed mobility, transfers, gait, maintaining seated position, and standing prolonged periods.  Physical Therapy will continue to follow patient for duration of hospitalization.    Patient continues to benefit from continued skilled PT services: to promote return to prior level of function and safety with continuous assistance and gradual rehabilitative therapy .    PLAN  PT Treatment Plan: Bed mobility;Body mechanics;Endurance;Energy conservation;Patient education;Family education;Gait training;Strengthening;Balance training;Transfer training  Frequency (Obs): 3-5x/week    SUBJECTIVE  Agreeable to mobility    \" How are YOU doing today \"       \" Am I doing bad \"    Pt stating name , aware in [facility]     \" Who is the man that is helping me ? \"      OBJECTIVE  Precautions: Bed/chair alarm (sitter present)    WEIGHT BEARING RESTRICTION                PAIN ASSESSMENT   Rating: Other (Comment)  Location: denies  pain  Management Techniques: Activity promotion;Body mechanics;Repositioning    BALANCE  Static Sitting: Fair +  Dynamic Sitting: Fair  Static Standing: Poor +  Dynamic Standing: Poor    ACTIVITY TOLERANCE  Pulse: 73 (post activity)        BP: 120/73 (resting  post activity  127/77)              O2 WALK  Oxygen Therapy  SPO2% on Room Air at Rest: 95  SPO2% Ambulation on Room Air: 94    AM-PAC '6-Clicks' INPATIENT SHORT FORM - BASIC MOBILITY  How much difficulty does the patient currently have...  Patient Difficulty: Turning over in bed (including adjusting bedclothes, sheets and blankets)?: A Lot   Patient Difficulty: Sitting down on and standing up from a chair with arms (e.g., wheelchair, bedside commode, etc.): A Lot   Patient Difficulty: Moving from lying on back to sitting on the side of the bed?: A Lot   How much help from another person does the patient currently need...   Help from Another: Moving to and from a bed to a chair (including a wheelchair)?: A Lot   Help from Another: Need to walk in hospital room?: A Lot   Help from Another: Climbing 3-5 steps with a railing?: Total     AM-PAC Score:  Raw Score: 11   Approx Degree of Impairment: 72.57%   Standardized Score (AM-PAC Scale): 33.86   CMS Modifier (G-Code): CL    FUNCTIONAL ABILITY STATUS  Functional Mobility/Gait Assessment  Gait Assistance:  (2A needed mod assist of 2 used with gait belt and mod cues)  Distance (ft): 35 ft x 2 (rest in between    pt fatiguing during second ambulation with decreasing gait quality .   USE GAIT BELT --reinforced with staff importance of multi assist and gait belt.)  Assistive Device: Rolling walker  Pattern: R Steppage;L Steppage;R Foot flat;L Foot flat;Shuffle (flexed knee /hip trunk gait --shuffling)  Rolling: moderate assist  Supine to Sit: moderate assist---pt with decrease sit balance requiring intermittent min A to CGA for safe sitting at EOB   Sit to Supine: NT   Sit to Stand: moderate assist from bed and chair  ---second person present and used throughout ALL fxn mobility for pt safety .          Patient received supine in bed, agreeable to physical therapy. Vital signs monitored as noted above, no adverse symptoms and patient stable during session.     Education with pt  provided verbally, via demonstration, and while practicing during session on Physical therapy plan of care, physiological benefits of out of bed mobility, and fxn mobility training , B AP and DC Planning  .     The patient's Approx Degree of Impairment: 72.57% has been calculated based on documentation in the [facility] '6 clicks' Inpatient Daily Activity Short Form.  Research supports that patients with this level of impairment may benefit from MINNIE .Pt was cooperative , alert and following one step directions . Pt with improving status and cognition overall from last therapy note.   Pt remains with sitter in room.  Pt is alert oriented to person  and place.   Therapy recommending MINNIE as next level of care .    Per  notes pt lives alone in condo .    Pt requiring 24hr skilled multi assisted  care and support with sitter present in room.   Pt does not appear with cognition / physical skilled to return to ILF .   Neuro and psych on consult for AMS with multiple prior admissions.        Final disposition will be made by interdisciplinary medical team.  Patient End of Session: Up in chair;Needs met;Call light within reach;RN aware of session/findings;All patient questions and concerns addressed;Alarm set;Family present (sitter present)    CURRENT GOALS   Goals to be met by: 4/10/24  Patient Goal Patient's self-stated goal is: did not state, resisting returning to bed   Goal #1 Patient is able to demonstrate supine - sit EOB @ level: supervision      Goal #1   Current Status Mod A    Goal #2 Patient is able to demonstrate transfers Sit to/from Stand at assistance level: supervision with none      Goal #2  Current Status Mod A    Goal #3 Patient is able to  ambulate 100 feet with assist device: none at assistance level: supervision   Goal #3   Current Status As above    Therapy activity 2 units

## 2024-04-14 NOTE — CONSULTS
[facility] ADULT [facility]ORY [facility]YSICAL  **Delayed entry      IDENTIFYING DATA  Patient is Carlo roland 68 year old male. MRN is [id number]    Admitting physician: Willow Alfred DO  Primary care physician: No primary care provider on file.    CHIEF COMPLAINT  Chief Complaint   Patient presents with    Stroke             HISTORY OF PRESENT ILLNESS  68 year old male presents with altered mental status, weakness, and agitation.  Extensive workup.  He has been retaining urine, prompting urologic consultation.  Patient is unable to provide history.  He has been on straight cath protocol due to concerns that he would pull out a you during a period of agitation.  He is on max dose flomax and finasteride has been started.  He is having Bms.  It seems he has voided spontaneously to some extent but relying on intermittent straight cath to empty majority of urine output.    PAST UROLOGICAL HISTORY BY ORGAN SYSTEM  See HPI    PAST MEDICAL HISTORY  Past Medical History:    Abnormal CT scan    Acute gout involving toe of left foot    Altered mental status    Anemia    Asthma in adult (HCC)    Cancer of parotid gland (HCC)    Formatting of this note might be different from the original.   Resection 2019    Catatonia    Cognitive communication deficit    Cystic disease of liver    Dementia (HCC)    Difficult airway    Dyslipidemia    Elevated white blood cell count, unspecified    Encephalopathy    Essential hypertension    Gastro-esophageal reflux disease with esophagitis, without bleeding    Gout, chronic    Gross hematuria    Hypercholesterolemia    Hyperlipidemia    Leukocytosis    Liver cyst    Metabolic encephalopathy    Neuroendocrine carcinoma, high grade (HCC)    Other abnormalities of gait and mobility    Other psoriasis    Other seizures (HCC)    Other specified disorders of kidney and ureter    Parotid neoplasm    Formatting of this note might be different from the original.   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   Keppra    Seizure disorder (HCC)    Toxic metabolic encephalopathy    Transient global amnesia    Formatting of this note might be different from the original.   Brief -Plavix    Unspecified dementia, unspecified severity, without behavioral disturbance, psychotic disturbance, mood disturbance, and anxiety (HCC)    Vascular dementia (HCC)    Vascular dementia, unspecified severity, without behavioral disturbance, psychotic disturbance, mood disturbance, and anxiety (HCC)    Weakness    Weakness of left upper extremity             PAST SURGICAL HISTORY  History reviewed. No pertinent surgical history.    PAST FAMILY HISTORY  No family history on file.    PAST SOCIAL HISTORY  Social History     Socioeconomic History    Marital status: Single     Spouse name: Not on file    Number of children: Not on file    Years of education: Not on file    Highest education level: Not on file   Occupational History    Not on file   Tobacco Use    Smoking status: Former     Current packs/day: 0.00     Average packs/day: 1 pack/day for 25.0 years (25.0 ttl pk-yrs)     Types: Cigarettes     Start date: 1965     Quit date: 1990     Years since quittin.2    Smokeless tobacco: Not on file    Tobacco comments:      Cigarettes 1 25 Quit:   Substance and Sexual Activity    Alcohol use: Not on file    Drug use: Not on file    Sexual activity: Not on file   Other Topics Concern    Not on file   Social History Narrative    Not on file     Social Determinants of Health     Financial Resource  Strain: Low Risk  (8/9/2021)    Received from [facility]    Overall Financial Resource Strain (CARDIA)     Difficulty of Paying Living Expenses: Not hard at all   Food Insecurity: Unknown (3/31/2024)    Food Insecurity     Food Insecurity: Patient unable to answer   Transportation Needs: Unknown (3/31/2024)    Transportation Needs     Lack of Transportation: Patient unable to answer   Physical Activity: Not on file   Stress: Not on file   Social Connections: Unknown (3/13/2021)    Received from [facility]    Social Connections     Conversations with friends/family/neighbors per week: Not on file   Housing Stability: Unknown (3/31/2024)    Housing Stability     Housing Instability: Patient unable to answer     Housing Instability Emergency: Not on file       MEDICATIONS  No current outpatient medications on file.    ALLERGIES  Allergies   Allergen Reactions    Risperidone WHEEZING          REVIEW OF SYSTEMS  Unable to complete     PHYSICAL EXAMINATIONS    Vital Signs:   Vitals:    04/14/24 0448 04/14/24 1001 04/14/24 1200 04/14/24 1429   BP: 140/74 127/82 120/73 123/71   BP Location: Left arm Left arm  Left arm   Pulse: 66 77 73 70   Resp: 20 18  18   Temp: 97.4 °F (36.3 °C) 97.2 °F (36.2 °C)  97.2 °F (36.2 °C)   TempSrc: Oral Oral  Oral   SpO2: 95% 95%  95%   Weight:       Height:           Constitutional: NAD  Neuro: Moving extremities  HEENT: Neck supple  Chest: Normal respiratory effort  CV: Normal radial pulse  Abdomen: Soft without tenderness, guarding. No suprapubic tenderness or fullness  No CVA tenderness bilaterally  Extremities: No edema appreciated.  Genital Exam: Penis without lesions, no scrotal masses      REVIEW OF LABORATORY, PATHOLOGY, AND RADIOLOGY DATA    CBC w/DIF:   Lab Results   Component Value Date    WBC 11.7 (H) 04/12/2024    RBC 5.20 04/12/2024    HGB 15.0 04/12/2024    HCT 46.4 04/12/2024    MCV 89.2 04/12/2024    MCH 28.8 04/12/2024    MCHC 32.3  04/12/2024    RDW 12.7 04/12/2024    .0 04/12/2024       CMP:  No results found for: \"CR\", \"EGFR\"    PSA:  No components found for: \"GPSAD\"    UA:  No components found for: \"GUCOL\", \"GUCLR\", \"GUSG\", \"GUPH\", \"GUPRO\", \"GUGLUC\", \"GUKET\", \"GUBLD\", \"GUBILI\", \"GUNITR\", \"GULEU\", \"GUURO\", \"GUWBC\", \"GURBC\", \"GUSQEP\", \"GUNSEP\", \"GUBACT\", \"GUAMOR\", \"GUHC\"    Urine culture:  Multiple species/contaminant    Imaging:  XR ABDOMEN (1 VIEW) (CPT=74018)    Result Date: 4/9/2024  PROCEDURE: XR ABDOMEN (1 VIEW) (CPT=74018)  COMPARISON: None.  INDICATIONS: Mild abdomen distention and persistent hiccups.  TECHNIQUE:   Single view.   FINDINGS:  BOWEL GAS PATTERN: There is bowel gas throughout the abdomen, including the rectum.  No dilated gas-filled loops of bowel are seen to suggest obstruction.  A large amount of stool is seen throughout the colon. SOFT TISSUES: Normal.  No masses or organomegaly.  CALCIFICATIONS: None significant. BONES: There is moderate dextroscoliosis to the visualized thoracolumbar spine with moderate multilevel spondylosis seen.  Enthesopathy is seen throughout the visualized aspect of the pelvis bilaterally. OTHER: Negative.  No abnormal gaseous collections.          CONCLUSION:  1. Nonobstructive bowel gas pattern. 2. Severe colonic stool burden.  Correlate clinically for a history of constipation. 3. Lesser incidental findings as above.    Dictated by (CST): Stevan Martinez MD on 4/09/2024 at 5:31 PM     Finalized by (CST): Stevan Martinez MD on 4/09/2024 at 5:33 PM          XR CHEST AP PORTABLE  (CPT=71045)    Result Date: 4/9/2024  PROCEDURE: XR CHEST AP PORTABLE  (CPT=71045) TIME: 12:20.   COMPARISON: [facility], XR CHEST AP PORTABLE (CPT=71045), 4/02/2024, 11:08 AM.  [facility], XR CHEST AP PORTABLE (CPT=71045), 3/31/2024, 11:27 AM.  [facility], X CHEST PORTABLE, 9/10/2012, 12:22 PM.  INDICATIONS: Cough and chronic hiccups.  TECHNIQUE:   Single  view.   FINDINGS:  CARDIAC/VASC: The cardiomediastinal silhouette is unchanged in size. MEDIAST/MICHELLE:   No visible mass or adenopathy. LUNGS/PLEURA: No focal opacity, pleural effusion, or pneumothorax.  There is no evidence of pulmonary edema. BONES: Multilevel degenerative changes of the thoracic spine. OTHER: Negative.          CONCLUSION:   No focal opacity, pleural effusion, or pneumothorax.   Dictated by (CST): Keith Partida MD on 4/09/2024 at 2:34 PM     Finalized by (CST): Keith Partida MD on 4/09/2024 at 2:35 PM          XR LUMBAR PUNCTURE LARGE VOL, INCLD IMG (CPT=62329)    Result Date: 4/4/2024  PROCEDURE: XR LUMBAR PUNCTURE LARGE VOL, INCLD IMG (CPT=62329)  COMPARISON: None.  INDICATIONS: Altered mental status.  Suspect normal pressure hydrocephalus.  Severe vascular dementia with agitation.  TECHNIQUE: Risks and benefits were explained to the patient's guardian and informed consent was obtained.  .  Conscious sedation was administered by the clinical service prior to the procedure.  Patient was prepped and draped in usual sterile  following administration 1% lidocaine for local anesthesia, lumbar puncture was performed at the L2 level with a 20 gauge spinal needle.  Low opening pressure approximately 12 cm H2O.  Closing pressure was less than 12 cm H2O.  Approximately 16 cc of cerebral spinal fluid withdrawn into 4 separate sterile vials.  Blood tinged spinal fluid contaminated the last specimen.  Procedure was terminated due to bloody spinal fluid aspirate near the end of the exam and lack of free-flowing spinal fluid  FLUOROSCOPY IMAGES OBTAINED:  1 FLUOROSCOPY TIME:  1.9 minutes RADIATION DOSE (Dose Area Product):  1701.7-uGy*m^2   FINDINGS:  LEVEL: L2 level paramedian approach  NEEDLE: 20 gauge.  FLUID OBTAINED: 16 cc.  First 3 vials were clear.  Blood tinged pinkish spinal fluid left specimen. COMPLICATIONS: None. Estimated blood loss:  None          CONCLUSION:  1. Fluoroscopic guided lumbar  puncture at the L2 level. 2. Approximately 16 cc of spinal fluid withdrawn.  The 1st 3 vials were clear.  Slight blood tinged pancreas spinal fluid contaminated the 4th specimen. 3. Low CSF pressure.  Opening pressure 12 cm H2O.  Closing pressure less than 12 cm H2O.     Dictated by (CST): Shon Miller MD on 2024 at 1:49 PM     Finalized by (CST): Shon Miller MD on 2024 at 1:55 PM          CARD ECHO 2D DOPPLER (CPT=93306)    Result Date: 4/3/2024  Transthoracic Echocardiogram Name:Carlo Ng Date: 2024 :  1955 Ht:  (68in)  BP: 172 / 88 MRN:  [id number]    Age:  68years    Wt:  (180lb) HR: 92bpm Loc:  [facility]       Gndr: M          BSA: 1.95m^2 Sonographer: Bill Ordering:    Neisha Barrett Consulting:  Raheem Johnson ---------------------------------------------------------------------------- History/Indications:   Cerebrovascular accident.  Risk factors: Hypertension. Altered Mental Status. ---------------------------------------------------------------------------- Procedure information:  A transthoracic complete 2D study was performed. Additional evaluation included M-mode, complete spectral Doppler, and color Doppler.  Patient status:  Inpatient.  Location:  Bedside.    The previous study was not available, so comparison was made to the report of 2022.    This was a routine study. Transthoracic echocardiography for diagnosis and ventricular function evaluation. Image quality was adequate. The study was technically limited due to poor acoustic window availability, poor patient compliance, restricted patient mobility, agitation, and patient sitting up. ECG rhythm:   Normal sinus ---------------------------------------------------------------------------- Conclusions: 1. Left ventricle: The cavity size was normal. Wall thickness was mildly    increased. Systolic function was normal. The estimated ejection fraction    was 60-65%, by visual assessment. No diagnostic  evidence for regional    wall motion abnormalities. Unable to assess LV diastolic function. 2. Left atrium: The left atrial volume was normal. Impressions:  This study is compared with previous dated 08/21/2022: * ---------------------------------------------------------------------------- * Findings: Left ventricle:  The cavity size was normal. Wall thickness was mildly increased. Systolic function was normal. The estimated ejection fraction was 60-65%, by visual assessment. No diagnostic evidence for regional wall motion abnormalities. Unable to assess LV diastolic function. Left atrium:  Not well visualized. The left atrial volume was normal. Right ventricle:  Not well visualized. The cavity size was normal. Systolic function was normal. Right atrium:  Not well visualized. The atrium was normal in size. Mitral valve:  The valve was structurally normal. Leaflet separation was normal.  Doppler:  Transvalvular velocity was within the normal range. There was no evidence for stenosis. There was no significant regurgitation.    The valve area by pressure half-time was 4.68cm^2. The valve area index by pressure half-time was 2.39cm^2/m^2. Aortic valve:  The valve was structurally normal. The valve was probably trileaflet. Cusp separation was normal.  Doppler:  Transvalvular velocity was within the normal range. There was no evidence for stenosis. There was no significant regurgitation. Tricuspid valve:  The valve is structurally normal. Leaflet separation was normal.  Doppler:  Transvalvular velocity was within the normal range. There was no evidence for stenosis. There was trace regurgitation. Pulmonic valve:   The valve is structurally normal. Cusp separation was normal.  Doppler:  Transvalvular velocity was within the normal range. There was no evidence for stenosis. There was no significant regurgitation. Aorta: Aortic root: The aortic root was normal. Pulmonary arteries: Systolic pressure could not be accurately  estimated. Systemic veins:  Poorly visualized. ---------------------------------------------------------------------------- Measurements  Left ventricle                  Value          Ref  IVS thickness, ED, PLAX     (H) 1.1   cm       0.6 - 1.0  LV ID, ED, PLAX                 4.4   cm       4.2 - 5.8  LV ID, ES, PLAX                 3.0   cm       2.5 - 4.0  LV PW thickness, ED, PLAX   (H) 1.1   cm       0.6 - 1.0  IVS/LV PW ratio, ED, PLAX       1.00           ---------  LV PW/LV ID ratio, ED, PLAX     0.25           ---------  LV ejection fraction            60    %        52 - 72  LVOT                            Value          Ref  LVOT ID                         2.2   cm       ---------  Aortic root                     Value          Ref  Aortic root ID                  3.7   cm       2.6 - 4.1  Left atrium                     Value          Ref  LA volume, ES, 1-p A2C          30    ml       18 - 58  Mitral valve                    Value          Ref  Mitral E-wave peak velocity     0.43  m/sec    ---------  Mitral A-wave peak velocity     0.69  m/sec    ---------  Mitral deceleration time        162   ms       ---------  Mitral pressure half-time       47    ms       ---------  Mitral E/A ratio, peak          0.6            ---------  Mitral valve area, PHT, DP      4.68  cm^2     ---------  Mitral valve area/bsa, PHT,     2.39  cm^2/m^2 ---------  DP  Right ventricle                 Value          Ref  TAPSE, 2D                       2.01  cm       >=1.70  TAPSE, MM                       2.01  cm       >=1.70  RV s', lateral                  14.9  cm/sec   >=9.5 Legend: (L)  and  (H)  christi values outside specified reference range. ---------------------------------------------------------------------------- Prepared and electronically signed by Ramirez Forrester 04/03/2024 11:39     MRI BRAIN WO ACUTE (3) SEQUENCE (CPT=70551)    Result Date: 4/2/2024  PROCEDURE: MRI BRAIN WO ACUTE (3) SEQUENCE(CPT=70551)   COMPARISON: [facility], CT STROKE BRAIN (NO IV) (CPT=70450), 3/31/2024, 10:58 AM.  [facility], CT STROKE CTA BRAIN/CTA NECK (WIV) (CPT=70496/15904), 3/31/2024, 10:58 AM.  INDICATIONS: Stroke alert.  Left-sided weakness.  evaluate for nph; comment on  cc angle  TECHNIQUE: Fast brain stroke protocol MRI.  No IV gadolinium.  FINDINGS:  CEREBRUM: Multiple foci pathologic signal with regions of confluence in the cerebral white matter bilaterally most pronounced in the frontal and parietal lobes.  Chronic bilateral thalamic, internal capsule, external capsule and basal ganglionic lacunar infarcts. CEREBELLUM: No edema, hemorrhage, mass, acute infarction, or inappropriate atrophy.  BRAINSTEM: Patchy pathologic signal in the saroj. CSF SPACES: Guevara index ((largest diameter of the anterior horns / largest brain diameter (measured on CT image 22 series 17-03/31/2024) equals 5.2 / 13.2= 0.39.  Corpus callosalangle measured on coronal CT approximately 86 degrees.  Prominence of ventricles and sulci with ventricular prominence slightly greater than the degree of sulcal prominence. SKULL: No mass or other significant visible lesion.  SINUSES: Opacified maxillary, ethmoid and hypoplastic frontal sinuses.  Partial opacification of sphenoid sinuses. ORBITS: Limited views are unremarkable.  OTHER:               Flow is present in the anterior and posterior circulation vessels.No restricted diffusion.          CONCLUSION:  1. No acute infarct or hemorrhage. 2. Advanced changes of chronic small vessel disease in both cerebral hemispheres. 3. Moderate changes of chronic small vessel disease in the saroj. 4. Ventricular prominence greater than the degree of cortical sulcal prominence.  Elevated Guevara index, and diminished corpus callosal angle are suggestive, but not specific for normal pressure hydrocephalus.  Findings could also be seen with central atrophy greater than degree of cortical atrophy.     Dictated by (CST): Romeo Cortes MD on 4/02/2024 at 4:16 PM     Finalized by (CST): Romeo Cortes MD on 4/02/2024 at 4:35 PM          XR CHEST AP PORTABLE  (CPT=71045)    Result Date: 4/2/2024  PROCEDURE: XR CHEST AP PORTABLE  (CPT=71045) TIME: 1115 hours.   COMPARISON: [facility], XR CHEST AP PORTABLE (CPT=71045), 3/31/2024, 11:27 AM.  [facility], X CHEST PORTABLE, 9/10/2012, 12:22 PM.  INDICATIONS: Possible aspiration.  TECHNIQUE:   Single view.   FINDINGS:  CARDIAC/VASC: Normal.  No cardiac silhouette abnormality or cardiomegaly.  Unremarkable pulmonary vasculature.  MEDIAST/MICHELLE:   No visible mass or adenopathy. LUNGS/PLEURA: Normal.  No significant pulmonary parenchymal abnormalities.  No effusion or pleural thickening. BONES: No fracture or visible bony lesion. OTHER: Negative.          CONCLUSION: No radiographic evidence of acute cardiopulmonary abnormality.  No evidence of aspiration.  No new focal consolidation.    Dictated by (CST): Ulysses Galindo MD on 4/02/2024 at 11:45 AM     Finalized by (CST): Ulysses Galindo MD on 4/02/2024 at 11:45 AM          XR CHEST AP PORTABLE  (CPT=71045)    Result Date: 3/31/2024  PROCEDURE: XR CHEST AP PORTABLE  (CPT=71045) TIME: 11:28 a.m.   COMPARISON: [facility], X CHEST PORTABLE, 9/10/2012, 12:22 PM.  INDICATIONS: Weakness and left sided facial droop.  TECHNIQUE:   Single view.   FINDINGS:  CARDIAC/VASC: No cardiac silhouette abnormality or cardiomegaly.  Unremarkable pulmonary vasculature.  MEDIAST/MICHELLE:   No visible mass or adenopathy. LUNGS/PLEURA: Linear bibasilar pulmonary opacities.  No effusion or pneumothorax. BONES: Scattered mild degenerative endplate changes in the visualized thoracolumbar spine. OTHER: Negative.          CONCLUSION: Linear opacities in both lung bases suggesting atelectasis.  Otherwise no acute cardiopulmonary abnormality.   Dictated by (CST): Jhoan Nunn MD on 3/31/2024 at 11:36 AM      Finalized by (CST): Jhoan Nunn MD on 3/31/2024 at 11:37 AM          CT STROKE CTA BRAIN/CTA NECK (W IV)(CPT=70496/05025)    Result Date: 3/31/2024  PROCEDURE: CT STROKE CTA BRAIN/CTA NECK (WIV) (CPT=70496/43264)  COMPARISON: [facility], CT BRAIN HEAD WO CONTRAST, 9/11/2012, 8:52 PM.  INDICATIONS: eval for stroke, Left sided weakness.  TECHNIQUE: CT images of the neck and brain were obtained with non-ionic intravenous contrast material. Multi-planar reformatted/3-D images were created to optimize visualization of vascular anatomy.  Automated exposure control for dose reduction was used. Evaluation of internal carotid stenosis is based on NASCET Criteria.  An independent workstation was not used to post process imaging.  FINDINGS:  CAROTID ARTERIES: RIGHT COMMON CAROTID: No hemodynamically significant stenosis or dissection.  RIGHT INTERNAL CAROTID: Mild calcific atherosclerosis and atherosclerotic narrowing within the cavernous segment.  No hemodynamically significant stenosis or dissection.   LEFT COMMON CAROTID: No hemodynamically significant stenosis or dissection.  LEFT INTERNAL CAROTID: Mild calcific atherosclerosis and atherosclerotic narrowing within the cavernous segment.  No hemodynamically significant stenosis or dissection.   VERTEBRAL ARTERIES: RIGHT: No hemodynamically significant stenosis or dissection.  LEFT: No hemodynamically significant stenosis or dissection.   BASILAR ARTERY: No hemodynamically significant stenosis or dissection.     AORTIC ARCH (Limited): Left-sided 4 vessel aortic arch with independent origin of the left vertebral from the arch.  There is atherosclerosis without evidence of aneurysm or acute aortic injury. MEDIASTINUM/APICES (Limited): Visualized lung apices are clear.  Visualized upper mediastinum is unremarkable.  OTHER: The visualized soft tissues of the neck are also unremarkable.   INTRACRANIAL ARTERIES: ANTERIOR CEREBRALS:  No significant stenosis.  No  visible aneurysm or vascular malformation.  MIDDLE CEREBRALS: No significant stenosis.  No visible aneurysm or vascular malformation. POSTERIOR CEREBRALS: No significant stenosis.  No visible aneurysm or vascular malformation.  OTHER:  There is near complete opacification of the visualized paranasal sinuses with including the frontoethmoid recesses, ethmoid sinuses, and maxillary sinuses as well as the right maxillary sinus.  Mastoid air cells are clear.         CONCLUSION:  1. CTA head: No hemodynamically significant stenosis, occlusion, or gross aneurysm in the anterior or posterior circulation. 2. CTA neck: No hemodynamically significant stenosis, occlusion, or dissection of the carotids or vertebrals. 3.  Advanced chronic appearing paranasal sinus inflammation.  Dictated by (CST): Jhoan Nunn MD on 3/31/2024 at 11:18 AM     Finalized by (CST): Jhoan Nunn MD on 3/31/2024 at 11:22 AM          CT STROKE BRAIN (NO IV)(CPT=70450)    Result Date: 3/31/2024  PROCEDURE: CT STROKE BRAIN (NO IV) (CPT=70450)  COMPARISON: [facility], CT BRAIN HEAD WO CONTRAST, 9/11/2012, 8:52 PM.  INDICATIONS: stroke, Left sided weakness  TECHNIQUE:   CT images were obtained without contrast material.  Automated exposure control for dose reduction was used.  Dose information is transmitted to the ACR (American College of Radiology) NRDR (National Radiology Data Registry) which includes the Dose Index Registry.  FINDINGS:  CSF SPACES: There is ex vacuo dilatation of the lateral and third ventricles. The remaining ventricles, cisterns, and sulci are appropriate for age.  No hydrocephalus, subarachnoid hemorrhage, or mass.  No midline shift.  CEREBRUM: No edema, hemorrhage, mass, acute infarction, or significant atrophy.  WHITE MATTER: Moderate chronic white matter ischemic changes.  Lacunar infarcts are present within the right basal ganglia and right caudate head. CEREBELLUM: No edema, hemorrhage, mass, acute infarction,  or significant atrophy.  BRAINSTEM: No edema, hemorrhage, mass, acute infarction, or significant atrophy.  CALVARIUM: No apparent fracture, mass, or other significant visible lesion.  SINUSES: Near complete opacification of the frontal ethmoid recesses, ethmoid sinuses, sphenoid ethmoidal recesses, right sphenoid sinus, and both maxillary sinuses.  Mastoid air cells are clear. ORBITS: Rightward conjugate gaze.   OTHER: There is calcific atherosclerosis in the cavernous segments of the internal carotid arteries and vertebral arteries.         CONCLUSION:  1.  No acute intracranial process. 2.  There are moderate microvascular white matter ischemic changes, likely related to long-standing hypertension and/or diabetes.  Interval development of multiple lacunar infarcts most notably in the right caudate head and right basal ganglia, new since  2012 imaging. 3.  Atherosclerosis in the anterior and posterior circulations.  Exam discussed with Dr. Lynn on 3/31/24 at 11:17 a.m.  Dictated by (CST): Jhoan Nunn MD on 3/31/2024 at 11:14 AM     Finalized by (CST): Jhoan Nunn MD on 3/31/2024 at 11:18 AM            ASSESSMENT AND PLAN   68 year old male admitted with altered mental status.  Not to be retaining urine.  Agree with continuing straight cath protocol as patient is at high risk to traumatically pull his you at times of agitation.  Continue BPH meds - tamsulosin and finasteride.  He has been treated for suspected UTI, although urine culture possibly contaminant.  Anticholinergic properties of olanzapine could be contributing to retention as well.      Thank you for this consult.    Lauren Munoz MD  [facility]   O: [phone]

## 2024-04-14 NOTE — PLAN OF CARE
Patient is Aox1, vitals stable on room air and meds given as noted. Patient had on episode of urination, bladder scan done and found bladder to have 220 ml. Patient had a fall this shift, assessment done, POA and MD notified. Patient noted to be calm with some episode of agitation but patient is able to be talked and in a calmer mood. Frequent rounding done on pt and needs attended to.     Problem: Patient Centered Care  Goal: Patient preferences are identified and integrated in the patient's plan of care  Description: Interventions:  - Provide timely, complete, and accurate information to patient/family  - Incorporate patient and family knowledge, values, beliefs, and cultural backgrounds into the planning and delivery of care  - Encourage patient/family to participate in care and decision-making at the level they choose  - Honor patient and family perspectives and choices  Outcome: Progressing     Problem: Patient/Family Goals  Goal: Patient/Family Long Term Goal  Description: Patient's Long Term Goal: discharge    Interventions:  - Monitor vital signs  - Monitor neurological status  - Ambulate as tolerated  - See additional Care Plan goals for specific interventions  Outcome: Progressing  Goal: Patient/Family Short Term Goal  Description: Patient's Short Term Goal: feel better    Interventions:   - Verbalize needs and wants  - Antianxiety medications as needed  - See additional Care Plan goals for specific interventions  Outcome: Progressing     Problem: Delirium  Goal: Minimize duration of delirium  Description: Interventions:  - Encourage use of hearing aids, eye glasses  - Promote highest level of mobility daily  - Provide frequent reorientation  - Promote wakefulness i.e. lights on, blinds open  - Promote sleep, encourage patient's normal rest cycle i.e. lights off, TV off, minimize noise and interruptions  - Encourage family to assist in orientation and promotion of home routines  Outcome: Progressing      Problem: METABOLIC/FLUID AND ELECTROLYTES - ADULT  Goal: Electrolytes maintained within normal limits  Description: INTERVENTIONS:  - Monitor labs and rhythm and assess patient for signs and symptoms of electrolyte imbalances  - Administer electrolyte replacement as ordered  - Monitor response to electrolyte replacements, including rhythm and repeat lab results as appropriate  - Fluid restriction as ordered  - Instruct patient on fluid and nutrition restrictions as appropriate  Outcome: Progressing     Problem: MUSCULOSKELETAL - ADULT  Goal: Return mobility to safest level of function  Description: INTERVENTIONS:  - Assess patient stability and activity tolerance for standing, transferring and ambulating w/ or w/o assistive devices  - Assist with transfers and ambulation using safe patient handling equipment as needed  - Ensure adequate protection for wounds/incisions during mobilization  - Obtain PT/OT consults as needed  - Advance activity as appropriate  - Communicate ordered activity level and limitations with patient/family  Outcome: Progressing     Problem: NEUROLOGICAL - ADULT  Goal: Achieves stable or improved neurological status  Description: INTERVENTIONS  - Assess for and report changes in neurological status  - Initiate measures to prevent increased intracranial pressure  - Maintain blood pressure and fluid volume within ordered parameters to optimize cerebral perfusion and minimize risk of hemorrhage  - Monitor temperature, glucose, and sodium. Initiate appropriate interventions as ordered  Outcome: Progressing  Goal: Absence of seizures  Description: INTERVENTIONS  - Monitor for seizure activity  - Administer anti-seizure medications as ordered  - Monitor neurological status  Outcome: Progressing  Goal: Remains free of injury related to seizure activity  Description: INTERVENTIONS:  - Maintain airway, patient safety  and administer oxygen as ordered  - Monitor patient for seizure activity, document  and report duration and description of seizure to MD/LIP  - If seizure occurs, turn patient to side and suction secretions as needed  - Reorient patient post seizure  - Seizure pads on all 4 side rails  - Instruct patient/family to notify RN of any seizure activity  - Instruct patient/family to call for assistance with activity based on assessment  Outcome: Progressing  Goal: Achieves maximal functionality and self care  Description: INTERVENTIONS  - Monitor swallowing and airway patency with patient fatigue and changes in neurological status  - Encourage and assist patient to increase activity and self care with guidance from PT/OT  - Encourage visually impaired, hearing impaired and aphasic patients to use assistive/communication devices  Outcome: Progressing     Problem: Safety Risk - Non-Violent Restraints  Goal: Patient will remain free from self-harm  Description: INTERVENTIONS:  - Apply the least restrictive restraint to prevent harm  - Notify patient and family of reasons restraints applied  - Assess for any contributing factors to confusion (electrolyte disturbances, delirium, medications)  - Discontinue any unnecessary medical devices as soon as possible  - Assess the patient's physical comfort, circulation, skin condition, hydration, nutrition and elimination needs   - Reorient and redirection as needed  - Assess for the need to continue restraints  Outcome: Progressing     Problem: Risk for Violence/Aggression  Goal: Absence of Violence/Aggression  Description: INTERVENTIONS:   - Identify precipitating factors for behavior   - Notify Charge RN/Provider   - Consider decreasing stimulation   - Consider distraction measures   - Consider discussion with provider regarding prn meds    - Consider GABRIEL (Moderate Risk only)   - Consider Code Support (High Risk only)   - Consider room safety checks   - Consider restraints  Outcome: Progressing

## 2024-04-14 NOTE — PROGRESS NOTES
[facility] Hospitalist Progress Note     CC: Hospital Follow up    PCP: No primary care provider on file.       Assessment/Plan:   Mr. 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, vascular dementia, HTN, history of possible seizure, gout, metabolic encephalopathy with agitation without clear cause status post 3 hospitalizations, who presents with altered mental status weakness and agitation, neurology and psychiatry consulted, extensive workup including CT/MRI brain/lumbar puncture without clear etiology, slow clinical improvement with cognitive state, plan for MINNIE once urinary retention improved and cognitive state improves      Altered mental status   Metabolic encephalopathy  Delirium in setting of likely vascular dementia  -unclear, ddx included metabolic encephalopathy, infection, stroke, post ictal state on NPH, frontotemporal dementia?  -has been admitted 3 other times (last in 2/2023, at [facility]) with similar presentation, extensive work up   -Per POA cognitive status returned back to baseline and his prior hospitalizations  -neurology and psychiatry consulted  -UA neg, no signs of infection, CXR negative  -initial CT imaging stable  -TSH, B12 normal  -MRI brain without acute infarct advanced small vessel disease, poss enlargement of ventricles  -aspirin and statin   -monitor for fever or infection   -CODE GABRIEL called on 4/2 in am, Psyc consulted  -concern for NPH, s/p lumbar puncture on 4/4, had physical therapy preceding and post LP, opening pressure was normal (12) minute and cognitive state or functional state did not improve  -Discussed with neurology, and neurosurgery, unlikely that this is NPH given lack of improvement post LP, also atypical presentation, neurosurgery recommending outpatient follow-up if suspicion is still high for NPH  -LP studies still pending given CJD testing, encephalitis panel negative  -psychiatry  adjusting medications  - has been off restraints for several days, does have safety sitter in room poss consider DC sitter in 1-2 days    Hypernatremia / DAWN  Urinary retention  Poss UTI  - urinary retention, bladder scan with 1600, straight Q6  - hold on you given high risk for pulling out  - continue flomax, bowel regimen, mobilize, minimizing contributing meds  - UA with sediment, IV ceftriaxone ordered, culture with poss contaminant, but has been straight cathed several times, will continue ab's to complete 7 day course (especially with leucocytosis, and suprapubic pain, now improved)  - ceftraixone (started on 4/9) -> transition to oral keflex for 4 more days.   - will increase flomax, had multiple BM on 4/10-11, improved mobility, offending medications (thorazine stopped, haldol minimized).  - continued retention, will consult urology, appreciate recommendations  - would like to avoid you given risk of pulling this out  - started on finasteride    Constipation  - KUB with large stool burden  - bowel regimen ordered--> several BM on 4/10-11    Pink eye / right eye radiation   - 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 Add to allergy list  - steroids, nebs, avoid IV benadryl given severe agitation  - CXR negative   - RVP neg    Leucocytosis   - poss secondary to IV steroids  - no fevers  - will continue to monitor  - steroid stopped  - improved      Hx of Hypertension  -started on amlodipine  -coreg noted on home meds, resumed   -consider re-ordering ACE or ARB if renal function stable  - BP improved     Possible left sided facial droop and weakness  -neurology consulted and stroke work up if needed based on their evaluation   -anti-platelets if recommended by neuro    -discussed with neurology. Low suspicion for acute CVA. MRI  negative for stroke      Hx of seizure? Noted in outside records,  - not on AED's    Hs of Small cell lung cancer   - parotid involvement, has had radiation therapy   - follows at [facility]      Gout  -he's  was on colchicine (stopped as OP), no signs of gout     Fluids: stopped  DVT prophylaxis: lovenox daily   Code status: FULL       Willow Kaplanyuri, DO [facility] Hospitalist      Subjective:   Patient awake, calm. Per sitter at bedside does try to get out of bed. Still confused    OBJECTIVE:    Blood pressure 120/73, pulse 73, temperature 97.2 °F (36.2 °C), temperature source Oral, resp. rate 18, height 5' 8\" (1.727 m), weight 180 lb 14.4 oz (82.1 kg), SpO2 95%.    Temp:  [97.2 °F (36.2 °C)-97.9 °F (36.6 °C)] 97.2 °F (36.2 °C)  Pulse:  [66-86] 73  Resp:  [18-20] 18  BP: (118-140)/(70-82) 120/73  SpO2:  [94 %-96 %] 95 %      Intake/Output:    Intake/Output Summary (Last 24 hours) at 4/14/2024 1347  Last data filed at 4/14/2024 0700  Gross per 24 hour   Intake 240 ml   Output 725 ml   Net -485 ml       Last 3 Weights   03/31/24 1311 180 lb 14.4 oz (82.1 kg)   03/31/24 1117 177 lb 0.5 oz (80.3 kg)       Exam    Gen:  calm  Pulm: Lungs clear  CV: Heart with regular rate and rhythm  Abd: Abdomen soft, no abd pain, no pain with palpation  Ext with edema   Neuro: moving all 4 ext, confused    Data Review:       Labs:     Recent Labs   Lab 04/10/24  0554 04/11/24  0708 04/12/24  0648   RBC 5.14 4.72 5.20   HGB 15.4 14.4 15.0   HCT 44.5 41.9 46.4   MCV 86.6 88.8 89.2   MCH 30.0 30.5 28.8   MCHC 34.6 34.4 32.3   RDW 12.6 12.9 12.7   NEPRELIM 11.20* 8.71* 9.32*   WBC 13.5* 11.3* 11.7*   .0 334.0 351.0         Recent Labs   Lab 04/10/24  0554 04/11/24  0708 04/12/24  0648   GLU 80 86 92   BUN 19 26* 21   CREATSERUM 1.17 1.20 1.07   EGFRCR 67 65 75   CA 9.7 9.6 9.4    144 143   K 3.1* 3.3*  3.3* 3.5  3.5    109 109   CO2 28.0  27.0 26.0       Recent Labs   Lab 04/10/24  0554   ALT 12   AST 20   ALB 3.8         Imaging:  No results found.      Meds:      finasteride  5 mg Oral Daily    cephalexin  500 mg Oral Q8H BHASKAR    melatonin  5 mg Oral Nightly    sennosides  17.2 mg Oral Nightly    polyethylene glycol (PEG 3350)  17 g Oral Daily    tamsulosin  0.8 mg Oral Daily    glycerin-hypromellose-  1 drop Both Eyes TID    thiamine  200 mg Oral BID    docusate sodium  100 mg Oral BID    divalproex DR  250 mg Oral BID    OLANZapine  5 mg Oral Nightly    calcium carbonate  1,000 mg Oral TID    amLODIPine  10 mg Oral Daily    carvedilol  12.5 mg Oral BID with meals    fluticasone furoate-vilanterol  1 puff Inhalation Daily    aspirin  81 mg Oral Daily    atorvastatin  80 mg Oral Nightly    enoxaparin  40 mg Subcutaneous Daily           ipratropium-albuterol    polyethylene glycol (PEG 3350)    magnesium hydroxide    bisacodyl    hydrALAZINE    haloperidol lactate    acetaminophen **OR** [DISCONTINUED] acetaminophen    ondansetron

## 2024-04-14 NOTE — PLAN OF CARE
Problem: Patient Centered Care  Goal: Patient preferences are identified and integrated in the patient's plan of care  Description: Interventions:  - What would you like us to know as we care for you? \"I am from [facility].\"  - Provide timely, complete, and accurate information to patient/family  - Incorporate patient and family knowledge, values, beliefs, and cultural backgrounds into the planning and delivery of care  - Encourage patient/family to participate in care and decision-making at the level they choose  - Honor patient and family perspectives and choices  Outcome: Progressing     Problem: Patient/Family Goals  Goal: Patient/Family Long Term Goal  Description: Patient's Long Term Goal: discharge    Interventions:  - Monitor vital signs  - Monitor neurological status  - Ambulate as tolerated  - See additional Care Plan goals for specific interventions  Outcome: Progressing  Goal: Patient/Family Short Term Goal  Description: Patient's Short Term Goal: feel better    Interventions:   - Verbalize needs and wants  - Antianxiety medications as needed  - See additional Care Plan goals for specific interventions  Outcome: Progressing     Problem: Delirium  Goal: Minimize duration of delirium  Description: Interventions:  - Encourage use of hearing aids, eye glasses  - Promote highest level of mobility daily  - Provide frequent reorientation  - Promote wakefulness i.e. lights on, blinds open  - Promote sleep, encourage patient's normal rest cycle i.e. lights off, TV off, minimize noise and interruptions  - Encourage family to assist in orientation and promotion of home routines  Outcome: Progressing     Problem: METABOLIC/FLUID AND ELECTROLYTES - ADULT  Goal: Electrolytes maintained within normal limits  Description: INTERVENTIONS:  - Monitor labs and rhythm and assess patient for signs and symptoms of electrolyte imbalances  - Administer electrolyte replacement as ordered  - Monitor response  to electrolyte replacements, including rhythm and repeat lab results as appropriate  - Fluid restriction as ordered  - Instruct patient on fluid and nutrition restrictions as appropriate  Outcome: Progressing     Problem: MUSCULOSKELETAL - ADULT  Goal: Return mobility to safest level of function  Description: INTERVENTIONS:  - Assess patient stability and activity tolerance for standing, transferring and ambulating w/ or w/o assistive devices  - Assist with transfers and ambulation using safe patient handling equipment as needed  - Ensure adequate protection for wounds/incisions during mobilization  - Obtain PT/OT consults as needed  - Advance activity as appropriate  - Communicate ordered activity level and limitations with patient/family  Outcome: Progressing     Problem: NEUROLOGICAL - ADULT  Goal: Achieves stable or improved neurological status  Description: INTERVENTIONS  - Assess for and report changes in neurological status  - Initiate measures to prevent increased intracranial pressure  - Maintain blood pressure and fluid volume within ordered parameters to optimize cerebral perfusion and minimize risk of hemorrhage  - Monitor temperature, glucose, and sodium. Initiate appropriate interventions as ordered  Outcome: Progressing  Goal: Absence of seizures  Description: INTERVENTIONS  - Monitor for seizure activity  - Administer anti-seizure medications as ordered  - Monitor neurological status  Outcome: Progressing  Goal: Remains free of injury related to seizure activity  Description: INTERVENTIONS:  - Maintain airway, patient safety  and administer oxygen as ordered  - Monitor patient for seizure activity, document and report duration and description of seizure to MD/LIP  - If seizure occurs, turn patient to side and suction secretions as needed  - Reorient patient post seizure  - Seizure pads on all 4 side rails  - Instruct patient/family to notify RN of any seizure activity  - Instruct patient/family to  call for assistance with activity based on assessment  Outcome: Progressing  Goal: Achieves maximal functionality and self care  Description: INTERVENTIONS  - Monitor swallowing and airway patency with patient fatigue and changes in neurological status  - Encourage and assist patient to increase activity and self care with guidance from PT/OT  - Encourage visually impaired, hearing impaired and aphasic patients to use assistive/communication devices  Outcome: Progressing     Problem: Safety Risk - Non-Violent Restraints  Goal: Patient will remain free from self-harm  Description: INTERVENTIONS:  - Apply the least restrictive restraint to prevent harm  - Notify patient and family of reasons restraints applied  - Assess for any contributing factors to confusion (electrolyte disturbances, delirium, medications)  - Discontinue any unnecessary medical devices as soon as possible  - Assess the patient's physical comfort, circulation, skin condition, hydration, nutrition and elimination needs   - Reorient and redirection as needed  - Assess for the need to continue restraints  Outcome: Progressing     Problem: Risk for Violence/Aggression  Goal: Absence of Violence/Aggression  Description: INTERVENTIONS:   - Identify precipitating factors for behavior   - Notify Charge RN/Provider   - Consider decreasing stimulation   - Consider distraction measures   - Consider discussion with provider regarding prn meds    - Consider GABRIEL (Moderate Risk only)   - Consider Code Support (High Risk only)   - Consider room safety checks   - Consider restraints  Outcome: Progressing     Problem: Risk for Violence-Violent Restraints/Seclusion  Goal: Patient will not express any violent or self-destructive behaviors  Description: Interventions:  - Apply the least restrictive restraint to prevent harm if patient strikes out and is not responding to redirection  - Notify patient and family of reasons restraints applied  - Assist the patient to  identify the precipitating event  - Assist the patient to identify alternatives to physically acting out  - Assess for any contributing factors to violent behaviors (substances,  medications, history of trauma)  - Assess the patient's physical comfort, circulation, skin condition, hydration, nutrition and elimination needs   - Assess for the need to continue restraints  - Evaluate the need for an emergency medication  Outcome: Progressing     Patient is alert and oriented x 1-2, irritable, sitter at bedside for safety. He denies to be in any pain or discomfort at this time. Bladder scan done at 2217 showed 479, performed straight cath: 350. Bladder scan done at 0449, showed 419, performed straight cath: 375. Safety and fall precautions maintained, bed alarm on. Call light within reach. Frequent rounding by nursing staff.

## 2024-04-15 PROCEDURE — 99233 SBSQ HOSP IP/OBS HIGH 50: CPT | Performed by: OTHER

## 2024-04-15 NOTE — CM/SW NOTE
Pt discussed in RN DC rounds. FRANSISCO called maite to follow up with MINNIE jalloh. Maite informed about [facility] informed that they are not accepting patient at this time. POA wishes for [facility], and requesting patient to be placed on regular floor. Fransisco will pass the information along to [facility]. [facility] reserved via aidin, and  Yesina informed to start auth. Fransisco was informed that Dr. Polo.     Plan: Pending medical clearance, DC to [facility], *insurance auth    FRANSISCO/JABARI to remain available for support and/or discharge planning.     Cheli Hoyt, MSW, LSW   x [phone]

## 2024-04-15 NOTE — PLAN OF CARE
Problem: Risk for Violence-Violent Restraints/Seclusion  Goal: Patient will not express any violent or self-destructive behaviors  Description: Interventions:  - Apply the least restrictive restraint to prevent harm if patient strikes out and is not responding to redirection  - Notify patient and family of reasons restraints applied  - Assist the patient to identify the precipitating event  - Assist the patient to identify alternatives to physically acting out  - Assess for any contributing factors to violent behaviors (substances,  medications, history of trauma)  - Assess the patient's physical comfort, circulation, skin condition, hydration, nutrition and elimination needs   - Assess for the need to continue restraints  - Evaluate the need for an emergency medication  Outcome: Progressing     Problem: Patient Centered Care  Goal: Patient preferences are identified and integrated in the patient's plan of care  Description: Interventions:  - What would you like us to know as we care for you? Im from [place]    Problem: Delirium  Goal: Minimize duration of delirium  Description: Interventions:  - Encourage use of hearing aids, eye glasses  - Promote highest level of mobility daily  - Provide frequent reorientation  - Promote wakefulness i.e. lights on, blinds open  - Promote sleep, encourage patient's normal rest cycle i.e. lights off, TV off, minimize noise and interruptions  - Encourage family to assist in orientation and promotion of home routines  Outcome: Progressing     Problem: METABOLIC/FLUID AND ELECTROLYTES - ADULT  Goal: Electrolytes maintained within normal limits  Description: INTERVENTIONS:  - Monitor labs and rhythm and assess patient for signs and symptoms of electrolyte imbalances  - Administer electrolyte replacement as ordered  - Monitor response to electrolyte replacements, including rhythm and repeat lab results as appropriate  - Fluid restriction as ordered  - Instruct patient  on fluid and nutrition restrictions as appropriate  Outcome: Progressing     Problem: MUSCULOSKELETAL - ADULT  Goal: Return mobility to safest level of function  Description: INTERVENTIONS:  - Assess patient stability and activity tolerance for standing, transferring and ambulating w/ or w/o assistive devices  - Assist with transfers and ambulation using safe patient handling equipment as needed  - Ensure adequate protection for wounds/incisions during mobilization  - Obtain PT/OT consults as needed  - Advance activity as appropriate  - Communicate ordered activity level and limitations with patient/family  Outcome: Progressing     Problem: NEUROLOGICAL - ADULT  Goal: Achieves stable or improved neurological status  Description: INTERVENTIONS  - Assess for and report changes in neurological status  - Initiate measures to prevent increased intracranial pressure  - Maintain blood pressure and fluid volume within ordered parameters to optimize cerebral perfusion and minimize risk of hemorrhage  - Monitor temperature, glucose, and sodium. Initiate appropriate interventions as ordered  Outcome: Progressing  Goal: Absence of seizures  Description: INTERVENTIONS  - Monitor for seizure activity  - Administer anti-seizure medications as ordered  - Monitor neurological status  Outcome: Progressing  Goal: Remains free of injury related to seizure activity  Description: INTERVENTIONS:  - Maintain airway, patient safety  and administer oxygen as ordered  - Monitor patient for seizure activity, document and report duration and description of seizure to MD/LIP  - If seizure occurs, turn patient to side and suction secretions as needed  - Reorient patient post seizure  - Seizure pads on all 4 side rails  - Instruct patient/family to notify RN of any seizure activity  - Instruct patient/family to call for assistance with activity based on assessment  Outcome: Progressing  Goal: Achieves maximal functionality and self  care  Description: INTERVENTIONS  - Monitor swallowing and airway patency with patient fatigue and changes in neurological status  - Encourage and assist patient to increase activity and self care with guidance from PT/OT  - Encourage visually impaired, hearing impaired and aphasic patients to use assistive/communication devices  Outcome: Progressing

## 2024-04-15 NOTE — PAYOR COMM NOTE
--------------  CONTINUED STAY REVIEW    Payor: [employer]  Subscriber #:  [id number]  Authorization Number: [id number]    Admit date: 3/31/24  Admit time: 12:40 PM    4/14/24   Subjective:   Patient awake, calm.  Still confused     Lab 04/10/24  0554 04/11/24  0708 04/12/24  0648   RBC 5.14 4.72 5.20   HGB 15.4 14.4 15.0   HCT 44.5 41.9 46.4   MCV 86.6 88.8 89.2   MCH 30.0 30.5 28.8   MCHC 34.6 34.4 32.3   RDW 12.6 12.9 12.7   NEPRELIM 11.20* 8.71* 9.32*   WBC 13.5* 11.3* 11.7*   .0 334.0 351.0      Lab 04/10/24  0554 04/11/24  0708 04/12/24  0648   GLU 80 86 92   BUN 19 26* 21   CREATSERUM 1.17 1.20 1.07   EGFRCR 67 65 75   CA 9.7 9.6 9.4    144 143   K 3.1* 3.3*  3.3* 3.5  3.5    109 109   CO2 28.0 27.0 26.0      Assessment/Plan:   Mr. 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, vascular dementia, HTN, history of possible seizure, gout, metabolic encephalopathy with agitation without clear cause status post 3 hospitalizations, who presents with altered mental status weakness and agitation, neurology consulted, extensive workup including CT/MRI brain/lumbar puncture without clear etiology, slow clinical improvement with cognitive state, plan for MINNIE once urinary retention improved and cognitive state improves      Altered mental status   Metabolic encephalopathy  Delirium in setting of likely vascular dementia  -unclear, ddx included metabolic encephalopathy, infection, stroke, post ictal state on NPH, frontotemporal dementia?  -has been admitted 3 other times (last in 2/2023, at [facility]) with similar presentation, extensive work up   -Per POA cognitive status returned back to baseline and his prior hospitalizations  -neurology consulted  -UA neg, no signs of infection, CXR negative  -initial CT imaging stable  -TSH, B12 normal  -MRI brain without acute infarct advanced small vessel disease, poss enlargement of  ventricles  -aspirin and statin   -monitor for fever or infection   -concern for NPH, s/p lumbar puncture on 4/4, had physical therapy preceding and post LP, opening pressure was normal (12) minute and cognitive state or functional state did not improve  -Discussed with neurology, and neurosurgery, unlikely that this is NPH given lack of improvement post LP, also atypical presentation, neurosurgery recommending outpatient follow-up if suspicion is still high for NPH  -LP studies still pending given CJD testing, encephalitis panel negative  -adjusting medications     Hypernatremia / DAWN  Urinary retention  Poss UTI  - urinary retention, bladder scan with 1600, straight Q6  - hold on you given high risk for pulling out  - continue flomax, bowel regimen, mobilize, minimizing contributing meds  - UA with sediment, IV ceftriaxone ordered, culture with poss contaminant, but has been straight cathed several times, will continue ab's to complete 7 day course (especially with leucocytosis, and suprapubic pain, now improved)  - ceftraixone (started on 4/9) -> transition to oral keflex for 4 more days.   - will increase flomax, had multiple BM on 4/10-11, improved mobility, offending medications (thorazine stopped, haldol minimized).  - continued retention, will consult urology, appreciate recommendations  - would like to avoid you given risk of pulling this out  - started on finasteride     Constipation  - KUB with large stool burden  - bowel regimen ordered--> several BM on 4/10-11     Pink eye / right eye radiation   - hs of right parotid radiation, has had history of recurrent eye infections since that time  - antibiotic drop started on 4/2 completed 4/9  - will continue artificial tears TID  - no changes in visual acuity, no pain  - improving     Wheezing- resolved  - poss secondary to risperidone? Add to allergy list  - steroids, nebs, avoid IV benadryl given severe agitation  - CXR negative   - RVP neg      Leucocytosis   - poss secondary to IV steroids  - no fevers  - will continue to monitor  - steroid stopped  - improved      Hx of Hypertension  -started on amlodipine  -coreg noted on home meds, resumed   -consider re-ordering ACE or ARB if renal function stable  - BP improved     Possible left sided facial droop and weakness  -neurology consulted and stroke work up if needed based on their evaluation   -anti-platelets if recommended by neuro   -discussed with neurology. Low suspicion for acute CVA. MRI  negative for stroke      Hx of seizure? Noted in outside records,  - not on AED's     Hs of Small cell lung cancer   - parotid involvement, has had radiation therapy   - follows at [facility]      Gout  -he's  was on colchicine (stopped as OP), no signs of gout     Fluids: stopped  DVT prophylaxis: lovenox daily        MEDICATIONS ADMINISTERED IN LAST 1 DAY:  atorvastatin (Lipitor) tab 80 mg       Date Action Dose Route User    4/14/2024 2047 Given 80 mg Oral Kristen Mc RN          calcium carbonate (Tums) chewable tab 1,000 mg       Date Action Dose Route User    4/14/2024 2047 Given 1,000 mg Oral Kristen Mc RN          carvedilol (Coreg) tab 12.5 mg       Date Action Dose Route User    4/14/2024 1800 Given 12.5 mg Oral Kay Coppola RN          cephalexin (Keflex) cap 500 mg       Date Action Dose Route User    4/14/2024 2044 Given 500 mg Oral Kristen Mc RN          divalproex DR (Depakote Sprinkle) sprinkle cap 250 mg       Date Action Dose Route User    4/14/2024 2044 Given 250 mg Oral Kristen Mc RN          docusate sodium (Colace) cap 100 mg       Date Action Dose Route User    4/14/2024 2047 Given 100 mg Oral Kristen Mc RN          glycerin-hypromellose- (Artificial Tears) 0.2-0.2-1 % ophthalmic solution 1 drop       Date Action Dose Route User    4/14/2024 2049 Given 1 drop Both Eyes Kristen Mc RN          haloperidol lactate (Haldol) 5 MG/ML injection 2 mg        Date Action Dose Route User    4/14/2024 2054 Given 2 mg Intravenous Kristen Mc RN    4/14/2024 1703 Given 2 mg Intravenous Kay Coppola RN          melatonin cap/tab 5 mg       Date Action Dose Route User    4/14/2024 2042 Given 5 mg Oral Kristen Mc RN     thiamine (Vitamin B1) tab 200 mg       Date Action Dose Route User    4/14/2024 2047 Given 200 mg Oral Kristen Mc RN            Vitals (last day)       Date/Time Temp Pulse Resp BP SpO2 Weight O2 Device O2 Flow Rate (L/min) [facility]    04/15/24 1049 97.6 °F (36.4 °C) 67 18 147/78 95 % -- None (Room air) --     04/15/24 0600 97.5 °F (36.4 °C) 83 17 134/77 95 % -- None (Room air) --     04/14/24 2041 97.2 °F (36.2 °C) 84 17 142/71 97 % -- None (Room air) --     04/14/24 1429 97.2 °F (36.2 °C) 70 18 123/71 95 % -- None (Room air) --     04/14/24 1001 97.2 °F (36.2 °C) 77 18 127/82 95 % -- None (Room air) --     04/14/24 0448 97.4 °F (36.3 °C) 66 20 140/74 95 % -- None (Room air) -- VG

## 2024-04-15 NOTE — PROGRESS NOTES
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 The patient has been demonstrating confusion, restlessness, agitation. Patient indicated for psych consult for evaluation and advise.    Consult Duration     The patient seen for over 25-minute, follow-up evaluation, over 50% counseling and coordinating care addressing confusion, restlessness.    Record reviewed, communication with attending, communication with RN and patient seen face to face evaluation.  History of Present Illness:      According to the team the patient has been demonstrating improvement in as needed interaction with no agitation reported and ability to be redirectable.    The patient laying down in his bed and sitter at bedside.  The patient eating his yogurt by himself with no tremors and said he has not.  Patient stopping his food to shake my hand and was polite.  Patient answered with commonsense answer with no specification.    Otherwise the patient continue demonstrate disorientation to place, date or condition.  Patient has not been demonstrating any agitation.  Patient reporting that he is feeling well.  Patient has been eating well.    Staff indicating that the patient has been sleeping well with no agitation or bizarre behavior and he is redirectable.  Patient reporting that his nurse is great and has been very helpful to him.    The patient continued demonstrating cognitive distortion, cognitive impairment, memory loss but not acting bizarre or demonstrating response to internal stimuli.    Patient demonstrating apathy with limited ability to understand his emotion otherwise with a smile and friendly engagement.  Patient denying any auditory or visual hallucination.  Patient denying any homicidal or suicidal ideation.  Patient  continued demonstrating tangential thought process.  Otherwise overall significant improvement.    Past Psychiatric/Medication History:  1. Prior diagnoses: Previous delirium episode.  2. Past psychiatric inpatient: Multiple admission to medical inpatient for altered mental status.  3. Past outpatient history: None  4. Past suicide history: None  5. Medication history: None    Social History:   Patient living in independent living facility    Family History:  Unknown  Medical History:   Past Medical History  Past Medical History:    Abnormal CT scan    Acute gout involving toe of left foot    Altered mental status    Anemia    Asthma in adult (HCC)    Cancer of parotid gland (HCC)    Formatting of this note might be different from the original.   Resection 2019    Catatonia    Cognitive communication deficit    Cystic disease of liver    Dementia (HCC)    Difficult airway    Dyslipidemia    Elevated white blood cell count, unspecified    Encephalopathy    Essential hypertension    Gastro-esophageal reflux disease with esophagitis, without bleeding    Gout, chronic    Gross hematuria    Hypercholesterolemia    Hyperlipidemia    Leukocytosis    Liver cyst    Metabolic encephalopathy    Neuroendocrine carcinoma, high grade (HCC)    Other abnormalities of gait and mobility    Other psoriasis    Other seizures (HCC)    Other specified disorders of kidney and ureter    Parotid neoplasm    Formatting of this note might be different from the original.   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   Keppra     Seizure disorder (HCC)    Toxic metabolic encephalopathy    Transient global amnesia    Formatting of this note might be different from the original.   Brief -Plavix    Unspecified dementia, unspecified severity, without behavioral disturbance, psychotic disturbance, mood disturbance, and anxiety (HCC)    Vascular dementia (HCC)    Vascular dementia, unspecified severity, without behavioral disturbance, psychotic disturbance, mood disturbance, and anxiety (HCC)    Weakness    Weakness of left upper extremity       Past Surgical History  History reviewed. No pertinent surgical history.    Family History  No family history on file.    Social History  Social History     Socioeconomic History    Marital status: Single   Tobacco Use    Smoking status: Former     Current packs/day: 0.00     Average packs/day: 1 pack/day for 25.0 years (25.0 ttl pk-yrs)     Types: Cigarettes     Start date: 1965     Quit date: 1990     Years since quittin.2    Tobacco comments:      Cigarettes  25 Quit:     Social Determinants of Health     Financial Resource Strain: Low Risk  (2021)    Received from [facility]    Overall Financial Resource Strain (CARDIA)     Difficulty of Paying Living Expenses: Not hard at all   Food Insecurity: Unknown (3/31/2024)    Food Insecurity     Food Insecurity: Patient unable to answer   Transportation Needs: Unknown (3/31/2024)    Transportation Needs     Lack of Transportation: Patient unable to answer    Received from [facility]    Social Connections   Housing Stability: Unknown (3/31/2024)    Housing Stability     Housing Instability: Patient unable to answer           Current Medications:  Current Facility-Administered Medications   Medication Dose Route Frequency    finasteride (Proscar) tab 5 mg  5 mg Oral Daily    cephalexin (Keflex) cap 500 mg  500 mg Oral Q8H BHASKAR    ipratropium-albuterol (Duoneb) 0.5-2.5 (3) MG/3ML inhalation solution 3  mL  3 mL Nebulization Q6H PRN    melatonin cap/tab 5 mg  5 mg Oral Nightly    sennosides (Senokot) tab 17.2 mg  17.2 mg Oral Nightly    polyethylene glycol (PEG 3350) (Miralax) 17 g oral packet 17 g  17 g Oral Daily    tamsulosin (Flomax) cap 0.8 mg  0.8 mg Oral Daily    glycerin-hypromellose- (Artificial Tears) 0.2-0.2-1 % ophthalmic solution 1 drop  1 drop Both Eyes TID    thiamine (Vitamin B1) tab 200 mg  200 mg Oral BID    docusate sodium (Colace) cap 100 mg  100 mg Oral BID    polyethylene glycol (PEG 3350) (Miralax) 17 g oral packet 17 g  17 g Oral Daily PRN    magnesium hydroxide (Milk of Magnesia) 400 MG/5ML oral suspension 30 mL  30 mL Oral Daily PRN    bisacodyl (Dulcolax) 10 MG rectal suppository 10 mg  10 mg Rectal Daily PRN    divalproex DR (Depakote Sprinkle) sprinkle cap 250 mg  250 mg Oral BID    OLANZapine (ZyPREXA) tab 5 mg  5 mg Oral Nightly    calcium carbonate (Tums) chewable tab 1,000 mg  1,000 mg Oral TID    hydrALAZINE (Apresoline) tab 25 mg  25 mg Oral Q8H PRN    amLODIPine (Norvasc) tab 10 mg  10 mg Oral Daily    haloperidol lactate (Haldol) 5 MG/ML injection 2 mg  2 mg Intravenous Q2H PRN    carvedilol (Coreg) tab 12.5 mg  12.5 mg Oral BID with meals    fluticasone furoate-vilanterol (Breo Ellipta) 200-25 MCG/ACT inhaler 1 puff  1 puff Inhalation Daily    aspirin chewable tab 81 mg  81 mg Oral Daily    acetaminophen (Tylenol) tab 650 mg  650 mg Oral Q4H PRN    ondansetron (Zofran) 4 MG/2ML injection 4 mg  4 mg Intravenous Q6H PRN    atorvastatin (Lipitor) tab 80 mg  80 mg Oral Nightly    enoxaparin (Lovenox) 40 MG/0.4ML SUBQ injection 40 mg  40 mg Subcutaneous Daily       Medications Prior to Admission   Medication Sig    albuterol 108 (90 Base) MCG/ACT Inhalation Aero Soln Inhale 2 puffs into the lungs every 4 (four) hours as needed for Wheezing.    carvedilol 12.5 MG Oral Tab Take 1 tablet (12.5 mg total) by mouth 2 (two) times daily with meals.    clopidogrel 75 MG Oral Tab Take  1 tablet (75 mg total) by mouth daily.    tamsulosin 0.4 MG Oral Cap Take 1 capsule (0.4 mg total) by mouth daily.    acetaminophen 325 MG Oral Tab Take 2 tablets (650 mg total) by mouth every 6 (six) hours as needed for Pain.    fluticasone-salmeterol 250-50 MCG/ACT Inhalation Aerosol Powder, Breath Activated Inhale 1 puff into the lungs 2 (two) times daily.       Allergies  Allergies   Allergen Reactions    Risperidone WHEEZING       Review of Systems:   As by Admitting/Attending    Results:   Laboratory Data:  Lab Results   Component Value Date    WBC 11.7 (H) 04/12/2024    HGB 15.0 04/12/2024    HCT 46.4 04/12/2024    .0 04/12/2024    CREATSERUM 1.07 04/12/2024    BUN 21 04/12/2024     04/12/2024    K 3.5 04/12/2024    K 3.5 04/12/2024     04/12/2024    CO2 26.0 04/12/2024    GLU 92 04/12/2024    CA 9.4 04/12/2024    ALB 3.8 04/10/2024    ALKPHO 65 04/10/2024    TP 6.6 04/10/2024    AST 20 04/10/2024    ALT 12 04/10/2024    TSH 3.927 04/03/2024    MG 2.0 04/12/2024    B12 470 04/03/2024         Imaging:  No results found.    Vital Signs:   Blood pressure 134/77, pulse 83, temperature 97.5 °F (36.4 °C), temperature source Axillary, resp. rate 17, height 68\", weight 82.1 kg (180 lb 14.4 oz), SpO2 95%.    Mental Status Exam:   Appearance: Stated age male, in hospital gown, laying down in hospital bed.    Psychomotor: Patient has been cooperative with no agitation or restlessness and able to shake my hand and engage friendly.  Orientation: Alert and oriented to self but not to location, date or exact condition.  Gait: Not evaluated.  Attitude/Coorperation: Patient made an effort to cooperate and presenting polite and attentive.  Behavior: No agitation has been reported.  Speech: No dysarthria.  Mood: Reporting feeling better.  Affect: Slightly blunted affect.  Thought process: Tangential thought process.  Thought content: No reports of  suicidal or homicidal ideation.  Perceptions: Patient has  been demonstrating response to internal stimuli hallucinating.   Concentration: Grossly impaired with tangential thought process.  Memory: Grossly impaired.  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  Intellect: Impaired due to vascular dementia.  Judgment and Insight: Impaired.  Patient demonstrating cognitive impairment.    Impression:     Delirium due to other medical condition.  Episodic mood disorder.  Rule out vascular dementia with behavioral disturbance.  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  Although the etiology still not discovered, the patient had a few episodes of delirium in [facility].    The patient today has been demonstrating increased alternation in his mood, restlessness, irritability, delusional ideation, distractibility, disorientation and has been demonstrating delirious episode.  Imaging indicate vascular ischemic changes.    The patient has been demonstrating delirium episode with alternation in mood and cognition with episodes of  increased confusion, restlessness, agitation and response to internal stimuli.     4/3/2024: The patient today continues to demonstrate confusion, restlessness, agitation. He remains in soft restraints. Vitamin B12 and TSH are within normal limits.    4/4/2024: Patient titrating sedation.  Patient is anticipated to have LP today    4/5/2024: The patient with multiple episodes of psychosis, agitation and deterioration in his cognition continued demonstrate alternation in his mood and interaction.      4/6/2024: The  patient has been demonstrating less agitation and with no restraint.    4/7/2024: The patient has been demonstrating improvement in his demeanor and mood with less agitation otherwise the patient with severe dementia and continues to be disoriented.    4/8/2024: The patient has been demonstrating improvement in his demeanor and interaction but has been demonstrating increased sedation.    4/9/2024: The patient has been demonstrating improvement in his cognition and cooperation with no agitation otherwise continue demonstrate some disorientation, memory loss and impairment in his insight.    4/10/2024: The patient demonstrating improvement in mood, interaction, and cooperation otherwise continues to demonstrate disorientation.     4/11/2024: Patient has been reasonably stable on the current medication.    4/15/2024: The patient continued demonstrating stable mood with no agitation otherwise continuous impairment in his cognition and physical function indicating rehab.    Discussed risk and benefit, acknowledging the current symptom and severity.  At this point, I would recommend the following approach:     Focus on safety.  Patient has been demonstrating establish safety.  Discontinue sitter.  Focus on education and support.  Focus on insight orientation helping the patient understand diagnosis and treatment plan.  Continue Zyprexa 5 mg nightly.  Continue Depakote 250 mg twice daily.  Thiamine 200 mg twice daily orally.  Utilize Haldol 2 mg IV every 2 hours as needed for agitation.  Utilize Ativan 1 mg every 6 hours as needed for anxiety.  Coordinate plan with team    Orders This Visit:  Orders Placed This Encounter   Procedures    Basic Metabolic Panel (8)    CBC With Differential With Platelet    Troponin I (High Sensitivity)    Urinalysis, Routine    Basic Metabolic Panel (8)    CBC With Differential With Platelet    Potassium    Potassium    Lipid Panel    Hemoglobin A1C    Basic Metabolic Panel (8)    CBC With  Differential With Platelet    Magnesium    Vitamin B12    TSH W Reflex To Free T4    CBC With Differential With Platelet    Basic Metabolic Panel (8)    Magnesium    Glucose, Serum    Protein, Total, Serum    CJD 14-3-3 Protein Tau Total, Cerebrospinal Fluid    MD BLOOD SMEAR CONSULT    Basic Metabolic Panel (8)    CBC With Differential With Platelet    Magnesium    Potassium    Miscellaneous Testing    Miscellaneous Testing    Miscellaneous Testing    Miscellaneous Testing    Miscellaneous Testing    Miscellaneous Testing    Miscellaneous Testing    Miscellaneous Testing    CBC With Differential With Platelet    Basic Metabolic Panel (8)    Magnesium    Scan slide    Potassium    Basic Metabolic Panel (8)    Potassium    CBC With Differential With Platelet    Basic Metabolic Panel (8)    Magnesium    Urinalysis with Culture Reflex    Magnesium    Potassium    CBC With Differential With Platelet    Basic Metabolic Panel (8)    Magnesium    Hepatic Function Panel (7)    Basic Metabolic Panel (8)    CBC With Differential With Platelet    Magnesium    Potassium    Basic Metabolic Panel (8)    CBC With Differential With Platelet    Magnesium    Potassium    Lyme Disease Antibodies by Western Blot    SARS-CoV-2/Flu A and B/RSV by PCR (GeneXpert)    $$$$Respiratory Flu Expanded Panel + Covid-19$$$$    Urine Culture, Routine       Meds This Visit:  Requested Prescriptions      No prescriptions requested or ordered in this encounter       Anselmo Lawson MD  4/15/2024    Note to Patient: The 21st Century Cures Act makes medical notes like these available to patients in the interest of transparency. However, be advised this is a medical document. It is intended as peer to peer communication. It is written in medical language and may contain abbreviations or verbiage that are unfamiliar. It may appear blunt or direct. Medical documents are intended to carry relevant information, facts as evident, and the clinical opinion of  the practitioner. This note may have been transcribed using a voice dictation system. Voice recognition errors may occur. This should not be taken to alter the content or meaning of this note.

## 2024-04-15 NOTE — PLAN OF CARE
1:1 sitter discontinued today    Problem: Patient Centered Care  Goal: Patient preferences are identified and integrated in the patient's plan of care  Description: Interventions:  - What would you like us to know as we care for you? From [facility]  - Provide timely, complete, and accurate information to patient/family  - Incorporate patient and family knowledge, values, beliefs, and cultural backgrounds into the planning and delivery of care  - Encourage patient/family to participate in care and decision-making at the level they choose  - Honor patient and family perspectives and choices  Outcome: Progressing     Problem: Patient/Family Goals  Goal: Patient/Family Long Term Goal  Description: Patient's Long Term Goal: discharge    Interventions:  - Monitor vital signs  - Monitor neurological status  - Ambulate as tolerated  - See additional Care Plan goals for specific interventions  Outcome: Progressing  Goal: Patient/Family Short Term Goal  Description: Patient's Short Term Goal: feel better    Interventions:   - Verbalize needs and wants  - Antianxiety medications as needed  - See additional Care Plan goals for specific interventions  Outcome: Progressing     Problem: Delirium  Goal: Minimize duration of delirium  Description: Interventions:  - Encourage use of hearing aids, eye glasses  - Promote highest level of mobility daily  - Provide frequent reorientation  - Promote wakefulness i.e. lights on, blinds open  - Promote sleep, encourage patient's normal rest cycle i.e. lights off, TV off, minimize noise and interruptions  - Encourage family to assist in orientation and promotion of home routines  Outcome: Progressing     Problem: METABOLIC/FLUID AND ELECTROLYTES - ADULT  Goal: Electrolytes maintained within normal limits  Description: INTERVENTIONS:  - Monitor labs and rhythm and assess patient for signs and symptoms of electrolyte imbalances  - Administer electrolyte replacement  as ordered  - Monitor response to electrolyte replacements, including rhythm and repeat lab results as appropriate  - Fluid restriction as ordered  - Instruct patient on fluid and nutrition restrictions as appropriate  Outcome: Progressing     Problem: MUSCULOSKELETAL - ADULT  Goal: Return mobility to safest level of function  Description: INTERVENTIONS:  - Assess patient stability and activity tolerance for standing, transferring and ambulating w/ or w/o assistive devices  - Assist with transfers and ambulation using safe patient handling equipment as needed  - Ensure adequate protection for wounds/incisions during mobilization  - Obtain PT/OT consults as needed  - Advance activity as appropriate  - Communicate ordered activity level and limitations with patient/family  Outcome: Progressing     Problem: NEUROLOGICAL - ADULT  Goal: Achieves stable or improved neurological status  Description: INTERVENTIONS  - Assess for and report changes in neurological status  - Initiate measures to prevent increased intracranial pressure  - Maintain blood pressure and fluid volume within ordered parameters to optimize cerebral perfusion and minimize risk of hemorrhage  - Monitor temperature, glucose, and sodium. Initiate appropriate interventions as ordered  Outcome: Progressing  Goal: Absence of seizures  Description: INTERVENTIONS  - Monitor for seizure activity  - Administer anti-seizure medications as ordered  - Monitor neurological status  Outcome: Progressing  Goal: Remains free of injury related to seizure activity  Description: INTERVENTIONS:  - Maintain airway, patient safety  and administer oxygen as ordered  - Monitor patient for seizure activity, document and report duration and description of seizure to MD/LIP  - If seizure occurs, turn patient to side and suction secretions as needed  - Reorient patient post seizure  - Seizure pads on all 4 side rails  - Instruct patient/family to notify RN of any seizure  activity  - Instruct patient/family to call for assistance with activity based on assessment  Outcome: Progressing  Goal: Achieves maximal functionality and self care  Description: INTERVENTIONS  - Monitor swallowing and airway patency with patient fatigue and changes in neurological status  - Encourage and assist patient to increase activity and self care with guidance from PT/OT  - Encourage visually impaired, hearing impaired and aphasic patients to use assistive/communication devices  Outcome: Progressing     Problem: Safety Risk - Non-Violent Restraints  Goal: Patient will remain free from self-harm  Description: INTERVENTIONS:  - Apply the least restrictive restraint to prevent harm  - Notify patient and family of reasons restraints applied  - Assess for any contributing factors to confusion (electrolyte disturbances, delirium, medications)  - Discontinue any unnecessary medical devices as soon as possible  - Assess the patient's physical comfort, circulation, skin condition, hydration, nutrition and elimination needs   - Reorient and redirection as needed  - Assess for the need to continue restraints  Outcome: Progressing     Problem: Risk for Violence/Aggression  Goal: Absence of Violence/Aggression  Description: INTERVENTIONS:   - Identify precipitating factors for behavior   - Notify Charge RN/Provider   - Consider decreasing stimulation   - Consider distraction measures   - Consider discussion with provider regarding prn meds    - Consider GABRIEL (Moderate Risk only)   - Consider Code Support (High Risk only)   - Consider room safety checks   - Consider restraints  Outcome: Progressing     Problem: Risk for Violence-Violent Restraints/Seclusion  Goal: Patient will not express any violent or self-destructive behaviors  Description: Interventions:  - Apply the least restrictive restraint to prevent harm if patient strikes out and is not responding to redirection  - Notify patient and family of reasons  restraints applied  - Assist the patient to identify the precipitating event  - Assist the patient to identify alternatives to physically acting out  - Assess for any contributing factors to violent behaviors (substances,  medications, history of trauma)  - Assess the patient's physical comfort, circulation, skin condition, hydration, nutrition and elimination needs   - Assess for the need to continue restraints  - Evaluate the need for an emergency medication  Outcome: Progressing

## 2024-04-15 NOTE — PROGRESS NOTES
[facility] Hospitalist Progress Note     CC: Hospital Follow up    PCP: No primary care provider on file.       Assessment/Plan:   Mr. 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     Altered mental status   Metabolic encephalopathy  Delirium in setting of likely vascular dementia  -unclear, ddx included metabolic encephalopathy, infection, stroke, post ictal state on NPH, frontotemporal dementia?  -has been admitted 3 other times (last in 2/2023, at [place]) with similar presentation, extensive work up   -Per POA cognitive status returned back to baseline and his prior hospitalizations  -neurology and psychiatry consulted  -initial CT imaging stable  -TSH, B12 normal  -MRI brain without acute infarct advanced small vessel disease, poss enlargement of ventricles  -[facility] GABRIEL called on 4/2 in am, Psyc consulted  -concern for NPH, s/p lumbar puncture on 4/4, had physical therapy preceding and post LP, opening pressure was normal (12) minute and cognitive state or functional state did not improve  -Discussed with neurology, and neurosurgery, unlikely that this is NPH given lack of improvement post LP, also atypical presentation, neurosurgery recommending outpatient follow-up if suspicion is still high for NPH  -LP studies pending given CJD testing, encephalitis panel negative  -psychiatry adjusting medications  - has been off restraints for several days, does have safety sitter in room poss consider DC sitter in 1-2 days    Hypernatremia / DAWN  Urinary  retention  Poss UTI  - urinary retention, bladder scan with 1600, straight Q6  - hold on you given high risk for pulling out  - continue flomax, bowel regimen, mobilize, minimizing contributing meds  - UA with sediment, IV ceftriaxone ordered, culture with poss contaminant, but has been straight cathed several times, will continue ab's to complete 7 day course (especially with leucocytosis, and suprapubic pain, now improved)  - ceftraixone (started on 4/9) -> transitioned to oral keflex   - flomax 0.8mg daily  - had multiple BM on 4/10-11, improved mobility, offending medications (thorazine stopped, haldol minimized).  - continued retention, so urology was consulted   - would like to avoid you given risk of pulling this out  - started on finasteride however this may not start working immediately     Constipation  - KUB with large stool burden  - bowel regimen ordered--> several BM on 4/10-11    Pink eye / right eye radiation   - 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 Add to allergy list  - steroids, nebs, avoid IV benadryl given severe agitation  - CXR negative   - RVP neg    Leucocytosis   - poss secondary to IV steroids  - no fevers  - will continue to monitor  - steroid stopped  - improved      Hx of Hypertension  -started on amlodipine  -coreg noted on home meds, resumed   -consider re-ordering ACE or ARB if renal function stable  - BP improved     Possible left sided facial droop and weakness  -neurology consulted and stroke work up if needed based on their evaluation   -anti-platelets if recommended by neuro   -discussed with neurology. Low suspicion for acute CVA. MRI  negative for stroke      Hx of seizure? Noted in outside records,  - not on AED's    Hs of  Small cell lung cancer   - parotid involvement, has had radiation therapy   - follows at [place]      Gout  -he's  was on colchicine (stopped as OP), no signs of gout     Fluids: stopped  DVT prophylaxis: lovenox daily   Code status: FULL   Discharge planning in progress    [facility] Hospitalist      Subjective:     Patient awake. Appears pleasant. Not agitated. Sitter at bedside.     OBJECTIVE:    Blood pressure 147/78, pulse 67, temperature 97.6 °F (36.4 °C), temperature source Axillary, resp. rate 18, height 5' 8\" (1.727 m), weight 180 lb 14.4 oz (82.1 kg), SpO2 95%.    Temp:  [97.2 °F (36.2 °C)-97.6 °F (36.4 °C)] 97.6 °F (36.4 °C)  Pulse:  [67-84] 67  Resp:  [17-18] 18  BP: (123-147)/(71-78) 147/78  SpO2:  [95 %-97 %] 95 %      Intake/Output:    Intake/Output Summary (Last 24 hours) at 4/15/2024 1229  Last data filed at 4/15/2024 0328  Gross per 24 hour   Intake 30 ml   Output 3 ml   Net 27 ml       Last 3 Weights   03/31/24 1311 180 lb 14.4 oz (82.1 kg)   03/31/24 1117 177 lb 0.5 oz (80.3 kg)       Exam    Gen:  calm  Pulm: Lungs clear  CV: Heart with regular rate and rhythm  Abd: Abdomen soft, no abd pain, no pain with palpation  Ext with edema   Neuro: moving all 4 ext    Data Review:       Labs:     Recent Labs   Lab 04/10/24  0554 04/11/24  0708 04/12/24  0648   RBC 5.14 4.72 5.20   HGB 15.4 14.4 15.0   HCT 44.5 41.9 46.4   MCV 86.6 88.8 89.2   MCH 30.0 30.5 28.8   MCHC 34.6 34.4 32.3   RDW 12.6 12.9 12.7   NEPRELIM 11.20* 8.71* 9.32*   WBC 13.5* 11.3* 11.7*   .0 334.0 351.0         Recent Labs   Lab 04/10/24  0554 04/11/24  0708 04/12/24  0648   GLU 80 86 92   BUN 19 26* 21   CREATSERUM 1.17 1.20 1.07   EGFRCR 67 65 75   CA 9.7 9.6 9.4    144 143   K 3.1* 3.3*  3.3* 3.5  3.5    109 109   CO2 28.0 27.0 26.0       Recent Labs   Lab 04/10/24  0554   ALT 12   AST 20   ALB 3.8         Imaging:  No results found.      Meds:      finasteride  5 mg Oral Daily     cephalexin  500 mg Oral Q8H BHASKAR    melatonin  5 mg Oral Nightly    sennosides  17.2 mg Oral Nightly    polyethylene glycol (PEG 3350)  17 g Oral Daily    tamsulosin  0.8 mg Oral Daily    glycerin-hypromellose-  1 drop Both Eyes TID    thiamine  200 mg Oral BID    docusate sodium  100 mg Oral BID    divalproex DR  250 mg Oral BID    OLANZapine  5 mg Oral Nightly    calcium carbonate  1,000 mg Oral TID    amLODIPine  10 mg Oral Daily    carvedilol  12.5 mg Oral BID with meals    fluticasone furoate-vilanterol  1 puff Inhalation Daily    aspirin  81 mg Oral Daily    atorvastatin  80 mg Oral Nightly    enoxaparin  40 mg Subcutaneous Daily           ipratropium-albuterol    polyethylene glycol (PEG 3350)    magnesium hydroxide    bisacodyl    hydrALAZINE    haloperidol lactate    acetaminophen **OR** [DISCONTINUED] acetaminophen    ondansetron

## 2024-04-16 NOTE — DIETARY NOTE
ADULT NUTRITION INITIAL ASSESSMENT      RECOMMENDATIONS TO MD: See Nutrition Intervention    Pt is at moderate nutrition risk.  Pt meets moderate malnutrition criteria.      CRITERIA FOR MALNUTRITION DIAGNOSIS: Criteria for non-severe malnutrition diagnosis: acute illness/injury related to energy intake less than 75% for greater than 7 days, body fat mild depletion, muscle mass mild depletion, and 9% significant wt loss in the past 2 weeks.  .     ADMITTING DIAGNOSIS:  Neurological deficit present [R29.818]  Altered mental status, unspecified altered mental status type [R41.82]     PERTINENT PAST MEDICAL HISTORY:    Past Medical History:    Abnormal CT scan    Acute gout involving toe of left foot    Altered mental status    Anemia    Asthma in adult (HCC)    Cancer of parotid gland (HCC)    Formatting of this note might be different from the original.   Resection 2019    Catatonia    Cognitive communication deficit    Cystic disease of liver    Dementia (HCC)    Difficult airway    Dyslipidemia    Elevated white blood cell count, unspecified    Encephalopathy    Essential hypertension    Gastro-esophageal reflux disease with esophagitis, without bleeding    Gout, chronic    Gross hematuria    Hypercholesterolemia    Hyperlipidemia    Leukocytosis    Liver cyst    Metabolic encephalopathy    Neuroendocrine carcinoma, high grade (HCC)    Other abnormalities of gait and mobility    Other psoriasis    Other seizures (HCC)    Other specified disorders of kidney and ureter    Parotid neoplasm    Formatting of this note might be different from the original.   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   Keppra    Seizure disorder (HCC)    Toxic metabolic encephalopathy    Transient global amnesia    Formatting of this note might be different from the original.   Brief -Plavix    Unspecified dementia, unspecified severity, without behavioral disturbance, psychotic disturbance, mood disturbance, and anxiety (HCC)    Vascular dementia (HCC)    Vascular dementia, unspecified severity, without behavioral disturbance, psychotic disturbance, mood disturbance, and anxiety (HCC)    Weakness    Weakness of left upper extremity    has no past surgical history on file.     PATIENT STATUS: Initial 04/16/24: Patient (pt) identified at Nutrition risk due to poor po and significant wt loss  after the Re-screening process.  Diagnosis & PMH above .  Upon visit, pt conversing appropriately, states he is eating better and now drinking Ensure. Documented with variable po intake with average of 38% meal intake in 1-2 meals/day. An episode of emesis today but none thereafter. Wt Eval:    8.8% or 16# significant wt loss over the course of admission based on admit wt of 180#, compared to current wt of 164#. However, pt reported usual wt of 190# of unknown time frame. EMR and care everywhere reveals 180# in May 2023.  Nutrition findings:    Swallow eval completed today, diet upgraded. Liberalized diet from Cardiac to Regular and continuing and increased Oral Nutrition Supplement ( ONS ) to BID.  Nutrition plan discussed with pt . See Nutrition Intervention for details.       FOOD/NUTRITION INTAKE ANALYSIS:    Current Appetite: Varies  Current Intake:  average meal intake of 38% in 1-2 meals/day with variable intake of ONS.   Current Intake Meeting Needs: No, but oral nutrition supplements (ONS) to maximize  Percent Meals Eaten (last 6 days)       Date/Time Percent Meals Eaten (%)    04/10/24 1237 25 %    04/11/24 0830 40 %    04/11/24  1454 15 %    04/11/24 1704 35 %    04/12/24 0900 --     Percent Meals Eaten (%): pt refused at 04/12/24 0900    04/12/24 1500 50 %    04/14/24 1634 5 %    04/15/24 1632 50 %    04/15/24 1843 20 %    04/16/24 0935 25 %    04/16/24 1406 10 %           Food Allergies: No Known Food Allergies (NKFA)  Cultural/Ethnic/Gnosticism Preferences: None    GASTROINTESTINAL: +BM 4/15 formed brown extra large stool x 1      MEDICATIONS: reviewed       finasteride  5 mg Oral Daily    melatonin  5 mg Oral Nightly    sennosides  17.2 mg Oral Nightly    polyethylene glycol (PEG 3350)  17 g Oral Daily    tamsulosin  0.8 mg Oral Daily    glycerin-hypromellose-  1 drop Both Eyes TID    thiamine  200 mg Oral BID    docusate sodium  100 mg Oral BID    divalproex DR  250 mg Oral BID    OLANZapine  5 mg Oral Nightly    calcium carbonate  1,000 mg Oral TID    amLODIPine  10 mg Oral Daily    carvedilol  12.5 mg Oral BID with meals    fluticasone furoate-vilanterol  1 puff Inhalation Daily    aspirin  81 mg Oral Daily    atorvastatin  80 mg Oral Nightly    enoxaparin  40 mg Subcutaneous Daily       LABS:  no recent labs    Last A1c value was 5.2% done 4/1/2024.      NUTRITION RELATED PHYSICAL FINDINGS:  - Nutrition Focused Physical Exam (NFPE): no wasting noted, malnutrition related to PO intake and weight loss   - Fluid Accumulation: non-pitting Right Hand (s) see RN documentation for details  - Skin Integrity: at risk see RN documentation for details    ANTHROPOMETRICS:  HT: 172.7 cm (5' 8\")  WT: 74.4 kg (164 lb)  4/16  Admit wt 180#   BMI: Body mass index is 24.94 kg/m².  BMI CLASSIFICATION: 19-24.9 kg/m2 - WNL  IBW: 154 lbs        106% IBW  Usual Body Wt: 180 lbs on admit. EMR or care everywhere reveals 180# in May 2023 but pt reported usual wt of 190# of unknown time frame.       91% UBW  WEIGHT HISTORY:    No data found.  Wt Readings from Last 10 Encounters:   03/31/24 82.1 kg (180 lb 14.4 oz)       NUTRITION DIAGNOSIS/PROBLEM:     Moderate Malnutrition related to Acute - Physiological causes resulting in anorexia or diminished intake  as evidenced by inadequate nutrition intake of <50% (or ave 38% meal intake in 1-2 meals/day) with significant 8.8% wt loss.     NUTRITION INTERVENTION:     NUTRITION PRESCRIPTION:   Estimated Nutrition needs: Dosing wt of 74.5 kg kg --wt taken on 4/16 .  Calories: 7813-1137 calories/day (25-30 calories per kg Dosing wt)  Protein: 89-97 g protein/day (1.2-1.3 g protein/kg  Dosing wt)    - Diet:       Procedures    Regular/General diet Fluid Consistency: Thin Liquids ; Texture Consistency: Regular; Is Patient on Accuchecks? No; Is Patient on Suicide Precautions? Yes; Misc Restriction: No Straw    - RD Malnutrition Care Plan: Liberalized diet, Encouraged increased PO intake, and Initiated ONS (oral nutritional supplements)  - Medical Food Supplements- Ensure Enlive (350 calories/ 20 g protein each) BID Vanilla--added by RD,  Rational/Benefits discussed.  - Vitamin and mineral supplements: none  - Feeding assistance: meal set up  - Nutrition education: Discussed importance of adequate energy and protein intake   - Coordination of nutrition care: collaboration with other providers  - Discharge and transfer of nutrition care to new setting or provider: monitor plans, plan discharge to [facility]      MONITOR AND EVALUATE/NUTRITION GOALS:  - Food and Nutrient Intake:    Monitor: adequacy of PO intake and adequacy of supplement intake  - Food and Nutrient Administration:    Monitor: N/A  - Anthropometric Measurement:    Monitor weight  - Nutrition Goals:    halt wt loss, maintain wt within 5%, PO and supplement greater than 75% of needs, and prevent skin breakdown      RD will follow up.    Nisha Austin, RD, LDN, [facility]  Clinical Dietitian  [phone]

## 2024-04-16 NOTE — OCCUPATIONAL THERAPY NOTE
OCCUPATIONAL THERAPY TREATMENT NOTE - INPATIENT        Room Number: 561/561-A     Presenting Problem: ams    Problem List  Principal Problem:    NPH (normal pressure hydrocephalus) ([facility])  Active Problems:    Severe vascular dementia with agitation (HCC)    Delirium due to another medical condition    Episodic mood disorder (HCC)    TIA (transient ischemic attack)      OCCUPATIONAL THERAPY ASSESSMENT   Patient demonstrates limited progress this session, goals remain in progress.    Patient continues to function below baseline with adls and functional mobility  Contributing factors to remaining limitations include decreased functional strength, decreased endurance, impaired sitting balance, and decreased safety awareness.  Next session anticipate patient to progress bed mobility, transfers, stating sitting balance, and static standing balance.  Occupational Therapy will continue to follow patient for duration of hospitalization.    Patient continues to benefit from continued skilled OT services: to promote return to prior level of function and safety with continuous assistance and gradual rehabilitative therapy .     PLAN  OT Treatment Plan: Balance activities;Functional transfer training;Endurance training;Patient/Family education;Patient/Family training  OT Device Recommendations: TBD    SUBJECTIVE  \"I'm sorry I can't walk\"    OBJECTIVE  Precautions: Bed/chair alarm (sitter present)    WEIGHT BEARING RESTRICTION     PAIN ASSESSMENT  Ratin    ACTIVITY TOLERANCE  good    O2 SATURATIONS  Activity on room air    ACTIVITIES OF DAILY LIVING ASSESSMENT  AM-PAC ‘6-Clicks’ Inpatient Daily Activity Short Form  How much help from another person does the patient currently need…  -   Putting on and taking off regular lower body clothing?: A Lot  -   Bathing (including washing, rinsing, drying)?: A Lot  -   Toileting, which includes using toilet, bedpan or urinal? : Total  -   Putting on and taking off regular upper body  clothing?: A Lot  -   Taking care of personal grooming such as brushing teeth?: A Lot  -   Eating meals?: A Lot    AM-PAC Score:  Score: 11  Approx Degree of Impairment: 70.42%  Standardized Score (AM-PAC Scale): 29.04  CMS Modifier (G-Code): CL    FUNCTIONAL ADL ASSESSMENT  Eating: setup assist  Grooming: mod assist  UB Dressing: mod assist  LB Dressing: mod assist  Toileting: na    Skilled Therapy Provided: RN contacted prior to start of care. Treatment coordinated w/ PT. Pt agreeable to participation in therapy. Gait belt used during dynamic activity. Pt received in bed alert and oriented to self.  Pt currently requires max a to transfer supine<>sit at eob. Pt w/ left lateral lean in sitting requiring max a to correct. Pt required max a x 2 for spt bed<>chair. Pt unable to achieve static standing to assist w/ transfer    At end of session pt up in chair w/ all needs in reach and alarm on. RN aware of pt's status and performance in therapy      EDUCATION PROVIDED  Patient: Plan of Care; Functional Transfer Techniques; Fall Prevention  Patient's Response to Education: Requires Further Education    The patient's Approx Degree of Impairment: 70.42% has been calculated based on documentation in the [facility] '6 clicks' Inpatient Daily Activity Short Form.  Research supports that patients with this level of impairment may benefit from IRF.  Final disposition will be made by interdisciplinary medical team.    Patient End of Session: Up in chair;Needs met;Call light within reach;RN aware of session/findings;All patient questions and concerns addressed;Alarm set    OT Goals:   Patient will complete LE dressing with supervision  Comment: pt required max a    Patient will complete toilet transfer with supervision  Comment: pt required max a x 2 for spt bed<>chair     Patient will complete self care task at sink level with supervision    Comment: na         Goals  on: 24  Frequency: 3x week    Therapeutic Activity: 23  minutes

## 2024-04-16 NOTE — PLAN OF CARE
Problem: Patient Centered Care  Goal: Patient preferences are identified and integrated in the patient's plan of care  Description: Interventions:  - What would you like us to know as we care for you? From [facility]  - Provide timely, complete, and accurate information to patient/family  - Incorporate patient and family knowledge, values, beliefs, and cultural backgrounds into the planning and delivery of care  - Encourage patient/family to participate in care and decision-making at the level they choose  - Honor patient and family perspectives and choices  Outcome: Progressing     Problem: Patient/Family Goals  Goal: Patient/Family Long Term Goal  Description: Patient's Long Term Goal: discharge    Interventions:  - Monitor vital signs  - Monitor neurological status  - Ambulate as tolerated  - See additional Care Plan goals for specific interventions  Outcome: Progressing  Goal: Patient/Family Short Term Goal  Description: Patient's Short Term Goal: feel better    Interventions:   - Verbalize needs and wants  - Antianxiety medications as needed  - See additional Care Plan goals for specific interventions  Outcome: Progressing     Problem: Delirium  Goal: Minimize duration of delirium  Description: Interventions:  - Encourage use of hearing aids, eye glasses  - Promote highest level of mobility daily  - Provide frequent reorientation  - Promote wakefulness i.e. lights on, blinds open  - Promote sleep, encourage patient's normal rest cycle i.e. lights off, TV off, minimize noise and interruptions  - Encourage family to assist in orientation and promotion of home routines  Outcome: Progressing     Problem: METABOLIC/FLUID AND ELECTROLYTES - ADULT  Goal: Electrolytes maintained within normal limits  Description: INTERVENTIONS:  - Monitor labs and rhythm and assess patient for signs and symptoms of electrolyte imbalances  - Administer electrolyte replacement as ordered  - Monitor response  to electrolyte replacements, including rhythm and repeat lab results as appropriate  - Fluid restriction as ordered  - Instruct patient on fluid and nutrition restrictions as appropriate  Outcome: Progressing     Problem: MUSCULOSKELETAL - ADULT  Goal: Return mobility to safest level of function  Description: INTERVENTIONS:  - Assess patient stability and activity tolerance for standing, transferring and ambulating w/ or w/o assistive devices  - Assist with transfers and ambulation using safe patient handling equipment as needed  - Ensure adequate protection for wounds/incisions during mobilization  - Obtain PT/OT consults as needed  - Advance activity as appropriate  - Communicate ordered activity level and limitations with patient/family  Outcome: Progressing     Problem: NEUROLOGICAL - ADULT  Goal: Achieves stable or improved neurological status  Description: INTERVENTIONS  - Assess for and report changes in neurological status  - Initiate measures to prevent increased intracranial pressure  - Maintain blood pressure and fluid volume within ordered parameters to optimize cerebral perfusion and minimize risk of hemorrhage  - Monitor temperature, glucose, and sodium. Initiate appropriate interventions as ordered  Outcome: Progressing  Goal: Absence of seizures  Description: INTERVENTIONS  - Monitor for seizure activity  - Administer anti-seizure medications as ordered  - Monitor neurological status  Outcome: Progressing  Goal: Remains free of injury related to seizure activity  Description: INTERVENTIONS:  - Maintain airway, patient safety  and administer oxygen as ordered  - Monitor patient for seizure activity, document and report duration and description of seizure to MD/LIP  - If seizure occurs, turn patient to side and suction secretions as needed  - Reorient patient post seizure  - Seizure pads on all 4 side rails  - Instruct patient/family to notify RN of any seizure activity  - Instruct patient/family to  call for assistance with activity based on assessment  Outcome: Progressing  Goal: Achieves maximal functionality and self care  Description: INTERVENTIONS  - Monitor swallowing and airway patency with patient fatigue and changes in neurological status  - Encourage and assist patient to increase activity and self care with guidance from PT/OT  - Encourage visually impaired, hearing impaired and aphasic patients to use assistive/communication devices  Outcome: Progressing     Problem: Safety Risk - Non-Violent Restraints  Goal: Patient will remain free from self-harm  Description: INTERVENTIONS:  - Apply the least restrictive restraint to prevent harm  - Notify patient and family of reasons restraints applied  - Assess for any contributing factors to confusion (electrolyte disturbances, delirium, medications)  - Discontinue any unnecessary medical devices as soon as possible  - Assess the patient's physical comfort, circulation, skin condition, hydration, nutrition and elimination needs   - Reorient and redirection as needed  - Assess for the need to continue restraints  Outcome: Progressing     Problem: Risk for Violence/Aggression  Goal: Absence of Violence/Aggression  Description: INTERVENTIONS:   - Identify precipitating factors for behavior   - Notify Charge RN/Provider   - Consider decreasing stimulation   - Consider distraction measures   - Consider discussion with provider regarding prn meds    - Consider GABRIEL (Moderate Risk only)   - Consider Code Support (High Risk only)   - Consider room safety checks   - Consider restraints  Outcome: Progressing     Problem: Risk for Violence-Violent Restraints/Seclusion  Goal: Patient will not express any violent or self-destructive behaviors  Description: Interventions:  - Apply the least restrictive restraint to prevent harm if patient strikes out and is not responding to redirection  - Notify patient and family of reasons restraints applied  - Assist the patient to  identify the precipitating event  - Assist the patient to identify alternatives to physically acting out  - Assess for any contributing factors to violent behaviors (substances,  medications, history of trauma)  - Assess the patient's physical comfort, circulation, skin condition, hydration, nutrition and elimination needs   - Assess for the need to continue restraints  - Evaluate the need for an emergency medication  Outcome: Progressing

## 2024-04-16 NOTE — SLP NOTE
SPEECH DAILY NOTE - INPATIENT    ASSESSMENT & PLAN   ASSESSMENT      Proper PPE worn. Hands sanitized upon entrance/exit Pt room.         Pt alert, afebrile and on room air. Pt seen for swallow analysis per BSE recommendations (after consulting with RN). Pt agreed to participate. Pt's preferred method of learning verbal. Pt upright in bed; observed with current diet of pureed/thin liquids for monitoring diet tolerance. Additionally, Pt seen with solid trials for candidacy of diet upgrade. Swallowing precautions/strategies discussed; mild cues needed for execution. Functional bite reflex/mastication on solid trials. Lingual skills functional for bolus control pureed and solids. No significant/mild oral retention. Pharyngeal response appeared to trigger within 1-2 sec per hyolaryngeal elevation to completion (functional rise/strength per palpation). No overt CSA on pureed, solid nor thin liquids (no coughing, no throat clearing, clear vocal quality and no SOB). Diet upgraded to SOLID/thin liquids/no straw/pills WHOLE in pureed. Collaborated with RN regarding Pt's swallowing plan of care. Per RN, Pt with good tolerance of current diet. No report of difficulty taking meds. No new CXR completed. Sp02 ~96% during this session. Call light within Pt's reach upon SLP discharge from room.      CXR 4/09/24:  CONCLUSION:    No focal opacity, pleural effusion, or pneumothorax.     PLAN:    To f/u x1-2 sessions to ensure safe intake of NEW UPGRADED diet and reinforce swallowing/aspiration precautions. Family education as available.       Diet Recommendations - Solids: Regular  Diet Recommendations - Liquids: Thin Liquids    Compensatory Strategies Recommended: No straws  Aspiration Precautions: Upright position;Slow rate;Small bites and sips;No straw  Medication Administration Recommendations: Whole in puree    Patient Experiencing Pain: No  Treatment Plan  Treatment Plan/Recommendations: Aspiration precautions  Interdisciplinary  Communication: Discussed with RN    GOALS  Goal #1 Patient will participate in ongoing clinical swallow evaluation as indicated/appropriate.     GOAL MET 4/01     Goal #2 The patient will tolerate puree consistency and thin liquids without overt signs or symptoms of aspiration with 100 % accuracy over 2-3 session(s).    No CSA on current diet.       GOAL MET 4/16     Goal #3 The patient/family/caregiver will demonstrate understanding and implementation of aspiration precautions and swallow strategies independently over 3 session(s).    Swallowing precautions posted in room.     In Progress   Goal #4 The patient will tolerate trial upgrade of minced and moist/BS/solid  consistency and thin liquids without overt signs or symptoms of aspiration with 100 % accuracy over 2-3 session(s).    Diet upgraded to SOLID consistency 4/16/24.    In Progress   Goal #5 The patient will utilize compensatory strategies as outlined by  BSSE (clinical evaluation) including Slow rate, Small bites, Small sips, Alternate liquids/solids, No straws, Upright 90 degrees, Eliminate distractions, Feed patient with 1:1 feed assistance 100 % of the time across 2 sessions.    Pt self fed oral intake. Mild cues for controlled amts.      In Progress   FOLLOW UP  Follow Up Needed (Documentation Required): Yes  SLP Follow-up Date: 04/17/24  Number of Visits to Meet Established Goals:  (1-2 on new diet)    Session: 2 on pureed diet    If you have any questions, please contact   Yumiko Cook M.S. [facility]SLP  Speech-Language Pathologist  [facility]  #[phone]

## 2024-04-16 NOTE — PROGRESS NOTES
[facility] Hospitalist Progress Note     CC: Hospital Follow up    PCP: No primary care provider on file.       Assessment/Plan:   Mr. 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     Altered mental status   Metabolic encephalopathy  Delirium in setting of likely vascular dementia  -IMPROVED  -unclear, ddx included metabolic encephalopathy, infection, stroke, post ictal state on NPH, frontotemporal dementia?  -has been admitted 3 other times (last in 2/2023, at [place]) with similar presentation, extensive work up   -neurology and psychiatry consulted  -MRI brain without acute infarct advanced small vessel disease, poss enlargement of ventricles  -CODE GABRIEL called on 4/2 in am, Psyc consulted  -concern for NPH, s/p lumbar puncture on 4/4, had physical therapy preceding and post LP, opening pressure was normal (12) minute and cognitive state or functional state did not improve  -Discussed with neurology, and neurosurgery, unlikely that this is NPH given lack of improvement post LP, also atypical presentation, neurosurgery recommending outpatient follow-up if suspicion is still high for NPH  -LP studies pending given CJD testing, encephalitis panel negative  -psychiatry managing psychiatric and behavorial meds   -has been off restraints for several days, sitter Dc'd on 4/15    Hypernatremia / DAWN  Urinary retention  Poss UTI  - urinary retention, bladder scan with 1600, straight Q6  - hold on you given high risk for pulling out  - continue flomax,  bowel regimen, mobilize, minimizing contributing meds  - UA with sediment, IV ceftriaxone ordered, culture with poss contaminant, but has been straight cathed several times, will continue ab's to complete 7 day course (especially with leucocytosis, and suprapubic pain, now improved)  - ceftraixone (started on 4/9) -> transitioned to oral keflex   - flomax 0.8mg daily  - had multiple BM on 4/10-11, improved mobility, offending medications (thorazine stopped, haldol minimized).  - continued retention, so urology was consulted   - would like to avoid you given risk of pulling this out  - started on finasteride however this may not start working immediately     Constipation  - KUB with large stool burden  - bowel regimen ordered--> several BM on 4/10-11    Pink eye / right eye radiation   - 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 Add to allergy list  - steroids, nebs, avoid IV benadryl given severe agitation  - CXR negative   - RVP neg    Leucocytosis   - poss secondary to IV steroids  - no fevers  - will continue to monitor  - steroid stopped  - improved      Hx of Hypertension  -started on amlodipine  -coreg noted on home meds, resumed   -consider re-ordering ACE or ARB if renal function stable  - BP improved     Possible left sided facial droop and weakness  -neurology consulted and stroke work up if needed based on their evaluation   -anti-platelets if recommended by neuro   -discussed with neurology. Low suspicion for acute CVA. MRI  negative for stroke      Hx of seizure? Noted in outside records,  - not on AED's    Hs of Small cell lung cancer   - parotid involvement, has had radiation therapy   - follows at [facility]  -he's  was on colchicine (stopped as OP), no  signs of gout     Fluids: stopped  DVT prophylaxis: lovenox daily   Code status: FULL   Discharge planning in progress. Medically stable to DC once MINNIE is arranged. Discussed with social work    [facility]ist      Subjective:     Sitting in chair today. Doing ok. Did have some nausea after his dietary supplement and mild emesis.     OBJECTIVE:    Blood pressure 97/57, pulse 64, temperature 97.6 °F (36.4 °C), temperature source Axillary, resp. rate 14, height 5' 8\" (1.727 m), weight 180 lb 14.4 oz (82.1 kg), SpO2 96%.    Temp:  [97.6 °F (36.4 °C)-97.7 °F (36.5 °C)] 97.6 °F (36.4 °C)  Pulse:  [58-76] 64  Resp:  [14-18] 14  BP: ()/(57-78) 97/57  SpO2:  [95 %-97 %] 96 %      Intake/Output:    Intake/Output Summary (Last 24 hours) at 4/16/2024 1117  Last data filed at 4/16/2024 0935  Gross per 24 hour   Intake 468 ml   Output 500 ml   Net -32 ml       Last 3 Weights   03/31/24 1311 180 lb 14.4 oz (82.1 kg)   03/31/24 1117 177 lb 0.5 oz (80.3 kg)       Exam    Gen:  calm  Pulm: Lungs clear  CV: Heart with regular rate and rhythm  Abd: Abdomen soft, no abd pain, no pain with palpation  Ext with edema     Data Review:       Labs:     Recent Labs   Lab 04/10/24  0554 04/11/24  0708 04/12/24  0648   RBC 5.14 4.72 5.20   HGB 15.4 14.4 15.0   HCT 44.5 41.9 46.4   MCV 86.6 88.8 89.2   MCH 30.0 30.5 28.8   MCHC 34.6 34.4 32.3   RDW 12.6 12.9 12.7   NEPRELIM 11.20* 8.71* 9.32*   WBC 13.5* 11.3* 11.7*   .0 334.0 351.0         Recent Labs   Lab 04/10/24  0554 04/11/24  0708 04/12/24  0648   GLU 80 86 92   BUN 19 26* 21   CREATSERUM 1.17 1.20 1.07   EGFRCR 67 65 75   CA 9.7 9.6 9.4    144 143   K 3.1* 3.3*  3.3* 3.5  3.5    109 109   CO2 28.0 27.0 26.0       Recent Labs   Lab 04/10/24  0554   ALT 12   AST 20   ALB 3.8         Imaging:  No results found.      Meds:      finasteride  5 mg Oral Daily    melatonin  5 mg Oral Nightly    sennosides  17.2 mg Oral Nightly     polyethylene glycol (PEG 3350)  17 g Oral Daily    tamsulosin  0.8 mg Oral Daily    glycerin-hypromellose-  1 drop Both Eyes TID    thiamine  200 mg Oral BID    docusate sodium  100 mg Oral BID    divalproex DR  250 mg Oral BID    OLANZapine  5 mg Oral Nightly    calcium carbonate  1,000 mg Oral TID    amLODIPine  10 mg Oral Daily    carvedilol  12.5 mg Oral BID with meals    fluticasone furoate-vilanterol  1 puff Inhalation Daily    aspirin  81 mg Oral Daily    atorvastatin  80 mg Oral Nightly    enoxaparin  40 mg Subcutaneous Daily           ipratropium-albuterol    polyethylene glycol (PEG 3350)    magnesium hydroxide    bisacodyl    hydrALAZINE    haloperidol lactate    acetaminophen **OR** [DISCONTINUED] acetaminophen    ondansetron

## 2024-04-16 NOTE — PLAN OF CARE
Monitoring vital signs, stable at this time. No acute changes at this moment. Safety and fall precautions in place, bed locked and in lowest position, call light in reach and non skid socks in place. Frequent rounding by nursing staff.      Problem: Patient Centered Care  Goal: Patient preferences are identified and integrated in the patient's plan of care  Description: Interventions:  - What would you like us to know as we care for you? From [facility]  - Provide timely, complete, and accurate information to patient/family  - Incorporate patient and family knowledge, values, beliefs, and cultural backgrounds into the planning and delivery of care  - Encourage patient/family to participate in care and decision-making at the level they choose  - Honor patient and family perspectives and choices  Outcome: Progressing     Problem: Patient/Family Goals  Goal: Patient/Family Long Term Goal  Description: Patient's Long Term Goal: discharge    Interventions:  - Monitor vital signs  - Monitor neurological status  - Ambulate as tolerated  - See additional Care Plan goals for specific interventions  Outcome: Progressing  Goal: Patient/Family Short Term Goal  Description: Patient's Short Term Goal: feel better    Interventions:   - Verbalize needs and wants  - Antianxiety medications as needed  - See additional Care Plan goals for specific interventions  Outcome: Progressing     Problem: Delirium  Goal: Minimize duration of delirium  Description: Interventions:  - Encourage use of hearing aids, eye glasses  - Promote highest level of mobility daily  - Provide frequent reorientation  - Promote wakefulness i.e. lights on, blinds open  - Promote sleep, encourage patient's normal rest cycle i.e. lights off, TV off, minimize noise and interruptions  - Encourage family to assist in orientation and promotion of home routines  Outcome: Progressing     Problem: METABOLIC/FLUID AND ELECTROLYTES -  ADULT  Goal: Electrolytes maintained within normal limits  Description: INTERVENTIONS:  - Monitor labs and rhythm and assess patient for signs and symptoms of electrolyte imbalances  - Administer electrolyte replacement as ordered  - Monitor response to electrolyte replacements, including rhythm and repeat lab results as appropriate  - Fluid restriction as ordered  - Instruct patient on fluid and nutrition restrictions as appropriate  Outcome: Progressing     Problem: MUSCULOSKELETAL - ADULT  Goal: Return mobility to safest level of function  Description: INTERVENTIONS:  - Assess patient stability and activity tolerance for standing, transferring and ambulating w/ or w/o assistive devices  - Assist with transfers and ambulation using safe patient handling equipment as needed  - Ensure adequate protection for wounds/incisions during mobilization  - Obtain PT/OT consults as needed  - Advance activity as appropriate  - Communicate ordered activity level and limitations with patient/family  Outcome: Progressing     Problem: NEUROLOGICAL - ADULT  Goal: Achieves stable or improved neurological status  Description: INTERVENTIONS  - Assess for and report changes in neurological status  - Initiate measures to prevent increased intracranial pressure  - Maintain blood pressure and fluid volume within ordered parameters to optimize cerebral perfusion and minimize risk of hemorrhage  - Monitor temperature, glucose, and sodium. Initiate appropriate interventions as ordered  Outcome: Progressing  Goal: Absence of seizures  Description: INTERVENTIONS  - Monitor for seizure activity  - Administer anti-seizure medications as ordered  - Monitor neurological status  Outcome: Progressing  Goal: Remains free of injury related to seizure activity  Description: INTERVENTIONS:  - Maintain airway, patient safety  and administer oxygen as ordered  - Monitor patient for seizure activity, document and report duration and description of seizure  to MD/LIP  - If seizure occurs, turn patient to side and suction secretions as needed  - Reorient patient post seizure  - Seizure pads on all 4 side rails  - Instruct patient/family to notify RN of any seizure activity  - Instruct patient/family to call for assistance with activity based on assessment  Outcome: Progressing  Goal: Achieves maximal functionality and self care  Description: INTERVENTIONS  - Monitor swallowing and airway patency with patient fatigue and changes in neurological status  - Encourage and assist patient to increase activity and self care with guidance from PT/OT  - Encourage visually impaired, hearing impaired and aphasic patients to use assistive/communication devices  Outcome: Progressing     Problem: Safety Risk - Non-Violent Restraints  Goal: Patient will remain free from self-harm  Description: INTERVENTIONS:  - Apply the least restrictive restraint to prevent harm  - Notify patient and family of reasons restraints applied  - Assess for any contributing factors to confusion (electrolyte disturbances, delirium, medications)  - Discontinue any unnecessary medical devices as soon as possible  - Assess the patient's physical comfort, circulation, skin condition, hydration, nutrition and elimination needs   - Reorient and redirection as needed  - Assess for the need to continue restraints  Outcome: Progressing     Problem: Risk for Violence/Aggression  Goal: Absence of Violence/Aggression  Description: INTERVENTIONS:   - Identify precipitating factors for behavior   - Notify Charge RN/Provider   - Consider decreasing stimulation   - Consider distraction measures   - Consider discussion with provider regarding prn meds    - Consider GABRIEL (Moderate Risk only)   - Consider Code Support (High Risk only)   - Consider room safety checks   - Consider restraints  Outcome: Progressing     Problem: Risk for Violence-Violent Restraints/Seclusion  Goal: Patient will not express any violent or  self-destructive behaviors  Description: Interventions:  - Apply the least restrictive restraint to prevent harm if patient strikes out and is not responding to redirection  - Notify patient and family of reasons restraints applied  - Assist the patient to identify the precipitating event  - Assist the patient to identify alternatives to physically acting out  - Assess for any contributing factors to violent behaviors (substances,  medications, history of trauma)  - Assess the patient's physical comfort, circulation, skin condition, hydration, nutrition and elimination needs   - Assess for the need to continue restraints  - Evaluate the need for an emergency medication  Outcome: Not Progressing

## 2024-04-16 NOTE — CM/SW NOTE
Pt discussed in RN DC rounds. SW uploaded PT OT notes to imtiaz. SW updated Yesina from [facility] who is aware of updated notes and sitter was Dc'ed yesterday.     Plan: Pending medical clearance, DC to [facility], *insurance auth pending    SW/CM to remain available for support and/or discharge planning.     Cheli Hoyt, MSW, LSW   x [phone]

## 2024-04-16 NOTE — PHYSICAL THERAPY NOTE
PHYSICAL THERAPY TREATMENT NOTE - INPATIENT     Room Number: 561/561-A       Presenting Problem: AMS, agitation       Problem List  Principal Problem:    NPH (normal pressure hydrocephalus) ([facility])  Active Problems:    Severe vascular dementia with agitation ([facility])    Delirium due to another medical condition    Episodic mood disorder ([facility])    TIA (transient ischemic attack)      PHYSICAL THERAPY ASSESSMENT   Patient demonstrates limited progress this session, goals  remain in progress.    Patient continues to function below baseline with bed mobility, transfers, and gait.  Contributing factors to remaining limitations include decreased functional strength, decreased endurance/aerobic capacity, pain, and medical status.  Next session anticipate patient to progress bed mobility, transfers, and gait.  Physical Therapy will continue to follow patient for duration of hospitalization.    Patient continues to benefit from continued skilled PT services: to promote return to prior level of function and safety with continuous assistance and gradual rehabilitative therapy .    PLAN  PT Treatment Plan: Bed mobility;Body mechanics;Endurance;Energy conservation;Patient education;Family education;Gait training;Strengthening;Balance training;Transfer training  Frequency (Obs): 3-5x/week    SUBJECTIVE  \"I'm sorry I couldn't walk.\"     OBJECTIVE  Precautions: Bed/chair alarm (sitter present)    WEIGHT BEARING RESTRICTION      No restrictions           PAIN ASSESSMENT   Rating: Other (Comment)  Location: denies pain  Management Techniques: Activity promotion;Body mechanics;Repositioning    BALANCE  Static Sitting: Poor +  Dynamic Sitting: Poor +  Static Standing: Poor  Dynamic Standing: Poor -      AM-PAC '6-Clicks' INPATIENT SHORT FORM - BASIC MOBILITY  How much difficulty does the patient currently have...  Patient Difficulty: Turning over in bed (including adjusting bedclothes, sheets and blankets)?: A Lot   Patient Difficulty:  Sitting down on and standing up from a chair with arms (e.g., wheelchair, bedside commode, etc.): A Lot   Patient Difficulty: Moving from lying on back to sitting on the side of the bed?: A Lot   How much help from another person does the patient currently need...   Help from Another: Moving to and from a bed to a chair (including a wheelchair)?: Total   Help from Another: Need to walk in hospital room?: Total   Help from Another: Climbing 3-5 steps with a railing?: Total     AM-PAC Score:  Raw Score: 9   Approx Degree of Impairment: 81.38%   Standardized Score (AM-PAC Scale): 30.55   CMS Modifier (G-Code): CM    FUNCTIONAL ABILITY STATUS  Functional Mobility/Gait Assessment  Gait Assistance: Maximum assistance (SPT only unable to ambulate)  Distance (ft):  (-)  Assistive Device:  (-)  Pattern:  (-)  Rolling: maximum assist  Supine to Sit: maximum assist  Sit to Supine: maximum assist  Sit to Stand: maximum assist    Additional information: mod/max A sitting EOB with lateral leaning and assist for weight shift     The patient's Approx Degree of Impairment: 81.38% has been calculated based on documentation in the [facility] '6 clicks' Inpatient Daily Activity Short Form.  Research supports that patients with this level of impairment may benefit from LTAC.  Final disposition will be made by interdisciplinary medical team.    THERAPEUTIC EXERCISES   - seated marches   - seated reaching/cross body punches for trunk and UE activation  - lateral weight shift sitting EOB     Patient End of Session: Up in chair;Needs met;Call light within reach;RN aware of session/findings;All patient questions and concerns addressed;Alarm set    CURRENT GOALS   Goals to be met by: 4/10/24  Patient Goal Patient's self-stated goal is: did not state, resisting returning to bed   Goal #1 Patient is able to demonstrate supine - sit EOB @ level: supervision      Goal #1   Current Status Max  A x 2   Goal #2 Patient is able to demonstrate transfers  Sit to/from Stand at assistance level: supervision with none      Goal #2  Current Status Max  A x 2    Goal #3 Patient is able to ambulate 100 feet with assist device: none at assistance level: supervision   Goal #3   Current Status Steps to be     Therapeutic Activity: 23 minutes

## 2024-04-17 LAB
IGG P58 AB: PRESENT
LYME IGG WB INTERP: NEGATIVE
LYME IGM WB INTERP: NEGATIVE

## 2024-04-17 PROCEDURE — 99233 SBSQ HOSP IP/OBS HIGH 50: CPT | Performed by: OTHER

## 2024-04-17 RX ORDER — QUETIAPINE FUMARATE 25 MG/1
25 TABLET, FILM COATED ORAL 3 TIMES DAILY PRN
Status: DISCONTINUED | OUTPATIENT
Start: 2024-04-17 | End: 2024-04-22

## 2024-04-17 RX ORDER — MEMANTINE HYDROCHLORIDE 5 MG/1
5 TABLET ORAL 2 TIMES DAILY
Status: DISCONTINUED | OUTPATIENT
Start: 2024-04-17 | End: 2024-04-22

## 2024-04-17 NOTE — PLAN OF CARE
Problem: Patient Centered Care  Goal: Patient preferences are identified and integrated in the patient's plan of care  Description: Interventions:  - What would you like us to know as we care for you? From [facility]  - Provide timely, complete, and accurate information to patient/family  - Incorporate patient and family knowledge, values, beliefs, and cultural backgrounds into the planning and delivery of care  - Encourage patient/family to participate in care and decision-making at the level they choose  - Honor patient and family perspectives and choices  Outcome: Progressing     Problem: Patient/Family Goals  Goal: Patient/Family Long Term Goal  Description: Patient's Long Term Goal: discharge    Interventions:  - Monitor vital signs  - Monitor neurological status  - Ambulate as tolerated  - See additional Care Plan goals for specific interventions  Outcome: Progressing  Goal: Patient/Family Short Term Goal  Description: Patient's Short Term Goal: feel better    Interventions:   - Verbalize needs and wants  - Antianxiety medications as needed  - See additional Care Plan goals for specific interventions  Outcome: Progressing     Problem: Delirium  Goal: Minimize duration of delirium  Description: Interventions:  - Encourage use of hearing aids, eye glasses  - Promote highest level of mobility daily  - Provide frequent reorientation  - Promote wakefulness i.e. lights on, blinds open  - Promote sleep, encourage patient's normal rest cycle i.e. lights off, TV off, minimize noise and interruptions  - Encourage family to assist in orientation and promotion of home routines  Outcome: Progressing     Problem: METABOLIC/FLUID AND ELECTROLYTES - ADULT  Goal: Electrolytes maintained within normal limits  Description: INTERVENTIONS:  - Monitor labs and rhythm and assess patient for signs and symptoms of electrolyte imbalances  - Administer electrolyte replacement as ordered  - Monitor response  to electrolyte replacements, including rhythm and repeat lab results as appropriate  - Fluid restriction as ordered  - Instruct patient on fluid and nutrition restrictions as appropriate  Outcome: Progressing     Problem: MUSCULOSKELETAL - ADULT  Goal: Return mobility to safest level of function  Description: INTERVENTIONS:  - Assess patient stability and activity tolerance for standing, transferring and ambulating w/ or w/o assistive devices  - Assist with transfers and ambulation using safe patient handling equipment as needed  - Ensure adequate protection for wounds/incisions during mobilization  - Obtain PT/OT consults as needed  - Advance activity as appropriate  - Communicate ordered activity level and limitations with patient/family  Outcome: Progressing     Problem: NEUROLOGICAL - ADULT  Goal: Achieves stable or improved neurological status  Description: INTERVENTIONS  - Assess for and report changes in neurological status  - Initiate measures to prevent increased intracranial pressure  - Maintain blood pressure and fluid volume within ordered parameters to optimize cerebral perfusion and minimize risk of hemorrhage  - Monitor temperature, glucose, and sodium. Initiate appropriate interventions as ordered  Outcome: Progressing  Goal: Absence of seizures  Description: INTERVENTIONS  - Monitor for seizure activity  - Administer anti-seizure medications as ordered  - Monitor neurological status  Outcome: Progressing  Goal: Remains free of injury related to seizure activity  Description: INTERVENTIONS:  - Maintain airway, patient safety  and administer oxygen as ordered  - Monitor patient for seizure activity, document and report duration and description of seizure to MD/LIP  - If seizure occurs, turn patient to side and suction secretions as needed  - Reorient patient post seizure  - Seizure pads on all 4 side rails  - Instruct patient/family to notify RN of any seizure activity  - Instruct patient/family to  call for assistance with activity based on assessment  Outcome: Progressing  Goal: Achieves maximal functionality and self care  Description: INTERVENTIONS  - Monitor swallowing and airway patency with patient fatigue and changes in neurological status  - Encourage and assist patient to increase activity and self care with guidance from PT/OT  - Encourage visually impaired, hearing impaired and aphasic patients to use assistive/communication devices  Outcome: Progressing     Problem: Safety Risk - Non-Violent Restraints  Goal: Patient will remain free from self-harm  Description: INTERVENTIONS:  - Apply the least restrictive restraint to prevent harm  - Notify patient and family of reasons restraints applied  - Assess for any contributing factors to confusion (electrolyte disturbances, delirium, medications)  - Discontinue any unnecessary medical devices as soon as possible  - Assess the patient's physical comfort, circulation, skin condition, hydration, nutrition and elimination needs   - Reorient and redirection as needed  - Assess for the need to continue restraints  Outcome: Progressing     Problem: Risk for Violence/Aggression  Goal: Absence of Violence/Aggression  Description: INTERVENTIONS:   - Identify precipitating factors for behavior   - Notify Charge RN/Provider   - Consider decreasing stimulation   - Consider distraction measures   - Consider discussion with provider regarding prn meds    - Consider GABRIEL (Moderate Risk only)   - Consider Code Support (High Risk only)   - Consider room safety checks   - Consider restraints  Outcome: Progressing     Problem: Risk for Violence-Violent Restraints/Seclusion  Goal: Patient will not express any violent or self-destructive behaviors  Description: Interventions:  - Apply the least restrictive restraint to prevent harm if patient strikes out and is not responding to redirection  - Notify patient and family of reasons restraints applied  - Assist the patient to  identify the precipitating event  - Assist the patient to identify alternatives to physically acting out  - Assess for any contributing factors to violent behaviors (substances,  medications, history of trauma)  - Assess the patient's physical comfort, circulation, skin condition, hydration, nutrition and elimination needs   - Assess for the need to continue restraints  - Evaluate the need for an emergency medication  Outcome: Progressing       Patient agitated, aggressive with staff. Refusing care at times. Took all scheduled meds. Safety measures in place. Video monitoring for safety. Frequent rounding by nursing staff.

## 2024-04-17 NOTE — PROGRESS NOTES
[facility] Hospitalist Progress Note     CC: Hospital Follow up    PCP: No primary care provider on file.       Assessment/Plan:   Mr. 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     Altered mental status   Metabolic encephalopathy  Delirium in setting of likely vascular dementia  -IMPROVED  -unclear, ddx included metabolic encephalopathy, infection, stroke, post ictal state on NPH, frontotemporal dementia?  -has been admitted 3 other times (last in 2/2023, at [place]) with similar presentation, extensive work up   -neurology and psychiatry consulted  -MRI brain without acute infarct advanced small vessel disease, poss enlargement of ventricles  -CODE GABRIEL called on 4/2 in am, Psyc consulted  -concern for NPH, s/p lumbar puncture on 4/4, had physical therapy preceding and post LP, opening pressure was normal (12) minute and cognitive state or functional state did not improve  -Discussed with neurology, and neurosurgery, unlikely that this is NPH given lack of improvement post LP, also atypical presentation, neurosurgery recommending outpatient follow-up if suspicion is still high for NPH  -LP studies pending given CJD testing, encephalitis panel negative  -psychiatry managing psychiatric and behavorial meds   -has been off restraints for several days, sitter Dc'd on 4/15    Hypernatremia / DAWN  Urinary retention  Poss UTI  - urinary retention, bladder scan with 1600, straight Q6  - hold on you given high risk for pulling out  - continue flomax,  bowel regimen, mobilize, minimizing contributing meds  - UA with sediment, IV ceftriaxone ordered, culture with poss contaminant, but has been straight cathed several times, will continue ab's to complete 7 day course (especially with leucocytosis, and suprapubic pain, now improved)  - ceftraixone (started on 4/9) -> transitioned to oral keflex   - flomax 0.8mg daily  - had multiple BM on 4/10-11, improved mobility, offending medications (thorazine stopped, haldol minimized).  - continued retention, so urology was consulted   - would like to avoid you given risk of pulling this out  - started on finasteride however this may not start working immediately     Constipation  - KUB with large stool burden  - bowel regimen ordered--> several BM on 4/10-11    Pink eye / right eye radiation   - 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 Add to allergy list  - steroids, nebs, avoid IV benadryl given severe agitation  - CXR negative   - RVP neg    Leucocytosis   - poss secondary to IV steroids  - no fevers  - will continue to monitor  - steroid stopped  - improved      Hx of Hypertension  -started on amlodipine  -coreg noted on home meds, resumed   -consider re-ordering ACE or ARB if renal function stable  - BP improved     Possible left sided facial droop and weakness  -neurology consulted and stroke work up if needed based on their evaluation   -anti-platelets if recommended by neuro   -discussed with neurology. Low suspicion for acute CVA. MRI  negative for stroke      Hx of seizure? Noted in outside records,  - not on AED's    Hs of Small cell lung cancer   - parotid involvement, has had radiation therapy   - follows at [facility]  -he's  was on colchicine (stopped as OP), no  signs of gout     Fluids: stopped  DVT prophylaxis: lovenox daily   Code status: FULL   Discharge planning in progress. Medically stable to DC once MINNIE is arranged.    [facility] Hospitalist      Subjective:     Pleasant when seen. No agitation noted. Awake.     OBJECTIVE:    Blood pressure 131/74, pulse 70, temperature 97.7 °F (36.5 °C), temperature source Oral, resp. rate 18, height 5' 7.99\" (1.727 m), weight 164 lb (74.4 kg), SpO2 96%.    Temp:  [97.6 °F (36.4 °C)-98 °F (36.7 °C)] 97.7 °F (36.5 °C)  Pulse:  [66-98] 70  Resp:  [18] 18  BP: (116-149)/(68-79) 131/74  SpO2:  [96 %-99 %] 96 %      Intake/Output:    Intake/Output Summary (Last 24 hours) at 4/17/2024 1103  Last data filed at 4/17/2024 0900  Gross per 24 hour   Intake 780 ml   Output 960 ml   Net -180 ml       Last 3 Weights   04/16/24 1600 164 lb (74.4 kg)   03/31/24 1311 180 lb 14.4 oz (82.1 kg)   03/31/24 1117 177 lb 0.5 oz (80.3 kg)       Exam    Gen:  calm  Pulm: Lungs clear  CV: Heart with regular rate and rhythm  Abd: Abdomen soft    Data Review:       Labs:     Recent Labs   Lab 04/11/24  0708 04/12/24  0648   RBC 4.72 5.20   HGB 14.4 15.0   HCT 41.9 46.4   MCV 88.8 89.2   MCH 30.5 28.8   MCHC 34.4 32.3   RDW 12.9 12.7   NEPRELIM 8.71* 9.32*   WBC 11.3* 11.7*   .0 351.0         Recent Labs   Lab 04/11/24  0708 04/12/24  0648   GLU 86 92   BUN 26* 21   CREATSERUM 1.20 1.07   EGFRCR 65 75   CA 9.6 9.4    143   K 3.3*  3.3* 3.5  3.5    109   CO2 27.0 26.0       No results for input(s): \"ALT\", \"AST\", \"ALB\", \"AMYLASE\", \"LIPASE\", \"LDH\" in the last 168 hours.    Invalid input(s): \"ALPHOS\", \"TBIL\", \"DBIL\", \"TPROT\"        Imaging:  No results found.      Meds:      finasteride  5 mg Oral Daily    melatonin  5 mg Oral Nightly    sennosides  17.2 mg Oral Nightly    polyethylene glycol (PEG 3350)  17 g Oral Daily    tamsulosin  0.8 mg Oral Daily    glycerin-hypromellose-  1 drop Both Eyes TID    thiamine   200 mg Oral BID    docusate sodium  100 mg Oral BID    divalproex DR  250 mg Oral BID    OLANZapine  5 mg Oral Nightly    calcium carbonate  1,000 mg Oral TID    amLODIPine  10 mg Oral Daily    carvedilol  12.5 mg Oral BID with meals    fluticasone furoate-vilanterol  1 puff Inhalation Daily    aspirin  81 mg Oral Daily    atorvastatin  80 mg Oral Nightly    enoxaparin  40 mg Subcutaneous Daily           QUEtiapine    ipratropium-albuterol    polyethylene glycol (PEG 3350)    magnesium hydroxide    bisacodyl    hydrALAZINE    haloperidol lactate    acetaminophen **OR** [DISCONTINUED] acetaminophen    ondansetron

## 2024-04-17 NOTE — PLAN OF CARE
Patient alert. Agitated at times. PRN for agitation as ordered. ISC as ordered. Fall precautions in place. Call light within reach.     Problem: Patient Centered Care  Goal: Patient preferences are identified and integrated in the patient's plan of care  Description: Interventions:  - What would you like us to know as we care for you? From [facility]  - Provide timely, complete, and accurate information to patient/family  - Incorporate patient and family knowledge, values, beliefs, and cultural backgrounds into the planning and delivery of care  - Encourage patient/family to participate in care and decision-making at the level they choose  - Honor patient and family perspectives and choices  Outcome: Progressing     Problem: Patient/Family Goals  Goal: Patient/Family Long Term Goal  Description: Patient's Long Term Goal: discharge    Interventions:  - Monitor vital signs  - Monitor neurological status  - Ambulate as tolerated  - See additional Care Plan goals for specific interventions  Outcome: Progressing  Goal: Patient/Family Short Term Goal  Description: Patient's Short Term Goal: feel better    Interventions:   - Verbalize needs and wants  - Antianxiety medications as needed  - See additional Care Plan goals for specific interventions  Outcome: Progressing     Problem: Delirium  Goal: Minimize duration of delirium  Description: Interventions:  - Encourage use of hearing aids, eye glasses  - Promote highest level of mobility daily  - Provide frequent reorientation  - Promote wakefulness i.e. lights on, blinds open  - Promote sleep, encourage patient's normal rest cycle i.e. lights off, TV off, minimize noise and interruptions  - Encourage family to assist in orientation and promotion of home routines  Outcome: Progressing     Problem: METABOLIC/FLUID AND ELECTROLYTES - ADULT  Goal: Electrolytes maintained within normal limits  Description: INTERVENTIONS:  - Monitor labs and rhythm  and assess patient for signs and symptoms of electrolyte imbalances  - Administer electrolyte replacement as ordered  - Monitor response to electrolyte replacements, including rhythm and repeat lab results as appropriate  - Fluid restriction as ordered  - Instruct patient on fluid and nutrition restrictions as appropriate  Outcome: Progressing     Problem: MUSCULOSKELETAL - ADULT  Goal: Return mobility to safest level of function  Description: INTERVENTIONS:  - Assess patient stability and activity tolerance for standing, transferring and ambulating w/ or w/o assistive devices  - Assist with transfers and ambulation using safe patient handling equipment as needed  - Ensure adequate protection for wounds/incisions during mobilization  - Obtain PT/OT consults as needed  - Advance activity as appropriate  - Communicate ordered activity level and limitations with patient/family  Outcome: Progressing     Problem: NEUROLOGICAL - ADULT  Goal: Achieves stable or improved neurological status  Description: INTERVENTIONS  - Assess for and report changes in neurological status  - Initiate measures to prevent increased intracranial pressure  - Maintain blood pressure and fluid volume within ordered parameters to optimize cerebral perfusion and minimize risk of hemorrhage  - Monitor temperature, glucose, and sodium. Initiate appropriate interventions as ordered  Outcome: Progressing  Goal: Absence of seizures  Description: INTERVENTIONS  - Monitor for seizure activity  - Administer anti-seizure medications as ordered  - Monitor neurological status  Outcome: Progressing  Goal: Remains free of injury related to seizure activity  Description: INTERVENTIONS:  - Maintain airway, patient safety  and administer oxygen as ordered  - Monitor patient for seizure activity, document and report duration and description of seizure to MD/LIP  - If seizure occurs, turn patient to side and suction secretions as needed  - Reorient patient post  seizure  - Seizure pads on all 4 side rails  - Instruct patient/family to notify RN of any seizure activity  - Instruct patient/family to call for assistance with activity based on assessment  Outcome: Progressing  Goal: Achieves maximal functionality and self care  Description: INTERVENTIONS  - Monitor swallowing and airway patency with patient fatigue and changes in neurological status  - Encourage and assist patient to increase activity and self care with guidance from PT/OT  - Encourage visually impaired, hearing impaired and aphasic patients to use assistive/communication devices  Outcome: Progressing     Problem: Safety Risk - Non-Violent Restraints  Goal: Patient will remain free from self-harm  Description: INTERVENTIONS:  - Apply the least restrictive restraint to prevent harm  - Notify patient and family of reasons restraints applied  - Assess for any contributing factors to confusion (electrolyte disturbances, delirium, medications)  - Discontinue any unnecessary medical devices as soon as possible  - Assess the patient's physical comfort, circulation, skin condition, hydration, nutrition and elimination needs   - Reorient and redirection as needed  - Assess for the need to continue restraints  Outcome: Progressing     Problem: Risk for Violence/Aggression  Goal: Absence of Violence/Aggression  Description: INTERVENTIONS:   - Identify precipitating factors for behavior   - Notify Charge RN/Provider   - Consider decreasing stimulation   - Consider distraction measures   - Consider discussion with provider regarding prn meds    - Consider GABRIEL (Moderate Risk only)   - Consider Code Support (High Risk only)   - Consider room safety checks   - Consider restraints  Outcome: Progressing     Problem: Risk for Violence-Violent Restraints/Seclusion  Goal: Patient will not express any violent or self-destructive behaviors  Description: Interventions:  - Apply the least restrictive restraint to prevent harm if  patient strikes out and is not responding to redirection  - Notify patient and family of reasons restraints applied  - Assist the patient to identify the precipitating event  - Assist the patient to identify alternatives to physically acting out  - Assess for any contributing factors to violent behaviors (substances,  medications, history of trauma)  - Assess the patient's physical comfort, circulation, skin condition, hydration, nutrition and elimination needs   - Assess for the need to continue restraints  - Evaluate the need for an emergency medication  Outcome: Progressing

## 2024-04-18 ENCOUNTER — TELEPHONE (OUTPATIENT)
Dept: NEUROLOGY | Facility: CLINIC | Age: 69
End: 2024-04-18

## 2024-04-18 PROCEDURE — 99232 SBSQ HOSP IP/OBS MODERATE 35: CPT | Performed by: OTHER

## 2024-04-18 RX ORDER — ESCITALOPRAM OXALATE 5 MG/1
5 TABLET ORAL NIGHTLY
Status: DISCONTINUED | OUTPATIENT
Start: 2024-04-18 | End: 2024-04-22

## 2024-04-18 NOTE — SLP NOTE
SPEECH DAILY NOTE - INPATIENT    ASSESSMENT & PLAN   ASSESSMENT      PPE REQUIRED. THIS THERAPIST WORE GLOVES FOR DURATION OF TX SESSION. HANDS WASHED UPON ENTRANCE/EXIT.     SLP f/u for ongoing meal assessment per recommendations of upgraded diet of general solids and thin liquids per swallow therapy on 4/16/24. Chart reviewed and collaborated with RN.  RN reports pt tolerates diet and medication well with no overt clinical signs of aspiration. Pt seen at bedside while sitting upright in the bedside chair.  Pt denies any pain. Pt is alert and agreeable to participate in swallowing therapy.  Pt denies any swallowing challenges and is thanking the therapist for upgrading his diet back to regular solids and thin liquids.     Pt was seen sitting at 90 degrees in bedside chair. Pt afebrile, tolerating room air with oxygen status 99% prior to the start of oral trials. The pt reports preferred method of education is verbal explanation.  SLP verbally reviewed aspiration precautions, recommended diet, and safe swallowing compensatory strategies with the patient. Patient acknowledged understanding and demonstrated swallowing precautions with self feeding po trails at a slow rate, taking controlled amounts, and no straw.  Good tolerance on general solids and thin liquids via open cup with adequate oral acceptance and no bilabial spillage.  Oropharyngeal transit phases functional.  Coordinated mastication on hard solids was complete with minimal oral stasis.  The pt completed a lingual sweep and multiple swallow to clear.  Functional hyolaryngeal elevation/excursion to palpation during the swallow with no overt clinical signs/symptoms of aspiration (e.g., immediate/delayed throat clear, immediate/delayed cough, wet vocal quality, increased O2 effort) observed across all trials.  Significant improvement in swallowing status from initial BSSE. Oxygen status remained >98% throughout the entire session. Oral/buccal cavities clear  of residue at the end of the session. Recommend to continue current diet of general solids and thin liquids via open cup.  Continue no straw precaution.     PLAN: SLP to sign off case secondary to pt tolerating least restrictive diet and demonstrating independent use of swallowing precautions.  Swallowing precautions and diet recommendations remain written on white board in the pt's room.  RN alerted with results and recommendations.      CXR 4/09/24:  CONCLUSION:    No focal opacity, pleural effusion, or pneumothorax.      Diet Recommendations - Solids: Regular  Diet Recommendations - Liquids: Thin Liquids    Compensatory Strategies Recommended: No straws  Aspiration Precautions: Upright position;Slow rate;Small bites and sips;No straw  Medication Administration Recommendations: One pill at a time    Patient Experiencing Pain: No  Treatment Plan  Treatment Plan/Recommendations: Aspiration precautions  Interdisciplinary Communication: Discussed with RN    GOALS  Goal #1 Patient will participate in ongoing clinical swallow evaluation as indicated/appropriate.     GOAL MET 4/01     Goal #2 The patient will tolerate puree consistency and thin liquids without overt signs or symptoms of aspiration with 100 % accuracy over 2-3 session(s).   GOAL MET 4/16     Goal #3 The patient/family/caregiver will demonstrate understanding and implementation of aspiration precautions and swallow strategies independently over 3 session(s).    GOAL MET 4/18   Goal #4 The patient will tolerate trial upgrade of minced and moist/BS/solid  consistency and thin liquids without overt signs or symptoms of aspiration with 100 % accuracy over 2-3 session(s).    GOAL MET 4/18   Goal #5 The patient will utilize compensatory strategies as outlined by  BSSE (clinical evaluation) including Slow rate, Small bites, Small sips, Alternate liquids/solids, No straws, Upright 90 degrees, Eliminate distractions, Feed patient with 1:1 feed assistance 100 % of  the time across 2 sessions.     The pt self fed po trials with following swallowing precautions. No straws used during the session.  GOAL MET 4/18   FOLLOW UP  Follow Up Needed (Documentation Required): No    Number of Visits to Meet Established Goals: 0    Session: 3    If you have any questions, please contact     Abby Lin MS/CCC-SLP  Speech Language Pathologist  [facility]  EXT. [phone]

## 2024-04-18 NOTE — PLAN OF CARE
PRN Seroquel given, pt straight cathed, in am.  Pt slept throughout the night. Safety precautions in place, call light within reach  Problem: Patient Centered Care  Goal: Patient preferences are identified and integrated in the patient's plan of care  Description: Interventions:  - What would you like us to know as we care for you? From [facility]  - Provide timely, complete, and accurate information to patient/family  - Incorporate patient and family knowledge, values, beliefs, and cultural backgrounds into the planning and delivery of care  - Encourage patient/family to participate in care and decision-making at the level they choose  - Honor patient and family perspectives and choices  Outcome: Progressing     Problem: Patient/Family Goals  Goal: Patient/Family Long Term Goal  Description: Patient's Long Term Goal: discharge    Interventions:  - Monitor vital signs  - Monitor neurological status  - Ambulate as tolerated  - See additional Care Plan goals for specific interventions  Outcome: Progressing  Goal: Patient/Family Short Term Goal  Description: Patient's Short Term Goal: feel better    Interventions:   - Verbalize needs and wants  - Antianxiety medications as needed  - See additional Care Plan goals for specific interventions  Outcome: Progressing     Problem: Delirium  Goal: Minimize duration of delirium  Description: Interventions:  - Encourage use of hearing aids, eye glasses  - Promote highest level of mobility daily  - Provide frequent reorientation  - Promote wakefulness i.e. lights on, blinds open  - Promote sleep, encourage patient's normal rest cycle i.e. lights off, TV off, minimize noise and interruptions  - Encourage family to assist in orientation and promotion of home routines  Outcome: Progressing     Problem: METABOLIC/FLUID AND ELECTROLYTES - ADULT  Goal: Electrolytes maintained within normal limits  Description: INTERVENTIONS:  - Monitor labs and rhythm and  assess patient for signs and symptoms of electrolyte imbalances  - Administer electrolyte replacement as ordered  - Monitor response to electrolyte replacements, including rhythm and repeat lab results as appropriate  - Fluid restriction as ordered  - Instruct patient on fluid and nutrition restrictions as appropriate  Outcome: Progressing     Problem: MUSCULOSKELETAL - ADULT  Goal: Return mobility to safest level of function  Description: INTERVENTIONS:  - Assess patient stability and activity tolerance for standing, transferring and ambulating w/ or w/o assistive devices  - Assist with transfers and ambulation using safe patient handling equipment as needed  - Ensure adequate protection for wounds/incisions during mobilization  - Obtain PT/OT consults as needed  - Advance activity as appropriate  - Communicate ordered activity level and limitations with patient/family  Outcome: Progressing     Problem: NEUROLOGICAL - ADULT  Goal: Achieves stable or improved neurological status  Description: INTERVENTIONS  - Assess for and report changes in neurological status  - Initiate measures to prevent increased intracranial pressure  - Maintain blood pressure and fluid volume within ordered parameters to optimize cerebral perfusion and minimize risk of hemorrhage  - Monitor temperature, glucose, and sodium. Initiate appropriate interventions as ordered  Outcome: Progressing  Goal: Absence of seizures  Description: INTERVENTIONS  - Monitor for seizure activity  - Administer anti-seizure medications as ordered  - Monitor neurological status  Outcome: Progressing  Goal: Remains free of injury related to seizure activity  Description: INTERVENTIONS:  - Maintain airway, patient safety  and administer oxygen as ordered  - Monitor patient for seizure activity, document and report duration and description of seizure to MD/LIP  - If seizure occurs, turn patient to side and suction secretions as needed  - Reorient patient post  seizure  - Seizure pads on all 4 side rails  - Instruct patient/family to notify RN of any seizure activity  - Instruct patient/family to call for assistance with activity based on assessment  Outcome: Progressing  Goal: Achieves maximal functionality and self care  Description: INTERVENTIONS  - Monitor swallowing and airway patency with patient fatigue and changes in neurological status  - Encourage and assist patient to increase activity and self care with guidance from PT/OT  - Encourage visually impaired, hearing impaired and aphasic patients to use assistive/communication devices  Outcome: Progressing     Problem: Safety Risk - Non-Violent Restraints  Goal: Patient will remain free from self-harm  Description: INTERVENTIONS:  - Apply the least restrictive restraint to prevent harm  - Notify patient and family of reasons restraints applied  - Assess for any contributing factors to confusion (electrolyte disturbances, delirium, medications)  - Discontinue any unnecessary medical devices as soon as possible  - Assess the patient's physical comfort, circulation, skin condition, hydration, nutrition and elimination needs   - Reorient and redirection as needed  - Assess for the need to continue restraints  Outcome: Progressing     Problem: Risk for Violence/Aggression  Goal: Absence of Violence/Aggression  Description: INTERVENTIONS:   - Identify precipitating factors for behavior   - Notify Charge RN/Provider   - Consider decreasing stimulation   - Consider distraction measures   - Consider discussion with provider regarding prn meds    - Consider GABRIEL (Moderate Risk only)   - Consider Code Support (High Risk only)   - Consider room safety checks   - Consider restraints  Outcome: Progressing     Problem: Risk for Violence-Violent Restraints/Seclusion  Goal: Patient will not express any violent or self-destructive behaviors  Description: Interventions:  - Apply the least restrictive restraint to prevent harm if  patient strikes out and is not responding to redirection  - Notify patient and family of reasons restraints applied  - Assist the patient to identify the precipitating event  - Assist the patient to identify alternatives to physically acting out  - Assess for any contributing factors to violent behaviors (substances,  medications, history of trauma)  - Assess the patient's physical comfort, circulation, skin condition, hydration, nutrition and elimination needs   - Assess for the need to continue restraints  - Evaluate the need for an emergency medication  Outcome: Progressing

## 2024-04-18 NOTE — OCCUPATIONAL THERAPY NOTE
OCCUPATIONAL THERAPY TREATMENT NOTE - INPATIENT        Room Number: 561/561-A  Presenting Problem: ams    Problem List  Principal Problem:    NPH (normal pressure hydrocephalus) ([facility])  Active Problems:    Severe vascular dementia with agitation (HCC)    Delirium due to another medical condition    Episodic mood disorder (HCC)    TIA (transient ischemic attack)      OCCUPATIONAL THERAPY ASSESSMENT   Patient demonstrates good  progress this session, goals remain in progress.    Patient continues to function below baseline with adls and functional mobility.   Contributing factors to remaining limitations include decreased functional strength, decreased endurance, and impaired sitting/standing balance balance.  Next session anticipate patient to progress bed mobility, transfers, dynamic standing balance, and functional standing tolerance.  Occupational Therapy will continue to follow patient for duration of hospitalization.    Patient continues to benefit from continued skilled OT services: to promote return to prior level of function and safety with continuous assistance and gradual rehabilitative therapy .     PLAN  OT Treatment Plan: Compensatory technique education;Patient/Family training;Patient/Family education;Endurance training;Functional transfer training;Balance activities;ADL training  OT Device Recommendations: TBD    SUBJECTIVE  \"How far should we go\"    OBJECTIVE  Precautions: Bed/chair alarm (sitter present)    PAIN ASSESSMENT  Ratin    ACTIVITY TOLERANCE  good    O2 SATURATIONS  Activity on room air    ACTIVITIES OF DAILY LIVING ASSESSMENT  AM-PAC ‘6-Clicks’ Inpatient Daily Activity Short Form  How much help from another person does the patient currently need…  -   Putting on and taking off regular lower body clothing?: A Lot  -   Bathing (including washing, rinsing, drying)?: A Lot  -   Toileting, which includes using toilet, bedpan or urinal? : A Lot  -   Putting on and taking off regular upper  body clothing?: A Little  -   Taking care of personal grooming such as brushing teeth?: A Little  -   Eating meals?: A Little    AM-PAC Score:  Score: 15  Approx Degree of Impairment: 56.46%  Standardized Score (AM-PAC Scale): 34.69  CMS Modifier (G-Code): CK    FUNCTIONAL ADL ASSESSMENT  Eating: setup assist  Grooming: min assist  UB Dressing: min assist  LB Dressing: mod assist  Toileting: na    Skilled Therapy Provided: RN contacted prior to start of care. Treatment coordinated w/ PT. Pt agreeable to participation in therapy. Gait belt used during dynamic activity. Pt received in bed asleep but easily aroused to name. Pt currently requires min a  to transfer supine<>sit at eob. Pt maintained unsupported sitting w/ close supervision and occasional assist to correct right drift. Pt performing sit<>stand transfers bed/chair w/ min a. Pt ambulating in the chavez w/ erwe and min a;occasional vc for posture  At end of session pt remaining up in chair w/ breakfast tray in placeand alarm on. RN aware of pt's status and performance in therapy      EDUCATION PROVIDED  Patient: Plan of Care; Fall Prevention; Functional Transfer Techniques  Patient's Response to Education: Verbalized Understanding    The patient's Approx Degree of Impairment: 56.46% has been calculated based on documentation in the [facility] '6 clicks' Inpatient Daily Activity Short Form.  Research supports that patients with this level of impairment may benefit from IRF.  Final disposition will be made by interdisciplinary medical team.    Patient End of Session: Up in chair;Needs met;Call light within reach;RN aware of session/findings;All patient questions and concerns addressed;Alarm set    OT Goals:   Patient will complete LE dressing with supervision  Comment: pt required mod a    Patient will complete toilet transfer with supervision  Comment: pt required min a for sit<>stand bed/chair    Patient will complete self care task at sink level with supervision     Comment: wilfrido         Goals  on: 24  Frequency: 3x week    Therapeutic Activity: 23 minutes

## 2024-04-18 NOTE — TELEPHONE ENCOUNTER
Avril from [facility] called asking to speak with someone clinical about a miscellaneous fluid sample sent in. Please advise

## 2024-04-18 NOTE — CM/SW NOTE
Liaison Adriana at [facility] notified CM that insurance auth remains pending for MINNIE. Adriana agreeable to notify CM onc auth has león obtained.    Plan: [facility] for MINNIE pending ins auth and medical clearance.    Ramirez Foy, YESIN    [phone]

## 2024-04-18 NOTE — CDS QUERY
How to answer this Query:    1.) DON'T CLICK COSIGN BUTTON FIRST  2.) Click \"3 dots...\" to the right of cosign button and click EDIT on the toolbar.  2.) Type an \"X\" in the bracket for the diagnosis that applies. (You may also add additional clinical details as you feel necessary to substantiate your response).   3.) Finally click \"Sign\" to complete response.  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  Criteria for non-severe malnutrition diagnosis: acute illness/injury related to energy intake less than 75% for greater than 7 days, body fat mild depletion, muscle mass mild depletion, and 9% significant wt loss in the past 2 weeks.  .   TREATMENT:LIBERALIZED DIET/ENCOURAGED PO INTAKE, ENSURE BID, MEAL SET UP    If you have any questions, please contact Clinical :  Shawnee PAULSON RN at [phone]     Thank You!    THIS FORM IS A PERMANENT PART OF THE MEDICAL RECORD

## 2024-04-18 NOTE — CDS QUERY
How to answer this Query:    1.) DON'T CLICK COSIGN BUTTON FIRST  2.) Click \"3 dots...\" to the right of cosign button and click EDIT on the toolbar.  2.) Type an \"X\" in the bracket for the diagnosis that applies. (You may also add additional clinical details as you feel necessary to substantiate your response).   3.) Finally click \"Sign\" to complete response.  Thank You    CLINICAL VALIDATION CLARIFICATION    DEAR DR RENAE      Please provide additional documentation in the patient's medical record supportive of your documented diagnosis of METABOLIC ENCEPHALOPATHY       ( X)  Condition was ruled out and amended documentation provided in the medical record        ( )  Condition was evident because: ___________________________                (please document in your next progress note)        ( ) Other (please specify)___________________________    _________________________________________________________________________________________    RISK FACTORS:VASCULAR DEMENTIA, SP 3 HOSPITALIZATION  PRESENTS W/AGITATION,AMS WEAKNESS    CLINICAL INDICATORS:, POOR PO INTAKE, DELIRIUM IN SETTING OF VASCULAR DEMENTIA ,unclear, ddx included metabolic encephalopathy, infection, stroke, post ictal state on NPH, frontotemporal dementia-DOC-4/18 DR RENAE  MRI/CTH NEG STROKE    TREATMENT:PSYCH/NEURO CONSULTED, MRI BRAIN. CTH, ZYPREXA,DEPAKOTE, MEMANTINE, SERAQUEL/HALDOL/ATIVAN PRN      If you have any questions, please contact Clinical :  Shawnee PAULSON RN at [phone]     Thank You!    THIS FORM IS A PERMANENT PART OF THE MEDICAL RECORD

## 2024-04-18 NOTE — PROGRESS NOTES
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 The patient has been demonstrating confusion, restlessness, agitation. Patient indicated for psych consult for evaluation and advise.    Consult Duration     The patient seen for over 35-minute, follow-up evaluation, over 50% counseling and coordinating care addressing confusion, restlessness.    Record reviewed, communication with attending, communication with RN and patient seen face to face evaluation.  History of Present Illness:      According to the staff the patient has been demonstrating some episodes of irritability at times.  Staff indicating the need for some as needed orally.    The patient seen today and patient was eating in his bed on his own. Otherwise he is laying down almost flat and eating.  Patient encouraged to sit up and he did.    Patient eating with his hand.    Asked patient how he is doing and the patient reporting that he has been stressed.  When I ask for elaboration, he indicated that he is  and there is a  lot of cases in his mind he need to fix.    Patient reporting that he does not care about the emotion and he focus on data and willing the cases.    Patient continued demonstrating some disorientation and confusion otherwise friendly and cooperative.  Patient at times is restless due to his confusion and lack of awareness    The patient continued demonstrating cognitive distortion, cognitive impairment, memory loss but not acting bizarre or demonstrating response to internal stimuli.    Patient demonstrating apathy with limited ability to understand his emotion otherwise with a smile and friendly engagement.  Patient denying any auditory or visual hallucination.  Patient denying any homicidal or suicidal  ideation.  Patient continued demonstrating tangential thought process.  Otherwise overall significant improvement.    Past Psychiatric/Medication History:  1. Prior diagnoses: Previous delirium episode.  2. Past psychiatric inpatient: Multiple admission to medical inpatient for altered mental status.  3. Past outpatient history: None  4. Past suicide history: None  5. Medication history: None    Social History:   Patient living in independent living facility    Family History:  Unknown  Medical History:   Past Medical History  Past Medical History:    Abnormal CT scan    Acute gout involving toe of left foot    Altered mental status    Anemia    Asthma in adult (HCC)    Cancer of parotid gland (HCC)    Formatting of this note might be different from the original.   Resection 2019    Catatonia    Cognitive communication deficit    Cystic disease of liver    Dementia (HCC)    Difficult airway    Dyslipidemia    Elevated white blood cell count, unspecified    Encephalopathy    Essential hypertension    Gastro-esophageal reflux disease with esophagitis, without bleeding    Gout, chronic    Gross hematuria    Hypercholesterolemia    Hyperlipidemia    Leukocytosis    Liver cyst    Metabolic encephalopathy    Neuroendocrine carcinoma, high grade (HCC)    Other abnormalities of gait and mobility    Other psoriasis    Other seizures (HCC)    Other specified disorders of kidney and ureter    Parotid neoplasm    Formatting of this note might be different from the original.   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   Keppra    Seizure disorder (HCC)    Toxic metabolic encephalopathy    Transient global amnesia    Formatting of this note might be different from the original.   Brief -Plavix    Unspecified dementia, unspecified severity, without behavioral disturbance, psychotic disturbance, mood disturbance, and anxiety (HCC)    Vascular dementia (HCC)    Vascular dementia, unspecified severity, without behavioral disturbance, psychotic disturbance, mood disturbance, and anxiety (HCC)    Weakness    Weakness of left upper extremity       Past Surgical History  History reviewed. No pertinent surgical history.    Family History  No family history on file.    Social History  Social History     Socioeconomic History    Marital status: Single   Tobacco Use    Smoking status: Former     Current packs/day: 0.00     Average packs/day: 1 pack/day for 25.0 years (25.0 ttl pk-yrs)     Types: Cigarettes     Start date: 1965     Quit date: 1990     Years since quittin.2    Tobacco comments:      Cigarettes 1 25 Quit:     Social Determinants of Health     Financial Resource Strain: Low Risk  (2021)    [facility]    Overall Financial Resource Strain (CARDIA)     Difficulty of Paying Living Expenses: Not hard at all   Food Insecurity: Unknown (3/31/2024)    Food Insecurity     Food Insecurity: Patient unable to answer   Transportation Needs: Unknown (3/31/2024)    Transportation Needs     Lack of Transportation: Patient unable to answer    [facility] from [facility], [facility]    Social Connections   Housing Stability: Unknown (3/31/2024)    Housing Stability     Housing Instability: Patient unable to answer           Current Medications:  Current Facility-Administered Medications   Medication Dose Route Frequency    QUEtiapine (SEROquel) tab 25 mg  25 mg Oral TID PRN    finasteride (Proscar) tab 5 mg  5 mg  Oral Daily    ipratropium-albuterol (Duoneb) 0.5-2.5 (3) MG/3ML inhalation solution 3 mL  3 mL Nebulization Q6H PRN    melatonin cap/tab 5 mg  5 mg Oral Nightly    sennosides (Senokot) tab 17.2 mg  17.2 mg Oral Nightly    polyethylene glycol (PEG 3350) (Miralax) 17 g oral packet 17 g  17 g Oral Daily    tamsulosin (Flomax) cap 0.8 mg  0.8 mg Oral Daily    glycerin-hypromellose- (Artificial Tears) 0.2-0.2-1 % ophthalmic solution 1 drop  1 drop Both Eyes TID    thiamine (Vitamin B1) tab 200 mg  200 mg Oral BID    docusate sodium (Colace) cap 100 mg  100 mg Oral BID    polyethylene glycol (PEG 3350) (Miralax) 17 g oral packet 17 g  17 g Oral Daily PRN    magnesium hydroxide (Milk of Magnesia) 400 MG/5ML oral suspension 30 mL  30 mL Oral Daily PRN    bisacodyl (Dulcolax) 10 MG rectal suppository 10 mg  10 mg Rectal Daily PRN    divalproex DR (Depakote Sprinkle) sprinkle cap 250 mg  250 mg Oral BID    OLANZapine (ZyPREXA) tab 5 mg  5 mg Oral Nightly    calcium carbonate (Tums) chewable tab 1,000 mg  1,000 mg Oral TID    hydrALAZINE (Apresoline) tab 25 mg  25 mg Oral Q8H PRN    amLODIPine (Norvasc) tab 10 mg  10 mg Oral Daily    haloperidol lactate (Haldol) 5 MG/ML injection 2 mg  2 mg Intravenous Q2H PRN    carvedilol (Coreg) tab 12.5 mg  12.5 mg Oral BID with meals    fluticasone furoate-vilanterol (Breo Ellipta) 200-25 MCG/ACT inhaler 1 puff  1 puff Inhalation Daily    aspirin chewable tab 81 mg  81 mg Oral Daily    acetaminophen (Tylenol) tab 650 mg  650 mg Oral Q4H PRN    ondansetron (Zofran) 4 MG/2ML injection 4 mg  4 mg Intravenous Q6H PRN    atorvastatin (Lipitor) tab 80 mg  80 mg Oral Nightly    enoxaparin (Lovenox) 40 MG/0.4ML SUBQ injection 40 mg  40 mg Subcutaneous Daily       Medications Prior to Admission   Medication Sig    albuterol 108 (90 Base) MCG/ACT Inhalation Aero Soln Inhale 2 puffs into the lungs every 4 (four) hours as needed for Wheezing.    carvedilol 12.5 MG Oral Tab Take 1 tablet (12.5  mg total) by mouth 2 (two) times daily with meals.    clopidogrel 75 MG Oral Tab Take 1 tablet (75 mg total) by mouth daily.    tamsulosin 0.4 MG Oral Cap Take 1 capsule (0.4 mg total) by mouth daily.    acetaminophen 325 MG Oral Tab Take 2 tablets (650 mg total) by mouth every 6 (six) hours as needed for Pain.    fluticasone-salmeterol 250-50 MCG/ACT Inhalation Aerosol Powder, Breath Activated Inhale 1 puff into the lungs 2 (two) times daily.       Allergies  Allergies   Allergen Reactions    Risperidone WHEEZING       Review of Systems:   As by Admitting/Attending    Results:   Laboratory Data:  Lab Results   Component Value Date    WBC 11.7 (H) 04/12/2024    HGB 15.0 04/12/2024    HCT 46.4 04/12/2024    .0 04/12/2024    CREATSERUM 1.07 04/12/2024    BUN 21 04/12/2024     04/12/2024    K 3.5 04/12/2024    K 3.5 04/12/2024     04/12/2024    CO2 26.0 04/12/2024    GLU 92 04/12/2024    CA 9.4 04/12/2024    ALB 3.8 04/10/2024    ALKPHO 65 04/10/2024    TP 6.6 04/10/2024    AST 20 04/10/2024    ALT 12 04/10/2024    TSH 3.927 04/03/2024    MG 2.0 04/12/2024    B12 470 04/03/2024         Imaging:  No results found.    Vital Signs:   Blood pressure 129/67, pulse 77, temperature 98.6 °F (37 °C), temperature source Oral, resp. rate 16, height 67.99\", weight 74.4 kg (164 lb), SpO2 97%.    Mental Status Exam:   Appearance: Stated age male, in hospital gown, laying down in hospital bed.    Psychomotor: Unintentional restlessness but no agitation..  Orientation: Alert and oriented to self but not to location, date or exact condition.  Gait: Not evaluated.  Attitude/Coorperation: Patient made an effort to cooperate and presenting polite and attentive.  Behavior: No agitation has been reported.  Speech: Talkative with clear sentences otherwise scattered.  Mood: Reporting feeling stressed for the need taking care of his job.  Affect: Slightly labile affect.  Thought process: Tangential thought process.  Thought  content: No reports of  suicidal or homicidal ideation.  Perceptions: Patient has been demonstrating response to internal stimuli hallucinating.   Concentration: Grossly impaired with tangential thought process.  Memory: Grossly impaired.  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  Intellect: Impaired due to vascular dementia.  Judgment and Insight: Impaired.  Patient demonstrating cognitive impairment.    Impression:     Delirium due to other medical condition.  Episodic mood disorder.  Rule out vascular dementia with behavioral disturbance.  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  Although the etiology still not discovered, the patient had a few episodes of delirium in [facility].    The patient today has been demonstrating increased alternation in his mood, restlessness, irritability, delusional ideation, distractibility, disorientation and has been demonstrating delirious episode.  Imaging indicate vascular ischemic changes.    The patient has been demonstrating delirium episode with alternation in mood and cognition with episodes of  increased confusion, restlessness, agitation and response to internal stimuli.     4/3/2024: The patient today continues to demonstrate confusion, restlessness, agitation. He remains in soft restraints. Vitamin B12 and TSH are within normal limits.    4/4/2024: Patient titrating sedation.  Patient is anticipated to have LP today    4/5/2024: The patient with multiple episodes of psychosis, agitation and deterioration in his cognition  continued demonstrate alternation in his mood and interaction.      4/6/2024: The patient has been demonstrating less agitation and with no restraint.    4/7/2024: The patient has been demonstrating improvement in his demeanor and mood with less agitation otherwise the patient with severe dementia and continues to be disoriented.    4/8/2024: The patient has been demonstrating improvement in his demeanor and interaction but has been demonstrating increased sedation.    4/9/2024: The patient has been demonstrating improvement in his cognition and cooperation with no agitation otherwise continue demonstrate some disorientation, memory loss and impairment in his insight.    4/10/2024: The patient demonstrating improvement in mood, interaction, and cooperation otherwise continues to demonstrate disorientation.     4/11/2024: Patient has been reasonably stable on the current medication.    4/15/2024: The patient continued demonstrating stable mood with no agitation otherwise continuous impairment in his cognition and physical function indicating rehab.    4/17/2024: The patient continue mistreating alternation in his mood and cognition with occasional restlessness due to his confusion and unawareness.    Discussed risk and benefit, acknowledging the current symptom and severity.  At this point, I would recommend the following approach:     Focus on safety.   Focus on education and support.  Focus on insight orientation helping the patient understand diagnosis and treatment plan.  Continue Zyprexa 5 mg nightly.  Continue Depakote 250 mg twice daily.  Start memantine 5 mg twice daily.  Continue thiamine 200 mg twice daily orally.  Utilize Seroquel 25 mg 3 times daily as needed for agitation.  Utilize Haldol 2 mg IV every 2 hours as needed for agitation.  Utilize Ativan 1 mg every 6 hours as needed for anxiety.  Coordinate plan with team    Orders This Visit:  Orders Placed This Encounter   Procedures    Basic Metabolic  Panel (8)    CBC With Differential With Platelet    Troponin I (High Sensitivity)    Urinalysis, Routine    Basic Metabolic Panel (8)    CBC With Differential With Platelet    Potassium    Potassium    Lipid Panel    Hemoglobin A1C    Basic Metabolic Panel (8)    CBC With Differential With Platelet    Magnesium    Vitamin B12    TSH W Reflex To Free T4    CBC With Differential With Platelet    Basic Metabolic Panel (8)    Magnesium    Glucose, Serum    Protein, Total, Serum    CJD 14-3-3 Protein Tau Total, Cerebrospinal Fluid    MD BLOOD SMEAR CONSULT    Basic Metabolic Panel (8)    CBC With Differential With Platelet    Magnesium    Potassium    Miscellaneous Testing    Miscellaneous Testing    Miscellaneous Testing    Miscellaneous Testing    Miscellaneous Testing    Miscellaneous Testing    Miscellaneous Testing    Miscellaneous Testing    CBC With Differential With Platelet    Basic Metabolic Panel (8)    Magnesium    Scan slide    Potassium    Basic Metabolic Panel (8)    Potassium    CBC With Differential With Platelet    Basic Metabolic Panel (8)    Magnesium    Urinalysis with Culture Reflex    Magnesium    Potassium    CBC With Differential With Platelet    Basic Metabolic Panel (8)    Magnesium    Hepatic Function Panel (7)    Basic Metabolic Panel (8)    CBC With Differential With Platelet    Magnesium    Potassium    Basic Metabolic Panel (8)    CBC With Differential With Platelet    Magnesium    Potassium    Lyme Disease Antibodies by Western Blot    SARS-CoV-2/Flu A and B/RSV by PCR (GeneXpert)    $$$$Respiratory Flu Expanded Panel + Covid-19$$$$    Urine Culture, Routine       Meds This Visit:  Requested Prescriptions      No prescriptions requested or ordered in this encounter       Anselmo Lawson MD  4/17/2024    Note to Patient: The 21st Century Cures Act makes medical notes like these available to patients in the interest of transparency. However, be advised this is a medical document. It is  intended as peer to peer communication. It is written in medical language and may contain abbreviations or verbiage that are unfamiliar. It may appear blunt or direct. Medical documents are intended to carry relevant information, facts as evident, and the clinical opinion of the practitioner. This note may have been transcribed using a voice dictation system. Voice recognition errors may occur. This should not be taken to alter the content or meaning of this note.

## 2024-04-18 NOTE — PROGRESS NOTES
[facility] Hospitalist Progress Note     CC: Hospital Follow up    PCP: No primary care provider on file.       Assessment/Plan:   Mr. 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     Altered mental status   Metabolic encephalopathy  Delirium in setting of likely vascular dementia  -IMPROVED  -unclear, ddx included metabolic encephalopathy, infection, stroke, post ictal state on NPH, frontotemporal dementia?  -has been admitted 3 other times (last in 2/2023, at [facility]) with similar presentation, extensive work up   -neurology and psychiatry consulted  -MRI brain without acute infarct advanced small vessel disease, poss enlargement of ventricles  -[facility] called on 4/2 in am, Psyc consulted  -concern for NPH, s/p lumbar puncture on 4/4, had physical therapy preceding and post LP, opening pressure was normal (12) minute and cognitive state or functional state did not improve  -Discussed with neurology, and neurosurgery, unlikely that this is NPH given lack of improvement post LP, also atypical presentation, neurosurgery recommending outpatient follow-up if suspicion is still high for NPH  -LP studies pending given CJD testing, encephalitis panel negative  -psychiatry managing psychiatric and behavorial meds   -has been off restraints for several days, sitter Dc'd on 4/15    Hypernatremia / DAWN  Urinary retention  Poss UTI  - urinary retention, bladder scan with 1600, straight Q6  - hold on you given high risk for pulling out  - continue flomax,  bowel regimen, mobilize, minimizing contributing meds  - UA with sediment, IV ceftriaxone ordered, culture with poss contaminant, but has been straight cathed several times, will continue ab's to complete 7 day course (especially with leucocytosis, and suprapubic pain, now improved)  - ceftraixone (started on 4/9) -> transitioned to oral keflex   - flomax 0.8mg daily  - had multiple BM on 4/10-11, improved mobility, offending medications (thorazine stopped, haldol minimized).  - continued retention, so urology was consulted   - would like to avoid you given risk of pulling this out  - started on finasteride however this may not start working immediately     Constipation  - KUB with large stool burden  - bowel regimen ordered--> several BM on 4/10-11    Pink eye / right eye radiation   - 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 Add to allergy list  - steroids, nebs, avoid IV benadryl given severe agitation  - CXR negative   - RVP neg    Leucocytosis   - poss secondary to IV steroids  - no fevers  - will continue to monitor  - steroid stopped  - improved      Hx of Hypertension  -started on amlodipine  -coreg noted on home meds, resumed   -consider re-ordering ACE or ARB if renal function stable  - BP improved     Possible left sided facial droop and weakness  -neurology consulted and stroke work up if needed based on their evaluation   -anti-platelets if recommended by neuro   -discussed with neurology. Low suspicion for acute CVA. MRI  negative for stroke      Hx of seizure? Noted in outside records,  - not on AED's    Hs of Small cell lung cancer   - parotid involvement, has had radiation therapy   - follows at [facility]  -he's  was on colchicine (stopped as OP), no  signs of gout     Fluids: stopped  DVT prophylaxis: lovenox daily   Code status: FULL   Discharge planning in progress. Medically stable to DC once MINNIE is arranged.    [facility] Hospitalist      Subjective:     Pleasant when seen. No agitation noted. Awake.     OBJECTIVE:    Blood pressure 125/74, pulse 56, temperature 97.6 °F (36.4 °C), temperature source Oral, resp. rate 16, height 5' 7.99\" (1.727 m), weight 164 lb (74.4 kg), SpO2 99%.    Temp:  [97.5 °F (36.4 °C)-98.7 °F (37.1 °C)] 97.6 °F (36.4 °C)  Pulse:  [56-78] 56  Resp:  [16] 16  BP: (124-136)/(64-81) 125/74  SpO2:  [96 %-99 %] 99 %      Intake/Output:    Intake/Output Summary (Last 24 hours) at 4/18/2024 1202  Last data filed at 4/18/2024 0645  Gross per 24 hour   Intake 680 ml   Output 1310 ml   Net -630 ml       Last 3 Weights   04/16/24 1600 164 lb (74.4 kg)   03/31/24 1311 180 lb 14.4 oz (82.1 kg)   03/31/24 1117 177 lb 0.5 oz (80.3 kg)       Exam    Gen:  calm  Pulm: Lungs clear  CV: Heart with regular rate and rhythm  Abd: Abdomen soft    Data Review:       Labs:     Recent Labs   Lab 04/12/24  0648   RBC 5.20   HGB 15.0   HCT 46.4   MCV 89.2   MCH 28.8   MCHC 32.3   RDW 12.7   NEPRELIM 9.32*   WBC 11.7*   .0         Recent Labs   Lab 04/12/24  0648   GLU 92   BUN 21   CREATSERUM 1.07   EGFRCR 75   CA 9.4      K 3.5  3.5      CO2 26.0       No results for input(s): \"ALT\", \"AST\", \"ALB\", \"AMYLASE\", \"LIPASE\", \"LDH\" in the last 168 hours.    Invalid input(s): \"ALPHOS\", \"TBIL\", \"DBIL\", \"TPROT\"        Imaging:  No results found.      Meds:      memantine  5 mg Oral BID    finasteride  5 mg Oral Daily    melatonin  5 mg Oral Nightly    sennosides  17.2 mg Oral Nightly    polyethylene glycol (PEG 3350)  17 g Oral Daily    tamsulosin  0.8 mg Oral Daily    glycerin-hypromellose-  1 drop Both Eyes TID    thiamine  200 mg Oral BID    docusate sodium  100 mg Oral BID    divalproex DR  250 mg Oral BID     OLANZapine  5 mg Oral Nightly    calcium carbonate  1,000 mg Oral TID    amLODIPine  10 mg Oral Daily    carvedilol  12.5 mg Oral BID with meals    fluticasone furoate-vilanterol  1 puff Inhalation Daily    aspirin  81 mg Oral Daily    atorvastatin  80 mg Oral Nightly    enoxaparin  40 mg Subcutaneous Daily           QUEtiapine    ipratropium-albuterol    polyethylene glycol (PEG 3350)    magnesium hydroxide    bisacodyl    hydrALAZINE    haloperidol lactate    acetaminophen **OR** [DISCONTINUED] acetaminophen    ondansetron

## 2024-04-18 NOTE — PHYSICAL THERAPY NOTE
PHYSICAL THERAPY TREATMENT NOTE - INPATIENT     Room Number: 561/561-A       Presenting Problem: AMS, agitation       Problem List  Principal Problem:    NPH (normal pressure hydrocephalus) ([facility])  Active Problems:    Severe vascular dementia with agitation ([facility])    Delirium due to another medical condition    Episodic mood disorder ([facility])    TIA (transient ischemic attack)      PHYSICAL THERAPY ASSESSMENT   Patient demonstrates good  progress this session, goals  remain in progress.    Patient continues to function below baseline with bed mobility, transfers, and gait.  Contributing factors to remaining limitations include decreased functional strength and medical status.  Next session anticipate patient to progress bed mobility, transfers, and gait.  Physical Therapy will continue to follow patient for duration of hospitalization.    Patient continues to benefit from continued skilled PT services: to promote return to prior level of function and safety with continuous assistance and gradual rehabilitative therapy .    PLAN  PT Treatment Plan: Bed mobility;Body mechanics;Patient education;Gait training;Transfer training;Balance training;Strengthening  Frequency (Obs): 3-5x/week    SUBJECTIVE  \"I can walk more\"    OBJECTIVE  Precautions: Bed/chair alarm (sitter present)    PAIN ASSESSMENT   Rating: Unable to rate  Location: denies pain  Management Techniques: Activity promotion;Body mechanics;Repositioning    BALANCE  Static Sitting: Good  Dynamic Sitting: Fair +  Static Standing: Fair -  Dynamic Standing: Poor +    AM-PAC '6-Clicks' INPATIENT SHORT FORM - BASIC MOBILITY  How much difficulty does the patient currently have...  Patient Difficulty: Turning over in bed (including adjusting bedclothes, sheets and blankets)?: A Little   Patient Difficulty: Sitting down on and standing up from a chair with arms (e.g., wheelchair, bedside commode, etc.): A Little   Patient Difficulty: Moving from lying on back to sitting on  the side of the bed?: A Little   How much help from another person does the patient currently need...   Help from Another: Moving to and from a bed to a chair (including a wheelchair)?: A Little   Help from Another: Need to walk in hospital room?: A Little   Help from Another: Climbing 3-5 steps with a railing?: A Lot     AM-PAC Score:  Raw Score: 17   Approx Degree of Impairment: 50.57%   Standardized Score (AM-PAC Scale): 42.13   CMS Modifier (G-Code): CK    FUNCTIONAL ABILITY STATUS  Functional Mobility/Gait Assessment  Gait Assistance: Minimum assistance  Distance (ft): 75ft  Assistive Device: Rolling walker  Pattern: Shuffle  Rolling:  Not tested   Supine to Sit: contact guard assist  Sit to Supine:  not tested   Sit to Stand: minimal assist    Additional information: Patient on room air. Patient able to sit edge of bed for about 5 minutes with RN while providing medications able to follow instructions. Patient agreeable to ambulation, able to ambulate about 75ft with rolling walker, cues for head up, shuffling gait, and decreased step length bilaterally. Patient with right sided lean when sitting edge of bed. The patient's Approx Degree of Impairment: 50.57% has been calculated based on documentation in the [facility] '6 clicks' Inpatient Daily Activity Short Form.  Research supports that patients with this level of impairment may benefit from rehab facility. Patient received semi-fowlers in bed, agreeable to physical therapy. Education with patient provided verbally on physical therapy plan of care and physiological benefits of out of bed mobility. Patient with good carryover during session. Anticipated therapy needs remain appropriate based on the patient's performance, personal factors, and remaining functional impairments. Final disposition will be made by interdisciplinary medical team.    Patient End of Session: Up in chair;Needs met;Call light within reach;RN aware of session/findings;All patient questions  and concerns addressed;Alarm set    CURRENT GOALS   Goals to be met by: 4/10/24  Patient Goal Patient's self-stated goal is: did not state, resisting returning to bed   Goal #1 Patient is able to demonstrate supine - sit EOB @ level: supervision      Goal #1   Current Status Min A x 1   Goal #2 Patient is able to demonstrate transfers Sit to/from Stand at assistance level: supervision with none      Goal #2  Current Status Min A x 1 with rolling walker    Goal #3 Patient is able to ambulate 100 feet with assist device: none at assistance level: supervision   Goal #3   Current Status Min A x 1 75ft with rolling walker      Gait Training: 10 minutes  Therapeutic Activity: 13 minutes

## 2024-04-18 NOTE — PROGRESS NOTES
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 The patient has been demonstrating confusion, restlessness, agitation. Patient indicated for psych consult for evaluation and advise.    Consult Duration     The patient seen for over 35-minute, follow-up evaluation, over 50% counseling and coordinating care addressing confusion, restlessness.    Record reviewed, communication with attending, communication with RN and patient seen face to face evaluation.  History of Present Illness:      Communicating with the team, the patient slept throughout the night and allowed straight cath this morning.     The patient seen today laying in his bed. Patient was alert and oriented to self.     Patient demonstrating some improvement in agitation, disorientation and confusion. Otherwise patient presenting calm, cooperative, and pleasant.     The patient continued demonstrating cognitive distortion, cognitive impairment, memory loss but not acting bizarre or demonstrating response to internal stimuli.    Patient demonstrating apathy with limited ability to understand his emotion otherwise with a smile and friendly engagement.  Patient denying any auditory or visual hallucination.  Patient denying any homicidal or suicidal ideation.  Patient continued demonstrating tangential thought process.  Otherwise overall significant improvement.    Past Psychiatric/Medication History:  1. Prior diagnoses: Previous delirium episode.  2. Past psychiatric inpatient: Multiple admission to medical inpatient for altered mental status.  3. Past outpatient history: None  4. Past suicide history: None  5. Medication history: None    Social History:   Patient living in independent living facility    Family History:  Unknown  Medical  History:   Past Medical History  Past Medical History:    Abnormal CT scan    Acute gout involving toe of left foot    Altered mental status    Anemia    Asthma in adult (HCC)    Cancer of parotid gland (HCC)    Formatting of this note might be different from the original.   Resection 2019    Catatonia    Cognitive communication deficit    Cystic disease of liver    Dementia (HCC)    Difficult airway    Dyslipidemia    Elevated white blood cell count, unspecified    Encephalopathy    Essential hypertension    Gastro-esophageal reflux disease with esophagitis, without bleeding    Gout, chronic    Gross hematuria    Hypercholesterolemia    Hyperlipidemia    Leukocytosis    Liver cyst    Metabolic encephalopathy    Neuroendocrine carcinoma, high grade (HCC)    Other abnormalities of gait and mobility    Other psoriasis    Other seizures (HCC)    Other specified disorders of kidney and ureter    Parotid neoplasm    Formatting of this note might be different from the original.   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   Keppra    Seizure disorder (HCC)    Toxic metabolic encephalopathy    Transient global amnesia    Formatting of this note might be different from the original.   Brief -Plavix    Unspecified dementia, unspecified severity, without behavioral disturbance, psychotic disturbance, mood disturbance, and anxiety (HCC)    Vascular dementia (HCC)    Vascular dementia, unspecified severity, without behavioral disturbance, psychotic disturbance, mood disturbance, and anxiety (HCC)    Weakness     Weakness of left upper extremity       Past Surgical History  History reviewed. No pertinent surgical history.    Family History  No family history on file.    Social History  Social History     Socioeconomic History    Marital status: Single   Tobacco Use    Smoking status: Former     Current packs/day: 0.00     Average packs/day: 1 pack/day for 25.0 years (25.0 ttl pk-yrs)     Types: Cigarettes     Start date: 1965     Quit date: 1990     Years since quittin.2    Tobacco comments:      Cigarettes 1 25 Quit:     Social Determinants of Health     Financial Resource Strain: Low Risk  (2021)    [facility]    Overall Financial Resource Strain (CARDIA)     Difficulty of Paying Living Expenses: Not hard at all   Food Insecurity: Unknown (3/31/2024)    Food Insecurity     Food Insecurity: Patient unable to answer   Transportation Needs: Unknown (3/31/2024)    Transportation Needs     Lack of Transportation: Patient unable to answer    [facility], [facility]    Social Connections   Housing Stability: Unknown (3/31/2024)    Housing Stability     Housing Instability: Patient unable to answer           Current Medications:  Current Facility-Administered Medications   Medication Dose Route Frequency    QUEtiapine (SEROquel) tab 25 mg  25 mg Oral TID PRN    memantine (Namenda) tab 5 mg  5 mg Oral BID    finasteride (Proscar) tab 5 mg  5 mg Oral Daily    ipratropium-albuterol (Duoneb) 0.5-2.5 (3) MG/3ML inhalation solution 3 mL  3 mL Nebulization Q6H PRN    melatonin cap/tab 5 mg  5 mg Oral Nightly    sennosides (Senokot) tab 17.2 mg  17.2 mg Oral Nightly    polyethylene glycol (PEG 3350) (Miralax) 17 g oral packet 17 g  17 g Oral Daily    tamsulosin (Flomax) cap 0.8 mg  0.8 mg Oral Daily    glycerin-hypromellose- (Artificial Tears) 0.2-0.2-1 % ophthalmic solution 1 drop  1 drop  Both Eyes TID    thiamine (Vitamin B1) tab 200 mg  200 mg Oral BID    docusate sodium (Colace) cap 100 mg  100 mg Oral BID    polyethylene glycol (PEG 3350) (Miralax) 17 g oral packet 17 g  17 g Oral Daily PRN    magnesium hydroxide (Milk of Magnesia) 400 MG/5ML oral suspension 30 mL  30 mL Oral Daily PRN    bisacodyl (Dulcolax) 10 MG rectal suppository 10 mg  10 mg Rectal Daily PRN    divalproex DR (Depakote Sprinkle) sprinkle cap 250 mg  250 mg Oral BID    OLANZapine (ZyPREXA) tab 5 mg  5 mg Oral Nightly    calcium carbonate (Tums) chewable tab 1,000 mg  1,000 mg Oral TID    hydrALAZINE (Apresoline) tab 25 mg  25 mg Oral Q8H PRN    amLODIPine (Norvasc) tab 10 mg  10 mg Oral Daily    haloperidol lactate (Haldol) 5 MG/ML injection 2 mg  2 mg Intravenous Q2H PRN    carvedilol (Coreg) tab 12.5 mg  12.5 mg Oral BID with meals    fluticasone furoate-vilanterol (Breo Ellipta) 200-25 MCG/ACT inhaler 1 puff  1 puff Inhalation Daily    aspirin chewable tab 81 mg  81 mg Oral Daily    acetaminophen (Tylenol) tab 650 mg  650 mg Oral Q4H PRN    ondansetron (Zofran) 4 MG/2ML injection 4 mg  4 mg Intravenous Q6H PRN    atorvastatin (Lipitor) tab 80 mg  80 mg Oral Nightly    enoxaparin (Lovenox) 40 MG/0.4ML SUBQ injection 40 mg  40 mg Subcutaneous Daily       Medications Prior to Admission   Medication Sig    albuterol 108 (90 Base) MCG/ACT Inhalation Aero Soln Inhale 2 puffs into the lungs every 4 (four) hours as needed for Wheezing.    carvedilol 12.5 MG Oral Tab Take 1 tablet (12.5 mg total) by mouth 2 (two) times daily with meals.    clopidogrel 75 MG Oral Tab Take 1 tablet (75 mg total) by mouth daily.    tamsulosin 0.4 MG Oral Cap Take 1 capsule (0.4 mg total) by mouth daily.    acetaminophen 325 MG Oral Tab Take 2 tablets (650 mg total) by mouth every 6 (six) hours as needed for Pain.    fluticasone-salmeterol 250-50 MCG/ACT Inhalation Aerosol Powder, Breath Activated Inhale 1 puff into the lungs 2 (two) times daily.        Allergies  Allergies   Allergen Reactions    Risperidone WHEEZING       Review of Systems:   As by Admitting/Attending    Results:   Laboratory Data:  Lab Results   Component Value Date    WBC 11.7 (H) 04/12/2024    HGB 15.0 04/12/2024    HCT 46.4 04/12/2024    .0 04/12/2024    CREATSERUM 1.07 04/12/2024    BUN 21 04/12/2024     04/12/2024    K 3.5 04/12/2024    K 3.5 04/12/2024     04/12/2024    CO2 26.0 04/12/2024    GLU 92 04/12/2024    CA 9.4 04/12/2024    ALB 3.8 04/10/2024    ALKPHO 65 04/10/2024    TP 6.6 04/10/2024    AST 20 04/10/2024    ALT 12 04/10/2024    TSH 3.927 04/03/2024    MG 2.0 04/12/2024    B12 470 04/03/2024         Imaging:  No results found.    Vital Signs:   Blood pressure 125/74, pulse 56, temperature 97.6 °F (36.4 °C), temperature source Oral, resp. rate 16, height 67.99\", weight 74.4 kg (164 lb), SpO2 99%.    Mental Status Exam:   Appearance: Stated age male, in hospital gown, laying down in hospital bed.    Psychomotor: Unintentional restlessness but no agitation..  Orientation: Alert and oriented to self but not to location, date or exact condition.  Gait: Not evaluated.  Attitude/Coorperation: Patient made an effort to cooperate and presenting polite and attentive.  Behavior: No agitation has been reported.  Speech: Talkative with clear sentences otherwise scattered.  Mood: Reporting feeling stressed for the need taking care of his job.  Affect: Slightly labile affect.  Thought process: Tangential thought process.  Thought content: No reports of  suicidal or homicidal ideation.  Perceptions: Patient has been demonstrating response to internal stimuli hallucinating.   Concentration: Grossly impaired with tangential thought process.  Memory: Grossly impaired.  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  Intellect: Impaired due to vascular dementia.  Judgment and Insight: Impaired.  Patient  demonstrating cognitive impairment.    Impression:     Delirium due to other medical condition.  Episodic mood disorder.  Rule out vascular dementia with behavioral disturbance.  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  Although the etiology still not discovered, the patient had a few episodes of delirium in [facility].    The patient today has been demonstrating increased alternation in his mood, restlessness, irritability, delusional ideation, distractibility, disorientation and has been demonstrating delirious episode.  Imaging indicate vascular ischemic changes.    The patient has been demonstrating delirium episode with alternation in mood and cognition with episodes of  increased confusion, restlessness, agitation and response to internal stimuli.     4/3/2024: The patient today continues to demonstrate confusion, restlessness, agitation. He remains in soft restraints. Vitamin B12 and TSH are within normal limits.    4/4/2024: Patient titrating sedation.  Patient is anticipated to have LP today    4/5/2024: The patient with multiple episodes of psychosis, agitation and deterioration in his cognition continued demonstrate alternation in his mood and interaction.      4/6/2024: The patient has been demonstrating less agitation and with no restraint.    4/7/2024: The patient has been demonstrating improvement in his demeanor and mood with less agitation otherwise the patient with severe dementia and continues to be disoriented.    4/8/2024: The patient has been demonstrating improvement in his demeanor and interaction but has been  demonstrating increased sedation.    4/9/2024: The patient has been demonstrating improvement in his cognition and cooperation with no agitation otherwise continue demonstrate some disorientation, memory loss and impairment in his insight.    4/10/2024: The patient demonstrating improvement in mood, interaction, and cooperation otherwise continues to demonstrate disorientation.     4/11/2024: Patient has been reasonably stable on the current medication.    4/15/2024: The patient continued demonstrating stable mood with no agitation otherwise continuous impairment in his cognition and physical function indicating rehab.    4/17/2024: The patient continue mistreating alternation in his mood and cognition with occasional restlessness due to his confusion and unawareness.    4/18/2024: The patient demonstrating some improvement in mood and agitation.     Discussed risk and benefit, acknowledging the current symptom and severity.  At this point, I would recommend the following approach:     Focus on safety.   Focus on education and support.  Focus on insight orientation helping the patient understand diagnosis and treatment plan.  Continue Zyprexa 5 mg nightly.  Continue Depakote 250 mg twice daily.  Continue memantine 5 mg twice daily.  Continue thiamine 200 mg twice daily orally.  Utilize Seroquel 25 mg 3 times daily as needed for agitation.  Utilize Haldol 2 mg IV every 2 hours as needed for agitation.  Utilize Ativan 1 mg every 6 hours as needed for anxiety.  Coordinate plan with team    Orders This Visit:  Orders Placed This Encounter   Procedures    Basic Metabolic Panel (8)    CBC With Differential With Platelet    Troponin I (High Sensitivity)    Urinalysis, Routine    Basic Metabolic Panel (8)    CBC With Differential With Platelet    Potassium    Potassium    Lipid Panel    Hemoglobin A1C    Basic Metabolic Panel (8)    CBC With Differential With Platelet    Magnesium    Vitamin B12    TSH W Reflex To Free T4     CBC With Differential With Platelet    Basic Metabolic Panel (8)    Magnesium    Glucose, Serum    Protein, Total, Serum    CJD 14-3-3 Protein Tau Total, Cerebrospinal Fluid    MD BLOOD SMEAR CONSULT    Basic Metabolic Panel (8)    CBC With Differential With Platelet    Magnesium    Potassium    Miscellaneous Testing    Miscellaneous Testing    Miscellaneous Testing    Miscellaneous Testing    Miscellaneous Testing    Miscellaneous Testing    Miscellaneous Testing    Miscellaneous Testing    CBC With Differential With Platelet    Basic Metabolic Panel (8)    Magnesium    Scan slide    Potassium    Basic Metabolic Panel (8)    Potassium    CBC With Differential With Platelet    Basic Metabolic Panel (8)    Magnesium    Urinalysis with Culture Reflex    Magnesium    Potassium    CBC With Differential With Platelet    Basic Metabolic Panel (8)    Magnesium    Hepatic Function Panel (7)    Basic Metabolic Panel (8)    CBC With Differential With Platelet    Magnesium    Potassium    Basic Metabolic Panel (8)    CBC With Differential With Platelet    Magnesium    Potassium    Lyme Disease Antibodies by Western Blot    SARS-CoV-2/Flu A and B/RSV by PCR (GeneXpert)    $$$$Respiratory Flu Expanded Panel + Covid-19$$$$    Urine Culture, Routine       Meds This Visit:  Requested Prescriptions      No prescriptions requested or ordered in this encounter       Anselmo Lawson MD  4/18/2024    Note to Patient: The 21st Century Cures Act makes medical notes like these available to patients in the interest of transparency. However, be advised this is a medical document. It is intended as peer to peer communication. It is written in medical language and may contain abbreviations or verbiage that are unfamiliar. It may appear blunt or direct. Medical documents are intended to carry relevant information, facts as evident, and the clinical opinion of the practitioner. This note may have been transcribed using a voice dictation  system. Voice recognition errors may occur. This should not be taken to alter the content or meaning of this note.

## 2024-04-19 NOTE — PLAN OF CARE
Patient A&O x1. VAAC plan in place. Cooperative to staff. VSS, on RA, afebrile. Denies pain at this time. Straight cath x1. Awaiting insurance authorization. Possible discharge. PRN Seroquel x1. Patient slept about 6-7 hours overnight. All safety measures in place. Care endorsed.       Problem: NEUROLOGICAL - ADULT  Goal: Achieves stable or improved neurological status  Description: INTERVENTIONS  - Assess for and report changes in neurological status  - Initiate measures to prevent increased intracranial pressure  - Maintain blood pressure and fluid volume within ordered parameters to optimize cerebral perfusion and minimize risk of hemorrhage  - Monitor temperature, glucose, and sodium. Initiate appropriate interventions as ordered  Outcome: Progressing  Goal: Absence of seizures  Description: INTERVENTIONS  - Monitor for seizure activity  - Administer anti-seizure medications as ordered  - Monitor neurological status  Outcome: Progressing  Goal: Remains free of injury related to seizure activity  Description: INTERVENTIONS:  - Maintain airway, patient safety  and administer oxygen as ordered  - Monitor patient for seizure activity, document and report duration and description of seizure to MD/LIP  - If seizure occurs, turn patient to side and suction secretions as needed  - Reorient patient post seizure  - Seizure pads on all 4 side rails  - Instruct patient/family to notify RN of any seizure activity  - Instruct patient/family to call for assistance with activity based on assessment  Outcome: Progressing  Goal: Achieves maximal functionality and self care  Description: INTERVENTIONS  - Monitor swallowing and airway patency with patient fatigue and changes in neurological status  - Encourage and assist patient to increase activity and self care with guidance from PT/OT  - Encourage visually impaired, hearing impaired and aphasic patients to use assistive/communication devices  Outcome: Progressing     Problem:  Risk for Violence/Aggression  Goal: Absence of Violence/Aggression  Description: INTERVENTIONS:   - Identify precipitating factors for behavior   - Notify Charge RN/Provider   - Consider decreasing stimulation   - Consider distraction measures   - Consider discussion with provider regarding prn meds    - Consider GABRIEL (Moderate Risk only)   - Consider Code Support (High Risk only)   - Consider room safety checks   - Consider restraints  Outcome: Progressing     Problem: Delirium  Goal: Minimize duration of delirium  Description: Interventions:  - Encourage use of hearing aids, eye glasses  - Promote highest level of mobility daily  - Provide frequent reorientation  - Promote wakefulness i.e. lights on, blinds open  - Promote sleep, encourage patient's normal rest cycle i.e. lights off, TV off, minimize noise and interruptions  - Encourage family to assist in orientation and promotion of home routines  Outcome: Progressing

## 2024-04-19 NOTE — PROGRESS NOTES
[facility] Hospitalist Progress Note     CC: Hospital Follow up    PCP: No primary care provider on file.       Assessment/Plan:   Mr. 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     Altered mental status   Metabolic encephalopathy  Delirium in setting of likely vascular dementia  -IMPROVED  -unclear, ddx included metabolic encephalopathy, infection, stroke, post ictal state on NPH, frontotemporal dementia?  -has been admitted 3 other times (last in 2/2023, at [facility]) with similar presentation, extensive work up   -neurology and psychiatry consulted  -MRI brain without acute infarct advanced small vessel disease, poss enlargement of ventricles  -CODE GABRIEL called on 4/2 in am, Psyc consulted  -concern for NPH, s/p lumbar puncture on 4/4, had physical therapy preceding and post LP, opening pressure was normal (12) minute and cognitive state or functional state did not improve  -Discussed with neurology, and neurosurgery, unlikely that this is NPH given lack of improvement post LP, also atypical presentation, neurosurgery recommending outpatient follow-up if suspicion is still high for NPH  -LP studies pending given CJD testing, encephalitis panel negative  -psychiatry managing psychiatric and behavorial meds   -has been off restraints for several days, sitter Dc'd on 4/15    Hypernatremia / DAWN  Urinary retention  Poss UTI  - urinary retention, bladder scan with 1600, straight Q6  - hold on you given high risk for pulling out  - continue flomax,  bowel regimen, mobilize, minimizing contributing meds  - UA with sediment, IV ceftriaxone ordered, culture with poss contaminant, but has been straight cathed several times, will continue ab's to complete 7 day course (especially with leucocytosis, and suprapubic pain, now improved)  - ceftraixone (started on 4/9) -> transitioned to oral keflex   - flomax 0.8mg daily  - had multiple BM on 4/10-11, improved mobility, offending medications (thorazine stopped, haldol minimized).  - continued retention, so urology was consulted. Now improved   - started on finasteride however this may not start working immediately     Constipation  - KUB with large stool burden  - bowel regimen ordered--> several BM on 4/10-11    Pink eye / right eye radiation   - hs of right parotid radiation, has had history of recurrent eye infections since that time  - antibiotic drop started on 4/2 completed 4/9  - will continue artificial tears TID  - no changes in visual acuity, no pain  - improved    Wheezing- resolved  - poss secondary to risperidone? Add to allergy list  - steroids, nebs, avoid IV benadryl given severe agitation  - CXR negative   - RVP neg    Leucocytosis   - poss secondary to IV steroids  - no fevers  - will continue to monitor  - steroid stopped  - improved      Hx of Hypertension  -started on amlodipine  -coreg noted on home meds, resumed   - BP improved     Possible left sided facial droop and weakness  -neurology consulted and stroke work up if needed based on their evaluation   -anti-platelets if recommended by neuro   -discussed with neurology. Low suspicion for acute CVA. MRI  negative for stroke      Hx of seizure? Noted in outside records,  - not on AED's    Hs of Small cell lung cancer   - parotid involvement, has had radiation therapy   - follows at [facility]      Gout  -he's  was on colchicine (stopped as OP), no signs of gout     Fluids: stopped  DVT prophylaxis: lovenox daily   Code status: FULL   Discharge  planning in progress. Medically stable to DC once MINNIE is arranged. Plan tomorrow afternoon to discharge    Yobany Christianson [facility] Hospitalist      Subjective:     No agitation noted. Awake.     OBJECTIVE:    Blood pressure 107/61, pulse 65, temperature 98.2 °F (36.8 °C), temperature source Axillary, resp. rate 16, height 5' 7.99\" (1.727 m), weight 164 lb (74.4 kg), SpO2 95%.    Temp:  [97.7 °F (36.5 °C)-98.2 °F (36.8 °C)] 98.2 °F (36.8 °C)  Pulse:  [63-73] 65  Resp:  [16-18] 16  BP: (107-146)/(61-79) 107/61  SpO2:  [95 %-97 %] 95 %      Intake/Output:    Intake/Output Summary (Last 24 hours) at 4/19/2024 1239  Last data filed at 4/19/2024 0600  Gross per 24 hour   Intake 240 ml   Output 1050 ml   Net -810 ml       Last 3 Weights   04/16/24 1600 164 lb (74.4 kg)   03/31/24 1311 180 lb 14.4 oz (82.1 kg)   03/31/24 1117 177 lb 0.5 oz (80.3 kg)       Exam    Gen:  calm  Pulm: Lungs clear  CV: Heart with regular rate and rhythm  Abd: Abdomen soft    Data Review:       Labs:     No results for input(s): \"RBC\", \"HGB\", \"HCT\", \"MCV\", \"MCH\", \"MCHC\", \"RDW\", \"NEPRELIM\", \"WBC\", \"PLT\" in the last 168 hours.        No results for input(s): \"GLU\", \"BUN\", \"CREATSERUM\", \"GFRAA\", \"GFRNAA\", \"EGFRCR\", \"CA\", \"NA\", \"K\", \"CL\", \"CO2\" in the last 168 hours.      No results for input(s): \"ALT\", \"AST\", \"ALB\", \"AMYLASE\", \"LIPASE\", \"LDH\" in the last 168 hours.    Invalid input(s): \"ALPHOS\", \"TBIL\", \"DBIL\", \"TPROT\"        Imaging:  No results found.      Meds:      escitalopram  5 mg Oral Nightly    memantine  5 mg Oral BID    finasteride  5 mg Oral Daily    melatonin  5 mg Oral Nightly    sennosides  17.2 mg Oral Nightly    polyethylene glycol (PEG 3350)  17 g Oral Daily    tamsulosin  0.8 mg Oral Daily    glycerin-hypromellose-  1 drop Both Eyes TID    thiamine  200 mg Oral BID    docusate sodium  100 mg Oral BID    divalproex DR  250 mg Oral BID    OLANZapine  5 mg Oral Nightly    calcium carbonate  1,000 mg Oral TID     amLODIPine  10 mg Oral Daily    carvedilol  12.5 mg Oral BID with meals    fluticasone furoate-vilanterol  1 puff Inhalation Daily    aspirin  81 mg Oral Daily    atorvastatin  80 mg Oral Nightly    enoxaparin  40 mg Subcutaneous Daily           QUEtiapine    ipratropium-albuterol    polyethylene glycol (PEG 3350)    magnesium hydroxide    bisacodyl    hydrALAZINE    haloperidol lactate    acetaminophen **OR** [DISCONTINUED] acetaminophen    ondansetron

## 2024-04-19 NOTE — PLAN OF CARE
Pt is aoxself, resting in bed, bed is low and locked in place, call light is within reach. Pending insurance auth for rehab  Problem: Risk for Violence/Aggression  Goal: Absence of Violence/Aggression  Description: INTERVENTIONS:   - Identify precipitating factors for behavior   - Notify Charge RN/Provider   - Consider decreasing stimulation   - Consider distraction measures   - Consider discussion with provider regarding prn meds    - Consider GABRIEL (Moderate Risk only)   - Consider Code Support (High Risk only)   - Consider room safety checks   - Consider restraints  Outcome: Progressing     Problem: NEUROLOGICAL - ADULT  Goal: Achieves maximal functionality and self care  Description: INTERVENTIONS  - Monitor swallowing and airway patency with patient fatigue and changes in neurological status  - Encourage and assist patient to increase activity and self care with guidance from PT/OT  - Encourage visually impaired, hearing impaired and aphasic patients to use assistive/communication devices  Outcome: Progressing     Problem: NEUROLOGICAL - ADULT  Goal: Remains free of injury related to seizure activity  Description: INTERVENTIONS:  - Maintain airway, patient safety  and administer oxygen as ordered  - Monitor patient for seizure activity, document and report duration and description of seizure to MD/LIP  - If seizure occurs, turn patient to side and suction secretions as needed  - Reorient patient post seizure  - Seizure pads on all 4 side rails  - Instruct patient/family to notify RN of any seizure activity  - Instruct patient/family to call for assistance with activity based on assessment  Outcome: Progressing     Problem: NEUROLOGICAL - ADULT  Goal: Absence of seizures  Description: INTERVENTIONS  - Monitor for seizure activity  - Administer anti-seizure medications as ordered  - Monitor neurological status  Outcome: Progressing     Problem: NEUROLOGICAL - ADULT  Goal: Achieves stable or improved neurological  status  Description: INTERVENTIONS  - Assess for and report changes in neurological status  - Initiate measures to prevent increased intracranial pressure  - Maintain blood pressure and fluid volume within ordered parameters to optimize cerebral perfusion and minimize risk of hemorrhage  - Monitor temperature, glucose, and sodium. Initiate appropriate interventions as ordered  Outcome: Progressing     Problem: MUSCULOSKELETAL - ADULT  Goal: Return mobility to safest level of function  Description: INTERVENTIONS:  - Assess patient stability and activity tolerance for standing, transferring and ambulating w/ or w/o assistive devices  - Assist with transfers and ambulation using safe patient handling equipment as needed  - Ensure adequate protection for wounds/incisions during mobilization  - Obtain PT/OT consults as needed  - Advance activity as appropriate  - Communicate ordered activity level and limitations with patient/family  Outcome: Progressing     Problem: METABOLIC/FLUID AND ELECTROLYTES - ADULT  Goal: Electrolytes maintained within normal limits  Description: INTERVENTIONS:  - Monitor labs and rhythm and assess patient for signs and symptoms of electrolyte imbalances  - Administer electrolyte replacement as ordered  - Monitor response to electrolyte replacements, including rhythm and repeat lab results as appropriate  - Fluid restriction as ordered  - Instruct patient on fluid and nutrition restrictions as appropriate  Outcome: Progressing     Problem: Delirium  Goal: Minimize duration of delirium  Description: Interventions:  - Encourage use of hearing aids, eye glasses  - Promote highest level of mobility daily  - Provide frequent reorientation  - Promote wakefulness i.e. lights on, blinds open  - Promote sleep, encourage patient's normal rest cycle i.e. lights off, TV off, minimize noise and interruptions  - Encourage family to assist in orientation and promotion of home routines  Outcome: Progressing      Problem: Patient/Family Goals  Goal: Patient/Family Short Term Goal  Description: Patient's Short Term Goal: feel better    Interventions:   - Verbalize needs and wants  - Antianxiety medications as needed  - See additional Care Plan goals for specific interventions  Outcome: Progressing     Problem: Patient/Family Goals  Goal: Patient/Family Long Term Goal  Description: Patient's Long Term Goal: discharge    Interventions:  - Monitor vital signs  - Monitor neurological status  - Ambulate as tolerated  - See additional Care Plan goals for specific interventions  Outcome: Progressing     Problem: Patient Centered Care  Goal: Patient preferences are identified and integrated in the patient's plan of care  Description: Interventions:  - What would you like us to know as we care for you? From [facility]  - Provide timely, complete, and accurate information to patient/family  - Incorporate patient and family knowledge, values, beliefs, and cultural backgrounds into the planning and delivery of care  - Encourage patient/family to participate in care and decision-making at the level they choose  - Honor patient and family perspectives and choices  Outcome: Progressing

## 2024-04-19 NOTE — CM/SW NOTE
04/19/24 1239   Discharge disposition   Expected discharge disposition subacute   Post Acute Care Provider   ([facility])   Discharge transportation [facility]     Pt discussed during nursing rounds. Ins auth obtained for [facility] per liaison at [facility] but no bed is available until tomorrow at 1pm. Liaison Rickie confirmed that pt can transfer from [facility] to [facility] tomorrow at 1pm. Pt's POA agreeable to transfer to [facility] tomorrow. MD aware and will enter dc order prior to dc. [facility] scheduled for 1pm pick on Saturday 4/20.    Number for nurse report is [phone].    1415: Liaison Arletta requesting patient  time be changed to 2:30pm on 4/20. [facility] rescheduled as requested.    Plan: [facility] for [facility] tomorrow 4/20 at 2:30pm via [facility].    / to remain available for support and/or discharge planning.     Ramirez Foy, BSN    [phone]

## 2024-04-19 NOTE — TELEPHONE ENCOUNTER
Detailed message has been left for Avril to contact the office.  Reviewed and electronically signed by: VIANEY Saravia

## 2024-04-20 RX ORDER — ATORVASTATIN CALCIUM 80 MG/1
80 TABLET, FILM COATED ORAL NIGHTLY
Status: SHIPPED | COMMUNITY
Start: 2024-04-20

## 2024-04-20 RX ORDER — MEMANTINE HYDROCHLORIDE 5 MG/1
5 TABLET ORAL 2 TIMES DAILY
Status: SHIPPED | COMMUNITY
Start: 2024-04-20

## 2024-04-20 RX ORDER — MELATONIN
200 2 TIMES DAILY
Status: SHIPPED | COMMUNITY
Start: 2024-04-20

## 2024-04-20 RX ORDER — ESCITALOPRAM OXALATE 5 MG/1
5 TABLET ORAL NIGHTLY
Status: SHIPPED | COMMUNITY
Start: 2024-04-20

## 2024-04-20 RX ORDER — FINASTERIDE 5 MG/1
5 TABLET, FILM COATED ORAL DAILY
Status: SHIPPED | COMMUNITY
Start: 2024-04-20

## 2024-04-20 RX ORDER — OLANZAPINE 5 MG/1
5 TABLET ORAL NIGHTLY
Status: SHIPPED | COMMUNITY
Start: 2024-04-20

## 2024-04-20 RX ORDER — AMLODIPINE BESYLATE 10 MG/1
10 TABLET ORAL DAILY
Status: SHIPPED | COMMUNITY
Start: 2024-04-20

## 2024-04-20 RX ORDER — MAGNESIUM OXIDE 400 MG (241.3 MG MAGNESIUM) TABLET
TABLET
Status: SHIPPED | COMMUNITY
Start: 2024-04-20

## 2024-04-20 RX ORDER — POLYETHYLENE GLYCOL 3350 17 G/17G
17 POWDER, FOR SOLUTION ORAL DAILY PRN
Status: SHIPPED | COMMUNITY
Start: 2024-04-20

## 2024-04-20 RX ORDER — DIVALPROEX SODIUM 125 MG/1
250 CAPSULE, COATED PELLETS ORAL 2 TIMES DAILY
Status: SHIPPED | COMMUNITY
Start: 2024-04-20

## 2024-04-20 RX ORDER — ASPIRIN 81 MG/1
81 TABLET, CHEWABLE ORAL DAILY
Status: SHIPPED | COMMUNITY
Start: 2024-04-20

## 2024-04-20 NOTE — PLAN OF CARE
Problem: Patient Centered Care  Goal: Patient preferences are identified and integrated in the patient's plan of care  Description: Interventions:  - What would you like us to know as we care for you? From [facility]  - Provide timely, complete, and accurate information to patient/family  - Incorporate patient and family knowledge, values, beliefs, and cultural backgrounds into the planning and delivery of care  - Encourage patient/family to participate in care and decision-making at the level they choose  - Honor patient and family perspectives and choices  Outcome: Progressing     Problem: Patient/Family Goals  Goal: Patient/Family Long Term Goal  Description: Patient's Long Term Goal: discharge    Interventions:  - Monitor vital signs  - Monitor neurological status  - Ambulate as tolerated  - See additional Care Plan goals for specific interventions  Outcome: Progressing  Goal: Patient/Family Short Term Goal  Description: Patient's Short Term Goal: feel better    Interventions:   - Verbalize needs and wants  - Antianxiety medications as needed  - See additional Care Plan goals for specific interventions  Outcome: Progressing     Problem: Delirium  Goal: Minimize duration of delirium  Description: Interventions:  - Encourage use of hearing aids, eye glasses  - Promote highest level of mobility daily  - Provide frequent reorientation  - Promote wakefulness i.e. lights on, blinds open  - Promote sleep, encourage patient's normal rest cycle i.e. lights off, TV off, minimize noise and interruptions  - Encourage family to assist in orientation and promotion of home routines  Outcome: Progressing     Problem: METABOLIC/FLUID AND ELECTROLYTES - ADULT  Goal: Electrolytes maintained within normal limits  Description: INTERVENTIONS:  - Monitor labs and rhythm and assess patient for signs and symptoms of electrolyte imbalances  - Administer electrolyte replacement as ordered  - Monitor response  to electrolyte replacements, including rhythm and repeat lab results as appropriate  - Fluid restriction as ordered  - Instruct patient on fluid and nutrition restrictions as appropriate  Outcome: Progressing     Problem: MUSCULOSKELETAL - ADULT  Goal: Return mobility to safest level of function  Description: INTERVENTIONS:  - Assess patient stability and activity tolerance for standing, transferring and ambulating w/ or w/o assistive devices  - Assist with transfers and ambulation using safe patient handling equipment as needed  - Ensure adequate protection for wounds/incisions during mobilization  - Obtain PT/OT consults as needed  - Advance activity as appropriate  - Communicate ordered activity level and limitations with patient/family  Outcome: Progressing     Problem: NEUROLOGICAL - ADULT  Goal: Achieves stable or improved neurological status  Description: INTERVENTIONS  - Assess for and report changes in neurological status  - Initiate measures to prevent increased intracranial pressure  - Maintain blood pressure and fluid volume within ordered parameters to optimize cerebral perfusion and minimize risk of hemorrhage  - Monitor temperature, glucose, and sodium. Initiate appropriate interventions as ordered  Outcome: Progressing  Goal: Absence of seizures  Description: INTERVENTIONS  - Monitor for seizure activity  - Administer anti-seizure medications as ordered  - Monitor neurological status  Outcome: Progressing  Goal: Remains free of injury related to seizure activity  Description: INTERVENTIONS:  - Maintain airway, patient safety  and administer oxygen as ordered  - Monitor patient for seizure activity, document and report duration and description of seizure to MD/LIP  - If seizure occurs, turn patient to side and suction secretions as needed  - Reorient patient post seizure  - Seizure pads on all 4 side rails  - Instruct patient/family to notify RN of any seizure activity  - Instruct patient/family to  call for assistance with activity based on assessment  Outcome: Progressing  Goal: Achieves maximal functionality and self care  Description: INTERVENTIONS  - Monitor swallowing and airway patency with patient fatigue and changes in neurological status  - Encourage and assist patient to increase activity and self care with guidance from PT/OT  - Encourage visually impaired, hearing impaired and aphasic patients to use assistive/communication devices  Outcome: Progressing     Problem: Safety Risk - Non-Violent Restraints  Goal: Patient will remain free from self-harm  Description: INTERVENTIONS:  - Apply the least restrictive restraint to prevent harm  - Notify patient and family of reasons restraints applied  - Assess for any contributing factors to confusion (electrolyte disturbances, delirium, medications)  - Discontinue any unnecessary medical devices as soon as possible  - Assess the patient's physical comfort, circulation, skin condition, hydration, nutrition and elimination needs   - Reorient and redirection as needed  - Assess for the need to continue restraints  Outcome: Progressing     Problem: Risk for Violence/Aggression  Goal: Absence of Violence/Aggression  Description: INTERVENTIONS:   - Identify precipitating factors for behavior   - Notify Charge RN/Provider   - Consider decreasing stimulation   - Consider distraction measures   - Consider discussion with provider regarding prn meds    - Consider GABRIEL (Moderate Risk only)   - Consider Code Support (High Risk only)   - Consider room safety checks   - Consider restraints  Outcome: Progressing     Problem: Risk for Violence-Violent Restraints/Seclusion  Goal: Patient will not express any violent or self-destructive behaviors  Description: Interventions:  - Apply the least restrictive restraint to prevent harm if patient strikes out and is not responding to redirection  - Notify patient and family of reasons restraints applied  - Assist the patient to  identify the precipitating event  - Assist the patient to identify alternatives to physically acting out  - Assess for any contributing factors to violent behaviors (substances,  medications, history of trauma)  - Assess the patient's physical comfort, circulation, skin condition, hydration, nutrition and elimination needs   - Assess for the need to continue restraints  - Evaluate the need for an emergency medication  Outcome: Progressing     Patient is presently resting in the bed. Alert x 2. Vital signs taken and stable. No complaints of pain or discomfort. Fall risk precautions are followed at all times. Tolerates diet well. Patient has discharge orders. I received message from  name Mary informing that [facility] will not have bed available for today, but on tomorrow, 4/21. Patient's Power of  name Yesenia Larsen was notified of discharge for tomorrow by  and this RN. Dr. Huggins was informed of discharge for tomorrow due to bed not being available until tomorrow.

## 2024-04-20 NOTE — PLAN OF CARE
Pt AOx1, oriented to self. Pt not combative/agitated but remains confused/forgetful, responsive to redirection. Pt remained on room air overnight, vital signs stable.    Bladder scan Q6H continued, straight cath x1, see Flowsheets.    Plan is for pt to discharge to [facility] at 2:30pm.     Bed alarm on, safety maintained.     Problem: NEUROLOGICAL - ADULT  Goal: Achieves stable or improved neurological status  Description: INTERVENTIONS  - Assess for and report changes in neurological status  - Initiate measures to prevent increased intracranial pressure  - Maintain blood pressure and fluid volume within ordered parameters to optimize cerebral perfusion and minimize risk of hemorrhage  - Monitor temperature, glucose, and sodium. Initiate appropriate interventions as ordered  Outcome: Progressing     Problem: Risk for Violence/Aggression  Goal: Absence of Violence/Aggression  Description: INTERVENTIONS:   - Identify precipitating factors for behavior   - Notify Charge RN/Provider   - Consider decreasing stimulation   - Consider distraction measures   - Consider discussion with provider regarding prn meds    - Consider GABRIEL (Moderate Risk only)   - Consider Code Support (High Risk only)   - Consider room safety checks   - Consider restraints  Outcome: Progressing

## 2024-04-20 NOTE — PROGRESS NOTES
[facility] Hospitalist Progress Note     CC: Hospital Follow up    PCP: No primary care provider on file.       Assessment/Plan:   Mr. 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     Altered mental status   Metabolic encephalopathy  Delirium in setting of likely vascular dementia  -IMPROVED  -unclear, ddx included metabolic encephalopathy, infection, stroke, post ictal state on NPH, frontotemporal dementia?  -has been admitted 3 other times (last in 2/2023, at [place]) with similar presentation, extensive work up   -neurology and psychiatry consulted  -MRI brain without acute infarct advanced small vessel disease, poss enlargement of ventricles  -CODE GABRIEL called on 4/2 in am, Psyc consulted  -concern for NPH, s/p lumbar puncture on 4/4, had physical therapy preceding and post LP, opening pressure was normal (12) minute and cognitive state or functional state did not improve  -Discussed with neurology, and neurosurgery, unlikely that this is NPH given lack of improvement post LP, also atypical presentation, neurosurgery recommending outpatient follow-up if suspicion is still high for NPH  -LP studies pending given CJD testing, encephalitis panel negative  -psychiatry managing psychiatric and behavorial meds   -has been off restraints for several days, sitter Dc'd on 4/15    Hypernatremia / DAWN  Urinary retention  Poss UTI  - urinary retention, bladder scan with 1600, straight Q6  - hold on you given high risk for pulling out  - continue flomax,  bowel regimen, mobilize, minimizing contributing meds  - UA with sediment, IV ceftriaxone ordered, culture with poss contaminant, but has been straight cathed several times, will continue ab's to complete 7 day course (especially with leucocytosis, and suprapubic pain, now improved)  - ceftraixone (started on 4/9) -> transitioned to oral keflex   - flomax 0.8mg daily  - had multiple BM on 4/10-11, improved mobility, offending medications (thorazine stopped, haldol minimized).  - continued retention, so urology was consulted. Now improved   - started on finasteride however this may not start working immediately     Constipation  - KUB with large stool burden  - bowel regimen ordered--> several BM on 4/10-11    Pink eye / right eye radiation   - hs of right parotid radiation, has had history of recurrent eye infections since that time  - antibiotic drop started on 4/2 completed 4/9  - will continue artificial tears TID  - no changes in visual acuity, no pain  - improved    Wheezing- resolved  - poss secondary to risperidone? Add to allergy list  - steroids, nebs, avoid IV benadryl given severe agitation  - CXR negative   - RVP neg    Leucocytosis   - poss secondary to IV steroids  - no fevers  - will continue to monitor  - steroid stopped  - improved      Hx of Hypertension  -started on amlodipine  -coreg noted on home meds, resumed   - BP improved     Possible left sided facial droop and weakness  -neurology consulted and stroke work up if needed based on their evaluation   -anti-platelets if recommended by neuro   -discussed with neurology. Low suspicion for acute CVA. MRI  negative for stroke      Hx of seizure? Noted in outside records,  - not on AED's    Hs of Small cell lung cancer   - parotid involvement, has had radiation therapy   - follows at [facility]      Gout  -he's  was on colchicine (stopped as OP), no signs of gout     Fluids: stopped  DVT prophylaxis: lovenox daily   Code status: FULL   Discharge  planning in progress. Medically stable to DC once [facility] is arranged.     [facility]      Subjective:     No agitation noted. Awake.     OBJECTIVE:    Blood pressure 127/74, pulse 74, temperature 97.6 °F (36.4 °C), temperature source Oral, resp. rate 20, height 5' 7.99\" (1.727 m), weight 165 lb 3.2 oz (74.9 kg), SpO2 97%.    Temp:  [97.6 °F (36.4 °C)-98.3 °F (36.8 °C)] 97.6 °F (36.4 °C)  Pulse:  [65-78] 74  Resp:  [18-22] 20  BP: (112-132)/(68-76) 127/74  SpO2:  [95 %-97 %] 97 %      Intake/Output:    Intake/Output Summary (Last 24 hours) at 4/20/2024 1433  Last data filed at 4/20/2024 1311  Gross per 24 hour   Intake 920 ml   Output 1400 ml   Net -480 ml       Last 3 Weights   04/20/24 0430 165 lb 3.2 oz (74.9 kg)   04/16/24 1600 164 lb (74.4 kg)   03/31/24 1311 180 lb 14.4 oz (82.1 kg)   03/31/24 1117 177 lb 0.5 oz (80.3 kg)       Exam    Gen:  calm  Pulm: Lungs clear  CV: Heart with regular rate and rhythm  Abd: Abdomen soft    Data Review:       Labs:     No results for input(s): \"RBC\", \"HGB\", \"HCT\", \"MCV\", \"MCH\", \"MCHC\", \"RDW\", \"NEPRELIM\", \"WBC\", \"PLT\" in the last 168 hours.        No results for input(s): \"GLU\", \"BUN\", \"CREATSERUM\", \"GFRAA\", \"GFRNAA\", \"EGFRCR\", \"CA\", \"NA\", \"K\", \"CL\", \"CO2\" in the last 168 hours.      No results for input(s): \"ALT\", \"AST\", \"ALB\", \"AMYLASE\", \"LIPASE\", \"LDH\" in the last 168 hours.    Invalid input(s): \"ALPHOS\", \"TBIL\", \"DBIL\", \"TPROT\"        Imaging:  No results found.      Meds:      escitalopram  5 mg Oral Nightly    memantine  5 mg Oral BID    finasteride  5 mg Oral Daily    melatonin  5 mg Oral Nightly    sennosides  17.2 mg Oral Nightly    polyethylene glycol (PEG 3350)  17 g Oral Daily    tamsulosin  0.8 mg Oral Daily    glycerin-hypromellose-  1 drop Both Eyes TID    thiamine  200 mg Oral BID    docusate sodium  100 mg Oral BID    divalproex DR  250 mg Oral BID    OLANZapine  5 mg Oral Nightly    calcium carbonate  1,000 mg Oral  TID    amLODIPine  10 mg Oral Daily    carvedilol  12.5 mg Oral BID with meals    fluticasone furoate-vilanterol  1 puff Inhalation Daily    aspirin  81 mg Oral Daily    atorvastatin  80 mg Oral Nightly    enoxaparin  40 mg Subcutaneous Daily           QUEtiapine    ipratropium-albuterol    polyethylene glycol (PEG 3350)    magnesium hydroxide    bisacodyl    hydrALAZINE    haloperidol lactate    acetaminophen **OR** [DISCONTINUED] acetaminophen    ondansetron

## 2024-04-20 NOTE — CM/SW NOTE
FRANSISCO was informed by liaison Adriana from [facility] that pt's bed is not available.    FRANSISCO spoke to pt's POA Maite.  Maite would prefer pt got to [facility] today rather than wait for a bed to open.    FRANSISCO sent message to [facility] to see if [facility] has a bed/CM can change auth to [facility] facility.    Transport placed on will-call through 21 if [facility] can accept.    PLAN: DC to [facility] vs. [facility] pending bed availability    / to remain available for support and/or discharge planning.     Mary Steward MSW, LSW [id number]

## 2024-04-21 RX ORDER — QUETIAPINE FUMARATE 25 MG/1
25 TABLET, FILM COATED ORAL 3 TIMES DAILY PRN
Status: SHIPPED | COMMUNITY
Start: 2024-04-21

## 2024-04-21 NOTE — PROGRESS NOTES
[facility] Hospitalist Progress Note     CC: Hospital Follow up    PCP: No primary care provider on file.       Assessment/Plan:   Mr. 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     Altered mental status   Metabolic encephalopathy  Delirium in setting of likely vascular dementia  -IMPROVED  -unclear, ddx included metabolic encephalopathy, infection, stroke, post ictal state on NPH, frontotemporal dementia?  -has been admitted 3 other times (last in 2/2023, at [place]) with similar presentation, extensive work up   -neurology and psychiatry consulted  -MRI brain without acute infarct advanced small vessel disease, poss enlargement of ventricles  -CODE GABRIEL called on 4/2 in am, Psyc consulted  -concern for NPH, s/p lumbar puncture on 4/4, had physical therapy preceding and post LP, opening pressure was normal (12) minute and cognitive state or functional state did not improve  -Discussed with neurology, and neurosurgery, unlikely that this is NPH given lack of improvement post LP, also atypical presentation, neurosurgery recommending outpatient follow-up if suspicion is still high for NPH  -LP studies pending given CJD testing, encephalitis panel negative  -psychiatry managing psychiatric and behavorial meds   -has been off restraints for several days, sitter Dc'd on 4/15    Hypernatremia / DAWN  Urinary retention  Poss UTI  - urinary retention, bladder scan with 1600, straight Q6  - hold on you given high risk for pulling out  - continue flomax,  bowel regimen, mobilize, minimizing contributing meds  - UA with sediment, IV ceftriaxone ordered, culture with poss contaminant, but has been straight cathed several times, will continue ab's to complete 7 day course (especially with leucocytosis, and suprapubic pain, now improved)  - ceftraixone (started on 4/9) -> transitioned to oral keflex   - flomax 0.8mg daily  - had multiple BM on 4/10-11, improved mobility, offending medications (thorazine stopped, haldol minimized).  - continued retention, so urology was consulted. Now improved   - started on finasteride however this may not start working immediately     Constipation  - KUB with large stool burden  - bowel regimen ordered--> several BM on 4/10-11    Pink eye / right eye radiation   - hs of right parotid radiation, has had history of recurrent eye infections since that time  - antibiotic drop started on 4/2 completed 4/9  - will continue artificial tears TID  - no changes in visual acuity, no pain  - improved    Wheezing- resolved  - poss secondary to risperidone? Add to allergy list  - steroids, nebs, avoid IV benadryl given severe agitation  - CXR negative   - RVP neg    Leucocytosis   - poss secondary to IV steroids  - no fevers  - will continue to monitor  - steroid stopped  - improved      Hx of Hypertension  -started on amlodipine  -coreg noted on home meds, resumed   - BP improved     Possible left sided facial droop and weakness  -neurology consulted and stroke work up if needed based on their evaluation   -anti-platelets if recommended by neuro   -discussed with neurology. Low suspicion for acute CVA. MRI  negative for stroke      Hx of seizure? Noted in outside records,  - not on AED's    Hs of Small cell lung cancer   - parotid involvement, has had radiation therapy   - follows at [place]      Gout  -he's  was on colchicine (stopped as OP), no signs of gout     Fluids: stopped  DVT prophylaxis: lovenox daily   Code status: FULL   Medically stable  to DC to [facility]. Got IV haldol last night so DC canceled. Will discontinue IV haldol order. Plan DC tomorrow    [facility]ist      Subjective:     No agitation noted. Awake. Watching the basketball game.     OBJECTIVE:    Blood pressure 119/66, pulse 60, temperature 97.9 °F (36.6 °C), temperature source Oral, resp. rate 18, height 5' 7.99\" (1.727 m), weight 165 lb 3.2 oz (74.9 kg), SpO2 98%.    Temp:  [97.5 °F (36.4 °C)-98 °F (36.7 °C)] 97.9 °F (36.6 °C)  Pulse:  [57-79] 60  Resp:  [18-26] 18  BP: (118-145)/(66-79) 119/66  SpO2:  [96 %-98 %] 98 %      Intake/Output:    Intake/Output Summary (Last 24 hours) at 4/21/2024 1321  Last data filed at 4/21/2024 1303  Gross per 24 hour   Intake 1620 ml   Output 1650 ml   Net -30 ml       Last 3 Weights   04/20/24 0430 165 lb 3.2 oz (74.9 kg)   04/16/24 1600 164 lb (74.4 kg)   03/31/24 1311 180 lb 14.4 oz (82.1 kg)   03/31/24 1117 177 lb 0.5 oz (80.3 kg)       Exam    Gen:  calm  Pulm: Lungs clear  CV: Heart with regular rate and rhythm  Abd: Abdomen soft    Data Review:       Labs:     No results for input(s): \"RBC\", \"HGB\", \"HCT\", \"MCV\", \"MCH\", \"MCHC\", \"RDW\", \"NEPRELIM\", \"WBC\", \"PLT\" in the last 168 hours.        No results for input(s): \"GLU\", \"BUN\", \"CREATSERUM\", \"GFRAA\", \"GFRNAA\", \"EGFRCR\", \"CA\", \"NA\", \"K\", \"CL\", \"CO2\" in the last 168 hours.      No results for input(s): \"ALT\", \"AST\", \"ALB\", \"AMYLASE\", \"LIPASE\", \"LDH\" in the last 168 hours.    Invalid input(s): \"ALPHOS\", \"TBIL\", \"DBIL\", \"TPROT\"        Imaging:  No results found.      Meds:      escitalopram  5 mg Oral Nightly    memantine  5 mg Oral BID    finasteride  5 mg Oral Daily    melatonin  5 mg Oral Nightly    sennosides  17.2 mg Oral Nightly    polyethylene glycol (PEG 3350)  17 g Oral Daily    tamsulosin  0.8 mg Oral Daily    glycerin-hypromellose-  1 drop Both Eyes TID    thiamine  200 mg Oral BID    docusate sodium  100 mg Oral BID    divalproex DR  250 mg Oral BID     OLANZapine  5 mg Oral Nightly    calcium carbonate  1,000 mg Oral TID    amLODIPine  10 mg Oral Daily    carvedilol  12.5 mg Oral BID with meals    fluticasone furoate-vilanterol  1 puff Inhalation Daily    aspirin  81 mg Oral Daily    atorvastatin  80 mg Oral Nightly    enoxaparin  40 mg Subcutaneous Daily           QUEtiapine    ipratropium-albuterol    polyethylene glycol (PEG 3350)    magnesium hydroxide    bisacodyl    hydrALAZINE    acetaminophen **OR** [DISCONTINUED] acetaminophen    ondansetron

## 2024-04-21 NOTE — CM/SW NOTE
FRANSISCO received call from LISHA Ramirez.    Pt was given IM Haldol 4/20 @ 8:19 PM by the night nurse.    Per regulation, pt cannot go to rehab until no IM/IV psych meds >24 hours.    Pt was not restrained- remains restraint free.    Liaison Adriana aware- stated pt can come tomorrow as long as above are met.  Insurance auth expires 4/22 for admission.    [facility] will call extended through 4/23.  PCS completed- will need date of discharge.    FRANSISCO left message for CAROLYN Arora regarding discharge/reason for pt staying another night.    PLAN: [facility] no Haldol >24 hours    / to remain available for support and/or discharge planning.     Mary Steward MSW, LSW [id number]

## 2024-04-21 NOTE — PHYSICAL THERAPY NOTE
PHYSICAL THERAPY TREATMENT NOTE - INPATIENT     Room Number: 561/561-A       Presenting Problem: AMS, agitation       Problem List  Principal Problem:    NPH (normal pressure hydrocephalus) ([facility])  Active Problems:    Severe vascular dementia with agitation ([facility])    Delirium due to another medical condition    Episodic mood disorder ([facility])    TIA (transient ischemic attack)      PHYSICAL THERAPY ASSESSMENT   Patient demonstrates fair progress this session, goals  remain in progress.    Patient continues to function below baseline with bed mobility, transfers, and gait.  Contributing factors to remaining limitations include decreased functional strength, decreased endurance/aerobic capacity, and cognitive deficits (disorientation, impaired safety awareness, poor insight into his deficits).  Next session anticipate patient to progress bed mobility, transfers, and gait.  Physical Therapy will continue to follow patient for duration of hospitalization.    Patient continues to benefit from continued skilled PT services: to promote return to prior level of function and safety with continuous assistance and gradual rehabilitative therapy .    PLAN  PT Treatment Plan: Bed mobility;Body mechanics;Patient education;Gait training;Transfer training;Balance training;Strengthening  Frequency (Obs): 3-5x/week    SUBJECTIVE  Pt agreeable to therapy, following commands appropriately, pleasant    OBJECTIVE  Precautions: Bed/chair alarm    WEIGHT BEARING RESTRICTION                PAIN ASSESSMENT   Ratin  Location: denies at this time  Management Techniques: Activity promotion    BALANCE  Static Sitting: Good  Dynamic Sitting: Fair  Static Standing: Poor +  Dynamic Standing: Poor +    ACTIVITY TOLERANCE                          O2 WALK       AM-PAC '6-Clicks' INPATIENT SHORT FORM - BASIC MOBILITY  How much difficulty does the patient currently have...  Patient Difficulty: Turning over in bed (including adjusting bedclothes, sheets  and blankets)?: A Little   Patient Difficulty: Sitting down on and standing up from a chair with arms (e.g., wheelchair, bedside commode, etc.): A Little   Patient Difficulty: Moving from lying on back to sitting on the side of the bed?: A Little   How much help from another person does the patient currently need...   Help from Another: Moving to and from a bed to a chair (including a wheelchair)?: A Little   Help from Another: Need to walk in hospital room?: A Little   Help from Another: Climbing 3-5 steps with a railing?: A Lot     AM-PAC Score:  Raw Score: 17   Approx Degree of Impairment: 50.57%   Standardized Score (AM-PAC Scale): 42.13   CMS Modifier (G-Code): CK    FUNCTIONAL ABILITY STATUS  Functional Mobility/Gait Assessment  Gait Assistance: Minimum assistance  Distance (ft): 60  Assistive Device: Rolling walker  Pattern: Shuffle  Rolling: stand-by assist  Supine to Sit: minimal assist  Sit to Supine: minimal assist  Sit to Stand: minimal assist    Additional information:   Patient ok to see per rn.   Pt recd in supine, easily awoken, educated in goals for session, importance of consistent mobility. Pt agreeable to physical therapy. Second person present as pt intermittently becomes combative.     Pt educated in pacing, energy conservation, safe use of rw, upright posture.  Gait training deficits include shuffling, cueing for safety, pacing.   Pt tolerated ambulation well.   Pt returned to supine at rn request due to pending bladder scan and str cath. Rn aware of session and assist needed, recommendations for activity.     The patient's Approx Degree of Impairment: 50.57% has been calculated based on documentation in the [facility] '6 clicks' Inpatient Daily Activity Short Form.  Research supports that patients with this level of impairment may benefit from rehab.  Final disposition will be made by interdisciplinary medical team.      Patient End of Session: In bed;Needs met;Call light within reach;RN aware of  session/findings;All patient questions and concerns addressed;Alarm set    CURRENT GOALS   Goals to be met by: 4/10/24, updated 24  Patient Goal Patient's self-stated goal is: did not state   Goal #1 Patient is able to demonstrate supine - sit EOB @ level: supervision      Goal #1   Current Status Min A   Goal #2 Patient is able to demonstrate transfers Sit to/from Stand at assistance level: supervision with none      Goal #2  Current Status Min A with rolling walker    Goal #3 Patient is able to ambulate 100 feet with assist device: none at assistance level: supervision   Goal #3   Current Status Min A 60' with rolling walker      Gait Trainin minutes  Therapeutic Activity: 13 minutes

## 2024-04-21 NOTE — PLAN OF CARE
Problem: Patient Centered Care  Goal: Patient preferences are identified and integrated in the patient's plan of care  Description: Interventions:  - What would you like us to know as we care for you? From [facility]  - Provide timely, complete, and accurate information to patient/family  - Incorporate patient and family knowledge, values, beliefs, and cultural backgrounds into the planning and delivery of care  - Encourage patient/family to participate in care and decision-making at the level they choose  - Honor patient and family perspectives and choices  Outcome: Progressing     Problem: Patient/Family Goals  Goal: Patient/Family Long Term Goal  Description: Patient's Long Term Goal: discharge    Interventions:  - Monitor vital signs  - Monitor neurological status  - Ambulate as tolerated  - See additional Care Plan goals for specific interventions  Outcome: Progressing  Goal: Patient/Family Short Term Goal  Description: Patient's Short Term Goal: feel better    Interventions:   - Verbalize needs and wants  - Antianxiety medications as needed  - See additional Care Plan goals for specific interventions  Outcome: Progressing     Problem: Delirium  Goal: Minimize duration of delirium  Description: Interventions:  - Encourage use of hearing aids, eye glasses  - Promote highest level of mobility daily  - Provide frequent reorientation  - Promote wakefulness i.e. lights on, blinds open  - Promote sleep, encourage patient's normal rest cycle i.e. lights off, TV off, minimize noise and interruptions  - Encourage family to assist in orientation and promotion of home routines  Outcome: Progressing     Problem: METABOLIC/FLUID AND ELECTROLYTES - ADULT  Goal: Electrolytes maintained within normal limits  Description: INTERVENTIONS:  - Monitor labs and rhythm and assess patient for signs and symptoms of electrolyte imbalances  - Administer electrolyte replacement as ordered  - Monitor response  to electrolyte replacements, including rhythm and repeat lab results as appropriate  - Fluid restriction as ordered  - Instruct patient on fluid and nutrition restrictions as appropriate  Outcome: Progressing     Problem: MUSCULOSKELETAL - ADULT  Goal: Return mobility to safest level of function  Description: INTERVENTIONS:  - Assess patient stability and activity tolerance for standing, transferring and ambulating w/ or w/o assistive devices  - Assist with transfers and ambulation using safe patient handling equipment as needed  - Ensure adequate protection for wounds/incisions during mobilization  - Obtain PT/OT consults as needed  - Advance activity as appropriate  - Communicate ordered activity level and limitations with patient/family  Outcome: Progressing     Problem: NEUROLOGICAL - ADULT  Goal: Achieves stable or improved neurological status  Description: INTERVENTIONS  - Assess for and report changes in neurological status  - Initiate measures to prevent increased intracranial pressure  - Maintain blood pressure and fluid volume within ordered parameters to optimize cerebral perfusion and minimize risk of hemorrhage  - Monitor temperature, glucose, and sodium. Initiate appropriate interventions as ordered  Outcome: Progressing  Goal: Absence of seizures  Description: INTERVENTIONS  - Monitor for seizure activity  - Administer anti-seizure medications as ordered  - Monitor neurological status  Outcome: Progressing  Goal: Remains free of injury related to seizure activity  Description: INTERVENTIONS:  - Maintain airway, patient safety  and administer oxygen as ordered  - Monitor patient for seizure activity, document and report duration and description of seizure to MD/LIP  - If seizure occurs, turn patient to side and suction secretions as needed  - Reorient patient post seizure  - Seizure pads on all 4 side rails  - Instruct patient/family to notify RN of any seizure activity  - Instruct patient/family to  call for assistance with activity based on assessment  Outcome: Progressing  Goal: Achieves maximal functionality and self care  Description: INTERVENTIONS  - Monitor swallowing and airway patency with patient fatigue and changes in neurological status  - Encourage and assist patient to increase activity and self care with guidance from PT/OT  - Encourage visually impaired, hearing impaired and aphasic patients to use assistive/communication devices  Outcome: Progressing     Problem: Safety Risk - Non-Violent Restraints  Goal: Patient will remain free from self-harm  Description: INTERVENTIONS:  - Apply the least restrictive restraint to prevent harm  - Notify patient and family of reasons restraints applied  - Assess for any contributing factors to confusion (electrolyte disturbances, delirium, medications)  - Discontinue any unnecessary medical devices as soon as possible  - Assess the patient's physical comfort, circulation, skin condition, hydration, nutrition and elimination needs   - Reorient and redirection as needed  - Assess for the need to continue restraints  Outcome: Progressing     Problem: Risk for Violence/Aggression  Goal: Absence of Violence/Aggression  Description: INTERVENTIONS:   - Identify precipitating factors for behavior   - Notify Charge RN/Provider   - Consider decreasing stimulation   - Consider distraction measures   - Consider discussion with provider regarding prn meds    - Consider GABRIEL (Moderate Risk only)   - Consider Code Support (High Risk only)   - Consider room safety checks   - Consider restraints  Outcome: Progressing     Problem: Risk for Violence-Violent Restraints/Seclusion  Goal: Patient will not express any violent or self-destructive behaviors  Description: Interventions:  - Apply the least restrictive restraint to prevent harm if patient strikes out and is not responding to redirection  - Notify patient and family of reasons restraints applied  - Assist the patient to  identify the precipitating event  - Assist the patient to identify alternatives to physically acting out  - Assess for any contributing factors to violent behaviors (substances,  medications, history of trauma)  - Assess the patient's physical comfort, circulation, skin condition, hydration, nutrition and elimination needs   - Assess for the need to continue restraints  - Evaluate the need for an emergency medication  Outcome: Progressing     Patient is presently resting in the bed. Alert x 1-2. Vital signs taken and stable. Fall risk precautions are followed at all times. Bed alarm remains in use at all times. Tolerates diet well. Patient is calm and cooperative at current time. Patient receives bladder scan every 6 hours and straight cath for urine retention greater than 400 ml. Frequent reorientation done for safety. Call light within reach at all times. Discharge tomorrow to [facility].

## 2024-04-21 NOTE — PLAN OF CARE
Patient awake/alert to self; impulsive, agitated and refusing care throughout the night.   Frequently removing gown, taking off blankets & tore apart his depends.  Reminder of place, situation  date and time he was screaming did not want to take medications and yelling when vital signs taken. Comfort measures, I decreased stimuli and asked if in pain  continued to be verbally abusive.  Patient kicking, spitting and swinging at staff, myself and 2 pcts holding him down for vitals and care.   I evaluated the need and haldol given once  helped keep him calm only for few hours, prevent him from falling out of bed or pulling at iv site.   Retaining urine, straight cath done every 6 hours 12mn and this morning.   Safety measures in place, bed alarm on and frequent rounding.   Problem: Risk for Violence-Violent Restraints/Seclusion  Goal: Patient will not express any violent or self-destructive behaviors  Description: Interventions:  - Apply the least restrictive restraint to prevent harm if patient strikes out and is not responding to redirection  - Notify patient and family of reasons restraints applied  - Assist the patient to identify the precipitating event  - Assist the patient to identify alternatives to physically acting out  - Assess for any contributing factors to violent behaviors (substances,  medications, history of trauma)  - Assess the patient's physical comfort, circulation, skin condition, hydration, nutrition and elimination needs   - Assess for the need to continue restraints  - Evaluate the need for an emergency medication  Outcome: Not Progressing     Problem: Delirium  Goal: Minimize duration of delirium  Description: Interventions:  - Encourage use of hearing aids, eye glasses  - Promote highest level of mobility daily  - Provide frequent reorientation  - Promote wakefulness i.e. lights on, blinds open  - Promote sleep, encourage patient's normal rest cycle i.e. lights off, TV off, minimize noise and  interruptions  - Encourage family to assist in orientation and promotion of home routines  Outcome: Not Progressing     Problem: MUSCULOSKELETAL - ADULT  Goal: Return mobility to safest level of function  Description: INTERVENTIONS:  - Assess patient stability and activity tolerance for standing, transferring and ambulating w/ or w/o assistive devices  - Assist with transfers and ambulation using safe patient handling equipment as needed  - Ensure adequate protection for wounds/incisions during mobilization  - Obtain PT/OT consults as needed  - Advance activity as appropriate  - Communicate ordered activity level and limitations with patient/family  Outcome: Not Progressing     Problem: NEUROLOGICAL - ADULT  Goal: Achieves stable or improved neurological status  Description: INTERVENTIONS  - Assess for and report changes in neurological status  - Initiate measures to prevent increased intracranial pressure  - Maintain blood pressure and fluid volume within ordered parameters to optimize cerebral perfusion and minimize risk of hemorrhage  - Monitor temperature, glucose, and sodium. Initiate appropriate interventions as ordered  Outcome: Not Progressing  Goal: Achieves maximal functionality and self care  Description: INTERVENTIONS  - Monitor swallowing and airway patency with patient fatigue and changes in neurological status  - Encourage and assist patient to increase activity and self care with guidance from PT/OT  - Encourage visually impaired, hearing impaired and aphasic patients to use assistive/communication devices  Outcome: Not Progressing     Problem: Safety Risk - Non-Violent Restraints  Goal: Patient will remain free from self-harm  Description: INTERVENTIONS:  - Apply the least restrictive restraint to prevent harm  - Notify patient and family of reasons restraints applied  - Assess for any contributing factors to confusion (electrolyte disturbances, delirium, medications)  - Discontinue any unnecessary  medical devices as soon as possible  - Assess the patient's physical comfort, circulation, skin condition, hydration, nutrition and elimination needs   - Reorient and redirection as needed  - Assess for the need to continue restraints  Outcome: Not Progressing     Problem: Risk for Violence/Aggression  Goal: Absence of Violence/Aggression  Description: INTERVENTIONS:   - Identify precipitating factors for behavior   - Notify Charge RN/Provider   - Consider decreasing stimulation   - Consider distraction measures   - Consider discussion with provider regarding prn meds    - Consider GABRIEL (Moderate Risk only)   - Consider Code Support (High Risk only)   - Consider room safety checks   - Consider restraints  Outcome: Not Progressing

## 2024-04-22 ENCOUNTER — PATIENT OUTREACH (OUTPATIENT)
Dept: CASE MANAGEMENT | Age: 69
End: 2024-04-22

## 2024-04-22 VITALS
BODY MASS INDEX: 25.04 KG/M2 | OXYGEN SATURATION: 100 % | TEMPERATURE: 98 F | HEIGHT: 67.99 IN | WEIGHT: 165.19 LBS | HEART RATE: 63 BPM | RESPIRATION RATE: 16 BRPM | DIASTOLIC BLOOD PRESSURE: 70 MMHG | SYSTOLIC BLOOD PRESSURE: 140 MMHG

## 2024-04-22 NOTE — PROGRESS NOTES
Called pt Yesenia LEON to schedule Neuro/Stroke apt - pt discharging to [facility]  Neurology  [facility]  [address]  [facility] [address]  Phone: [phone]  Nurse will be calling Yesenia PATTERSON) @11:20am to schedule apt  Pt CAROLYN Patterson verbalized understanding  Closing encounter

## 2024-04-22 NOTE — CM/SW NOTE
04/22/24 1000   Discharge disposition   Expected discharge disposition subacute   Post Acute Care Provider   ([facility] [place])   Discharge transportation [facility]     Pt discussed during nursing rounds. Pt is stable for dc today. MD dc order entered. [facility], liaison Adriana confirmed bed is available today. POA agreeable to transfer today. [facility] scheduled for 1pm .    Number for nurse report is [phone].    [facility]    / to remain available for support and/or discharge planning.     JOSE MARIA Cornejo    [phone]

## 2024-04-22 NOTE — DISCHARGE SUMMARY
General Medicine Discharge Summary     Patient ID:  Carlo Ng  68 year old  12/13/1955    Admit date: 3/31/2024    Discharge date and time: 4/22/24    Attending Physician: Alli Hernandez MD     Consults: IP CONSULT TO HOSPITALIST  IP CONSULT TO NEUROLOGY  IP CONSULT TO SOCIAL WORK  IP CONSULT TO SOCIAL WORK  IP CONSULT TO PSYCHIATRY  IP CONSULT TO UROLOGY    Primary Care Physician: No primary care provider on file.     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  Patient arrives via EMS from his independent living facility.  It appears that the patient had a visiting nurse and noted him to be sitting at the edge of the bed, confused.  EMS arrived and they found the patient to have left-sided weakness and left-sided facial droop.  The patient was not answering questions appropriately and was altered.  He was last known well sometime yesterday. Normally gets his care at [facility]. Has had admissions for altered mental status as well.     Hospital Course:   Mr. 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     Altered mental status   Metabolic encephalopathy  Delirium in setting of likely vascular dementia  -IMPROVED  -unclear, ddx included metabolic encephalopathy, infection, stroke, post ictal state on NPH, frontotemporal dementia?  -has been admitted 3 other times (last in 2/2023, at [facility]) with similar presentation, extensive work up   -neurology and psychiatry consulted  -MRI brain without acute infarct advanced small vessel disease, poss enlargement of ventricles  -CODE GABRIEL called on 4/2 in am, Psyc consulted  -concern for NPH, s/p lumbar puncture on 4/4, had physical therapy preceding and post LP, opening pressure was normal (12) minute and cognitive state or functional state did not improve  -Discussed with neurology, and neurosurgery, unlikely that this is NPH given lack of improvement post LP, also atypical presentation, neurosurgery recommending outpatient follow-up if suspicion is still high for NPH  -LP studies pending given CJD testing, encephalitis panel negative  -psychiatry managing psychiatric and behavorial meds   -has been off restraints for several days, sitter Dc'd on 4/15     Hypernatremia / DAWN  Urinary retention  Poss UTI  - urinary retention, bladder scan with 1600, straight Q6  - hold on you given high risk for pulling out  - continue flomax, bowel regimen, mobilize, minimizing contributing meds  - UA with sediment, IV ceftriaxone ordered, culture with poss contaminant, but has been straight cathed several times, will continue ab's to complete 7 day course (especially with leucocytosis, and suprapubic pain, now improved)  - ceftraixone (started on 4/9) -> transitioned to oral keflex   - flomax 0.8mg daily  - had multiple BM on 4/10-11, improved mobility, offending medications  (thorazine stopped, haldol minimized).  - continued retention, so urology was consulted. Now improved   - started on finasteride however this may not start working immediately      Constipation  - KUB with large stool burden  - bowel regimen ordered--> several BM on 4/10-11     Pink eye / right eye radiation   - hs of right parotid radiation, has had history of recurrent eye infections since that time  - antibiotic drop started on 4/2 completed 4/9  - will continue artificial tears TID  - no changes in visual acuity, no pain  - improved     Wheezing- resolved  - poss secondary to risperidone? Add to allergy list  - steroids, nebs, avoid IV benadryl given severe agitation  - CXR negative   - RVP neg     Leucocytosis   - poss secondary to IV steroids  - no fevers  - will continue to monitor  - steroid stopped  - improved      Hx of Hypertension  -started on amlodipine  -coreg noted on home meds, resumed   - BP improved     Possible left sided facial droop and weakness  -neurology consulted and stroke work up if needed based on their evaluation   -anti-platelets if recommended by neuro   -discussed with neurology. Low suspicion for acute CVA. MRI  negative for stroke      Hx of seizure? Noted in outside records,  - not on AED's     Hs of Small cell lung cancer   - parotid involvement, has had radiation therapy   - follows at [place]      Gout  -he's  was on colchicine (stopped as OP), no signs of gout    Operative Procedures:      Imaging: No results found.    Disposition: MINNIE    Activity: as tolerated   Diet: general   Wound Care: no needs  Code Status: Full Code  O2: no needs    Home Medication Changes: as below   All discharge medications have been reconciled with current medication list.     Med list     Medication List        START taking these medications      amLODIPine 10 MG Tabs  Commonly known as: Norvasc     aspirin 81 MG Chew     atorvastatin 80 MG Tabs  Commonly known as: Lipitor     divalproex  MG  Csdr  Commonly known as: Depakote Sprinkle     escitalopram 5 MG Tabs  Commonly known as: Lexapro     finasteride 5 MG Tabs  Commonly known as: Proscar     melatonin 1 MG Tabs     memantine 5 MG Tabs  Commonly known as: Namenda     OLANZapine 5 MG Tabs  Commonly known as: ZyPREXA     Polyethylene Glycol 3350 17 g Pack  Commonly known as: MIRALAX     QUEtiapine 25 MG Tabs  Commonly known as: SEROquel     Sennosides 17.2 MG Tabs     thiamine 100 MG Tabs  Commonly known as: Vitamin B1            CONTINUE taking these medications      acetaminophen 325 MG Tabs  Commonly known as: Tylenol     albuterol 108 (90 Base) MCG/ACT Aers  Commonly known as: Ventolin HFA     carvedilol 12.5 MG Tabs  Commonly known as: Coreg     clopidogrel 75 MG Tabs  Commonly known as: Plavix     fluticasone-salmeterol 250-50 MCG/ACT Aepb  Commonly known as: Advair Diskus     tamsulosin 0.4 MG Caps  Commonly known as: Flomax              FU   Follow-up Information       [facility].    Specialty: NEUROLOGY  Why: Neuro/Stroke follow up  Contact information:  [address] [address] [phone]               Pcp, Unknown Follow up in 1 week(s).                             DC instructions:      Other Discharge Instructions:         Keep all follow up appointments and continue current medications.         Patient had opportunity to ask questions and state understand and agree with therapeutic plan as outlined    Thank You,    Alli Hernandez M.D.  [facility]

## 2024-04-22 NOTE — PAYOR COMM NOTE
--------------  DISCHARGE REVIEW    Payor: [employer]  Subscriber #:  [id number]  Authorization Number: [id number]    Admit date: 3/31/24  Admit time:  12:40 PM  Discharge Date: 4/22/2024  1:01 PM     Admitting Physician: Raheem Johnson MD  Attending Physician:  No att. providers found  Primary Care Physician: No primary care provider on file.    Discharge Summary signed by Alli Hernandez MD at 4/22/2024 12:47 PM       Author: Alli Hernandez MD Specialty: HOSPITALIST Author Type: Physician    Filed: 4/22/2024 12:47 PM Date of Service: 4/22/2024  9:27 AM Status: Signed    : Alli Hernandez MD (Physician)     General Medicine Discharge Summary     Admit date: 3/31/2024    Discharge date and time: 4/22/24    Attending Physician: Alli Hernandez MD     Consults: IP CONSULT TO HOSPITALIST  IP CONSULT TO NEUROLOGY  IP CONSULT TO SOCIAL WORK  IP CONSULT TO SOCIAL WORK  IP CONSULT TO PSYCHIATRY  IP CONSULT TO UROLOGY    Primary Care Physician: No primary care provider on file.     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  Patient arrives via EMS from his independent living facility.  It appears that the patient had a visiting nurse and noted him to be sitting at the edge of the bed, confused.  EMS arrived and they found the patient to have left-sided  weakness and left-sided facial droop.  The patient was not answering questions appropriately and was altered.  He was last known well sometime yesterday. Normally gets his care at [facility]. Has had admissions for altered mental status as well.     Hospital Course:   68 year old male with history of metastatic small cell cancer with parotid involvement s/p chemo 2019, vascular dementia, HTN, history of possible seizure, gout, metabolic encephalopathy with agitation without clear cause status post 3 hospitalizations, who presents with altered mental status weakness and agitation, neurology and psychiatry consulted, extensive workup including CT/MRI brain/lumbar puncture without clear etiology, slow clinical improvement with cognitive state, plan for [facility].     Altered mental status   Metabolic encephalopathy  Delirium in setting of likely vascular dementia  -IMPROVED  -unclear, ddx included metabolic encephalopathy, infection, stroke, post ictal state on NPH, frontotemporal dementia?  -has been admitted 3 other times (last in 2/2023, at [facility]) with similar presentation, extensive work up   -neurology and psychiatry consulted  -MRI brain without acute infarct advanced small vessel disease, poss enlargement of ventricles  -CODE GABRIEL called on 4/2 in am, Psyc consulted  -concern for NPH, s/p lumbar puncture on 4/4, had physical therapy preceding and post LP, opening pressure was normal (12) minute and cognitive state or functional state did not improve  -Discussed with neurology, and neurosurgery, unlikely that this is NPH given lack of improvement post LP, also atypical presentation, neurosurgery recommending outpatient follow-up if suspicion is still high for NPH  -LP studies pending given CJD testing, encephalitis panel negative  -psychiatry managing psychiatric and behavorial meds   -has been off restraints for several days, sitter Dc'd on 4/15     Hypernatremia / DAWN  Urinary retention  Poss UTI  - urinary retention,  bladder scan with 1600, straight Q6  - hold on you given high risk for pulling out  - continue flomax, bowel regimen, mobilize, minimizing contributing meds  - UA with sediment, IV ceftriaxone ordered, culture with poss contaminant, but has been straight cathed several times, will continue ab's to complete 7 day course (especially with leucocytosis, and suprapubic pain, now improved)  - ceftraixone (started on 4/9) -> transitioned to oral keflex   - flomax 0.8mg daily  - had multiple BM on 4/10-11, improved mobility, offending medications (thorazine stopped, haldol minimized).  - continued retention, so urology was consulted. Now improved   - started on finasteride however this may not start working immediately      Constipation  - KUB with large stool burden  - bowel regimen ordered--> several BM on 4/10-11     Pink eye / right eye radiation   - hs of right parotid radiation, has had history of recurrent eye infections since that time  - antibiotic drop started on 4/2 completed 4/9  - will continue artificial tears TID  - no changes in visual acuity, no pain  - improved     Wheezing- resolved  - poss secondary to risperidone? Add to allergy list  - steroids, nebs, avoid IV benadryl given severe agitation  - CXR negative   - RVP neg     Leucocytosis   - poss secondary to IV steroids  - no fevers  - will continue to monitor  - steroid stopped  - improved      Hx of Hypertension  -started on amlodipine  -coreg noted on home meds, resumed   - BP improved     Possible left sided facial droop and weakness  -neurology consulted and stroke work up if needed based on their evaluation   -anti-platelets if recommended by neuro   -discussed with neurology. Low suspicion for acute CVA. MRI  negative for stroke      Hx of seizure? Noted in outside records,  - not on AED's     Hs of Small cell lung cancer   - parotid involvement, has had radiation therapy   - follows at [facility]      Gout  -he's  was on colchicine (stopped as  OP), no signs of gout    Operative Procedures:      Imaging: No results found.    Disposition: [facility]    Activity: as tolerated   Diet: general   Wound Care: no needs  Code Status: Full Code  O2: no needs    Home Medication Changes: as below   All discharge medications have been reconciled with current medication list.     Med list     Medication List        START taking these medications      amLODIPine 10 MG Tabs  Commonly known as: Norvasc     aspirin 81 MG Chew     atorvastatin 80 MG Tabs  Commonly known as: Lipitor     divalproex  MG Csdr  Commonly known as: Depakote Sprinkle     escitalopram 5 MG Tabs  Commonly known as: Lexapro     finasteride 5 MG Tabs  Commonly known as: Proscar     melatonin 1 MG Tabs     memantine 5 MG Tabs  Commonly known as: Namenda     OLANZapine 5 MG Tabs  Commonly known as: ZyPREXA     Polyethylene Glycol 3350 17 g Pack  Commonly known as: MIRALAX     QUEtiapine 25 MG Tabs  Commonly known as: SEROquel     Sennosides 17.2 MG Tabs     thiamine 100 MG Tabs  Commonly known as: Vitamin B1            CONTINUE taking these medications      acetaminophen 325 MG Tabs  Commonly known as: Tylenol     albuterol 108 (90 Base) MCG/ACT Aers  Commonly known as: Ventolin HFA     carvedilol 12.5 MG Tabs  Commonly known as: Coreg     clopidogrel 75 MG Tabs  Commonly known as: Plavix     fluticasone-salmeterol 250-50 MCG/ACT Aepb  Commonly known as: Advair Diskus     tamsulosin 0.4 MG Caps  Commonly known as: Flomax       DC instructions:      Other Discharge Instructions:         Keep all follow up appointments and continue current medications.     Alli Hernandez M.D.  4/22/2024 12:47 PM      FOR FINAL REVIEW/APPROVAL OF ALL INPT DAYS

## 2024-04-22 NOTE — PLAN OF CARE
Problem: Risk for Violence-Violent Restraints/Seclusion  Goal: Patient will not express any violent or self-destructive behaviors  Description: Interventions:  - Apply the least restrictive restraint to prevent harm if patient strikes out and is not responding to redirection  - Notify patient and family of reasons restraints applied  - Assist the patient to identify the precipitating event  - Assist the patient to identify alternatives to physically acting out  - Assess for any contributing factors to violent behaviors (substances,  medications, history of trauma)  - Assess the patient's physical comfort, circulation, skin condition, hydration, nutrition and elimination needs   - Assess for the need to continue restraints  - Evaluate the need for an emergency medication  Outcome: Progressing     Problem: Patient Centered Care  Goal: Patient preferences are identified and integrated in the patient's plan of care  Description: Interventions:  - What would you like us to know as we care for you? From [facility]  - Provide timely, complete, and accurate information to patient/family  - Incorporate patient and family knowledge, values, beliefs, and cultural backgrounds into the planning and delivery of care  - Encourage patient/family to participate in care and decision-making at the level they choose  - Honor patient and family perspectives and choices  Outcome: Progressing     Problem: Patient/Family Goals  Goal: Patient/Family Long Term Goal  Description: Patient's Long Term Goal: discharge    Interventions:  - Monitor vital signs  - Monitor neurological status  - Ambulate as tolerated  - See additional Care Plan goals for specific interventions  Outcome: Progressing  Goal: Patient/Family Short Term Goal  Description: Patient's Short Term Goal: feel better    Interventions:   - Verbalize needs and wants  - Antianxiety medications as needed  - See additional Care Plan goals for specific  interventions  Outcome: Progressing     Problem: Delirium  Goal: Minimize duration of delirium  Description: Interventions:  - Encourage use of hearing aids, eye glasses  - Promote highest level of mobility daily  - Provide frequent reorientation  - Promote wakefulness i.e. lights on, blinds open  - Promote sleep, encourage patient's normal rest cycle i.e. lights off, TV off, minimize noise and interruptions  - Encourage family to assist in orientation and promotion of home routines  Outcome: Progressing     Problem: METABOLIC/FLUID AND ELECTROLYTES - ADULT  Goal: Electrolytes maintained within normal limits  Description: INTERVENTIONS:  - Monitor labs and rhythm and assess patient for signs and symptoms of electrolyte imbalances  - Administer electrolyte replacement as ordered  - Monitor response to electrolyte replacements, including rhythm and repeat lab results as appropriate  - Fluid restriction as ordered  - Instruct patient on fluid and nutrition restrictions as appropriate  Outcome: Progressing     Problem: MUSCULOSKELETAL - ADULT  Goal: Return mobility to safest level of function  Description: INTERVENTIONS:  - Assess patient stability and activity tolerance for standing, transferring and ambulating w/ or w/o assistive devices  - Assist with transfers and ambulation using safe patient handling equipment as needed  - Ensure adequate protection for wounds/incisions during mobilization  - Obtain PT/OT consults as needed  - Advance activity as appropriate  - Communicate ordered activity level and limitations with patient/family  Outcome: Progressing       VAAC plan in place, intermittent agitation but mostly cooperative and pleasant. Plan is to discharge today to [facility]. Report given to LISHA Javier. Peripheral IV removed.

## 2024-04-23 NOTE — PAYOR COMM NOTE
--------------  DISCHARGE REVIEW    Payor: [employer]  Subscriber #:  [id number]  Authorization Number: [id number]    Admit date: 3/31/24  Admit time:  12:40 PM  Discharge Date: 4/22/2024  1:01 PM     Admitting Physician: Raheem Johnson MD  Attending Physician:  No att. providers found  Primary Care Physician: No primary care provider on file.          Discharge Summary Notes        Discharge Summary signed by Alli Hernandez MD at 4/22/2024 12:47 PM       Author: Alli Hernandez MD Specialty: HOSPITALIST Author Type: Physician    Filed: 4/22/2024 12:47 PM Date of Service: 4/22/2024  9:27 AM Status: Signed    : Alli Hernandez MD (Physician)           General Medicine Discharge Summary     Patient ID:  Carlo Ng  68 year old  12/13/1955    Admit date: 3/31/2024    Discharge date and time: 4/22/24    Attending Physician: Alli Hernandez MD     Consults: IP CONSULT TO HOSPITALIST  IP CONSULT TO NEUROLOGY  IP CONSULT TO SOCIAL WORK  IP CONSULT TO SOCIAL WORK  IP CONSULT TO PSYCHIATRY  IP CONSULT TO UROLOGY    Primary Care Physician: No primary care provider on file.     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  Patient arrives via EMS from his independent living facility.  It appears that the patient had a visiting nurse and noted him to be  sitting at the edge of the bed, confused.  EMS arrived and they found the patient to have left-sided weakness and left-sided facial droop.  The patient was not answering questions appropriately and was altered.  He was last known well sometime yesterday. Normally gets his care at [facility]. Has had admissions for altered mental status as well.     Hospital Course:   Mr. 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     Altered mental status   Metabolic encephalopathy  Delirium in setting of likely vascular dementia  -IMPROVED  -unclear, ddx included metabolic encephalopathy, infection, stroke, post ictal state on NPH, frontotemporal dementia?  -has been admitted 3 other times (last in 2/2023, at [facility]) with similar presentation, extensive work up   -neurology and psychiatry consulted  -MRI brain without acute infarct advanced small vessel disease, poss enlargement of ventricles  -CODE GABRIEL called on 4/2 in am, Psyc consulted  -concern for NPH, s/p lumbar puncture on 4/4, had physical therapy preceding and post LP, opening pressure was normal (12) minute and cognitive state or functional state did not improve  -Discussed with neurology, and neurosurgery, unlikely that this is NPH given lack of improvement post LP, also atypical presentation, neurosurgery recommending outpatient follow-up if suspicion is still high for NPH  -LP studies pending given CJD testing, encephalitis panel negative  -psychiatry managing psychiatric and behavorial meds   -has been off  restraints for several days, sitter Dc'd on 4/15     Hypernatremia / DAWN  Urinary retention  Poss UTI  - urinary retention, bladder scan with 1600, straight Q6  - hold on you given high risk for pulling out  - continue flomax, bowel regimen, mobilize, minimizing contributing meds  - UA with sediment, IV ceftriaxone ordered, culture with poss contaminant, but has been straight cathed several times, will continue ab's to complete 7 day course (especially with leucocytosis, and suprapubic pain, now improved)  - ceftraixone (started on 4/9) -> transitioned to oral keflex   - flomax 0.8mg daily  - had multiple BM on 4/10-11, improved mobility, offending medications (thorazine stopped, haldol minimized).  - continued retention, so urology was consulted. Now improved   - started on finasteride however this may not start working immediately      Constipation  - KUB with large stool burden  - bowel regimen ordered--> several BM on 4/10-11     Pink eye / right eye radiation   - hs of right parotid radiation, has had history of recurrent eye infections since that time  - antibiotic drop started on 4/2 completed 4/9  - will continue artificial tears TID  - no changes in visual acuity, no pain  - improved     Wheezing- resolved  - poss secondary to risperidone? Add to allergy list  - steroids, nebs, avoid IV benadryl given severe agitation  - CXR negative   - RVP neg     Leucocytosis   - poss secondary to IV steroids  - no fevers  - will continue to monitor  - steroid stopped  - improved      Hx of Hypertension  -started on amlodipine  -coreg noted on home meds, resumed   - BP improved     Possible left sided facial droop and weakness  -neurology consulted and stroke work up if needed based on their evaluation   -anti-platelets if recommended by neuro   -discussed with neurology. Low suspicion for acute CVA. MRI  negative for stroke      Hx of seizure? Noted in outside records,  - not on AED's     Hs of Small cell lung cancer    - parotid involvement, has had radiation therapy   - follows at [facility]      Gout  -he's  was on colchicine (stopped as OP), no signs of gout    Operative Procedures:      Imaging: No results found.    Disposition: [facility]    Activity: as tolerated   Diet: general   Wound Care: no needs  Code Status: Full Code  O2: no needs    Home Medication Changes: as below   All discharge medications have been reconciled with current medication list.     Med list     Medication List        START taking these medications      amLODIPine 10 MG Tabs  Commonly known as: Norvasc     aspirin 81 MG Chew     atorvastatin 80 MG Tabs  Commonly known as: Lipitor     divalproex  MG Csdr  Commonly known as: Depakote Sprinkle     escitalopram 5 MG Tabs  Commonly known as: Lexapro     finasteride 5 MG Tabs  Commonly known as: Proscar     melatonin 1 MG Tabs     memantine 5 MG Tabs  Commonly known as: Namenda     OLANZapine 5 MG Tabs  Commonly known as: ZyPREXA     Polyethylene Glycol 3350 17 g Pack  Commonly known as: MIRALAX     QUEtiapine 25 MG Tabs  Commonly known as: SEROquel     Sennosides 17.2 MG Tabs     thiamine 100 MG Tabs  Commonly known as: Vitamin B1            CONTINUE taking these medications      acetaminophen 325 MG Tabs  Commonly known as: Tylenol     albuterol 108 (90 Base) MCG/ACT Aers  Commonly known as: Ventolin HFA     carvedilol 12.5 MG Tabs  Commonly known as: Coreg     clopidogrel 75 MG Tabs  Commonly known as: Plavix     fluticasone-salmeterol 250-50 MCG/ACT Aepb  Commonly known as: Advair Diskus     tamsulosin 0.4 MG Caps  Commonly known as: Flomax              FU   Follow-up Information       [facility].    Specialty: NEUROLOGY  Why: Neuro/Stroke follow up  Contact information:  [address] [address] [phone]               Pcp, Unknown Follow up in 1 week(s).                             DC instructions:      Other Discharge Instructions:         Keep all follow up  appointments and continue current medications.         Patient had opportunity to ask questions and state understand and agree with therapeutic plan as outlined    Thank You,    Alli Hernandez M.D.  [facility]      Electronically signed by Alli Hernandez MD on 4/22/2024 12:47 PM         REVIEWER COMMENTS

## 2024-04-23 NOTE — CDS QUERY
How to answer this Query:    1.) DON'T CLICK COSIGN BUTTON FIRST  2.) Click \"3 dots...\" to the right of cosign button and click EDIT on the toolbar.  2.) Type an \"X\" in the bracket for the diagnosis that applies. (You may also add additional clinical details as you feel necessary to substantiate your response).   3.) Finally click \"Sign\" to complete response.  Thank You  CLINICAL DOCUMENTATION CLARIFICATION FORM  Dear Doctor:DANE    Please specify the underlying cause of VASCULAR DEMENTIA    ( ) CEREBRAL ARTERIOSCLEROSIS    ( X ) SEQUELA OF CEREBROVASCULAR DISEASE    ( ) Other (please specify):     RISK FACTORS: HO CVA, VASCULAR DEMENTIA  CLINICAL INDICATORS: CTBRAIN-3/31 -There are moderate microvascular white matter ischemic changes, Interval development of multiple lacunar infarcts most notably in the right caudate head and right basal ganglia, new since    2012 imaging.   3.  Atherosclerosis in the anterior and posterior circulations.  4/2 MRI- Advanced changes of chronic small vessel disease in both cerebral hemispheres.   3. Moderate changes of chronic small vessel disease in the saroj   TREATMENT:DC TO [facility], MRI, CT, PSY/NEURO CONSULTED    If you have any questions, please contact Clinical :  Shawnee PAULSON RN at [phone]     Thank You!    THIS FORM IS A PERMANENT PART OF THE MEDICAL RECORD

## 2024-05-10 ENCOUNTER — TELEPHONE (OUTPATIENT)
Dept: NEUROLOGY | Facility: CLINIC | Age: 69
End: 2024-05-10

## 2024-05-10 NOTE — TELEPHONE ENCOUNTER
Hello,    Can you call the patient or his family about his CSF results?    Hello, we have some results from when you were hospitalized and had neurological testing done.    Your spinal fluid did not show any cancer cells.  There were no white blood cells in your spinal fluid, which is normal.  It was a slightly traumatic tap so there was some blood seen.  Your protein levels in your spinal fluid was elevated at 74.4.  Your glucose levels were slightly high as well.    Elevated protein in the spinal fluid can mean inflammation.  Sometimes this can cause cognitive decline.     We should discuss this in clinic in more detail.      He initially saw Dr. Peguero in the hospital so he should see him after discharge.  Please ask Dr. Peguero when he would like to see this patient.      Thank you  SKS

## 2024-05-13 NOTE — TELEPHONE ENCOUNTER
Phone call placed to pt. Reviewed Dr. Barrett message, pt verbalized understanding of message and scheduled a follow up with DR. Peguero.

## 2024-11-20 ENCOUNTER — EXTERNAL FACILITY (OUTPATIENT)
Dept: PULMONOLOGY | Facility: CLINIC | Age: 69
End: 2024-11-20

## 2024-11-20 DIAGNOSIS — J69.0 ASPIRATION PNEUMONIA, UNSPECIFIED ASPIRATION PNEUMONIA TYPE, UNSPECIFIED LATERALITY, UNSPECIFIED PART OF LUNG (HCC): ICD-10-CM

## 2024-11-20 DIAGNOSIS — R13.10 DYSPHAGIA, UNSPECIFIED TYPE: Primary | ICD-10-CM

## 2024-11-20 DIAGNOSIS — Z87.09 HISTORY OF ASTHMA: ICD-10-CM

## 2024-11-25 NOTE — PROGRESS NOTES
Pulmonary Consult Note  SNF External Facility - [facility]    History of Present Illness:   Carlo Ng is a(n) 68 year old male with PMH notable for vascular dementia, small cell carcinoma of parotid gland, asthma, gout, who I am now evaluating for aspiration pneumonia at [facility]. Pt was hospitalized 10/2024 with COVID-19, discharged to rehab, then was re-admitted with worsening respiratory failure and aspiration pneumonia. Pt is nonverbal and unable to provide any history. Limited hospital records available for review. He is NPO with g-tube. Per chart review, pt has history of asthma. He is breathing comfortably on room air.    Past Medical History:  Past Medical History:    Abnormal CT scan    Acute gout involving toe of left foot    Altered mental status    Anemia    Asthma in adult (HCC)    Cancer of parotid gland (HCC)    Formatting of this note might be different from the original.   Resection 2019    Catatonia    Cognitive communication deficit    Cystic disease of liver    Dementia (HCC)    Difficult airway    Dyslipidemia    Elevated white blood cell count, unspecified    Encephalopathy    Essential hypertension    Gastro-esophageal reflux disease with esophagitis, without bleeding    Gout, chronic    Gross hematuria    Hypercholesterolemia    Hyperlipidemia    Leukocytosis    Liver cyst    Metabolic encephalopathy    Neuroendocrine carcinoma, high grade (HCC)    Other abnormalities of gait and mobility    Other psoriasis    Other seizures (HCC)    Other specified disorders of kidney and ureter    Parotid neoplasm    Formatting of this note might be different from the original.   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   Keppra    Seizure disorder (HCC)    Toxic metabolic encephalopathy    Transient global amnesia    Formatting of this note might be different from the original.   Brief -Plavix    Unspecified dementia, unspecified severity, without behavioral disturbance, psychotic disturbance, mood disturbance, and anxiety (HCC)    Vascular dementia (HCC)    Vascular dementia, unspecified severity, without behavioral disturbance, psychotic disturbance, mood disturbance, and anxiety (HCC)    Weakness    Weakness of left upper extremity     Past Surgical History: Unknown surgical history    Social History: Former smoker    Allergies: Codeine, Ferric Carboxymaltose, risperiDONE     Medications: Reviewed on [facility] EMR; pertinent pulmonary meds include: DuoNebs    Review of Systems: Unable to obtain.    Physical Exam:  /74, HR 80, RR 18, T 97.9, sat 95% on room air   Constitutional: Chronically ill-appearing, awake, alert, NAD  HEENT: Head NC/AT, MMM  Cardio: RRR, S1S2, no murmurs  Respiratory: Thorax symmetrical with no labored breathing. Clear to ausculation bilaterally with symmetrical breath sounds. No wheezing, rhonchi, or crackles.   Extremities: No clubbing or cyanosis. No LE edema. No calf tenderness.  Neurologic: Nonverbal. Moves upper extremities. Does not consistently follow commands or track.  Skin: Warm, dry.    Results:  -Labs 11/20/24: wbc 15.32, hgb 11.3, plt 546, cr 0.75, bun 21, na 138, k 4.8, co2 27    Assessment and Plan:  1. Dysphagia with recent aspiration pneumonia - s/p g-tube. Lungs clear and oxygen saturations 95% on room air. Prior imaging not available.    Plan:  -Aspiration precautions  -NPO with g-tube feedings  -Consider follow up CXR in 1-2 weeks    2. History of asthma - not on maintenance inhaler therapy. No signs of  exacerbation.    Plan:  -DuoNebs as needed    3. Vascular dementia    Plan:  -Supportive care    DVT prophylaxis: eBrta Torre PA-C  Pulmonary Medicine

## 2024-11-27 ENCOUNTER — EXTERNAL FACILITY (OUTPATIENT)
Dept: PULMONOLOGY | Facility: CLINIC | Age: 69
End: 2024-11-27

## 2024-11-27 DIAGNOSIS — Z87.09 HISTORY OF ASTHMA: ICD-10-CM

## 2024-11-27 DIAGNOSIS — R13.10 DYSPHAGIA, UNSPECIFIED TYPE: Primary | ICD-10-CM

## 2024-11-27 DIAGNOSIS — J69.0 ASPIRATION PNEUMONIA, UNSPECIFIED ASPIRATION PNEUMONIA TYPE, UNSPECIFIED LATERALITY, UNSPECIFIED PART OF LUNG (HCC): ICD-10-CM

## 2024-11-27 PROCEDURE — 99309 SBSQ NF CARE MODERATE MDM 30: CPT | Performed by: PHYSICIAN ASSISTANT

## 2024-12-09 ENCOUNTER — HOSPITAL ENCOUNTER (EMERGENCY)
Facility: HOSPITAL | Age: 69
Discharge: HOME | End: 2024-12-10
Attending: EMERGENCY MEDICINE
Payer: MEDICARE

## 2024-12-09 ENCOUNTER — APPOINTMENT (OUTPATIENT)
Dept: CT IMAGING | Facility: HOSPITAL | Age: 69
End: 2024-12-09
Attending: EMERGENCY MEDICINE
Payer: MEDICARE
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
## 2024-12-09 DIAGNOSIS — T07.XXXA ABRASIONS OF MULTIPLE SITES: ICD-10-CM

## 2024-12-09 PROCEDURE — 70450 CT HEAD/BRAIN W/O DYE: CPT | Performed by: EMERGENCY MEDICINE

## 2024-12-09 PROCEDURE — 99284 EMERGENCY DEPT VISIT MOD MDM: CPT

## 2024-12-10 VITALS
TEMPERATURE: 97 F | BODY MASS INDEX: 25.92 KG/M2 | OXYGEN SATURATION: 98 % | HEIGHT: 69 IN | HEART RATE: 67 BPM | RESPIRATION RATE: 21 BRPM | SYSTOLIC BLOOD PRESSURE: 121 MMHG | WEIGHT: 175 LBS | DIASTOLIC BLOOD PRESSURE: 56 MMHG

## 2024-12-10 NOTE — ED PROVIDER NOTES
Patient Seen in: [facility] Emergency Department      History     Chief Complaint   Patient presents with    Fall     Stated Complaint: fall unwitnessed, onhinners.0    Subjective:   HPI      68-year-old male with history of hypertension, hyperlipidemia, vascular dementia, cognitive communication deficit, anemia, and seizure presents after a fall today.  The fall was unwitnessed but the patient was found on the floor with abrasions to his upper back, left thigh, right leg, and right foot.  The patient states that he walked into a room and bumped into another resident at his nursing home.  He reports falling.  He is without complaints of any pain.  He denies any headache.  He denies hitting his head.  He denies any neck or back pain.  He denies chest or abdominal pain.  He denies extremity pain.    Objective:     Past Medical History:    Abnormal CT scan    Acute gout involving toe of left foot    Altered mental status    Anemia    Asthma in adult (HCC)    Cancer of parotid gland (HCC)    Formatting of this note might be different from the original.   Resection 2019    Catatonia    Cognitive communication deficit    Cystic disease of liver    Dementia (HCC)    Difficult airway    Dyslipidemia    Elevated white blood cell count, unspecified    Encephalopathy    Essential hypertension    Gastro-esophageal reflux disease with esophagitis, without bleeding    Gout, chronic    Gross hematuria    Hypercholesterolemia    Hyperlipidemia    Leukocytosis    Liver cyst    Metabolic encephalopathy    Neuroendocrine carcinoma, high grade (HCC)    Other abnormalities of gait and mobility    Other psoriasis    Other seizures (HCC)    Other specified disorders of kidney and ureter    Parotid neoplasm    Formatting of this note might be different from the original.   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   Keppra    Seizure disorder (HCC)    Toxic metabolic encephalopathy    Transient global amnesia    Formatting of this note might be different from the original.   Brief -Plavix    Unspecified dementia, unspecified severity, without behavioral disturbance, psychotic disturbance, mood disturbance, and anxiety (HCC)    Vascular dementia (HCC)    Vascular dementia, unspecified severity, without behavioral disturbance, psychotic disturbance, mood disturbance, and anxiety (HCC)    Weakness    Weakness of left upper extremity              History reviewed. No pertinent surgical history.             Social History     Socioeconomic History    Marital status: Single   Tobacco Use    Smoking status: Former     Current packs/day: 0.00     Average packs/day: 1 pack/day for 25.0 years (25.0 ttl pk-yrs)     Types: Cigarettes     Start date: 1965     Quit date: 1990     Years since quittin.9    Tobacco comments:      Cigarettes 1 25 Quit:     Social Drivers of Health     Financial Resource Strain: Low Risk  (2021)    [facility]    Overall Financial Resource Strain (CARDIA)     Difficulty of Paying Living Expenses: Not hard at all   Food Insecurity: Low Risk  (2024)    Received from [facility] Food Security     Within the past 12 months, the food you bought just didn't last and you didn't have money to get more.: 3     Within the past 12 months, you worried that your food would run out before you got money to buy more.: 3   Transportation Needs: Not At Risk (2024)    Received from [facility]  Transportation Needs     In the past 12 months, has lack of reliable transportation kept you from medical appointments, meetings, work or from getting things needed for daily living?: No   Physical Activity: Inactive (10/9/2024)    Received from [facility]    Physical Activity     On average, how many days per week do you engage in moderate to strenuous exercise (like a brisk walk)?: 0 days     On average, how many minutes do you engage in exercise at this level?: 0 min     On average, how many minutes do you engage in exercise at this level?: 0 min     On average, how many days per week do you engage in moderate to strenuous exercise (like a brisk walk)?: 0 days    Received from [facility], [facility]    Social Connections   Housing Stability: Not At Risk (11/2/2024)    Received from [facility] Housing     What is your living situation today?: I have a steady place to live     Think about the place you live. Do you have problems with any of the following?: None of the above                  Physical Exam     ED Triage Vitals   BP 12/09/24 2226 110/64   Pulse 12/09/24 2221 81   Resp 12/09/24 2221 16   Temp 12/09/24 2221 97 °F (36.1 °C)   Temp src 12/09/24 2221 Temporal   SpO2 12/09/24 2221 95 %   O2 Device 12/09/24 2221 None (Room air)       Current Vitals:   Vital Signs  BP: 115/62  Pulse: 74  Resp: 22  Temp: 97 °F (36.1 °C)  Temp src: Temporal  MAP (mmHg): 76    Oxygen Therapy  SpO2: 97 %  O2 Device: None (Room air)        Physical Exam      General Appearance: alert, no distress  Eyes: pupils equal and round no injection  Respiratory: chest is non tender to palpation, breath sounds are equal  Cardiac: regular rate and rhythm  Gastrointestinal:  soft and non tender, there is no evidence of external or internal trauma by exam.  Neurological: Speech normal.  Moving extremities x 4.  Skin: Superficial abrasion noted to the midline of the upper back.  There is a small  skin tear noted to the anterior aspect of the left thigh and abrasion is noted to the right leg and right great toe.  No full-thickness lacerations or active bleeding.  Musculoskeletal                Head: atraumatic without scalp tenderness                Neck: The patient arrived in a cervical collar. The cervical spine is non-tender and there is no pain with active range of motion                Back: there is no thoracic or lumbar spine or paraspinal tenderness                Extremities are non tender to palpation and there is full range of motion of the joints.      ED Course     Labs Reviewed   RAINBOW DRAW LAVENDER   RAINBOW DRAW LIGHT GREEN   RAINBOW DRAW BLUE   RAINBOW DRAW GOLD                 MDM      Head CT was reviewed independently.  No hemorrhage or intracranial injury noted.  Discharge the patient back to his nursing home.        Medical Decision Making      Disposition and Plan     Clinical Impression:  1. Fall, initial encounter    2. Abrasions of multiple sites         Disposition:  Discharge  12/9/2024 11:41 pm    Follow-up:  own physician    Follow up      We recommend that you schedule follow up care with a primary care provider within the next three months to obtain basic health screening including reassessment of your blood pressure.      Medications Prescribed:  Current Discharge Medication List              Supplementary Documentation:

## 2024-12-10 NOTE — DISCHARGE INSTRUCTIONS
Follow-up with your primary physician.  Return if severe headache, repeated vomiting, focal weakness, or other new symptoms develop.

## 2024-12-17 ENCOUNTER — HOSPITAL ENCOUNTER (EMERGENCY)
Facility: HOSPITAL | Age: 69
Discharge: HOME | End: 2024-12-18
Attending: EMERGENCY MEDICINE
Payer: MEDICARE

## 2024-12-17 DIAGNOSIS — S10.91XA ABRASION OF NECK, INITIAL ENCOUNTER: ICD-10-CM
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
## 2024-12-17 LAB
ALBUMIN SERPL-MCNC: 4.2 G/DL (ref 3.2–4.8)
ALP LIVER SERPL-CCNC: 101 U/L
ALT SERPL-CCNC: 22 U/L
ANION GAP SERPL CALC-SCNC: 7 MMOL/L (ref 0–18)
AST SERPL-CCNC: 21 U/L (ref ?–34)
BASOPHILS # BLD AUTO: 0.05 X10(3) UL (ref 0–0.2)
BASOPHILS NFR BLD AUTO: 0.6 %
BILIRUB DIRECT SERPL-MCNC: 0.3 MG/DL (ref ?–0.3)
BILIRUB SERPL-MCNC: 0.8 MG/DL (ref 0.2–1.1)
BUN BLD-MCNC: 19 MG/DL (ref 9–23)
BUN/CREAT SERPL: 21.6 (ref 10–20)
CALCIUM BLD-MCNC: 9.8 MG/DL (ref 8.7–10.4)
CHLORIDE SERPL-SCNC: 106 MMOL/L (ref 98–112)
CO2 SERPL-SCNC: 28 MMOL/L (ref 21–32)
CREAT BLD-MCNC: 0.88 MG/DL
DEPRECATED RDW RBC AUTO: 53.9 FL (ref 35.1–46.3)
EGFRCR SERPLBLD CKD-EPI 2021: 93 ML/MIN/1.73M2 (ref 60–?)
EOSINOPHIL # BLD AUTO: 0.19 X10(3) UL (ref 0–0.7)
EOSINOPHIL NFR BLD AUTO: 2.4 %
ERYTHROCYTE [DISTWIDTH] IN BLOOD BY AUTOMATED COUNT: 16.8 % (ref 11–15)
GLUCOSE BLD-MCNC: 89 MG/DL (ref 70–99)
GLUCOSE BLDC GLUCOMTR-MCNC: 89 MG/DL (ref 70–99)
HCT VFR BLD AUTO: 39.9 %
HGB BLD-MCNC: 12.7 G/DL
IMM GRANULOCYTES # BLD AUTO: 0.02 X10(3) UL (ref 0–1)
IMM GRANULOCYTES NFR BLD: 0.3 %
LYMPHOCYTES # BLD AUTO: 1.06 X10(3) UL (ref 1–4)
LYMPHOCYTES NFR BLD AUTO: 13.4 %
MCH RBC QN AUTO: 27.8 PG (ref 26–34)
MCHC RBC AUTO-ENTMCNC: 31.8 G/DL (ref 31–37)
MCV RBC AUTO: 87.3 FL
MONOCYTES # BLD AUTO: 0.5 X10(3) UL (ref 0.1–1)
MONOCYTES NFR BLD AUTO: 6.3 %
NEUTROPHILS # BLD AUTO: 6.07 X10 (3) UL (ref 1.5–7.7)
NEUTROPHILS # BLD AUTO: 6.07 X10(3) UL (ref 1.5–7.7)
NEUTROPHILS NFR BLD AUTO: 77 %
OSMOLALITY SERPL CALC.SUM OF ELEC: 294 MOSM/KG (ref 275–295)
PLATELET # BLD AUTO: 383 10(3)UL (ref 150–450)
POTASSIUM SERPL-SCNC: 3.8 MMOL/L (ref 3.5–5.1)
PROT SERPL-MCNC: 7.3 G/DL (ref 5.7–8.2)
RBC # BLD AUTO: 4.57 X10(6)UL
SODIUM SERPL-SCNC: 141 MMOL/L (ref 136–145)
WBC # BLD AUTO: 7.9 X10(3) UL (ref 4–11)

## 2024-12-17 PROCEDURE — 93010 ELECTROCARDIOGRAM REPORT: CPT

## 2024-12-17 PROCEDURE — 99285 EMERGENCY DEPT VISIT HI MDM: CPT

## 2024-12-17 PROCEDURE — 36415 COLL VENOUS BLD VENIPUNCTURE: CPT

## 2024-12-17 PROCEDURE — 80076 HEPATIC FUNCTION PANEL: CPT | Performed by: EMERGENCY MEDICINE

## 2024-12-17 PROCEDURE — 99284 EMERGENCY DEPT VISIT MOD MDM: CPT

## 2024-12-17 PROCEDURE — 93005 ELECTROCARDIOGRAM TRACING: CPT

## 2024-12-17 PROCEDURE — 82962 GLUCOSE BLOOD TEST: CPT

## 2024-12-17 PROCEDURE — 85025 COMPLETE CBC W/AUTO DIFF WBC: CPT | Performed by: EMERGENCY MEDICINE

## 2024-12-17 PROCEDURE — 80048 BASIC METABOLIC PNL TOTAL CA: CPT | Performed by: EMERGENCY MEDICINE

## 2024-12-18 ENCOUNTER — APPOINTMENT (OUTPATIENT)
Dept: CT IMAGING | Facility: HOSPITAL | Age: 69
End: 2024-12-18
Attending: EMERGENCY MEDICINE
Payer: MEDICARE

## 2024-12-18 VITALS
SYSTOLIC BLOOD PRESSURE: 139 MMHG | DIASTOLIC BLOOD PRESSURE: 84 MMHG | HEART RATE: 98 BPM | RESPIRATION RATE: 18 BRPM | TEMPERATURE: 98 F | OXYGEN SATURATION: 98 %

## 2024-12-18 LAB
ATRIAL RATE: 78 BPM
P AXIS: 54 DEGREES
P-R INTERVAL: 168 MS
Q-T INTERVAL: 356 MS
QRS DURATION: 90 MS
QTC CALCULATION (BEZET): 405 MS
R AXIS: -36 DEGREES
T AXIS: 18 DEGREES
VENTRICULAR RATE: 78 BPM

## 2024-12-18 PROCEDURE — 72125 CT NECK SPINE W/O DYE: CPT | Performed by: EMERGENCY MEDICINE

## 2024-12-18 PROCEDURE — 70450 CT HEAD/BRAIN W/O DYE: CPT | Performed by: EMERGENCY MEDICINE

## 2024-12-18 RX ORDER — MUPIROCIN 20 MG/G
1 OINTMENT TOPICAL 3 TIMES DAILY
Qty: 1 EACH | Refills: 0 | Status: SHIPPED | OUTPATIENT
Start: 2024-12-18 | End: 2025-01-01

## 2024-12-18 NOTE — ED INITIAL ASSESSMENT (HPI)
Pt to ED via EMS from [facility] c/o unwitnessed fall. Per EMS, pt was placed back into bed upon their arrival. Noted to have skin tear to right side of neck +thinners. Upon arrival, to ED when asked about fall, pt says \"I don't remember.\" Pt is A&Ox2 which is baseline per EMS

## 2024-12-18 NOTE — ED QUICK NOTES
Patient handoff given to Tracy HUSAIN. Answered all questions and concerns. Will handover care at this time.

## 2024-12-18 NOTE — ED PROVIDER NOTES
Patient Seen in: [facility] Emergency Department    History     Chief Complaint   Patient presents with    Fall       HPI    69-year-old male presents from nursing facility for evaluation of fall out of bed.  Per EMS, patient is from [facility] and had an unwitnessed fall out of bed.  Patient has been placed back in bed upon their arrival but was noted to have a skin tear.  They state that patient is alert and oriented x 2 at baseline.  Currently, patient states that he does not know how he had fallen.  He only complains of pain in the right side of his neck where he has some abrasions present.    History from Independent Source: Initial history given by EMS as stated in HPI    External Records Reviewed: Nursing home records available with patient, patient is on Lovenox shots, baby aspirin, Plavix    History reviewed.   Past Medical History:    Abnormal CT scan    Acute gout involving toe of left foot    Altered mental status    Anemia    Asthma in adult (HCC)    Cancer of parotid gland (HCC)    Formatting of this note might be different from the original.   Resection 2019    Catatonia    Cognitive communication deficit    Cystic disease of liver    Dementia (HCC)    Difficult airway    Dyslipidemia    Elevated white blood cell count, unspecified    Encephalopathy    Essential hypertension    Gastro-esophageal reflux disease with esophagitis, without bleeding    Gout, chronic    Gross hematuria    Hypercholesterolemia    Hyperlipidemia    Leukocytosis    Liver cyst    Metabolic encephalopathy    Neuroendocrine carcinoma, high grade (HCC)    Other abnormalities of gait and mobility    Other psoriasis    Other seizures (HCC)    Other specified disorders of kidney and ureter    Parotid neoplasm    Formatting of this note might be different from the original.   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   Keppra    Seizure disorder (HCC)    Toxic metabolic encephalopathy    Transient global amnesia    Formatting of this note might be different from the original.   Brief -Plavix    Unspecified dementia, unspecified severity, without behavioral disturbance, psychotic disturbance, mood disturbance, and anxiety (HCC)    Vascular dementia (HCC)    Vascular dementia, unspecified severity, without behavioral disturbance, psychotic disturbance, mood disturbance, and anxiety (HCC)    Weakness    Weakness of left upper extremity       History reviewed. History reviewed. No pertinent surgical history.      Medications :  Prescriptions Prior to Admission[1]     History reviewed. No pertinent family history.    Smoking Status:   Social History     Socioeconomic History    Marital status: Single   Tobacco Use    Smoking status: Former     Current packs/day: 0.00     Average packs/day: 1 pack/day for 25.0 years (25.0 ttl pk-yrs)     Types: Cigarettes     Start date: 1965     Quit date: 1990     Years since quittin.9    Tobacco comments:      Cigarettes 1 25 Quit:       Constitutional and vital signs reviewed.      Social History and Family History elements reviewed from today, pertinent positives to the presenting problem noted.    Physical Exam     ED Triage Vitals [24 2249]   BP (!) 153/96   Pulse 81   Resp 18   Temp 98 °F (36.7 °C)   Temp src Oral   SpO2 96 %   O2 Device None (Room air)       Physical Exam   Constitutional: AAOx2, well nourished, NAD  HEENT: Normocephalic, PERRLA, MMM  CV: s1s2+, RRR, no m/r/g, normal distal pulses  Pulmonary/Chest: CTA b/l with no rales, wheezes.  No chest  wall tenderness  Abdominal: Nontender.  Nondistended. Soft. Bowel sounds are normal.   Neck/Back: Right lateral neck with skin tear present and mild venous oozing.  Tenderness to palpation in this area.  No midline tenderness percussion  :   Musculoskeletal: Normal range of motion. No deformity.   Neurological: Awake, alert. Normal reflexes. No cranial nerve deficit.    Skin: Skin is warm and dry. No rash noted. No erythema.   Psychiatric:      All measures to prevent infection transmission during my interaction with the patient were taken. The patient was already wearing a droplet mask on my arrival to the room. Personal protective equipment was worn throughout the duration of the exam.      ED Course        Labs Reviewed   BASIC METABOLIC PANEL (8) - Abnormal; Notable for the following components:       Result Value    BUN/CREA Ratio 21.6 (*)     All other components within normal limits   CBC WITH DIFFERENTIAL WITH PLATELET - Abnormal; Notable for the following components:    HGB 12.7 (*)     RDW-SD 53.9 (*)     RDW 16.8 (*)     All other components within normal limits   HEPATIC FUNCTION PANEL (7) - Normal   POCT GLUCOSE - Normal   RAINBOW DRAW LAVENDER   RAINBOW DRAW LIGHT GREEN   RAINBOW DRAW BLUE     My Independent Interpretation of EKG (if performed): EKG    Rate, intervals and axes as noted on EKG Report.  Rate: 78 bpm  Rhythm: Sinus Rhythm  Reading: Normal sinus rhythm, no acute ST changes, left axis deviation, normal intervals, no ectopy             Monitor Interpretation:   normal sinus rhythm as interpreted by me.      Imaging Results Available and Reviewed while in ED: No results found.[facility], [facility]  [phone] - Phone    [facility]    NAME: RODERICK MARTINEZ JASON    DATE OF EXAM: 12/18/2024  Patient No:  [id number]  Physician:  SANJUANITA  YOB: 1955    Past Medical History (entered by Technologist):    Reason For Exam (entered by Technologist):  Fall.  Other Notes  (entered by Technologist): ROOM 46/ 30969    Additional Information (per [facility]):            NONCONTRAST HEAD CT    IMPRESSION  Compared to 12/9/2024  No evidence of acute intracranial abnormality.  No evidence of hemorrhage or mass.     Severe periventricular and subcortical regions of low attenuation likely representing chronic small vessel ischemic disease.    Prominence of the ventricles and sulci compatible with mild atrophy.    Bones are unremarkable.  Severe opacification of right maxillary and ethmoid sinuses.    NONCONTRAST CT  CERVICAL SPINE:    IMPRESSION    No fractures.  Multilevel mild anterolisthesis and retrolisthesis probably secondary to underlying degenerative changes, assuming no clinical signs and symptoms of ligamentous instability.    Paraspinous soft tissues are unremarkable.    Case faxed/finalized at 2:20 AM eastern time .  If there are any questions please contact me at [phone].  ED Medications Administered: Medications - No data to display          MDM     Vitals:    12/17/24 2249   BP: (!) 153/96   Pulse: 81   Resp: 18   Temp: 98 °F (36.7 °C)   TempSrc: Oral   SpO2: 96%     *I personally reviewed and interpreted all ED vitals.    Independent Interpretation of Studies: I have independently reviewed CT of the head and neck.  There are no acute findings    Social Determinants of Health:     Procedures:      Differential/MDM/Shared Decision Making: Differential Diagnosis includes intracranial hemorrhage, fracture, abrasion, others.      The patient already  has a past medical history of Abnormal CT scan (03/31/2024), Acute gout involving toe of left foot (02/22/2023), Altered mental status (03/31/2024), Anemia (11/18/2009), Asthma in adult (HCC) (02/22/2023), Cancer of parotid gland (HCC) (08/23/2022), Catatonia (03/31/2024), Cognitive communication deficit (03/14/2023), Cystic disease of liver (02/22/2023), Dementia ([facility]) (02/01/2023), Difficult airway (02/02/2023),  Dyslipidemia (02/01/2023), Elevated white blood cell count, unspecified (02/22/2023), Encephalopathy (09/19/2012), Essential hypertension, Gastro-esophageal reflux disease with esophagitis, without bleeding (02/22/2023), Gout, chronic (03/31/2024), Gross hematuria (03/31/2024), Hypercholesterolemia (06/25/2015), Hyperlipidemia (09/19/2012), Leukocytosis (02/19/2023), Liver cyst (08/30/2022), Metabolic encephalopathy (02/22/2023), Neuroendocrine carcinoma, high grade (HCC) (04/17/2019), Other abnormalities of gait and mobility (03/12/2023), Other psoriasis (02/22/2023), Other seizures (HCC) (02/22/2023), Other specified disorders of kidney and ureter (03/11/2023), Parotid neoplasm (03/31/2024), Primary hypertension (11/18/2009), Psoriasis, unspecified (09/09/2022), Seizure (HCC) (09/19/2012), Seizure disorder (HCC), Toxic metabolic encephalopathy (08/20/2022), Transient global amnesia (01/10/2017), Unspecified dementia, unspecified severity, without behavioral disturbance, psychotic disturbance, mood disturbance, and anxiety (HCC) (02/22/2023), Vascular dementia (HCC) (09/11/2022), Vascular dementia, unspecified severity, without behavioral disturbance, psychotic disturbance, mood disturbance, and anxiety (HCC) (02/22/2023), Weakness (03/12/2023), and Weakness of left upper extremity (08/20/2022).  to contribute to the complexity of this ED evaluation.           Medications, Diagnostics, or Disposition considered but not done:     Labs and CTs are unremarkable.  Management of case was discussed with patient and will discharge back to nursing facility.      Condition upon leaving the department: Stable    Disposition and Plan     Clinical Impression:  1. Fall, initial encounter    2. Abrasion of neck, initial encounter        Disposition:  Discharge    Follow-up:  physician at Nursing Home    Follow up        Medications Prescribed:  Current Discharge Medication List        START taking these medications    Details    mupirocin 2 % External Ointment Apply 1 Application topically 3 (three) times daily for 14 days.  Qty: 1 each, Refills: 0                      [1] (Not in a hospital admission)

## 2025-01-26 NOTE — H&P
[facility]  History & Physical     Carlo Ng  : 1955    Status: Emergency  Day #: 0    Attending: Virgilio Chavis MD  PCP: VIRGILIO CROOK MD     Date of Encounter:  2025  Date of Admission:  2025     Chief Complaint:  hypoxemia      History of Present Illness:     Carlo Ng is a(n) 69 year old male sent to ED from SNF due to hypoxemia. 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 He was found in the morning with vomit on him and low O2 sat in 80s with rhonchi. He was sent to ED. CXR shows right perihilar and basilar patchy infiltrates. WBC 24.9, hypotensive. He is receiving IVF bolus in ED and will be admitted to ICU.     Past Medical History:    Abnormal CT scan    Acute gout involving toe of left foot    Altered mental status    Anemia    Asthma in adult (HCC)    Cancer of parotid gland (HCC)    Formatting of this note might be different from the original.   Resection 2019    Catatonia    Cognitive communication deficit    Cystic disease of liver    Dementia (HCC)    Difficult airway    Dyslipidemia    Elevated white blood cell count, unspecified    Encephalopathy    Essential hypertension    Gastro-esophageal reflux disease with esophagitis, without bleeding    Gout, chronic    Gross hematuria    Hypercholesterolemia    Hyperlipidemia    Leukocytosis    Liver cyst    Metabolic encephalopathy    Neuroendocrine carcinoma, high grade (HCC)    Other abnormalities of gait and mobility    Other psoriasis    Other seizures (HCC)    Other specified disorders of kidney and ureter    Parotid neoplasm    Formatting of this note might be different from the original.   Poorly differentiated small cell carcinoma R tail of parotid gland:   1)  3/6/19 - FNA tail of R  parotid gland -> poorly differentiated carcinoma with neuroendocrine features   2)  3/28/19 - R total parotidectomy with R neck dissection -> poorly differentiated small cell carcinoma involving intraparotid LNs x 3 and extending into surr    Primary hypertension    Psoriasis, unspecified    Seizure (HCC)    Formatting of this note might be different from the original.   Keppra    Seizure disorder (HCC)    Toxic metabolic encephalopathy    Transient global amnesia    Formatting of this note might be different from the original.   Brief -Plavix    Unspecified dementia, unspecified severity, without behavioral disturbance, psychotic disturbance, mood disturbance, and anxiety (HCC)    Vascular dementia (HCC)    Vascular dementia, unspecified severity, without behavioral disturbance, psychotic disturbance, mood disturbance, and anxiety (HCC)    Weakness    Weakness of left upper extremity     History reviewed. No pertinent surgical history.    History reviewed. No pertinent family history.    Social History:  Social History     Socioeconomic History    Marital status:    Tobacco Use    Smoking status: Former     Current packs/day: 0.00     Average packs/day: 1 pack/day for 25.0 years (25.0 ttl pk-yrs)     Types: Cigarettes     Start date: 1965     Quit date: 1990     Years since quittin.0    Tobacco comments:      Cigarettes 1 25 Quit:     Social Drivers of Health     Financial Resource Strain: Low Risk  (2021)    [facility] from [facility], [facility]    Overall Financial Resource Strain (CARDIA)     Difficulty of Paying Living Expenses: Not hard at all   Food Insecurity: Low Risk  (2024)    Received from [facility] Food Security     Within the past 12 months, the food you bought just didn't last and you didn't have money to get more.: 3     Within the past 12 months, you worried that your food would run out before you got money to buy  more.: 3   Transportation Needs: Not At Risk (11/2/2024)    Received from [facility] Transportation Needs     In the past 12 months, has lack of reliable transportation kept you from medical appointments, meetings, work or from getting things needed for daily living?: No   Physical Activity: Inactive (10/9/2024)    Received from [facility]    Physical Activity     On average, how many days per week do you engage in moderate to strenuous exercise (like a brisk walk)?: 0 days     On average, how many minutes do you engage in exercise at this level?: 0 min     On average, how many minutes do you engage in exercise at this level?: 0 min     On average, how many days per week do you engage in moderate to strenuous exercise (like a brisk walk)?: 0 days    Received from [facility], [facility]    Social Connections   Housing Stability: Not At Risk (11/2/2024)    Received from [facility] Housing     What is your living situation today?: I have a steady place to live     Think about the place you live. Do you have problems with any of the following?: None of the above       Allergies: Allergies[1]       ROS/Exam       Unable to assess     Physical Exam:     Temp:  [98.3 °F (36.8 °C)] 98.3 °F (36.8 °C)  Pulse:  [115-119] 115  Resp:  [25] 25  BP: (116)/(68) 116/68  SpO2:  [88 %-90 %] 90 %  General:  Awake, on bipap, respiratory distress  HEENT:  Normocephalic, atraumatic  Neck:  Supple, symmetrical  Cardiac:  Regular rate, regular rhythm  Pulmonary:  b/l rhochi  Gastrointestinal:  Soft, non-tender, normal bowel sounds  Musculoskeletal:  No joint swelling  Extremities:  No edema, no cyanosis, no clubbing  Neurologic:  nonfocal  Psychiatric:  Normal affect, calm and appropriate  Skin:  No rash, no lesion     Results:       Recent Labs   Lab 01/26/25  0613   WBC 24.9*   HGB 14.5   HCT 47.3   .0*   RBC 5.38   MCV 87.9   MCH 27.0   MCHC 30.7*   RDW 15.3*   NEPRELIM  23.39*     Recent Labs   Lab 01/26/25  0613   CREATSERUM 1.27   CA 9.9   ALB 4.1         CO2 25.0   *   BILT 0.4   ALT 15   ALKPHO 70   TP 7.4     No results found.  EKG 12 Lead    Result Date: 1/26/2025  Sinus tachycardia Left axis deviation Anterior infarct , age undetermined Abnormal ECG When compared with ECG of 17-DEC-2024 22:57, Vent. rate has increased BY  40 BPM      Assessment and Plan:     Acute respiratory failure  Acute aspiration pneumonia  -CXR reviewed  -on bipap  -pulm/critical care on consult  -cont IV abx    Acute UTI  -f/u urine cultures results  -cont IV abx    Acute septic shock - due to above  Lactic acidosis  Hypotension  Leukocytosis  -received sepsis bolus  -f/u blood cultures  -follow lactic acid  -cont IV abx    GOC - POA confirms DNAR/selective  -Palliative care consult tomorrow  -if declines consider comfort measures    Other:  -vascular dementia  -h/o dysphagia s/p G-tube  -COPD  -h/o TIA  -HTN  -GERD  -h/o gout  -h/o 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 dx in 2019, s/p R neck dissection and chemoradiation    DVT ppx:  heparin sq      [facility]  [facility] of [facility]      Sepsis Reassessment Note    BP (!) 109/93 (BP Location: Left arm)   Pulse 96   Temp 98.3 °F (36.8 °C) (Rectal)   Resp (!) 38   Wt 156 lb 8.4 oz (71 kg)   SpO2 92%   BMI 23.11 kg/m²      I completed the sepsis reassessment at 9:59 AM    Cardiac:  Regularity: Regular  Rate: Normal  Heart Sounds: S1,S2    Lungs:   Right: Rhonchi  Left: Rhonchi    Peripheral Pulses:  Radial: Right 1+ or Left 1+      Capillary Refill:  <3 Secs    Skin:  Temp/Moisture: Warm and Dry  Color: Normal      Mitchell Chavis MD  1/26/2025  10:00 AM              **Certification      PHYSICIAN Certification of Need for Inpatient Hospitalization - Initial Certification    Patient will require inpatient services that will reasonably be expected to span two midnight's based on  the clinical documentation in H+P.   Based on patients current state of illness, I anticipate that, after discharge, patient will require TBD.    Mitchell Chavis MD         [1]   Allergies  Allergen Reactions    Risperidone WHEEZING

## 2025-01-26 NOTE — ED INITIAL ASSESSMENT (HPI)
Found this morning with vomit on them, nh called for low O2. Found in the 80s on ra, rhonchi heard. Minimal improvement with 15L NRB. Sinus tachy

## 2025-01-26 NOTE — PLAN OF CARE
Patient lethargic, arousable, follows commands. Unable to fully assess orientation due to respiratory status and continous bipap. Remains on AVAPS 50%. ABG monitored. Patient tachypneic, labored, lungs with rhonchi throughout. NSR on monitor, BP stable- off levo. PEG tube in place, remains NPO. Morphine given for pain and increased respirations. See MAR. Multiple wounds to sacrum, right heel, upper back. Wound RN consulted. Santyl applied to sacrum as ordered. Bed low and locked, safety maintained. Patient started on tamiflu for Influenza A. Family updated on plan of care and patient condition, POLST signed on chart. See assessments. Will continue to monitor.    Problem: CARDIOVASCULAR - ADULT  Goal: Maintains optimal cardiac output and hemodynamic stability  Description: INTERVENTIONS:  - Monitor vital signs, rhythm, and trends  - Monitor for bleeding, hypotension and signs of decreased cardiac output  - Evaluate effectiveness of vasoactive medications to optimize hemodynamic stability  - Monitor arterial and/or venous puncture sites for bleeding and/or hematoma  - Assess quality of pulses, skin color and temperature  - Assess for signs of decreased coronary artery perfusion - ex. Angina  - Evaluate fluid balance, assess for edema, trend weights  Outcome: Progressing  Goal: Absence of cardiac arrhythmias or at baseline  Description: INTERVENTIONS:  - Continuous cardiac monitoring, monitor vital signs, obtain 12 lead EKG if indicated  - Evaluate effectiveness of antiarrhythmic and heart rate control medications as ordered  - Initiate emergency measures for life threatening arrhythmias  - Monitor electrolytes and administer replacement therapy as ordered  Outcome: Progressing     Problem: RESPIRATORY - ADULT  Goal: Achieves optimal ventilation and oxygenation  Description: INTERVENTIONS:  - Assess for changes in respiratory status  - Assess for changes in mentation and behavior  - Position to facilitate oxygenation  and minimize respiratory effort  - Oxygen supplementation based on oxygen saturation or ABGs  - Provide Smoking Cessation handout, if applicable  - Encourage broncho-pulmonary hygiene including cough, deep breathe, Incentive Spirometry  - Assess the need for suctioning and perform as needed  - Assess and instruct to report SOB or any respiratory difficulty  - Respiratory Therapy support as indicated  - Manage/alleviate anxiety  - Monitor for signs/symptoms of CO2 retention  Outcome: Progressing     Problem: GASTROINTESTINAL - ADULT  Goal: Maintains adequate nutritional intake (undernourished)  Description: INTERVENTIONS:  - Monitor percentage of each meal consumed  - Identify factors contributing to decreased intake, treat as appropriate  - Assist with meals as needed  - Monitor I&O, WT and lab values  - Obtain nutritional consult as needed  - Optimize oral hygiene and moisture  - Encourage food from home; allow for food preferences  - Enhance eating environment  Outcome: Progressing     Problem: GENITOURINARY - ADULT  Goal: Absence of urinary retention  Description: INTERVENTIONS:  - Assess patient’s ability to void and empty bladder  - Monitor intake/output and perform bladder scan as needed  - Follow urinary retention protocol/standard of care  - Consider collaborating with pharmacy to review patient's medication profile  - Implement strategies to promote bladder emptying  Outcome: Progressing     Problem: SKIN/TISSUE INTEGRITY - ADULT  Goal: Incision(s), wounds(s) or drain site(s) healing without S/S of infection  Description: INTERVENTIONS:  - Assess and document risk factors for pressure ulcer development  - Assess and document skin integrity  - Assess and document dressing/incision, wound bed, drain sites and surrounding tissue  - Implement wound care per orders  - Initiate isolation precautions as appropriate  - Initiate Pressure Ulcer prevention bundle as indicated  Outcome: Progressing     Problem:  NEUROLOGICAL - ADULT  Goal: Achieves stable or improved neurological status  Description: INTERVENTIONS  - Assess for and report changes in neurological status  - Initiate measures to prevent increased intracranial pressure  - Maintain blood pressure and fluid volume within ordered parameters to optimize cerebral perfusion and minimize risk of hemorrhage  - Monitor temperature, glucose, and sodium. Initiate appropriate interventions as ordered  Outcome: Progressing  Goal: Absence of seizures  Description: INTERVENTIONS  - Monitor for seizure activity  - Administer anti-seizure medications as ordered  - Monitor neurological status  Outcome: Progressing     Problem: Impaired Swallowing  Goal: Minimize aspiration risk  Description: Interventions:  - Patient should be alert and upright for all feedings (90 degrees preferred)  - Offer food and liquids at a slow rate  - No straws  - Encourage small bites of food and small sips of liquid  - Offer pills one at a time, crush or deliver with applesauce as needed  - Discontinue feeding and notify MD (or speech pathologist) if coughing or persistent throat clearing or wet/gurgly vocal quality is noted  Outcome: Progressing     Problem: SAFETY ADULT - FALL  Goal: Free from fall injury  Description: INTERVENTIONS:  - Assess pt frequently for physical needs  - Identify cognitive and physical deficits and behaviors that affect risk of falls.  - [place] fall precautions as indicated by assessment.  - Educate pt/family on patient safety including physical limitations  - Instruct pt to call for assistance with activity based on assessment  - Modify environment to reduce risk of injury  - Provide assistive devices as appropriate  - Consider OT/PT consult to assist with strengthening/mobility  - Encourage toileting schedule  Outcome: Progressing     Problem: DISCHARGE PLANNING  Goal: Discharge to home or other facility with appropriate resources  Description: INTERVENTIONS:  -  Identify barriers to discharge w/pt and caregiver  - Include patient/family/discharge partner in discharge planning  - Arrange for needed discharge resources and transportation as appropriate  - Identify discharge learning needs (meds, wound care, etc)  - Arrange for interpreters to assist at discharge as needed  - Consider post-discharge preferences of patient/family/discharge partner  - Complete POLST form as appropriate  - Assess patient's ability to be responsible for managing their own health  - Refer to Case Management Department for coordinating discharge planning if the patient needs post-hospital services based on physician/LIP order or complex needs related to functional status, cognitive ability or social support system  Outcome: Progressing

## 2025-01-26 NOTE — ED QUICK NOTES
Report given to LISHA Morris in PCCU. Pt safe to transport per MD with RN and RT. Another message left for POA.

## 2025-01-26 NOTE — ED QUICK NOTES
RN at [facility] Elis Iyer RN states patient does not have any POLST form and is Full Code. Another message left for CAROLYN Kerr to confirm. MDs at bedside for handoff report.

## 2025-01-26 NOTE — PROGRESS NOTES
Received patient from ED to PCCU 220. Report received from Neda HUSAIN Medications, labs, plan of care reviewed. Skin assessment on arrival to unit performed with Katina HUSAIN.     Wounds are as follows: sacrum unstageable ulcer, left heel DTI, right neck skin tear, upper back abrasion/sheering. Dressings changed. See wound documentation. Consult placed to wound RN.    Additional notifications/statements include: cont bipap in place, APN speaking to DPOA regarding code status.

## 2025-01-26 NOTE — ED PROVIDER NOTES
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  He was brought in by EMS from his nursing home facility for evaluation of respiratory distress.  Patient was noted by nursing home staff to be covered in vomit and having coarse breath sounds.  Found to be hypoxic.  Upon EMS arrival, was noted to be hypoxic with O2 saturations in the 80s on room air.  Increased to 93% on nonrebreather.  No additional history provided by patient as patient is nonverbal.  EMS report, the patient is at his baseline mental status.    Review of Systems:  Review of Systems  Positive for stated complaint: . Constitutional and vital signs reviewed. All other systems reviewed and negative except as noted above.    Patient History:  Past Medical History:    Abnormal CT scan    Acute gout involving toe of left foot    Altered mental status    Anemia    Asthma in adult (HCC)    Cancer of parotid gland (HCC)    Formatting of this note might be different from the original.   Resection 2019    Catatonia    Cognitive communication deficit    Cystic disease of liver    Dementia (HCC)    Difficult airway    Dyslipidemia    Elevated white blood cell count, unspecified    Encephalopathy    Essential hypertension    Gastro-esophageal reflux disease with esophagitis, without bleeding    Gout, chronic    Gross hematuria    Hypercholesterolemia    Hyperlipidemia    Leukocytosis    Liver cyst    Metabolic encephalopathy     Neuroendocrine carcinoma, high grade (HCC)    Other abnormalities of gait and mobility    Other psoriasis    Other seizures (HCC)    Other specified disorders of kidney and ureter    Parotid neoplasm    Formatting of this note might be different from the original.   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   Keppra    Seizure disorder (HCC)    Toxic metabolic encephalopathy    Transient global amnesia    Formatting of this note might be different from the original.   Brief -Plavix    Unspecified dementia, unspecified severity, without behavioral disturbance, psychotic disturbance, mood disturbance, and anxiety (HCC)    Vascular dementia (HCC)    Vascular dementia, unspecified severity, without behavioral disturbance, psychotic disturbance, mood disturbance, and anxiety (HCC)    Weakness    Weakness of left upper extremity       History reviewed. No pertinent surgical history.     History reviewed. No pertinent family history.    Specific Social Determinants of Health:   Social History     Socioeconomic History    Marital status:    Tobacco Use    Smoking status: Former     Current packs/day: 0.00     Average packs/day: 1 pack/day for 25.0 years (25.0 ttl pk-yrs)     Types: Cigarettes     Start date: 1965     Quit date: 1990     Years since quittin.0    Tobacco comments:      Cigarettes 1 25 Quit:     Social Drivers of Health     Financial Resource Strain: Low Risk  (2021)    [facility]    Overall  Financial Resource Strain (CARDIA)     Difficulty of Paying Living Expenses: Not hard at all   Food Insecurity: Low Risk  (11/2/2024)    Received from [facility] Food Security     Within the past 12 months, the food you bought just didn't last and you didn't have money to get more.: 3     Within the past 12 months, you worried that your food would run out before you got money to buy more.: 3   Transportation Needs: Not At Risk (11/2/2024)    Received from [facility] Transportation Needs     In the past 12 months, has lack of reliable transportation kept you from medical appointments, meetings, work or from getting things needed for daily living?: No   Physical Activity: Inactive (10/9/2024)    Received from [facility]    Physical Activity     On average, how many days per week do you engage in moderate to strenuous exercise (like a brisk walk)?: 0 days     On average, how many minutes do you engage in exercise at this level?: 0 min     On average, how many minutes do you engage in exercise at this level?: 0 min     On average, how many days per week do you engage in moderate to strenuous exercise (like a brisk walk)?: 0 days    [facility] from [facility], [facility]    Social Connections   Housing Stability: Not At Risk (11/2/2024)    Received from [facility] Housing     What is your living situation today?: I have a steady place to live     Think about the place you live. Do you have problems with any of the following?: None of the above           PSFH elements reviewed from today and agreed except as otherwise stated in HPI.    Physical Exam     ED Triage Vitals   BP 01/26/25 0551 116/68   Pulse 01/26/25 0551 119   Resp 01/26/25 0551 25   Temp 01/26/25 0621 98.3 °F (36.8 °C)   Temp src 01/26/25 0621 Rectal   SpO2 01/26/25 0551 (!) 88 %   O2 Device 01/26/25 0551 Non-rebreather mask       Current:/54   Pulse 119   Temp 98.3 °F (36.8 °C)  (Rectal)   Resp (!) 40   SpO2 93%         Physical Exam  Constitutional:       Appearance: He is diaphoretic.      Comments: Cachectic   HENT:      Head: Normocephalic and atraumatic.      Right Ear: External ear normal.      Left Ear: External ear normal.      Nose: Nose normal.      Mouth/Throat:      Comments: Dry  Eyes:      Conjunctiva/sclera: Conjunctivae normal.      Pupils: Pupils are equal, round, and reactive to light.   Cardiovascular:      Rate and Rhythm: Regular rhythm. Tachycardia present.      Heart sounds: Normal heart sounds.   Pulmonary:      Effort: Pulmonary effort is normal.      Breath sounds: Rhonchi present.   Abdominal:      General: Bowel sounds are normal.      Palpations: Abdomen is soft.      Tenderness: There is no abdominal tenderness.      Comments: G-tube in left upper quadrant of the abdomen   Musculoskeletal:         General: Normal range of motion.      Cervical back: Normal range of motion and neck supple.   Skin:     General: Skin is warm.      Coloration: Skin is pale.      Findings: No rash.   Neurological:      Deep Tendon Reflexes: Reflexes are normal and symmetric.      Comments: Oriented x 0, at baseline mental status, does not follow commands in all 4 extremities         ED Course   Labs:   Labs Reviewed   CBC WITH DIFFERENTIAL WITH PLATELET - Abnormal; Notable for the following components:       Result Value    WBC 24.9 (*)     MCHC 30.7 (*)     RDW-SD 49.1 (*)     RDW 15.3 (*)     .0 (*)     Neutrophil Absolute Prelim 23.39 (*)     Neutrophil Absolute 23.39 (*)     Lymphocyte Absolute 0.54 (*)     All other components within normal limits   COMP METABOLIC PANEL (14) - Abnormal; Notable for the following components:    Glucose 112 (*)     All other components within normal limits   LACTIC ACID, PLASMA - Abnormal; Notable for the following components:    Lactic Acid 3.6 (*)     All other components within normal limits   PROCALCITONIN - Abnormal; Notable for the  following components:    Procalcitonin 42.61 (*)     All other components within normal limits   URINALYSIS WITH CULTURE REFLEX - Abnormal; Notable for the following components:    Blood Urine Trace (*)     Protein Urine 30 (*)     Leukocyte Esterase Urine 500 (*)     WBC Urine >50 (*)     RBC Urine 3-5 (*)     Bacteria Urine Rare (*)     Squamous Epi. Cells Few (*)     Transitional Cells Few (*)     Yeast Urine Present (*)     All other components within normal limits   LACTIC ACID REFLEX POST POSTIVE   PROTHROMBIN TIME (PT)   PTT, ACTIVATED   RAINBOW DRAW LAVENDER   RAINBOW DRAW LIGHT GREEN   RAINBOW DRAW BLUE   URINE CULTURE, ROUTINE   BLOOD CULTURE   BLOOD CULTURE   ED/MRSA SCREEN BY PCR-CC   URINE CULTURE, ROUTINE     Radiology findings:  I personally reviewed the images.   No results found.    PROCEDURE: XR CHEST AP PORTABLE  (CPT=71045)   TIME: 631   COMPARISON: [facility], XR CHEST AP PORTABLE (CPT=71045), 4/09/2024, 12:20 PM.   INDICATIONS: Generalized weakness and difficulty breating; Rhonchi.   TECHNIQUE:   Single view.       Findings and impression:   Normal heart size   Right perihilar and bibasilar patchy opacities have developed; consider multifocal infection   No pneumothorax     EKG as interpreted by me: Ventricular rate 118, normal sinus tachycardia, left axis deviation, no CA interval prolongation, narrow QRS, QTc 417 ms, no ST segment elevations or depressions, no abnormal T wave inversions  Cardiac Monitor: Interpreted by me.   Pulse Readings from Last 1 Encounters:   01/26/25 119   , sinus,     External non-ED records reviewed independently by me: CODE STATUS documentation from discharge summary on 12/26/2024 is as follows      69-year-old with hypertension, hyperlipidemia, seizures, dementia, possible NPH, neuroendocrine tumor presenting with fall     Patient made no meaningful improvements during the hospital course.     Please see progress notes for all details.     I had a  discussion with the POA who agreed to DNR/DNI and she will sign the POLST form at the nursing home.     They will try 1 more round at rehab and if that does not improve then she will transition to hospice.    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  Upon arrival to emergency department, patient is saturating 88% on nonrebreather.  Coarse rhonchi appreciated on exam.  Tachycardic and tachypneic.  Appears pale.  G-tube noted in left upper quadrant of the abdomen.  I performed a bedside echocardiogram that shows approximately normal ejection fraction with hyperdynamic activity    Differential diagnoses considered includes, but is not limited to: Sepsis, aspiration pneumonia, pneumonia, UTI, electrolyte or metabolic derangements    Will obtain the following tests: CBC, CMP, lactic acid, blood cultures, procalcitonin, urinalysis with urine culture, chest x-ray, EKG  Please see ED course for my independent review of these tests/imaging results.    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  Consideration was given for hospice at that  time if patient's status did not improve after rehab  ------------------------------------------------------------  Time: 01/26 2169  Comment: Voicemail with callback number left with patient's POA in attempt to confirm CODE STATUS.  Patient's nursing home was called requesting confirmation of his CODE STATUS; no POLST form was found on file.  ------------------------------------------------------------  Time: 01/26 0752  Comment: Patient improvement of hypoxia on BiPAP.  BiPAP settings were titrated up to 22/10.  Blood pressures initially stable however became hypotensive.  Was started on sepsis fluid bolus with fluid responsiveness.  Repeat blood pressures with maps greater than 65.  ------------------------------------------------------------  Time: 01/26 0754  Comment: Remain awaiting callback from patient's POA regarding his CODE STATUS.  I considered risks and benefits of intubation in the emergency department.  Given his hemodynamic instability and overall very poor prognosis, I believe is high risk for intubation at this time.  He is improving on BiPAP at this time.  Will continue on BiPAP for oxygen support until able to confirm CODE STATUS  ------------------------------------------------------------  Time: 01/26 9064  Comment: Endorsed patient's case to the [facility] hospitalist, Dr. Fulton, who accepted the patient for admission.  Also discussed with Dr. Glez, pulm/crit regarding plans for ICU admission.   ------------------------------------------------------------  Time: 01/26 0803  Comment: Very low-dose peripheral Levophed for borderline soft blood pressures however overall responding to fluids and has not yet completed his full sepsis fluid bolus.        [facility]  [facility]      Sepsis Reassessment Note    /54   Pulse 119   Temp 98.3 °F (36.8 °C) (Rectal)   Resp (!) 40   SpO2 93%      I completed the sepsis reassessment at 0756    Cardiac:  Regularity:  Regular  Rate: Tachycardic  Heart Sounds: S1,S2    Lungs:   Right: Rhonchi  Left: Rhonchi    Peripheral Pulses:  Radial: Right 1+ or Left 1+      Capillary Refill:  <3 Secs    Skin:  Temp/Moisture: Warm and Dry  Color: Pale      Salma Abebe MD  1/26/2025  7:56 AM      Critical Care:  I spent a total of 45 minutes of critical care time in obtaining history, performing a physical exam, bedside monitoring of interventions, collecting and interpreting tests and discussion with consultants but not including time spent performing procedures.      Disposition and Plan     Clinical Impression:  1. Acute respiratory failure with hypoxia (HCC)    2. Aspiration pneumonitis (HCC)    3. Acute cystitis with hematuria    4. Severe sepsis (HCC)        Disposition:  Admit    Follow-up:  No follow-up provider specified.    Medications Prescribed:  Current Discharge Medication List          Hospital Problems       Present on Admission             ICD-10-CM Noted POA    * (Principal) Acute respiratory failure with hypoxia (HCC) J96.01 1/26/2025 Unknown        This note may have been created using voice dictation technology and may include inadvertent errors.      Salma Abebe MD  Emergency Medicine

## 2025-01-26 NOTE — ED QUICK NOTES
Orders for admission, patient is aware of plan and ready to go upstairs. Any questions, please call ED LISHA Day at extension [phone].     Patient Covid vaccination status: Fully vaccinated     COVID Test Ordered in ED: None    COVID Suspicion at Admission: N/A    Running Infusions:    sodium chloride      None    Mental Status/LOC at time of transport: a&ox1    Other pertinent information:   CIWA score: N/A   NIH score:  N/A

## 2025-01-26 NOTE — PROGRESS NOTES
[facility] Pharmacy Dosing Service      Initial Pharmacokinetic Consult for Vancomycin Dosing     Carlo Ng is a 69 year old male who is being initiated on vancomycin therapy for pneumonia.  Pharmacy has been asked to dose vancomycin by Dr. Chavis.  The initial treatment and monitoring approach will be steady state AUC strategy.        Weight and Temperature:    Wt Readings from Last 1 Encounters:   25 71 kg (156 lb 8.4 oz)        Temp Readings from Last 1 Encounters:   25 98.3 °F (36.8 °C) (Rectal)      Labs:   Recent Labs   Lab 25  0613   CREATSERUM 1.27      Estimated Creatinine Clearance: 54.1 mL/min (based on SCr of 1.27 mg/dL).     Recent Labs   Lab 25  0613   WBC 24.9*          The Pharmacokinetic Target is:     to 600 mg-h/L and trough <=15 mg/L    Renal Dosing Considerations:    None     Assessment/Plan:   Initial/Loading dose: 1.25g x1 dose      Maintenance dose: Pharmacy will dose vancomycin at 1000 mg IV every 24 hours    Monitorin) Plan for vancomycin peak and trough to be obtained at steady state    2) Pharmacy will order SCr as clinically indicated to assess renal function.    3) Pharmacy will monitor for toxicity and efficacy, adjust vancomycin dose and/or frequency, and order vancomycin levels as appropriate per the Pharmacy and Therapeutics Committee approved protocol until discontinuation of the medication.       We appreciate the opportunity to assist in the care of this patient.     Marita Morrison, PharmD, [facility]  [facility] Extension: [phone]

## 2025-01-26 NOTE — CONSULTS
Initial Pulmonary/ICU/Critical Care Consultation        Reason for Consultation: resp failure   Referring Physician: Dr. Chavis      HPI: 70 yo gentleman with hx of asthma, dementia, CVA, HTN, parotid cancer, neuroendocrine cancer, NPH admitted with respiratory failure resides in a NH.  Reportedly was covered in vomit with concern for aspiration.  Upon EMS arrival, was noted to be hypoxic with O2 saturations in the 80s on room air.   In the ED, placed on BIPAP but still working hard to breath and tachypneic.   Received IVF, vanco/zosyn in the ED.  Started on levophed for hypotension.   Now seen in the ICU. Pt is tachypneic but wakes up and follows commands.  On levophed at 0.5 mcg.      REVIEW OF SYSTEMS:  Positives and negatives as noted in HPI. All other review of systems otherwise are either limited (due to pt/family inability to provide) or negative.      PAST MEDICAL HISTORY:  Past Medical History:   Diagnosis Date    Abnormal CT scan 03/31/2024    Acute gout involving toe of left foot 02/22/2023    Altered mental status 03/31/2024    Anemia 11/18/2009    Asthma in adult (HCC) 02/22/2023    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   Resection 2019    Catatonia 03/31/2024    Cognitive communication deficit 03/14/2023    Cystic disease of liver 02/22/2023    Dementia (HCC) 02/01/2023    Difficult airway 02/02/2023    Dyslipidemia 02/01/2023    Elevated white blood cell count, unspecified 02/22/2023    Encephalopathy 09/19/2012    Essential hypertension     Gastro-esophageal reflux disease with esophagitis, without bleeding 02/22/2023    Gout, chronic 03/31/2024    Gross hematuria 03/31/2024    Hypercholesterolemia 06/25/2015    Hyperlipidemia 09/19/2012    Leukocytosis 02/19/2023    Liver cyst 08/30/2022    Metabolic encephalopathy 02/22/2023    Neuroendocrine carcinoma, high grade (HCC) 04/17/2019    Other abnormalities of gait and mobility 03/12/2023     Other psoriasis 2023    Other seizures (HCC) 2023    Other specified disorders of kidney and ureter 2023    Parotid neoplasm 2024    Formatting of this note might be different from the original.   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   Keppra    Seizure disorder (HCC)     Toxic metabolic encephalopathy 2022    Transient global amnesia 01/10/2017    Formatting of this note might be different from the original.   Brief -Plavix    Unspecified dementia, unspecified severity, without behavioral disturbance, psychotic disturbance, mood disturbance, and anxiety (HCC) 2023    Vascular dementia (HCC) 2022    Vascular dementia, unspecified severity, without behavioral disturbance, psychotic disturbance, mood disturbance, and anxiety (HCC) 2023    Weakness 2023    Weakness of left upper extremity 2022         PAST SURGICAL HISTORY:  History reviewed. No pertinent surgical history.      PAST SOCIAL HISTORY:  Social History     Socioeconomic History    Marital status:    Tobacco Use    Smoking status: Former     Current packs/day: 0.00     Average packs/day: 1 pack/day for 25.0 years (25.0 ttl pk-yrs)     Types: Cigarettes     Start date: 1965     Quit date: 1990     Years since quittin.0    Tobacco comments:      Cigarettes 1 25 Quit:         PAST FAMILY HISTORY:  History reviewed. No pertinent family history.      ALLERGIES:  Allergies[1]      MEDS:  Home Medications:  Medications  Taking[2]    Scheduled Medication:   vancomycin  15 mg/kg Intravenous Once     Continuous Infusing Medication:   norepinephrine 1 mcg/min (01/26/25 0900)     PRN Medications:         PHYSICAL EXAM:  /62   Pulse 108   Temp 98.3 °F (36.8 °C) (Rectal)   Resp (!) 40   SpO2 95%   FiO2 (%):  [100 %] 100 %  CONSTITUTIONAL: awake   HEENT: atraumatic normocephalic  MOUTH: on AVAPs  NECK/THROAT: no JVD. Trachea midline. No obvious thyromegaly  LUNG: coarse BS, tachy. Chest symmetric with respiratory motion  HEART: regular rate and rhythm, no obvious murmers or gallops noted  ABD: soft non tender. + bowel sounds. No organomegaly noted  EXT: no clubbing, cyanosis, or edema noted. Pulses intact grossly  NEURO/MUSCULOSKELETAL: follows commands  SKIN: warm, dry. No obvious lesions noted  LYMPH: no obvious LAD      IMAGES:   CXR 1/26/25 on my review with RML infiltrates  Findings and impression:   Normal heart size   Right perihilar and bibasilar patchy opacities have developed; consider multifocal infection   No pneumothorax         LABS:  Recent Labs   Lab 01/26/25  0613   RBC 5.38   HGB 14.5   HCT 47.3   MCV 87.9   MCH 27.0   MCHC 30.7*   RDW 15.3*   NEPRELIM 23.39*   WBC 24.9*   .0*       Recent Labs   Lab 01/26/25  0613   *   CREATSERUM 1.27   EGFRCR 61   CA 9.9   ALB 4.1         CO2 25.0   ALKPHO 70   ALT 15   BILT 0.4   TP 7.4       ASSESSMENT/PLAN:  Acute hypoxemic respiratory failure   -secondary to aspiration PNA  -continue zosyn. Check viral panel, urine leg, strep pneum  -aspiration precautions  -continue AVAPS at 50%  -per POA he is a do not intubate    Septic shock  -from aspiration   -continue abx  -on levophed for MAP > 65  -IVF    Emesis  -check KUB to r/o obstruction, perforation     Multiple chronic medical conditions   -continue as before    Proph:  -DVT: lovenox or hep subcutaneous    Dispo:  -spoke with POA. Pt is DNR/[facility]  -palliative consult    Upon my evaluation, this  patient had a high probability of imminent or life-threatening deterioration due to shock and respiratory failure, which required my direct attention, intervention, and personal management.    I have personally provided 40 minutes of critical care time, exclusive of time spent on separately billable procedures.  Time includes review of all pertinent laboratory/radiology results, discussion with consultants, and monitoring for potential decompensation.  Performed interventions included pressor drugs and managing mechanical ventilation.      Thank you for the opportunity to care for Carlo Ng.      MINESH Glez DO, MPH  Pulmonary Critical Care Medicine  [facility]                         [1]   Allergies  Allergen Reactions    Risperidone WHEEZING    Codeine UNKNOWN    Ferric Carboxymaltose UNKNOWN   [2]   No outpatient medications have been marked as taking for the 1/26/25 encounter (Hospital Encounter).      Negative

## 2025-01-26 NOTE — PROGRESS NOTES
Received from ED with bipap mask in place, noted increased WOB on bipap/AVAPS  Expanded ABG done.   Call received from CAROLYN Kerr who initially requested fully code but once she arrived to ICU she stated she would want DNR-S and also agreed to palliative care consult.   POLST form signed and placed on chart.  D/w medical team as well.     WHITNEY LILLY/CNP  Acute Care Nurse Practitioner  1/26/2025

## 2025-01-27 NOTE — PLAN OF CARE
Problem: RESPIRATORY - ADULT  Goal: Achieves optimal ventilation and oxygenation  Description: INTERVENTIONS:  - Assess for changes in respiratory status  - Assess for changes in mentation and behavior  - Position to facilitate oxygenation and minimize respiratory effort  - Oxygen supplementation based on oxygen saturation or ABGs  - Provide Smoking Cessation handout, if applicable  - Encourage broncho-pulmonary hygiene including cough, deep breathe, Incentive Spirometry  - Assess the need for suctioning and perform as needed  - Assess and instruct to report SOB or any respiratory difficulty  - Respiratory Therapy support as indicated  - Manage/alleviate anxiety  - Monitor for signs/symptoms of CO2 retention  Outcome: Progressing     Pt remains on continuous AVAPS, tolerating well, saturating 94%. No changes at this time, RT will continue to monitor.  AVAPS settings and readings as follow:     01/26/25 2310   BiPAP   Device [employer]   BiPAP / CPAP CE#    BiPAP bacteria filter Yes   BIPAP plugged into main power? Yes   Mode AVAPS   Interface Full face mask   Mask Size Large   Control Settings   Set Rate 20 breaths/min   Set EPAP 5   Oxygen Percent 50 %   Inspiratory time 0.6   Insp rise time 2   AVAPS   Min IPAP 30   Max IPAP 15   EPAP Level 5   Set rate 20   Tidal volume 600   BiPAP/CPAP Alarm Settings   Hi Rate 50   Low Rate 10   Hi VT 1500   Low    Hi Pressure 40   Low Pressure 10   Low MV 4   High MV (L/min) 30   Apnea 20   BiPAP/CPAP Monitored Parameters   Pulse 93   SpO2 94 %   PIP 17   Total Rate 20 breaths/min   Minute Volume 22   Tidal Volume 602   Total Leak 44   Trigger % 100   Ti/Ttot % 34   EPAP 5   Toleration Well

## 2025-01-27 NOTE — PLAN OF CARE
Problem: ALTERED NUTRIENT INTAKE - ADULT  Goal: Nutrient intake appropriate for improving, restoring or maintaining nutritional needs  Description: INTERVENTIONS:  - Assess nutritional status and recommend course of action  - Monitor labs and treatment plans  - Recommend, monitor, and adjust tube feedings and TPN/PPN based on assessed needs  - Provide specific nutrition education as appropriate  Outcome: Progressing

## 2025-01-27 NOTE — PLAN OF CARE
Problem: NEUROLOGICAL - ADULT  Goal: Absence of seizures  Description: INTERVENTIONS  - Monitor for seizure activity  - Administer anti-seizure medications as ordered  - Monitor neurological status  Outcome: Progressing     Problem: SAFETY ADULT - FALL  Goal: Free from fall injury  Description: INTERVENTIONS:  - Assess pt frequently for physical needs  - Identify cognitive and physical deficits and behaviors that affect risk of falls.  - [place] fall precautions as indicated by assessment.  - Educate pt/family on patient safety including physical limitations  - Instruct pt to call for assistance with activity based on assessment  - Modify environment to reduce risk of injury  - Provide assistive devices as appropriate  - Consider OT/PT consult to assist with strengthening/mobility  - Encourage toileting schedule  Outcome: Progressing     Problem: RESPIRATORY - ADULT  Goal: Achieves optimal ventilation and oxygenation  Description: INTERVENTIONS:  - Assess for changes in respiratory status  - Assess for changes in mentation and behavior  - Position to facilitate oxygenation and minimize respiratory effort  - Oxygen supplementation based on oxygen saturation or ABGs  - Provide Smoking Cessation handout, if applicable  - Encourage broncho-pulmonary hygiene including cough, deep breathe, Incentive Spirometry  - Assess the need for suctioning and perform as needed  - Assess and instruct to report SOB or any respiratory difficulty  - Respiratory Therapy support as indicated  - Manage/alleviate anxiety  - Monitor for signs/symptoms of CO2 retention  Outcome: Not Progressing     Problem: GASTROINTESTINAL - ADULT  Goal: Maintains adequate nutritional intake (undernourished)  Description: INTERVENTIONS:  - Monitor percentage of each meal consumed  - Identify factors contributing to decreased intake, treat as appropriate  - Assist with meals as needed  - Monitor I&O, WT and lab values  - Obtain nutritional consult as  needed  - Optimize oral hygiene and moisture  - Encourage food from home; allow for food preferences  - Enhance eating environment  Outcome: Not Progressing

## 2025-01-27 NOTE — PLAN OF CARE
Patient weaned down to 35% FiO2 per respiratory therapy. Tube feedings started per orders. IVF discontinued. Primo fit in place. POA at bedside met with pallative care. Patient safety maintained, bed alarm on. Turned q2 hours. SCDs in place.       Problem: CARDIOVASCULAR - ADULT  Goal: Maintains optimal cardiac output and hemodynamic stability  Description: INTERVENTIONS:  - Monitor vital signs, rhythm, and trends  - Monitor for bleeding, hypotension and signs of decreased cardiac output  - Evaluate effectiveness of vasoactive medications to optimize hemodynamic stability  - Monitor arterial and/or venous puncture sites for bleeding and/or hematoma  - Assess quality of pulses, skin color and temperature  - Assess for signs of decreased coronary artery perfusion - ex. Angina  - Evaluate fluid balance, assess for edema, trend weights  1/27/2025 1557 by Julia Murphy RN  Outcome: Progressing  1/27/2025 0857 by Julia Murphy RN  Outcome: Progressing  Goal: Absence of cardiac arrhythmias or at baseline  Description: INTERVENTIONS:  - Continuous cardiac monitoring, monitor vital signs, obtain 12 lead EKG if indicated  - Evaluate effectiveness of antiarrhythmic and heart rate control medications as ordered  - Initiate emergency measures for life threatening arrhythmias  - Monitor electrolytes and administer replacement therapy as ordered  1/27/2025 1557 by Julia Murphy, RN  Outcome: Progressing  1/27/2025 0857 by Julia Murphy RN  Outcome: Progressing     Problem: RESPIRATORY - ADULT  Goal: Achieves optimal ventilation and oxygenation  Description: INTERVENTIONS:  - Assess for changes in respiratory status  - Assess for changes in mentation and behavior  - Position to facilitate oxygenation and minimize respiratory effort  - Oxygen supplementation based on oxygen saturation or ABGs  - Provide Smoking Cessation handout, if applicable  - Encourage broncho-pulmonary hygiene including cough, deep breathe,  Incentive Spirometry  - Assess the need for suctioning and perform as needed  - Assess and instruct to report SOB or any respiratory difficulty  - Respiratory Therapy support as indicated  - Manage/alleviate anxiety  - Monitor for signs/symptoms of CO2 retention  1/27/2025 1557 by Julia Murphy RN  Outcome: Progressing  1/27/2025 0857 by Julia Murphy RN  Outcome: Progressing     Problem: GASTROINTESTINAL - ADULT  Goal: Maintains adequate nutritional intake (undernourished)  Description: INTERVENTIONS:  - Monitor percentage of each meal consumed  - Identify factors contributing to decreased intake, treat as appropriate  - Assist with meals as needed  - Monitor I&O, WT and lab values  - Obtain nutritional consult as needed  - Optimize oral hygiene and moisture  - Encourage food from home; allow for food preferences  - Enhance eating environment  1/27/2025 1557 by Julia Murphy RN  Outcome: Progressing  1/27/2025 0857 by Julia Murphy RN  Outcome: Progressing     Problem: GENITOURINARY - ADULT  Goal: Absence of urinary retention  Description: INTERVENTIONS:  - Assess patient’s ability to void and empty bladder  - Monitor intake/output and perform bladder scan as needed  - Follow urinary retention protocol/standard of care  - Consider collaborating with pharmacy to review patient's medication profile  - Implement strategies to promote bladder emptying  1/27/2025 1557 by Julia Murphy RN  Outcome: Progressing  1/27/2025 0857 by Julia Murphy RN  Outcome: Progressing     Problem: SKIN/TISSUE INTEGRITY - ADULT  Goal: Incision(s), wounds(s) or drain site(s) healing without S/S of infection  Description: INTERVENTIONS:  - Assess and document risk factors for pressure ulcer development  - Assess and document skin integrity  - Assess and document dressing/incision, wound bed, drain sites and surrounding tissue  - Implement wound care per orders  - Initiate isolation precautions as appropriate  -  Initiate Pressure Ulcer prevention bundle as indicated  1/27/2025 1557 by Julia Murphy RN  Outcome: Progressing  1/27/2025 0857 by Julia Murphy RN  Outcome: Progressing     Problem: NEUROLOGICAL - ADULT  Goal: Achieves stable or improved neurological status  Description: INTERVENTIONS  - Assess for and report changes in neurological status  - Initiate measures to prevent increased intracranial pressure  - Maintain blood pressure and fluid volume within ordered parameters to optimize cerebral perfusion and minimize risk of hemorrhage  - Monitor temperature, glucose, and sodium. Initiate appropriate interventions as ordered  1/27/2025 1557 by Julia Murphy RN  Outcome: Progressing  1/27/2025 0857 by Julia Murphy RN  Outcome: Progressing  Goal: Absence of seizures  Description: INTERVENTIONS  - Monitor for seizure activity  - Administer anti-seizure medications as ordered  - Monitor neurological status  1/27/2025 1557 by Julia Murphy RN  Outcome: Progressing  1/27/2025 0857 by Julia Murphy RN  Outcome: Progressing     Problem: Impaired Swallowing  Goal: Minimize aspiration risk  Description: Interventions:  - Patient should be alert and upright for all feedings (90 degrees preferred)  - Offer food and liquids at a slow rate  - No straws  - Encourage small bites of food and small sips of liquid  - Offer pills one at a time, crush or deliver with applesauce as needed  - Discontinue feeding and notify MD (or speech pathologist) if coughing or persistent throat clearing or wet/gurgly vocal quality is noted  1/27/2025 1557 by Julia Murphy RN  Outcome: Progressing  1/27/2025 0857 by Julia Murphy RN  Outcome: Progressing     Problem: SAFETY ADULT - FALL  Goal: Free from fall injury  Description: INTERVENTIONS:  - Assess pt frequently for physical needs  - Identify cognitive and physical deficits and behaviors that affect risk of falls.  - [place] fall precautions as indicated by  assessment.  - Educate pt/family on patient safety including physical limitations  - Instruct pt to call for assistance with activity based on assessment  - Modify environment to reduce risk of injury  - Provide assistive devices as appropriate  - Consider OT/PT consult to assist with strengthening/mobility  - Encourage toileting schedule  1/27/2025 1557 by Julia Murphy RN  Outcome: Progressing  1/27/2025 0857 by Julia Murphy RN  Outcome: Progressing     Problem: DISCHARGE PLANNING  Goal: Discharge to home or other facility with appropriate resources  Description: INTERVENTIONS:  - Identify barriers to discharge w/pt and caregiver  - Include patient/family/discharge partner in discharge planning  - Arrange for needed discharge resources and transportation as appropriate  - Identify discharge learning needs (meds, wound care, etc)  - Arrange for interpreters to assist at discharge as needed  - Consider post-discharge preferences of patient/family/discharge partner  - Complete POLST form as appropriate  - Assess patient's ability to be responsible for managing their own health  - Refer to Case Management Department for coordinating discharge planning if the patient needs post-hospital services based on physician/LIP order or complex needs related to functional status, cognitive ability or social support system  1/27/2025 1557 by Julia Murphy, RN  Outcome: Progressing  1/27/2025 0857 by Julia Murphy RN  Outcome: Progressing

## 2025-01-27 NOTE — PROGRESS NOTES
01/27/25 1145   Wound 01/26/25 Coccyx Mid   Date First Assessed/Time First Assessed: 01/26/25 1001   Present on Original Admission: Yes  Primary Wound Type: Pressure Injury  Location: Coccyx  Wound Location Orientation: Mid   Wound Image    Site Assessment Moist;Fragile;Pink;Red;Tan   Closure Not approximated   Drainage Amount Scant   Drainage Description Serosanguineous;Yellow   Treatments Cleansed;Saline;Santyl   Dressing 4x4s;Aquacel Foam   Dressing Changed Changed   Dressing Status Dressing Changed;Removed;Old drainage   Wound Length (cm) 3.8 cm   Wound Width (cm) 3.8 cm   Wound Surface Area (cm^2) 14.44 cm^2   Anita-wound Assessment Clean;Fragile;Moist;Pink   Wound Bed Granulation (%) 60 %   Wound Bed Slough (%) 40 %   State of Healing Early/partial granulation   Wound Odor None   Pressure Injury Stage U   [REMOVED] Wound 01/26/25 Back Medial;Upper   Final Assessment Date: 01/27/25  Date First Assessed/Time First Assessed: 01/26/25 1001   Primary Wound Type: Abrasion  Location: Back  Wound Location Orientation: Medial;Upper  Wound Outcome: Healed   Wound Image    Site Assessment Clean;Intact;Dry;Epithelialization   Closure Approximated   Drainage Amount None   Dressing Aquacel Foam   Non-staged Wound Description Not applicable   Anita-wound Assessment Clean;Dry;Intact   Wound Bed Epithelium (%) 100 %   State of Healing Epithelialized   Wound Odor None   Wound 01/26/25 Heel Right   Date First Assessed/Time First Assessed: 01/26/25 1002   Primary Wound Type: Pressure Injury  Location: Heel  Wound Location Orientation: Right   Wound Image    Site Assessment Clean;Dry;Fragile;Purple   Closure Not approximated   Drainage Amount None   Treatments Site Care   Dressing Aquacel Foam   Dressing Changed Changed   Dressing Status Dressing Changed;Removed   Wound Length (cm) 1.6 cm   Wound Width (cm) 1 cm   Wound Surface Area (cm^2) 1.6 cm^2   Anita-wound Assessment Clean;Dry;Intact   State of Healing Non-healing   Wound  Odor None   Pressure Injury Stage DTPI   Wound 01/27/25 Foot Right;Inner   Date First Assessed/Time First Assessed: 01/27/25 1143   Present on Original Admission: Yes  Location: Foot  Wound Location Orientation: Right;Inner   Wound Image    Site Assessment Clean;Dry;Fragile;Purple   Closure Not approximated   Drainage Amount None   Treatments Site Care   Dressing Aquacel Foam   Dressing Changed New   Wound Length (cm) 1 cm   Wound Width (cm) 1.7 cm   Wound Surface Area (cm^2) 1.7 cm^2   Anita-wound Assessment Clean;Dry;Intact   State of Healing Non-healing   Wound Odor None   Pressure Injury Stage DTPI   Wound Follow Up   Follow up needed Yes       WOUND CARE NOTE    History:  Past Medical History:    Abnormal CT scan    Acute gout involving toe of left foot    Altered mental status    Anemia    Asthma in adult (HCC)    Cancer of parotid gland (HCC)    Formatting of this note might be different from the original.   Resection 2019    Catatonia    Cognitive communication deficit    Cystic disease of liver    Dementia (HCC)    Difficult airway    Dyslipidemia    Elevated white blood cell count, unspecified    Encephalopathy    Essential hypertension    Gastro-esophageal reflux disease with esophagitis, without bleeding    Gout, chronic    Gross hematuria    Hypercholesterolemia    Hyperlipidemia    Leukocytosis    Liver cyst    Metabolic encephalopathy    Neuroendocrine carcinoma, high grade (HCC)    Other abnormalities of gait and mobility    Other psoriasis    Other seizures (HCC)    Other specified disorders of kidney and ureter    Parotid neoplasm    Formatting of this note might be different from the original.   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   Keppra    Seizure disorder (HCC)    Toxic metabolic encephalopathy    Transient global amnesia    Formatting of this note might be different from the original.   Brief -Plavix    Unspecified dementia, unspecified severity, without behavioral disturbance, psychotic disturbance, mood disturbance, and anxiety (HCC)    Vascular dementia (HCC)    Vascular dementia, unspecified severity, without behavioral disturbance, psychotic disturbance, mood disturbance, and anxiety (HCC)    Weakness    Weakness of left upper extremity     History reviewed. No pertinent surgical history.   Social History     Socioeconomic History    Marital status:    Tobacco Use    Smoking status: Former     Current packs/day: 0.00     Average packs/day: 1 pack/day for 25.0 years (25.0 ttl pk-yrs)     Types: Cigarettes     Start date: 1965     Quit date: 1990     Years since quittin.0    Tobacco comments:      Cigarettes 1 25 Quit:     Social Drivers of Health     Financial Resource Strain: Low Risk  (2021)    [facility], [facility]    Overall Financial Resource Strain (CARDIA)     Difficulty of Paying Living Expenses: Not hard at all   Food Insecurity: Unknown (2025)    Food Insecurity     Food Insecurity: Patient unable to answer   Transportation Needs: Unknown (2025)    Transportation Needs     Lack of Transportation: Patient unable to answer   Physical Activity: Inactive (10/9/2024)    Received from [facility]    Physical Activity     On average, how many days per week do you engage in moderate to strenuous exercise (like a brisk walk)?: 0 days     On average, how many minutes do you engage in exercise at this level?: 0 min     On average, how many minutes do you engage in exercise at this  level?: 0 min     On average, how many days per week do you engage in moderate to strenuous exercise (like a brisk walk)?: 0 days    [facility], [facility]    Social Connections   Housing Stability: Unknown (1/26/2025)    Housing Stability     Housing Instability: Patient unable to answer     PLAN   Recommendations:  Turn schedules  Heels elevated using pillows, heel wedge or heel boots to offload heels  Prompt incontinence care  Moisture barrier for incontinence. May consider ZINC    Wound(s)  Location: SACRUM  Cleansing  Saline   Topical SANTYL  Dressings SALINE MOIST GAUZE  Secure with BORDERED FOAM  Frequency DAILY    Location: RIGHT HEEL, RIGHT FOOT  Dressings BORDERED FOAMS     OBJECTIVE   RN Consult new  Reason for Consult HEEL AND SACRUM      ASSESSMENT   Moy Score:  Moy Scale Score: 11    Chart Reviewed: yes    Wound(s):    Pt seen with PCT. Santyl was previously ordered and applied to the sacral wound which has adherent tan slough. Recommend to continue daily dressings with the santyl and saline moist gauze. The back has no wound, epithelialized tissue only. The right medial foot and right medial heel have intact deep tissue ulcers. Foams and heel boots re applied. Pt remained non verbal during tx.   Pillow placed under the right hip, bedside RN updated to wound care.     Allergies: Risperidone, Codeine, and Ferric carboxymaltose    Labs:   Lab Results   Component Value Date    WBC 24.9 (H) 01/26/2025    HGB 14.5 01/26/2025    HCT 47.3 01/26/2025    .0 (H) 01/26/2025    CREATSERUM 1.27 01/26/2025    BUN  01/26/2025      Comment:      Test not reported due to hemolysis.  Test reordered by laboratory.         01/26/2025    K  01/26/2025      Comment:      Test not reported due to hemolysis.  Test reordered by laboratory.         01/26/2025    CO2 25.0 01/26/2025     (H) 01/26/2025    CA 9.9 01/26/2025    ALB 4.1 01/26/2025     ALKPHO 70 01/26/2025    BILT 0.4 01/26/2025    TP 7.4 01/26/2025    AST  01/26/2025      Comment:      Test not reported due to hemolysis.  Test reordered by laboratory.        ALT 15 01/26/2025    MG 2.0 04/12/2024     No results found for: \"PREALBUMIN\"      Time Spent 30 Minutes.    Yumiko Hollis RN, BSN, [facility]  [facility]  [phone]

## 2025-01-27 NOTE — DIETARY NOTE
ADULT NUTRITION INITIAL ASSESSMENT    Pt is at high nutrition risk.  Pt meets severe malnutrition criteria.      RECOMMENDATIONS TO MD: Recommend initiation of nutrition support    ADMITTING DIAGNOSIS:  Aspiration pneumonitis (HCC) [J69.0]  Acute cystitis with hematuria [N30.01]  Acute respiratory failure with hypoxia (HCC) [J96.01]  Severe sepsis (HCC) [A41.9, R65.20]  PERTINENT PAST MEDICAL HISTORY:   Past Medical History:    Abnormal CT scan    Acute gout involving toe of left foot    Altered mental status    Anemia    Asthma in adult (HCC)    Cancer of parotid gland (HCC)    Formatting of this note might be different from the original.   Resection 2019    Catatonia    Cognitive communication deficit    Cystic disease of liver    Dementia (HCC)    Difficult airway    Dyslipidemia    Elevated white blood cell count, unspecified    Encephalopathy    Essential hypertension    Gastro-esophageal reflux disease with esophagitis, without bleeding    Gout, chronic    Gross hematuria    Hypercholesterolemia    Hyperlipidemia    Leukocytosis    Liver cyst    Metabolic encephalopathy    Neuroendocrine carcinoma, high grade (HCC)    Other abnormalities of gait and mobility    Other psoriasis    Other seizures (HCC)    Other specified disorders of kidney and ureter    Parotid neoplasm    Formatting of this note might be different from the original.   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   Keppra    Seizure disorder (HCC)    Toxic metabolic encephalopathy    Transient global amnesia    Formatting of  this note might be different from the original.   Brief -Plavix    Unspecified dementia, unspecified severity, without behavioral disturbance, psychotic disturbance, mood disturbance, and anxiety (HCC)    Vascular dementia (HCC)    Vascular dementia, unspecified severity, without behavioral disturbance, psychotic disturbance, mood disturbance, and anxiety (HCC)    Weakness    Weakness of left upper extremity       PATIENT STATUS: Initial 01/27/25: Pt assessed due to screening at risk for tube feeding order. Pt from [facility] and was found covered in vomit with concern for aspiration. Pt has chronic PEG. Per paper chart patient also was on regular diet with thin liquids and supervision. Bolus feeds Osmolite 1.5 at 237mL after meals and bedtime for total of 948mL daily. FWF 175mL H20 4x daily. Total provided 1422kcal + 60g protein and 1442mL H20. Unsure how much PO patient was taking. Pt currently not able to eat per RN pt is unresponsive. Pt on Bipap continually. GOC discussion today.    FOOD/NUTRITION RELATED HISTORY:  Appetite: NPO  Intake: NPO  Intake Meeting Needs: TF will meet needs once at goal rate  Percent Meals Eaten (last 6 days)       None           Food Allergies: No Known Food Allergies (NKFA)  Cultural/Ethnic/Gnosticism Preferences: Not Obtained    GASTROINTESTINAL: PEJ/J-tube    MEDICATIONS: reviewed   heparin  5,000 Units Subcutaneous 2 times per day    mupirocin  1 Application Each Nare BID    piperacillin-tazobactam  4.5 g Intravenous Q8H    vancomycin  15 mg/kg Intravenous Q24H    atorvastatin  80 mg Oral Nightly    aspirin  81 mg Oral Daily    clopidogrel  75 mg Oral Daily    collagenase  1 Application Topical Daily    divalproex DR  250 mg Oral BID    escitalopram  5 mg Oral Nightly    finasteride  5 mg Oral Daily    memantine  5 mg Oral BID    OLANZapine  5 mg Oral Nightly    tamsulosin  0.4 mg Oral Daily    budesonide  0.5 mg Nebulization BID    arformoterol  15 mcg Nebulization 2 times daily     oseltamivir  30 mg Oral BID     LABS: reviewed  Recent Labs     01/26/25  0613   *   CREATSERUM 1.27   CA 9.9         CO2 25.0       WEIGHT HISTORY:  Patient Weight(s) for the past 336 hrs:   Weight   01/27/25 0600 67.6 kg (149 lb 0.5 oz)   01/26/25 0945 71 kg (156 lb 8.4 oz)     Wt Readings from Last 10 Encounters:   01/27/25 67.6 kg (149 lb 0.5 oz)   12/09/24 79.4 kg (175 lb)   04/20/24 74.9 kg (165 lb 3.2 oz)       ANTHROPOMETRICS:  HT:    Wt Readings from Last 1 Encounters:   01/27/25 67.6 kg (149 lb 0.5 oz)     Last weight: Likely accurate  BMI: Body mass index is 22.01 kg/m².  Dosing Weight: 67.6 kg - recent weight, utilized for anthropometric calculations  BMI CLASSIFICATION: 18.5-24.9 kg/m2 - WNL  No data recorded           93% IBW  Usual Body Wt: 67.6 lbs       100% UBW    NUTRITION RELATED PHYSICAL FINDINGS:  - Nutrition Focused Physical Exam (NFPE): moderate depletion body fat orbital region, triceps region, and fat overlying rib cage region and moderate depletion muscle mass temple region, clavicle region, and scapular region  - Fluid Accumulation: none . See RN documentation for details  - Skin Integrity: Pressure Injury: back medial upper, right heel - no stage documented in flow sheet. Sacral decubitus ulcerSee RN documentation for details    CRITERIA FOR MALNUTRITION DIAGNOSIS:  Criteria for non-severe malnutrition diagnosis: chronic illness related to body fat mild depletion and muscle mass mild depletion.    NUTRITION DIAGNOSIS/PROBLEM:   Moderate Malnutrition related to Chronic - Physiological causes increasing nutrient needs due to illness or condition as evidenced by mild fat and muscle mass depletion    NUTRITION INTERVENTION:     NUTRITION PRESCRIPTION:   Estimated Nutrition needs: --dosing wt of 67.6 kg - wt taken on 1/27/25  Calories: 8673-4127 calories/day (25-30 calories per kg Dosing wt)  Protein:  g protein/day (1.2-2 g protein/kg Dosing wt)  Fluid Needs:  1712-1998mL (1mL/kcal)    - Diet:       Procedures    NPO          - Enteral Nutrition: Osmolite 1.2 at 15 ml/hr. Recommend advance by 10 q 6 to goal rate of 65 ml/hr. Based on average 22 hour infusion time via G-Tube/PEG. Goal rate provides 1716 kcal, 79 grams protein, 1172ml total free water, and 100% RDI's.  Water flushes 130 q 4 hrs. Pt to receive additional 160kcal + 5g collagen protein from santy supplement.  Total fluid daily:  1952mL from FW + FWF  - Nutrition Care Plan: Recommend EN (enteral nutrition) support if unable to take adequate PO safely within 1-2 days  - ONS (Oral Nutrition Supplements)/Meals/Snacks: Santy BID Unflavored (2.5 g collagen protein and 80 calories per packet)   - Vitamin and mineral supplements: none  - Feeding assistance: NPO  - Nutrition education: not appropriate at this time    - Coordination of nutrition care: collaboration with other providers  - Discharge and transfer of nutrition care to new setting or provider: to be determined and monitor plans    MONITOR AND EVALUATE/NUTRITION GOALS:  - Food and Nutrient Intake:      Monitor: N/A  - Food and Nutrient Administration:      Monitor: enteral nutrition initiation  - Anthropometric Measurement:    Monitor weight  - Nutrition Goals:      maintain wt within 5%, TF meet >80% of goal within first week , support wound healing, and monitor fluid status    DIETITIAN FOLLOW UP: RD to follow and monitor nutrition status  POOJA Miller  Clinical Dietitian  P: [phone]

## 2025-01-27 NOTE — CONSULTS
[facility]  part of [facility]  Palliative Care Initial Consult Note    Carlo Ng Patient Status:  Inpatient    1955 MRN [id number]   Location [facility] 2W/SW Attending Virgilio Chavis MD   Hosp Day # 1 PCP VIRGILIO CROOK MD     Date of Consult: 2025  Patient seen at: [facility] Inpatient    The  Cures Act makes medical notes like these available to patients in the interest of transparency. Please be advised this is a medical document. Medical documents are intended to carry relevant information, facts as evident, and the clinical opinion of the practitioner. The medical note is intended as peer to peer communication and may appear blunt or direct. It is written in medical language and may contain abbreviations or verbiage that are unfamiliar.     Reason for Consultation: Consult ordered by:: Vianney lala for evaluation of Palliative Care needs and Goals of care discussion.    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 See below for reviewed labs and imaging. Pt was admitted for treatment and evaluation of leukocytosis, sepsis, possible aspiration pneumonia, influenza A, possible UTI and acute hypoxemic respiratory failure. See below for reviewed imaging.     He is being followed by pulmonary services.    I reviewed labs and imaging-see below. History was obtained from [facility], NH records and his HCPOA friend Yesenia Kerr. Today is day 1 of hospitalization. This is 5th hospitalization in the past 4 months.    Pt is listed as DNAR/DNI-selective in Epic,  and reviewed previously completed POLST which shows wishes for  DNAR/DNI-selective.     Reviewed symptom needs past 24 hrs: Morphine 2 mg IVP X6 doses, Miralax X1    Patient was seen and examined in bed with no family at bedside. Pt is very lethargic currently on continuous bipap, fio2 40%, sats 97%. He is not opening his eyes or following any commands. Breathing appears labored with tachypnea and increased WOB noted, RR 32. No non-verbal signs of pain noted. He is NPO, g-tube is currently clamped.  See physical exam and ROS below.    Review of Systems/Palliative Care symptom needs assessed:   Unable to obtain ROS due to pt factors above      Medical History: obtained from [facility]  Past Medical History:    Abnormal CT scan    Acute gout involving toe of left foot    Altered mental status    Anemia    Asthma in adult (HCC)    Cancer of parotid gland (HCC)    Formatting of this note might be different from the original.   Resection 2019    Catatonia    Cognitive communication deficit    Cystic disease of liver    Dementia (HCC)    Difficult airway    Dyslipidemia    Elevated white blood cell count, unspecified    Encephalopathy    Essential hypertension    Gastro-esophageal reflux disease with esophagitis, without bleeding    Gout, chronic    Gross hematuria    Hypercholesterolemia    Hyperlipidemia    Leukocytosis    Liver cyst    Metabolic encephalopathy    Neuroendocrine carcinoma, high grade (HCC)    Other abnormalities of gait and mobility    Other psoriasis    Other seizures (HCC)    Other specified disorders of kidney and ureter    Parotid neoplasm    Formatting of this note might be different from the original.   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   Keppra    Seizure disorder (HCC)    Toxic metabolic encephalopathy    Transient global amnesia    Formatting of this note might be different from the original.   Brief -Plavix    Unspecified dementia, unspecified severity, without behavioral disturbance, psychotic disturbance, mood disturbance, and anxiety (HCC)    Vascular dementia (HCC)    Vascular dementia, unspecified severity, without behavioral disturbance, psychotic disturbance, mood disturbance, and anxiety (HCC)    Weakness    Weakness of left upper extremity     History reviewed. No pertinent surgical history.    Family History: obtained from EPIC  History reviewed. No pertinent family history.    Palliative Care Social History:   Marital Status:   Children: none  Living Situation Prior to Admit: [place] [facility]  Occupational History: Retired,     Substance History:   reports that he quit smoking about 35 years ago. His smoking use included cigarettes. He started smoking about 60 years ago. He has a 25 pack-year smoking history. He does not have any smokeless tobacco history on file.  has no history on file for alcohol use.  has no history on file for drug use.  Hx of Substance Use/Abuse: No    Spiritual Assessment:   Adventist: Oriental orthodox   requested: yes, requesting a  visit for anointing of sick blessing.    Allergies:  Allergies[1]    Medications:     Current Facility-Administered Medications:     ondansetron (Zofran) 4 MG/2ML injection 4 mg, 4 mg, Intravenous, Q6H PRN    heparin (Porcine) 5000 UNIT/ML injection 5,000 Units, 5,000 Units, Subcutaneous, 2 times per day    acetaminophen (Tylenol Extra Strength) tab 1,000 mg, 1,000 mg, Oral, Q4H PRN    polyethylene glycol (PEG 3350) (Miralax) 17 g oral packet 17 g, 17 g, Oral, Daily PRN    sennosides (Senokot) tab 17.2 mg, 17.2 mg, Oral, Nightly PRN    bisacodyl  (Dulcolax) 10 MG rectal suppository 10 mg, 10 mg, Rectal, Daily PRN    fleet enema (Fleet) rectal enema 133 mL, 1 enema, Rectal, Once PRN    norepinephrine (Levophed) 4 mg/250mL infusion premix, 0.5-50 mcg/min, Intravenous, Continuous    mupirocin (Bactroban) 2% nasal ointment 1 Application, 1 Application, Each Nare, BID    morphINE PF 2 MG/ML injection 1 mg, 1 mg, Intravenous, Q2H PRN **OR** morphINE PF 2 MG/ML injection 2 mg, 2 mg, Intravenous, Q2H PRN    sodium chloride 0.9% infusion, , Intravenous, Continuous    piperacillin-tazobactam (Zosyn) 4.5 g in dextrose 5% 100 mL IVPB-ADDV, 4.5 g, Intravenous, Q8H    vancomycin (Vancocin) 1,000 mg in sodium chloride 0.9% 250 mL IVPB-ADDV, 15 mg/kg, Intravenous, Q24H    atorvastatin (Lipitor) tab 80 mg, 80 mg, Oral, Nightly    aspirin chewable tab 81 mg, 81 mg, Oral, Daily    clopidogrel (Plavix) tab 75 mg, 75 mg, Oral, Daily    collagenase (Santyl) 250 UNIT/GM ointment 1 Application, 1 Application, Topical, Daily    divalproex DR (Depakote Sprinkle) sprinkle cap 250 mg, 250 mg, Oral, BID    escitalopram (Lexapro) tab 5 mg, 5 mg, Oral, Nightly    finasteride (Proscar) tab 5 mg, 5 mg, Oral, Daily    memantine (Namenda) tab 5 mg, 5 mg, Oral, BID    OLANZapine (ZyPREXA) tab 5 mg, 5 mg, Oral, Nightly    tamsulosin (Flomax) cap 0.4 mg, 0.4 mg, Oral, Daily    budesonide (Pulmicort) 0.5 MG/2ML nebulizer suspension 0.5 mg, 0.5 mg, Nebulization, BID    arformoterol (Brovana) 15 MCG/2ML nebulizer solution 15 mcg, 15 mcg, Nebulization, 2 times daily    oseltamivir (Tamiflu) cap 30 mg, 30 mg, Oral, BID    Nutritional status:  BMI: 22 Weight: 149  Weight changes: +wt loss per EPIC documentation, pt was 175 pounds in Dec?  Current Appetite: Poor-NPO  Dysphagia: Yes has g-tube    Functional Status History:  ADLs: bed bound and total care      Palliative Performance Scale:   Prior to admission: 30%  Observed during hospitalization: 10%  % Ambulation Activity Level Self-Care Intake  Consciousness   100 Full  Normal  No Disease Full Normal Full   90 Full  Normal  Some Disease Full Normal Full   80 Full  Normal w/effort  Some Disease Full Normal or reduced Full   70 Reduced  Can't Perform Job  Some Disease Full Normal or reduced Full   60 Reduced  Can't Perform Hobby   Significant Disease Occ Assist Normal or reduced Full or confused   50 Mainly sit/lie Can't do any work  Extensive Disease Partial Assist Normal or reduced Full or confused   40 Mainly in bed Can't do any work  Extensive Disease Mainly Assist Normal or reduced Full or confused   30 Bed Bound Can't do any work  Extensive Disease Max Assist  Total Care Reduced  Drowsy/confused   20 Bed Bound Can't do any work  Extensive Disease Max Assist  Total Care Minimal  Drowsy/confused   10 Bed Bound Can't do any work  Extensive Disease Max Assist  Total Care Mouth Care  Drowsy/confused   0 Death        Objective      Vital Signs:  Blood pressure 114/72, pulse 100, temperature 97.9 °F (36.6 °C), temperature source Temporal, resp. rate (!) 31, weight 149 lb 0.5 oz (67.6 kg), SpO2 98%.  Body mass index is 22.01 kg/m².  Present Level of pain: JENI  Non-verbal signs of pain present: No    Physical Exam:  General: Lethargic and in with increased WOB noted. Chronically-ill appearing  HEENT: No focal deficits. +dry mucous membranes, +bipap mask  Cardiac: Regular rate and rhythm, S1, S2 normal, no murmur, rub or gallop.  Lungs: Coarse breath sounds bilaterally. Increased respiratory excursions and effort.  Abdomen: Soft, non-tender, normal bowel sounds X 4 quadrants, no rebound or guarding, g-tube clamped  Extremities: Without clubbing, cyanosis. Peripheral pulses are 2+. BLE Edema  present  Neurologic: Lethargic, not verbalizing or following any commands.   Skin: Warm and dry.    Hematology:  Lab Results   Component Value Date    WBC 24.9 (H) 01/26/2025    HGB 14.5 01/26/2025    HCT 47.3 01/26/2025    .0 (H) 01/26/2025       Coags:  Lab Results    Component Value Date    INR 1.06 2025    PTT 29.6 2025       Chemistry:  Lab Results   Component Value Date    CREATSERUM 1.27 2025    BUN  2025      Comment:      Test not reported due to hemolysis.  Test reordered by laboratory.         2025    K  2025      Comment:      Test not reported due to hemolysis.  Test reordered by laboratory.         2025    CO2 25.0 2025     (H) 2025    CA 9.9 2025    ALB 4.1 2025    ALKPHO 70 2025    BILT 0.4 2025    TP 7.4 2025    AST  2025      Comment:      Test not reported due to hemolysis.  Test reordered by laboratory.        ALT 15 2025    MG 2.0 2024   Resp panel 25 +influenza A    Imagin/26/25 Abdomen XRAY  Findings and impression:      G-tube overlies the left upper quadrant      3-4 mildly dilated central small bowel loops measure up to 3.3 centimeter, nonspecific      Small volume colonic stool      No enteric tube     25 CXR  Findings and impression:      Normal heart size      Right perihilar and bibasilar patchy opacities have developed; consider multifocal infection      No pneumothorax          Summary of GOC Discussion      25-I attempted to reach pt's HCPOA Yesenia Kerr by phone to discuss palliative care/GOC, no answer, LM. I left palliative care information at bedside and asked RN to call if me POA comes to visit. I reviewed previously completed POLST form with wishes for DNAR/DNI-selective. I made a copy of this directive and sent down to registration for scan into [employer].     Addendum 3pm  I discussed reason for palliative care consultation with patient's long time work friend Yesenia Kerr who tells me she is his HCPOA.     I differentiated the palliative treatment-focus model versus the hospice comfort-focused philosophy of care. I informed the patient/family that having palliative care support does not limit medical  treatment options or decisions to those who wish to continue curative or restorative medical therapies. I discussed the benefits of palliative care to include assistance with arising symptom management needs, an extra layer of support, to ensure GOC are respected throughout healthcare continuum, and assist with transition to hospice care when appropriate.  Palliative care handout provided.    Outpatient/Community Palliative Care Services:  Usually visit once per 4 weeks depending on contracted agency guidelines  Focus on GOC and symptom management   Palliative Care criteria:  Not altered by prognosis   Does not limit curative or restorative therapies      Outpatient Hospice services:  24/7 phone triage services   RN visit one or more times per week depending on need  Home health aid to assist in ADLs/hygiene   Hospice criteria:  Less than six-month prognosis   Must forego most life-prolonging  measures/treatments   Focus solely on comfort   Must sign onto hospice benefit with agency     Background provided by family:  -Yesenia Kerr talked with me about pt's recent cognitive/functional decline with recurrent hospitalizations. She tells me pt was never truly diagnosed with NPH or dementia. Pt had g-tube placed in Nov and has had ongoing issues with aspiration. Pt was previously living in [facility] prior to all of these recent hospitalizations since Sept.       Prognostic awareness/understanding: still information gathering stage    -I  discussed current clinical condition and explored previous discussions with MDs. I stressed his overall guarded prognosis. She tells me she has not really talked with any MDs since admission and requests updates.     -I discussed the normal disease trajectory of possible dementia with associated symptoms and decline over time.     Hopes/goals/concerns:   -Yesenia Kerr is concerned about pt's condition and tells me this is the most sick he has ever been. She tells me she wants to talk with  the MD further as she tells me he never really had an actual diagnosis that correlated with his ongoing decline. I discussed encephalopathy in the setting of critical illness/sepsis.    -I talked with her about concern with ongoing respiratory failure requiring NIV and she tells me he had been intubated in the past and would not want to put him through that again. She does want to keep his comfort a priority and discussed ongoing use of Morphine IVP prn for resp distress. I prepared her if he is not improving in the next few days, it would be reasonable to consider comfort care with hospice. Discussed given severity of his resp failure/distress he would likely be inpt hospice appropriate.    -Yesenia Kerr would like to have  visit for anoiting of sick blessing. She is going to contact pt's siblings who live out of state to see if they want to come visit with him.    -I confirmed wishes for DNAR/DNI-selective. I gave her previously completed original POLST form for her records.    -Yesenia Kerr wants to continue with supportive care for now. Discussed ongoing GOC discussions will be needed through his clinical course. She is agreeable to having palliative care following.    -Provided emotional support to pt's friend who is coping adequately.        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    Goals of care counseling  -see above for details  -Confirmed wishes for DNAR/DNI-selective  and continue with supportive care.  -Education provided on option for comfort care with hospice care if not improving.  -Ongoing GOC discussions will be needed through clinical course and over time. Yesenia Kerr is asking for MD to also update her and provide prognosis to help her with decision making.  - referral for spiritual support for  visit for anointing of sick blessing.  -Agreeable to palliative care following  -Dispo:  TBD pending clinical course. SW to help with dc planning.   -Provided emotional support to pt's friend who is coping adequately.    Advance care planning  -see above for details  -Pt's HCPOA is friend Yesenia Larsen #[phone].  -Previously completed POLST paperwork sent to registration for can into EPIC and original given to POA.     Palliative Performance Scale 10%    Emotion support provided to patient/family today: Yes    A total of 70 mins were spent on this consult, which included all of the following:direct face to face contact, history taking, physical examination, and >50% was spent counseling and coordinating care.    Discussed today's visit with message to Dr. Chavis, and Julia HUSAIN .    I will continue to follow clinically.      Thank you for allowing Palliative Care services to participate in the care of Mr. Carlo Ng.       Christine Turk, ANP-BC, ACHPN [id number]  1/27/2025  3:40 PM  Palliative Care Services          [1]   Allergies  Allergen Reactions    Risperidone WHEEZING    Codeine UNKNOWN    Ferric Carboxymaltose UNKNOWN

## 2025-01-27 NOTE — PROGRESS NOTES
[facility]  Progress Note     Carlo Ng  : 1955    Status: Inpatient  Day #: 1    Attending: Virgilio Chavis MD  PCP: VIRGILIO CROOK MD     Assessment and Plan:    Acute respiratory failure  Acute aspiration pneumonia  Acute influenza A infection  -admitted to ICU  -CXR reviewed  -on bipap  -pulm/critical care on consult  -cont IV abx  -cont tamiflu  -off pressors     Acute septic shock - due to above  Lactic acidosis  Hypotension  Leukocytosis  -received sepsis bolus  -f/u blood cultures  -follow lactic acid  -cont IV abx     GOC - POA confirms DNAR/selective  -Palliative care consult tomorrow  -if declines consider comfort measures    Sacral decubitus ulcer POA  -wound care    Abnormal UA, not UTI  -UCx no growth     Other:  -vascular dementia  -h/o dysphagia s/p G-tube  -COPD  -h/o TIA  -HTN  -GERD  -h/o gout  -h/o 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 dx in 2019, s/p R neck dissection and chemoradiation        DVT Mechanical Prophylaxis:   SCDs,    DVT Pharmacologic Prophylaxis   Medication    heparin (Porcine) 5000 UNIT/ML injection 5,000 Units      DVT Pharmacologic prophylaxis: Aspirin 162 mg        Subjective:      Initial Chief Complaint:  respiratory failure    Not waking up       Objective:      Temp:  [96.7 °F (35.9 °C)-100 °F (37.8 °C)] 98.9 °F (37.2 °C)  Pulse:  [] 93  Resp:  [31-44] 31  BP: ()/(52-93) 95/71  SpO2:  [89 %-100 %] 100 %  FiO2 (%):  [50 %-100 %] 60 %  General:  NAD  HEENT:  Normocephalic, atraumatic  Cardiac:  Regular rate, regular rhythm  Pulmonary:  Clear to auscultation bilaterally  Gastrointestinal:  Soft, non-tender, normal bowel sounds  Musculoskeletal:  No joint swelling  Extremities:  No edema, no cyanosis, no clubbing  Neurologic:  nonfocal  Psychiatric:  calm    Intake/Output Summary (Last 24 hours) at 2025 0810  Last data filed at 2025 0600  Gross per 24 hour   Intake 6264 ml   Output 170 ml    Net 6094 ml         Recent Labs   Lab 01/26/25  0613   WBC 24.9*   HGB 14.5   HCT 47.3   .0*   RBC 5.38   MCV 87.9   MCH 27.0   MCHC 30.7*   RDW 15.3*   NEPRELIM 23.39*     Recent Labs   Lab 01/26/25  0613   CREATSERUM 1.27   CA 9.9   ALB 4.1         CO2 25.0   *   BILT 0.4   ALT 15   ALKPHO 70   TP 7.4   PTT 29.6   INR 1.06       No results found.  EKG 12 Lead    Result Date: 1/27/2025  Sinus tachycardia Left axis deviation Anterior infarct , age undetermined Abnormal ECG When compared with ECG of 17-DEC-2024 22:57, Vent. rate has increased BY  40 BPM   Medications:   heparin  5,000 Units Subcutaneous 2 times per day    mupirocin  1 Application Each Nare BID    piperacillin-tazobactam  4.5 g Intravenous Q8H    vancomycin  15 mg/kg Intravenous Q24H    atorvastatin  80 mg Oral Nightly    aspirin  81 mg Oral Daily    clopidogrel  75 mg Oral Daily    collagenase  1 Application Topical Daily    divalproex DR  250 mg Oral BID    escitalopram  5 mg Oral Nightly    finasteride  5 mg Oral Daily    memantine  5 mg Oral BID    OLANZapine  5 mg Oral Nightly    tamsulosin  0.4 mg Oral Daily    budesonide  0.5 mg Nebulization BID    arformoterol  15 mcg Nebulization 2 times daily    oseltamivir  30 mg Oral BID      PRN Meds:   ondansetron    acetaminophen    polyethylene glycol (PEG 3350)    sennosides    bisacodyl    fleet enema    morphINE **OR** morphINE    Supplementary Documentation:   DVT Mechanical Prophylaxis:   SCDs,    DVT Pharmacologic Prophylaxis   Medication    heparin (Porcine) 5000 UNIT/ML injection 5,000 Units      DVT Pharmacologic prophylaxis: Aspirin 162 mg         Code Status: DNAR/Selective Treatment  Montejo: External urinary catheter in place  Mon[place]jo Duration (in days):   Central line:    CAMRON:                     MDM High. Time spent on chart/note review, review of labs/imaging, physical exam, discussion with staff, consultants, coordinating care, writing progress note, discussion  of plan of care.      Mitchell Chavis MD

## 2025-01-27 NOTE — PROGRESS NOTES
Pulmonary/ICU/Critical Care Progress Note         Reason for Consultation: resp failure   Referring Physician: Dr. Chavis      Subjective:  Opens eyes but not following commands  Flu A + so started on tamiflu      From the initial consultation:  HPI: 70 yo gentleman with hx of asthma, dementia, CVA, HTN, parotid cancer, neuroendocrine cancer, NPH admitted with respiratory failure resides in a NH.  Reportedly was covered in vomit with concern for aspiration.  Upon EMS arrival, was noted to be hypoxic with O2 saturations in the 80s on room air.   In the ED, placed on BIPAP but still working hard to breath and tachypneic.   Received IVF, vanco/zosyn in the ED.  Started on levophed for hypotension.   Now seen in the ICU. Pt is tachypneic but wakes up and follows commands.  On levophed at 0.5 mcg.      REVIEW OF SYSTEMS:  Positives and negatives as noted in HPI. All other review of systems otherwise are either limited (due to pt/family inability to provide) or negative.      PAST MEDICAL HISTORY:  Past Medical History:   Diagnosis Date    Abnormal CT scan 03/31/2024    Acute gout involving toe of left foot 02/22/2023    Altered mental status 03/31/2024    Anemia 11/18/2009    Asthma in adult (HCC) 02/22/2023    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   Resection 2019    Catatonia 03/31/2024    Cognitive communication deficit 03/14/2023    Cystic disease of liver 02/22/2023    Dementia (HCC) 02/01/2023    Difficult airway 02/02/2023    Dyslipidemia 02/01/2023    Elevated white blood cell count, unspecified 02/22/2023    Encephalopathy 09/19/2012    Essential hypertension     Gastro-esophageal reflux disease with esophagitis, without bleeding 02/22/2023    Gout, chronic 03/31/2024    Gross hematuria 03/31/2024    Hypercholesterolemia 06/25/2015    Hyperlipidemia 09/19/2012    Leukocytosis 02/19/2023    Liver cyst 08/30/2022    Metabolic encephalopathy 02/22/2023     Neuroendocrine carcinoma, high grade (HCC) 2019    Other abnormalities of gait and mobility 2023    Other psoriasis 2023    Other seizures (HCC) 2023    Other specified disorders of kidney and ureter 2023    Parotid neoplasm 2024    Formatting of this note might be different from the original.   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   Keppra    Seizure disorder (HCC)     Toxic metabolic encephalopathy 2022    Transient global amnesia 01/10/2017    Formatting of this note might be different from the original.   Brief -Plavix    Unspecified dementia, unspecified severity, without behavioral disturbance, psychotic disturbance, mood disturbance, and anxiety (HCC) 2023    Vascular dementia (HCC) 2022    Vascular dementia, unspecified severity, without behavioral disturbance, psychotic disturbance, mood disturbance, and anxiety (HCC) 2023    Weakness 2023    Weakness of left upper extremity 2022         PAST SURGICAL HISTORY:  History reviewed. No pertinent surgical history.      PAST SOCIAL HISTORY:  Social History     Socioeconomic History    Marital status:    Tobacco Use    Smoking status: Former     Current packs/day: 0.00     Average packs/day: 1 pack/day for 25.0 years (25.0 ttl pk-yrs)     Types: Cigarettes     Start date: 1965     Quit date: 1990     Years since quittin.0    Tobacco comments:      Cigarettes 1 25 Quit:         PAST FAMILY HISTORY:  History  reviewed. No pertinent family history.      ALLERGIES:  Allergies[1]      MEDS:  Home Medications:  Medications Taking[2]    Scheduled Medication:   [START ON 1/28/2025] collagenase  1 Application Topical Daily    heparin  5,000 Units Subcutaneous 2 times per day    mupirocin  1 Application Each Nare BID    piperacillin-tazobactam  4.5 g Intravenous Q8H    vancomycin  15 mg/kg Intravenous Q24H    atorvastatin  80 mg Oral Nightly    aspirin  81 mg Oral Daily    clopidogrel  75 mg Oral Daily    divalproex DR  250 mg Oral BID    escitalopram  5 mg Oral Nightly    finasteride  5 mg Oral Daily    memantine  5 mg Oral BID    OLANZapine  5 mg Oral Nightly    tamsulosin  0.4 mg Oral Daily    budesonide  0.5 mg Nebulization BID    arformoterol  15 mcg Nebulization 2 times daily    oseltamivir  30 mg Oral BID     Continuous Infusing Medication:   norepinephrine Stopped (01/26/25 1200)    sodium chloride 83 mL/hr at 01/26/25 1526     PRN Medications:    ondansetron    acetaminophen    polyethylene glycol (PEG 3350)    sennosides    bisacodyl    fleet enema    morphINE **OR** morphINE       PHYSICAL EXAM:  /81 (BP Location: Left arm)   Pulse 96   Temp 97.9 °F (36.6 °C) (Temporal)   Resp (!) 31   Wt 149 lb 0.5 oz (67.6 kg)   SpO2 96%   BMI 22.01 kg/m²   FiO2 (%):  [40 %-60 %] 40 %  CONSTITUTIONAL: opens eyes but not really following commands  HEENT: atraumatic normocephalic  MOUTH: on NIV  NECK/THROAT: no JVD. Trachea midline. No obvious thyromegaly  LUNG: coarse BS, still tachy. Chest symmetric with respiratory motion  HEART: regular rate and rhythm, no obvious murmers or gallops noted  ABD: soft non tender. + bowel sounds. No organomegaly noted  EXT: no clubbing, cyanosis, or edema noted. Pulses intact grossly  NEURO/MUSCULOSKELETAL: not following commands  SKIN: warm, dry. No obvious lesions noted  LYMPH: no obvious LAD      IMAGES:  KUB 1/26/25  G-tube overlies the left upper quadrant   3-4 mildly dilated central  small bowel loops measure up to 3.3 centimeter, nonspecific   Small volume colonic stool   No enteric tube   Cannot assess for free air on the supine radiographs      CXR 1/26/25 on my review with RML infiltrates  Findings and impression:   Normal heart size   Right perihilar and bibasilar patchy opacities have developed; consider multifocal infection   No pneumothorax         LABS:  Recent Labs   Lab 01/26/25  0613   RBC 5.38   HGB 14.5   HCT 47.3   MCV 87.9   MCH 27.0   MCHC 30.7*   RDW 15.3*   NEPRELIM 23.39*   WBC 24.9*   .0*       Recent Labs   Lab 01/26/25  0613   *   CREATSERUM 1.27   EGFRCR 61   CA 9.9   ALB 4.1         CO2 25.0   ALKPHO 70   ALT 15   BILT 0.4   TP 7.4       ASSESSMENT/PLAN:  Acute hypoxemic respiratory failure   -secondary to aspiration PNA  -continue zosyn. Checked viral panel, urine leg, strep pneum  -aspiration precautions  -continue AVAPS  -continue nebs   -per POA he is a do not intubate    Influenza A  -started on tamiflu    Septic shock (resolved)  -from aspiration and influenza A infection   -continue abx  -PRN levophed for MAP > 65  -IVF    Emesis  -checked KUB to r/o obstruction, perforation     Multiple chronic medical conditions   -continue as before    Proph:  -DVT: hep subcutaneous    Dispo:  -spoke with POA. Pt is DNR/Select  -palliative consult    Upon my evaluation, this patient had a high probability of imminent or life-threatening deterioration due to critical findings of laboratory tests and hypoxemia, which required my direct attention, intervention, and personal management.    I have personally provided 30 minutes of critical care time, exclusive of time spent on separately billable procedures.  Time includes review of all pertinent laboratory/radiology results, discussion with consultants, and monitoring for potential decompensation.  Performed interventions included fluids, oxygen, and non invasive ventilation .    Thank you for the  opportunity to care for Carlo Ng.      MINESH Glez DO, MPH  Pulmonary Critical Care Medicine  [facility]                       [1]   Allergies  Allergen Reactions    Risperidone WHEEZING    Codeine UNKNOWN    Ferric Carboxymaltose UNKNOWN   [2]   No outpatient medications have been marked as taking for the 1/26/25 encounter (Hospital Encounter).

## 2025-01-28 NOTE — PROGRESS NOTES
[facility]  part of Located within [facility]    Progress Note      Assessment and Plan:   1.  Acute respiratory failure-aspiration suspected and concomitant influenza A infection and the patient requiring continuous PAP therapy.  He is DNAR select at present.    Recommendations:  1.  Empiric Zosyn  2.  Tamiflu  3.  Morning chest x-ray  4.  Ongoing PAP therapy until decision made regarding transition to comfort measures.  5.  Nebulizer therapy     2.  Goals of care-as per palliative care report, consideration given to redirection toward comfort after further clarification from family as to whether or not sister is coming in from [place].  In the interim will remain DNAR select.    3.  DVT prophylaxis-subcutaneous heparin    4.  History of vascular dementia    Subjective:   Carlo Ng is a(n) 69 year old male who is awake on PAP but not following.    Objective:   Blood pressure 152/85, pulse 104, temperature 98 °F (36.7 °C), temperature source Temporal, resp. rate 26, weight 158 lb 15.2 oz (72.1 kg), SpO2 91%.    Physical Exam eyes open but not following  HEENT examination is unremarkable with pupils equal round and reactive to light and accommodation.   Neck without adenopathy, thyromegaly, JVD nor bruit.   Lungs diminished to auscultation and percussion.  Cardiac regular rate and rhythm no murmur.   Abdomen nontender, without hepatosplenomegaly and no mass appreciable.   Extremities without clubbing cyanosis nor edema.   Neurologic encephalopathic.  Skin without gross abnormality     Results:     Lab Results   Component Value Date    WBC 19.4 01/28/2025    HGB 9.7 01/28/2025    HCT 30.8 01/28/2025    .0 01/28/2025    CREATSERUM 0.78 01/28/2025    BUN 25 01/28/2025     01/28/2025    K 3.9 01/28/2025     01/28/2025    CO2 25.0 01/28/2025     01/28/2025    CA 9.3 01/28/2025    MG 2.0 01/28/2025    PHOS 2.0 01/28/2025       John Camacho MD  Medical Director, Critical Care,  [facility]  Medical Director, [facility]  Pager: [phone]

## 2025-01-28 NOTE — PROGRESS NOTES
[facility]  Progress Note     Carlo Ng  : 1955    Status: Inpatient  Day #: 2    Attending: Virgilio Chavis MD  PCP: VIRGILIO CROOK MD     Assessment and Plan:    Acute respiratory failure  Acute aspiration pneumonia  Acute influenza A infection  -admitted to ICU  -CXR reviewed  -on continuous bipap  -pulm/critical care on consult  -cont IV abx  -cont tamiflu  -off pressors     Acute septic shock - due to above  Lactic acidosis  Hypotension  Leukocytosis  -received sepsis IVF bolus  -f/u blood cultures - NGTD  -cont IV abx     GOC - POA confirms DNAR/selective  -Palliative care consulted  -d/w POA, if no improvement in the next day or if he shows signs of worsening distress, she would like to transition to comfort care    Sacral decubitus ulcer POA  -wound care    Abnormal UA, not UTI  -UCx no growth     Other:  -vascular dementia  -h/o dysphagia s/p G-tube  -COPD  -h/o TIA  -HTN  -GERD  -h/o gout  -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       RD Malnutrition Care Plan: Recommend EN (enteral nutrition) support if unable to take adequate PO safely within 1-2 days.    Body Fat/Muscle Mass: Moderate depletion body fat., Moderate depletion muscle mass. orbital region., triceps region., fat overlying rib cage region. temple region., clavicle region., scapular region.    Physician Assessment     Patient has a diagnosis of severe malnutrition     DVT Mechanical Prophylaxis:   SCDs,    DVT Pharmacologic Prophylaxis   Medication    heparin (Porcine) 5000 UNIT/ML injection 5,000 Units      DVT Pharmacologic prophylaxis: Aspirin 162 mg        Subjective:      Initial Chief Complaint:  respiratory  failure    Not waking up, but did squeeze my hand upon command      Objective:      Temp:  [97.8 °F (36.6 °C)-98.6 °F (37 °C)] 97.8 °F (36.6 °C)  Pulse:  [] 88  Resp:  [29-37] 29  BP: (109-177)/() 109/57  SpO2:  [95 %-99 %] 97 %  FiO2 (%):  [35 %-40 %] 35 %  General:  NAD  HEENT:  Normocephalic, atraumatic  Cardiac:  Regular rate, regular rhythm  Pulmonary:  Clear to auscultation bilaterally  Gastrointestinal:  Soft, non-tender, normal bowel sounds  Musculoskeletal:  No joint swelling  Extremities:  No edema, no cyanosis, no clubbing  Neurologic:  nonfocal  Psychiatric:  calm    Intake/Output Summary (Last 24 hours) at 1/28/2025 0931  Last data filed at 1/28/2025 0500  Gross per 24 hour   Intake 1968.1 ml   Output 800 ml   Net 1168.1 ml         Recent Labs   Lab 01/26/25 0613 01/27/25 1217 01/28/25 0347   WBC 24.9* 21.2* 19.4*   HGB 14.5 10.0* 9.7*   HCT 47.3 32.9* 30.8*   .0* 371.0 409.0   RBC 5.38 3.76* 3.62*   MCV 87.9 87.5 85.1   MCH 27.0 26.6 26.8   MCHC 30.7* 30.4* 31.5   RDW 15.3* 15.5* 15.6*   NEPRELIM 23.39* 19.48* 17.60*     Recent Labs   Lab 01/26/25  0613 01/27/25  1217 01/28/25  0347   BUN  --  29* 25*   CREATSERUM 1.27 0.94 0.78   CA 9.9 8.8 9.3   ALB 4.1  --   --     141 143   K  --  4.1 3.3*    108 109   CO2 25.0 24.0 25.0   * 99 122*   MG  --   --  2.0   PHOS  --   --  2.0*   BILT 0.4  --   --    ALT 15  --   --    ALKPHO 70  --   --    TP 7.4  --   --    PTT 29.6  --   --    INR 1.06  --   --        No results found.      Medications:   potassium phosphate dibasic 15 mmol in sodium chloride 0.9% 250 mL IVPB  15 mmol Intravenous Once    Followed by    potassium chloride  20 mEq Intravenous Once    collagenase  1 Application Topical Daily    hydrALAzine        heparin  5,000 Units Subcutaneous 2 times per day    mupirocin  1 Application Each Nare BID    piperacillin-tazobactam  4.5 g Intravenous Q8H    vancomycin  15 mg/kg Intravenous Q24H    atorvastatin  80  mg Oral Nightly    aspirin  81 mg Oral Daily    clopidogrel  75 mg Oral Daily    divalproex DR  250 mg Oral BID    escitalopram  5 mg Oral Nightly    finasteride  5 mg Oral Daily    memantine  5 mg Oral BID    OLANZapine  5 mg Oral Nightly    tamsulosin  0.4 mg Oral Daily    budesonide  0.5 mg Nebulization BID    arformoterol  15 mcg Nebulization 2 times daily    oseltamivir  30 mg Oral BID      PRN Meds:   dextrose 10%    lipase-protease-amylase (Lip-Prot-Amyl) **AND** sodium bicarbonate    morphINE **OR** morphINE    hydrALAzine    ondansetron    acetaminophen    polyethylene glycol (PEG 3350)    sennosides    bisacodyl    fleet enema    Supplementary Documentation:   DVT Mechanical Prophylaxis:   SCDs,    DVT Pharmacologic Prophylaxis   Medication    heparin (Porcine) 5000 UNIT/ML injection 5,000 Units      DVT Pharmacologic prophylaxis: Aspirin 162 mg         Code Status: DNAR/Selective Treatment  [place]: External urinary catheter in place  [place] Duration (in days):   Central line:    CAMRON:                  MDM High. Time spent on chart/note review, review of labs/imaging, physical exam, discussion with staff, consultants, coordinating care, writing progress note, discussion of plan of care. D/w POA.       Mitchell Chavis MD

## 2025-01-28 NOTE — CM/SW NOTE
01/27/25 1800   CM/SW Referral Data   Referral Source    Reason for Referral Discharge planning   Informant Clinical Staff Member;EMR;HCPOA/Surrogate   Medical Hx   Does patient have an established PCP? Yes  (Mitchell Peoples)   Patient Info   Patient's Current Mental Status at Time of Assessment Confused or unable to complete assessment   Patient's Home Environment Assisted Living   Post Acute Care Provider Upon Admission   ([facility])   Patient Status Prior to Admission   Independent with ADLs and Mobility No   Pt. requires assistance with Housework;Driving;Meals;Bathing;Ambulating;Dressing;Medications;Toileting;Finances   Services in place prior to admission DME/Supplies at home   Type of DME/Supplies Straight Cane;Wheeled Walker;Wheelchair;Shower Chair;Raised Toilet Seat;Grab Bars   Discharge Needs   Anticipated D/C needs To be determined   Services Requested   Submitted to [facility] Yes   PASRR Level 1 Submitted No - Current within 90 days     Pt discussed during nursing rounds. Dx acute respiratory failure on continuous BIPAP, palliative consulted. From [facility] for MINNIE. Pt normally lives at [facility], independent w/walker at baseline per HCPLUIS Patterson who is listed in EMR. Yesenia notified CM that she will confirm with palliative team tomorrow if she is agreeable to inpatient hospice or pt's return for MINNIE. Return referral sent in [facility] review pending. Tuesday CM to confirm result of GOC discussion.    Plan: TBD    / to remain available for support and/or discharge planning.     JOSE MARIA Cornejo    [phone]

## 2025-01-28 NOTE — PLAN OF CARE
Problem: CARDIOVASCULAR - ADULT  Goal: Maintains optimal cardiac output and hemodynamic stability  Description: INTERVENTIONS:  - Monitor vital signs, rhythm, and trends  - Monitor for bleeding, hypotension and signs of decreased cardiac output  - Evaluate effectiveness of vasoactive medications to optimize hemodynamic stability  - Monitor arterial and/or venous puncture sites for bleeding and/or hematoma  - Assess quality of pulses, skin color and temperature  - Assess for signs of decreased coronary artery perfusion - ex. Angina  - Evaluate fluid balance, assess for edema, trend weights  Outcome: Progressing  Goal: Absence of cardiac arrhythmias or at baseline  Description: INTERVENTIONS:  - Continuous cardiac monitoring, monitor vital signs, obtain 12 lead EKG if indicated  - Evaluate effectiveness of antiarrhythmic and heart rate control medications as ordered  - Initiate emergency measures for life threatening arrhythmias  - Monitor electrolytes and administer replacement therapy as ordered  Outcome: Progressing     Problem: RESPIRATORY - ADULT  Goal: Achieves optimal ventilation and oxygenation  Description: INTERVENTIONS:  - Assess for changes in respiratory status  - Assess for changes in mentation and behavior  - Position to facilitate oxygenation and minimize respiratory effort  - Oxygen supplementation based on oxygen saturation or ABGs  - Provide Smoking Cessation handout, if applicable  - Encourage broncho-pulmonary hygiene including cough, deep breathe, Incentive Spirometry  - Assess the need for suctioning and perform as needed  - Assess and instruct to report SOB or any respiratory difficulty  - Respiratory Therapy support as indicated  - Manage/alleviate anxiety  - Monitor for signs/symptoms of CO2 retention  Outcome: Progressing     Problem: GASTROINTESTINAL - ADULT  Goal: Maintains adequate nutritional intake (undernourished)  Description: INTERVENTIONS:  - Monitor percentage of each meal  consumed  - Identify factors contributing to decreased intake, treat as appropriate  - Assist with meals as needed  - Monitor I&O, WT and lab values  - Obtain nutritional consult as needed  - Optimize oral hygiene and moisture  - Encourage food from home; allow for food preferences  - Enhance eating environment  Outcome: Progressing     Problem: GENITOURINARY - ADULT  Goal: Absence of urinary retention  Description: INTERVENTIONS:  - Assess patient’s ability to void and empty bladder  - Monitor intake/output and perform bladder scan as needed  - Follow urinary retention protocol/standard of care  - Consider collaborating with pharmacy to review patient's medication profile  - Implement strategies to promote bladder emptying  Outcome: Progressing     Problem: SKIN/TISSUE INTEGRITY - ADULT  Goal: Incision(s), wounds(s) or drain site(s) healing without S/S of infection  Description: INTERVENTIONS:  - Assess and document risk factors for pressure ulcer development  - Assess and document skin integrity  - Assess and document dressing/incision, wound bed, drain sites and surrounding tissue  - Implement wound care per orders  - Initiate isolation precautions as appropriate  - Initiate Pressure Ulcer prevention bundle as indicated  Outcome: Progressing     Problem: NEUROLOGICAL - ADULT  Goal: Achieves stable or improved neurological status  Description: INTERVENTIONS  - Assess for and report changes in neurological status  - Initiate measures to prevent increased intracranial pressure  - Maintain blood pressure and fluid volume within ordered parameters to optimize cerebral perfusion and minimize risk of hemorrhage  - Monitor temperature, glucose, and sodium. Initiate appropriate interventions as ordered  Outcome: Progressing  Goal: Absence of seizures  Description: INTERVENTIONS  - Monitor for seizure activity  - Administer anti-seizure medications as ordered  - Monitor neurological status  Outcome: Progressing      Problem: Impaired Swallowing  Goal: Minimize aspiration risk  Description: Interventions:  - Patient should be alert and upright for all feedings (90 degrees preferred)  - Offer food and liquids at a slow rate  - No straws  - Encourage small bites of food and small sips of liquid  - Offer pills one at a time, crush or deliver with applesauce as needed  - Discontinue feeding and notify MD (or speech pathologist) if coughing or persistent throat clearing or wet/gurgly vocal quality is noted  Outcome: Progressing     Problem: SAFETY ADULT - FALL  Goal: Free from fall injury  Description: INTERVENTIONS:  - Assess pt frequently for physical needs  - Identify cognitive and physical deficits and behaviors that affect risk of falls.  - [place] fall precautions as indicated by assessment.  - Educate pt/family on patient safety including physical limitations  - Instruct pt to call for assistance with activity based on assessment  - Modify environment to reduce risk of injury  - Provide assistive devices as appropriate  - Consider OT/PT consult to assist with strengthening/mobility  - Encourage toileting schedule  Outcome: Progressing     Problem: DISCHARGE PLANNING  Goal: Discharge to home or other facility with appropriate resources  Description: INTERVENTIONS:  - Identify barriers to discharge w/pt and caregiver  - Include patient/family/discharge partner in discharge planning  - Arrange for needed discharge resources and transportation as appropriate  - Identify discharge learning needs (meds, wound care, etc)  - Arrange for interpreters to assist at discharge as needed  - Consider post-discharge preferences of patient/family/discharge partner  - Complete POLST form as appropriate  - Assess patient's ability to be responsible for managing their own health  - Refer to Case Management Department for coordinating discharge planning if the patient needs post-hospital services based on physician/LIP order or complex needs  related to functional status, cognitive ability or social support system  Outcome: Progressing

## 2025-01-28 NOTE — PALLIATIVE CARE NOTE
[facility]  part of [facility]  Palliative Care Progress Note    Carlo Ng Patient Status:  Inpatient    1955 MRN [id number]   Location [facility] 2W/SW Attending Virgilio Chavis MD   Hosp Day # 2 PCP VIRGILIO CROOK MD     The  Cures Act makes medical notes like these available to patients in the interest of transparency. Please be advised this is a medical document. Medical documents are intended to carry relevant information, facts as evident, and the clinical opinion of the practitioner. The medical note is intended as peer to peer communication and may appear blunt or direct. It is written in medical language and may contain abbreviations or verbiage that are unfamiliar.       Subjective     Reviewed symptom medications past 24 hrs: Morphine 2 mg IVP X3    Patient was seen and examined in bed with no family at the bedside. Pt is clinically unchanged. He remains very lethargic on AVAPS fio2 40% with noted tachypnea and increased WOB. RR 32 with use of abdominal muscles noted. I asked RN to give dose of Morphine as he appears uncomfortable in resp distress.  No non-verbal signs of pain. He remains on g-tube feeds and moved his bowels today.     See summary of discussion below.     Review of Systems:  Unable to obtain ROS due to pt's condition as above    Allergies:  Allergies[1]    Medications:     Current Facility-Administered Medications:     collagenase (Santyl) 250 UNIT/GM ointment 1 Application, 1 Application, Topical, Daily    dextrose 10% infusion (TPN no rate), , Intravenous, Continuous PRN    pancrelipase (Lip-Prot-Amyl) (Zenpep) DR particles cap 10,000 Units, 10,000 Units, Per G Tube, PRN **AND** sodium bicarbonate tab 325 mg, 325 mg, Per G Tube, PRN    morphINE PF 2 MG/ML injection 1 mg, 1 mg, Intravenous, Q2H PRN **OR** morphINE PF 2 MG/ML injection 2 mg, 2 mg, Intravenous, Q2H PRN    ondansetron (Zofran) 4 MG/2ML injection 4 mg, 4 mg, Intravenous, Q6H  PRN    heparin (Porcine) 5000 UNIT/ML injection 5,000 Units, 5,000 Units, Subcutaneous, 2 times per day    acetaminophen (Tylenol Extra Strength) tab 1,000 mg, 1,000 mg, Oral, Q4H PRN    polyethylene glycol (PEG 3350) (Miralax) 17 g oral packet 17 g, 17 g, Oral, Daily PRN    sennosides (Senokot) tab 17.2 mg, 17.2 mg, Oral, Nightly PRN    bisacodyl (Dulcolax) 10 MG rectal suppository 10 mg, 10 mg, Rectal, Daily PRN    fleet enema (Fleet) rectal enema 133 mL, 1 enema, Rectal, Once PRN    norepinephrine (Levophed) 4 mg/250mL infusion premix, 0.5-50 mcg/min, Intravenous, Continuous    mupirocin (Bactroban) 2% nasal ointment 1 Application, 1 Application, Each Nare, BID    piperacillin-tazobactam (Zosyn) 4.5 g in dextrose 5% 100 mL IVPB-ADDV, 4.5 g, Intravenous, Q8H    vancomycin (Vancocin) 1,000 mg in sodium chloride 0.9% 250 mL IVPB-ADDV, 15 mg/kg, Intravenous, Q24H    atorvastatin (Lipitor) tab 80 mg, 80 mg, Oral, Nightly    aspirin chewable tab 81 mg, 81 mg, Oral, Daily    clopidogrel (Plavix) tab 75 mg, 75 mg, Oral, Daily    divalproex DR (Depakote Sprinkle) sprinkle cap 250 mg, 250 mg, Oral, BID    escitalopram (Lexapro) tab 5 mg, 5 mg, Oral, Nightly    finasteride (Proscar) tab 5 mg, 5 mg, Oral, Daily    memantine (Namenda) tab 5 mg, 5 mg, Oral, BID    OLANZapine (ZyPREXA) tab 5 mg, 5 mg, Oral, Nightly    tamsulosin (Flomax) cap 0.4 mg, 0.4 mg, Oral, Daily    budesonide (Pulmicort) 0.5 MG/2ML nebulizer suspension 0.5 mg, 0.5 mg, Nebulization, BID    arformoterol (Brovana) 15 MCG/2ML nebulizer solution 15 mcg, 15 mcg, Nebulization, 2 times daily    oseltamivir (Tamiflu) cap 30 mg, 30 mg, Oral, BID    Objective     Vital Signs:  Blood pressure 146/76, pulse 88, temperature 98 °F (36.7 °C), temperature source Temporal, resp. rate (!) 32, weight 158 lb 15.2 oz (72.1 kg), SpO2 98%.  Body mass index is 23.47 kg/m².  Present Level of pain: JENI  Non-verbal signs of pain present: No    Physical Exam:  General: Lethargic and  ill-appearing  HEENT: No focal deficits. +dry mucous membranes, +bipap mask  Cardiac: Regular rate and rhythm, S1, S2 normal, no murmur, rub or gallop.  Lungs: Coarse breath sounds bilaterally. Increased respiratory excursions and effort. RR 32  Abdomen: Soft, non-tender, normal bowel sounds X 4 quadrants, no rebound or guarding, +g-tube   Extremities: Without clubbing, cyanosis. Peripheral pulses are 2+. BLE Edema present  Neurologic: Lethargic, not verbalizing or following any commands.   Skin: Warm and dry.       Hematology:  Lab Results   Component Value Date    WBC 19.4 (H) 01/28/2025    HGB 9.7 (L) 01/28/2025    HCT 30.8 (L) 01/28/2025    .0 01/28/2025       Coags:  Lab Results   Component Value Date    INR 1.06 01/26/2025    PTT 29.6 01/26/2025       Chemistry:  Lab Results   Component Value Date    CREATSERUM 0.78 01/28/2025    BUN 25 (H) 01/28/2025     01/28/2025    K 3.3 (L) 01/28/2025     01/28/2025    CO2 25.0 01/28/2025     (H) 01/28/2025    CA 9.3 01/28/2025    ALB 4.1 01/26/2025    ALKPHO 70 01/26/2025    BILT 0.4 01/26/2025    TP 7.4 01/26/2025    AST  01/26/2025      Comment:      Test not reported due to hemolysis.  Test reordered by laboratory.        ALT 15 01/26/2025    MG 2.0 01/28/2025    PHOS 2.0 (L) 01/28/2025       Imaging:  No results found.    Follow up [facility] Discussion      1/28/25-I spoke to pt's POA Yesenia Kerr with clinical update. I reinforced his overall poor prognosis with lack of clinical improvement. She tells me she will be coming in tonight to see him and will likely transition him to comfort care tomorrow.  I set up a follow up meeting with her tomorrow morning at 11a. She is waiting to see if his sister is planning to come see him from [place]. I provided education on the process of transition off bipap to comfort care and recommended having him on low dose Morphine infusion for better symptom control with transition. She says if his condition deteriorates  further she will proceed with comfort care sooner. Provided emotional support to pt's friend who is coping adequately.     Discussed with Patient's family: yes  Patient's preference about sharing medical information: speak to pt's CAROLYN Kerr  Patient's decision making preferences: speak to pt's CAROLYN Kerr  Code status: DNAR/DNI-selective  Have advanced directives been discussed with patient or healthcare power of : Yes        Healthcare Agent Appointed: Yes  Healthcare Agent's Name: holger Larsen  Healthcare Agent's Phone Number: [phone]               Palliative disposition: Ongoing discussions      Procedures:  No intubation      Palliative Care Assessment and Recommendations     Problem List:     Acute hypoxemic respiratory failure  Acute aspiration pneumonia  Acute influenza A infection  Leukocytosis  Sacral wound-POA  Vascular dementia  H/o dysphagia with g-tube  COPD  H/o TIA  HTN  GERD  H/o gout  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 Will plan for low dose Morphine infusion for better symptom control with transition to comfort care     Goals of care counseling  -see above for details  -Confirmed wishes for DNAR/DNI-selective and continue with supportive care.  -Pt's CAROLYN Kerr is planning on transition to comfort care tomorrow. I will meet with her tomorrow at 11a. Will involve hospice for GIP admission for any prolonged survival post bipap removal.  -- referral for spiritual support for  visit for anointing of sick blessing.  -Agreeable to palliative care following  -Dispo:  TBD pending clinical course. SW to help with dc planning.   -Provided emotional support to pt's friend who is coping adequately.     Advance  care planning  -Pt's HCPOA is friend Yesenia Larsen #[phone].  -Previously completed POLST paperwork in [employer].      Palliative Performance Scale 30%     Emotion support provided to patient/family today: Yes      A total of 25 mins were spent on this consult, which included all of the following: direct face to face contact, history taking, physical examination, and >50% was spent counseling and coordinating care.    Discussed today's visit with message to Dr. Chavis, Dr. Camacho, and Julia HUSAIN    I will continue to follow clinically.      Christine Turk, ANP-BC, [facility] [id number]  1/28/2025  2:14 PM  Palliative Care Services        [1]   Allergies  Allergen Reactions    Risperidone WHEEZING    Codeine UNKNOWN    Ferric Carboxymaltose UNKNOWN

## 2025-01-28 NOTE — CONSULTS
Spiritual Care Visit Note    Patient Name: Carlo Ng Date of Spiritual Care Visit: 25   : 1955 Primary Dx: Acute respiratory failure with hypoxia (HCC)       Referred By: Referral From: Family    Spiritual Care Taxonomy:    Intended Effects: Aligning care plan with patient's values    Methods: Collaborate with care team member;Offer spiritual/Temple support    Interventions: Connect someone with their uriel community/clergy    Visit Type/Summary:     - Spiritual Care: Consulted with RN prior to visit. Provided support for Patient's spiritual/Temple requests. Coordinated  visit for Sacrament of the Sick.  remains available for follow up.     Heber Claros, MAT CAMII   [id number]     Spiritual Care support can be requested via an [employer] consult. For urgent/immediate needs, please contact the On Call  at: [place]: [phone]

## 2025-01-28 NOTE — PLAN OF CARE
Patient FiO2 at 40%. TF advanced per ordered. PRN morphine given for signs of pain. Primofit in place. MD notified of hypertension. POA and friends at bedside. Patient safety maintained.  Wound care completed. SCDs in place. Patient turned q2. Plan for palliative meeting tomorrow at 11 am.       Problem: CARDIOVASCULAR - ADULT  Goal: Maintains optimal cardiac output and hemodynamic stability  Description: INTERVENTIONS:  - Monitor vital signs, rhythm, and trends  - Monitor for bleeding, hypotension and signs of decreased cardiac output  - Evaluate effectiveness of vasoactive medications to optimize hemodynamic stability  - Monitor arterial and/or venous puncture sites for bleeding and/or hematoma  - Assess quality of pulses, skin color and temperature  - Assess for signs of decreased coronary artery perfusion - ex. Angina  - Evaluate fluid balance, assess for edema, trend weights  1/28/2025 1453 by Julia Murphy RN  Outcome: Not Progressing  1/28/2025 1049 by Julia Murphy, RN  Outcome: Not Progressing  Goal: Absence of cardiac arrhythmias or at baseline  Description: INTERVENTIONS:  - Continuous cardiac monitoring, monitor vital signs, obtain 12 lead EKG if indicated  - Evaluate effectiveness of antiarrhythmic and heart rate control medications as ordered  - Initiate emergency measures for life threatening arrhythmias  - Monitor electrolytes and administer replacement therapy as ordered  1/28/2025 1453 by Julia Murphy RN  Outcome: Not Progressing  1/28/2025 1049 by Julia Murphy, RN  Outcome: Not Progressing     Problem: RESPIRATORY - ADULT  Goal: Achieves optimal ventilation and oxygenation  Description: INTERVENTIONS:  - Assess for changes in respiratory status  - Assess for changes in mentation and behavior  - Position to facilitate oxygenation and minimize respiratory effort  - Oxygen supplementation based on oxygen saturation or ABGs  - Provide Smoking Cessation handout, if applicable  -  Encourage broncho-pulmonary hygiene including cough, deep breathe, Incentive Spirometry  - Assess the need for suctioning and perform as needed  - Assess and instruct to report SOB or any respiratory difficulty  - Respiratory Therapy support as indicated  - Manage/alleviate anxiety  - Monitor for signs/symptoms of CO2 retention  1/28/2025 1453 by Julia Murphy RN  Outcome: Not Progressing  1/28/2025 1049 by Julia Murphy RN  Outcome: Not Progressing     Problem: GASTROINTESTINAL - ADULT  Goal: Maintains adequate nutritional intake (undernourished)  Description: INTERVENTIONS:  - Monitor percentage of each meal consumed  - Identify factors contributing to decreased intake, treat as appropriate  - Assist with meals as needed  - Monitor I&O, WT and lab values  - Obtain nutritional consult as needed  - Optimize oral hygiene and moisture  - Encourage food from home; allow for food preferences  - Enhance eating environment  1/28/2025 1453 by Julia Murphy RN  Outcome: Not Progressing  1/28/2025 1049 by Julia Murphy RN  Outcome: Not Progressing     Problem: SKIN/TISSUE INTEGRITY - ADULT  Goal: Incision(s), wounds(s) or drain site(s) healing without S/S of infection  Description: INTERVENTIONS:  - Assess and document risk factors for pressure ulcer development  - Assess and document skin integrity  - Assess and document dressing/incision, wound bed, drain sites and surrounding tissue  - Implement wound care per orders  - Initiate isolation precautions as appropriate  - Initiate Pressure Ulcer prevention bundle as indicated  1/28/2025 1453 by Julia Murphy RN  Outcome: Not Progressing  1/28/2025 1049 by Julia Murphy RN  Outcome: Not Progressing     Problem: GENITOURINARY - ADULT  Goal: Absence of urinary retention  Description: INTERVENTIONS:  - Assess patient’s ability to void and empty bladder  - Monitor intake/output and perform bladder scan as needed  - Follow urinary retention protocol/standard  of care  - Consider collaborating with pharmacy to review patient's medication profile  - Implement strategies to promote bladder emptying  1/28/2025 1453 by Julia Murphy RN  Outcome: Not Progressing  1/28/2025 1049 by Julia Murphy RN  Outcome: Not Progressing     Problem: NEUROLOGICAL - ADULT  Goal: Achieves stable or improved neurological status  Description: INTERVENTIONS  - Assess for and report changes in neurological status  - Initiate measures to prevent increased intracranial pressure  - Maintain blood pressure and fluid volume within ordered parameters to optimize cerebral perfusion and minimize risk of hemorrhage  - Monitor temperature, glucose, and sodium. Initiate appropriate interventions as ordered  1/28/2025 1453 by Julia Murphy RN  Outcome: Not Progressing  1/28/2025 1049 by Julia Murphy RN  Outcome: Not Progressing  Goal: Absence of seizures  Description: INTERVENTIONS  - Monitor for seizure activity  - Administer anti-seizure medications as ordered  - Monitor neurological status  1/28/2025 1453 by Julia Murphy RN  Outcome: Not Progressing  1/28/2025 1049 by Julia Murphy RN  Outcome: Not Progressing     Problem: Impaired Swallowing  Goal: Minimize aspiration risk  Description: Interventions:  - Patient should be alert and upright for all feedings (90 degrees preferred)  - Offer food and liquids at a slow rate  - No straws  - Encourage small bites of food and small sips of liquid  - Offer pills one at a time, crush or deliver with applesauce as needed  - Discontinue feeding and notify MD (or speech pathologist) if coughing or persistent throat clearing or wet/gurgly vocal quality is noted  1/28/2025 1453 by Julia Muprhy RN  Outcome: Not Progressing  1/28/2025 1049 by Julia Murphy RN  Outcome: Not Progressing     Problem: SAFETY ADULT - FALL  Goal: Free from fall injury  Description: INTERVENTIONS:  - Assess pt frequently for physical needs  - Identify cognitive  and physical deficits and behaviors that affect risk of falls.  - [place] fall precautions as indicated by assessment.  - Educate pt/family on patient safety including physical limitations  - Instruct pt to call for assistance with activity based on assessment  - Modify environment to reduce risk of injury  - Provide assistive devices as appropriate  - Consider OT/PT consult to assist with strengthening/mobility  - Encourage toileting schedule  1/28/2025 1453 by Julia Murphy, RN  Outcome: Not Progressing  1/28/2025 1049 by Julia Murphy RN  Outcome: Not Progressing     Problem: DISCHARGE PLANNING  Goal: Discharge to home or other facility with appropriate resources  Description: INTERVENTIONS:  - Identify barriers to discharge w/pt and caregiver  - Include patient/family/discharge partner in discharge planning  - Arrange for needed discharge resources and transportation as appropriate  - Identify discharge learning needs (meds, wound care, etc)  - Arrange for interpreters to assist at discharge as needed  - Consider post-discharge preferences of patient/family/discharge partner  - Complete POLST form as appropriate  - Assess patient's ability to be responsible for managing their own health  - Refer to Case Management Department for coordinating discharge planning if the patient needs post-hospital services based on physician/LIP order or complex needs related to functional status, cognitive ability or social support system  1/28/2025 1453 by Julia Murphy, RN  Outcome: Not Progressing  1/28/2025 1049 by Julia Murphy, RN  Outcome: Not Progressing

## 2025-01-29 NOTE — PROGRESS NOTES
Pulmonary/ICU/Critical Care Progress Note         Reason for Consultation: resp failure   Referring Physician: Dr. Chavis      Subjective:  Not following commands      From the initial consultation:  HPI: 70 yo gentleman with hx of asthma, dementia, CVA, HTN, parotid cancer, neuroendocrine cancer, NPH admitted with respiratory failure resides in a NH.  Reportedly was covered in vomit with concern for aspiration.  Upon EMS arrival, was noted to be hypoxic with O2 saturations in the 80s on room air.   In the ED, placed on BIPAP but still working hard to breath and tachypneic.   Received IVF, vanco/zosyn in the ED.  Started on levophed for hypotension.   Now seen in the ICU. Pt is tachypneic but wakes up and follows commands.  On levophed at 0.5 mcg.      REVIEW OF SYSTEMS:  Positives and negatives as noted in HPI. All other review of systems otherwise are either limited (due to pt/family inability to provide) or negative.      PAST MEDICAL HISTORY:  Past Medical History:   Diagnosis Date    Abnormal CT scan 03/31/2024    Acute gout involving toe of left foot 02/22/2023    Altered mental status 03/31/2024    Anemia 11/18/2009    Asthma in adult (HCC) 02/22/2023    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   Resection 2019    Catatonia 03/31/2024    Cognitive communication deficit 03/14/2023    Cystic disease of liver 02/22/2023    Dementia (HCC) 02/01/2023    Difficult airway 02/02/2023    Dyslipidemia 02/01/2023    Elevated white blood cell count, unspecified 02/22/2023    Encephalopathy 09/19/2012    Essential hypertension     Gastro-esophageal reflux disease with esophagitis, without bleeding 02/22/2023    Gout, chronic 03/31/2024    Gross hematuria 03/31/2024    Hypercholesterolemia 06/25/2015    Hyperlipidemia 09/19/2012    Leukocytosis 02/19/2023    Liver cyst 08/30/2022    Metabolic encephalopathy 02/22/2023    Neuroendocrine carcinoma, high grade (HCC)  2019    Other abnormalities of gait and mobility 2023    Other psoriasis 2023    Other seizures (HCC) 2023    Other specified disorders of kidney and ureter 2023    Parotid neoplasm 2024    Formatting of this note might be different from the original.   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   Keppra    Seizure disorder (HCC)     Toxic metabolic encephalopathy 2022    Transient global amnesia 01/10/2017    Formatting of this note might be different from the original.   Brief -Plavix    Unspecified dementia, unspecified severity, without behavioral disturbance, psychotic disturbance, mood disturbance, and anxiety (HCC) 2023    Vascular dementia (HCC) 2022    Vascular dementia, unspecified severity, without behavioral disturbance, psychotic disturbance, mood disturbance, and anxiety (HCC) 2023    Weakness 2023    Weakness of left upper extremity 2022         PAST SURGICAL HISTORY:  History reviewed. No pertinent surgical history.      PAST SOCIAL HISTORY:  Social History     Socioeconomic History    Marital status:    Tobacco Use    Smoking status: Former     Current packs/day: 0.00     Average packs/day: 1 pack/day for 25.0 years (25.0 ttl pk-yrs)     Types: Cigarettes     Start date: 1965     Quit date: 1990     Years since quittin.0    Tobacco comments:      Cigarettes 1 25 Quit:         PAST FAMILY HISTORY:  History reviewed. No pertinent family  history.      ALLERGIES:  Allergies[1]      MEDS:  Home Medications:  Medications Taking[2]    Scheduled Medication:   sodium phosphate  15 mmol Intravenous Once    carvedilol  12.5 mg Per G Tube BID    collagenase  1 Application Topical Daily    heparin  5,000 Units Subcutaneous 2 times per day    mupirocin  1 Application Each Nare BID    piperacillin-tazobactam  4.5 g Intravenous Q8H    vancomycin  15 mg/kg Intravenous Q24H    atorvastatin  80 mg Oral Nightly    aspirin  81 mg Oral Daily    clopidogrel  75 mg Oral Daily    divalproex DR  250 mg Oral BID    escitalopram  5 mg Oral Nightly    finasteride  5 mg Oral Daily    memantine  5 mg Oral BID    OLANZapine  5 mg Oral Nightly    tamsulosin  0.4 mg Oral Daily    budesonide  0.5 mg Nebulization BID    arformoterol  15 mcg Nebulization 2 times daily    oseltamivir  30 mg Oral BID     Continuous Infusing Medication:   dextrose 10%      norepinephrine Stopped (01/26/25 1200)     PRN Medications:    dextrose 10%    lipase-protease-amylase (Lip-Prot-Amyl) **AND** sodium bicarbonate    morphINE **OR** morphINE    ondansetron    acetaminophen    polyethylene glycol (PEG 3350)    sennosides    bisacodyl    fleet enema       PHYSICAL EXAM:  BP (!) 163/83   Pulse 87   Temp 98.1 °F (36.7 °C) (Temporal)   Resp (!) 33   Wt 158 lb 15.2 oz (72.1 kg)   SpO2 96%   BMI 23.47 kg/m²   FiO2 (%):  [40 %] 40 %  CONSTITUTIONAL: not following commands  HEENT: atraumatic normocephalic  MOUTH: on NIV  NECK/THROAT: no JVD. Trachea midline. No obvious thyromegaly  LUNG: coarse BS, still tachy. Chest symmetric with respiratory motion  HEART: regular rate and rhythm, no obvious murmers or gallops noted  ABD: soft non tender. + bowel sounds. No organomegaly noted  EXT: no clubbing, cyanosis, or edema noted. Pulses intact grossly  NEURO/MUSCULOSKELETAL: not following commands  SKIN: warm, dry. No obvious lesions noted  LYMPH: no obvious LAD      IMAGES:  CXR 1/29/25  B/l interstitial  infiltrates on my review  CONCLUSION:  1. Borderline heart size.   2. Extensive bilateral mixed alveolar and interstitial multifocal airspace opacification has progressed.     KUB 1/26/25  G-tube overlies the left upper quadrant   3-4 mildly dilated central small bowel loops measure up to 3.3 centimeter, nonspecific   Small volume colonic stool   No enteric tube   Cannot assess for free air on the supine radiographs      CXR 1/26/25 on my review with RML infiltrates  Findings and impression:   Normal heart size   Right perihilar and bibasilar patchy opacities have developed; consider multifocal infection   No pneumothorax         LABS:  Recent Labs   Lab 01/27/25  1217 01/28/25  0347 01/29/25  0422   RBC 3.76* 3.62* 3.45*   HGB 10.0* 9.7* 9.1*   HCT 32.9* 30.8* 30.8*   MCV 87.5 85.1 89.3   MCH 26.6 26.8 26.4   MCHC 30.4* 31.5 29.5*   RDW 15.5* 15.6* 15.9*   NEPRELIM 19.48* 17.60* 16.12*   WBC 21.2* 19.4* 17.2*   .0 409.0 373.0       Recent Labs   Lab 01/26/25  0613 01/26/25  0613 01/27/25  1217 01/28/25  0347 01/28/25  1548 01/29/25  0421   *  --  99 122*  --  141*   BUN  --   --  29* 25*  --  24*   CREATSERUM 1.27  --  0.94 0.78  --  0.72   EGFRCR 61  --  87 96  --  98   CA 9.9  --  8.8 9.3  --  9.1   ALB 4.1  --   --   --   --   --      --  141 143  --  141   K  --    < > 4.1 3.3* 3.9 3.8     --  108 109  --  107   CO2 25.0  --  24.0 25.0  --  28.0   ALKPHO 70  --   --   --   --   --    ALT 15  --   --   --   --   --    BILT 0.4  --   --   --   --   --    TP 7.4  --   --   --   --   --     < > = values in this interval not displayed.       ASSESSMENT/PLAN:  Acute hypoxemic respiratory failure   -secondary to aspiration PNA  -continue zosyn. Checked viral panel, urine leg, strep pneum  -aspiration precautions  -continue AVAPS  -continue nebs   -per POA he is a do not intubate    Influenza A  -on tamiflu    Septic shock (resolved)  -from aspiration and influenza A infection   -continue  abx  -PRN levophed for MAP > 65  -IVF    Emesis  -checked KUB to r/o obstruction, perforation     Multiple chronic medical conditions   -continue as before    Proph:  -DVT: hep subcutaneous    Dispo:  -spoke with POA. Pt is DNR/[facility]  -palliative consult-plan to transition to comfort care later today     Thank you for the opportunity to care for Carlo Ng.      MINESH Glez DO, MPH  Pulmonary Critical Care Medicine  [facility]                         [1]   Allergies  Allergen Reactions    Risperidone WHEEZING    Codeine UNKNOWN    Ferric Carboxymaltose UNKNOWN   [2]   No outpatient medications have been marked as taking for the 1/26/25 encounter (Hospital Encounter).

## 2025-01-29 NOTE — CONGREGATE LIVING REVIEW
Congregate Living Authorization    The Critical access hospitalte Living Review Committee (CLRC) has reviewed this case and the committee DOES NOT RECOMMEND discharge to a skilled nursing facility under skilled care.    The CLRC recommends:  Home with home health and any other appropriate caregiver assistance or medical equipment as needed vs respite in SNF.     Does not appear to have skilled services for MINNIE, but additional home support appears to be needed.    For questions regarding CLRC approval process, please contact the CM assigned to the case.  For questions regarding RN discharge workflow, please contact the unit Clinical Leader.

## 2025-01-29 NOTE — PALLIATIVE CARE NOTE
[facility]  part of [facility]  Palliative Care Progress Note    Carlo Ng Patient Status:  Inpatient    1955 MRN [id number]   Location [facility] 2W/SW Attending Virgilio Chavis MD   Hosp Day # 3 PCP VIRGILIO CROOK MD     The  Cures Act makes medical notes like these available to patients in the interest of transparency. Please be advised this is a medical document. Medical documents are intended to carry relevant information, facts as evident, and the clinical opinion of the practitioner. The medical note is intended as peer to peer communication and may appear blunt or direct. It is written in medical language and may contain abbreviations or verbiage that are unfamiliar.       Subjective     Reviewed symptom medications past 24 hrs: Morphine 2 mg IVP X3    Patient was seen and examined in bed with his sister and multiple friends at the bedside. Pt remains unresponsive on AVAPS fio2 40%. Breathing continues to appears labored with tachypnea and use of abdominal muscles. RR 34. No non-verbal signs of pain noted. Remains on g-tube feeds and moved his bowels today.     See summary of discussion below.     Review of Systems:  Unable to obtain ROS due to pt's condition as above    Allergies:  Allergies[1]    Medications:     Current Facility-Administered Medications:     scopolamine (Transderm-Scop) 1 MG/3DAYS patch 1 patch, 1 patch, Transdermal, Q72H    glycopyrrolate (Robinul) 0.2 MG/ML injection 0.4 mg, 0.4 mg, Intravenous, Q3H PRN    morphINE in sodium chloride 0.9% 100 mg/100mL infusion premix, 1 mg/hr, Intravenous, Continuous    morphINE PF 2 MG/ML injection 2 mg, 2 mg, Intravenous, Once    LORazepam (Ativan) 2 mg/mL injection 1 mg, 1 mg, Intravenous, Q4H PRN    [DISCONTINUED] morphINE PF 2 MG/ML injection 1 mg, 1 mg, Intravenous, Q2H PRN **OR** morphINE PF 2 MG/ML injection 2 mg, 2 mg, Intravenous, Q1H PRN    collagenase (Santyl) 250 UNIT/GM ointment 1  Application, 1 Application, Topical, Daily    dextrose 10% infusion (TPN no rate), , Intravenous, Continuous PRN    pancrelipase (Lip-Prot-Amyl) (Zenpep) DR particles cap 10,000 Units, 10,000 Units, Per G Tube, PRN **AND** sodium bicarbonate tab 325 mg, 325 mg, Per G Tube, PRN    ondansetron (Zofran) 4 MG/2ML injection 4 mg, 4 mg, Intravenous, Q6H PRN    acetaminophen (Tylenol Extra Strength) tab 1,000 mg, 1,000 mg, Oral, Q4H PRN    polyethylene glycol (PEG 3350) (Miralax) 17 g oral packet 17 g, 17 g, Oral, Daily PRN    sennosides (Senokot) tab 17.2 mg, 17.2 mg, Oral, Nightly PRN    bisacodyl (Dulcolax) 10 MG rectal suppository 10 mg, 10 mg, Rectal, Daily PRN    fleet enema (Fleet) rectal enema 133 mL, 1 enema, Rectal, Once PRN    divalproex DR (Depakote Sprinkle) sprinkle cap 250 mg, 250 mg, Oral, BID    budesonide (Pulmicort) 0.5 MG/2ML nebulizer suspension 0.5 mg, 0.5 mg, Nebulization, BID    arformoterol (Brovana) 15 MCG/2ML nebulizer solution 15 mcg, 15 mcg, Nebulization, 2 times daily    Objective     Vital Signs:  Blood pressure 122/71, pulse 80, temperature 98 °F (36.7 °C), temperature source Temporal, resp. rate (!) 30, weight 158 lb 15.2 oz (72.1 kg), SpO2 96%.  Body mass index is 23.47 kg/m².  Present Level of pain: JENI  Non-verbal signs of pain present: No    Physical Exam:  General: Lethargic and ill-appearing  HEENT: No focal deficits. +dry mucous membranes, +bipap mask  Cardiac: Regular rate and rhythm, S1, S2 normal, no murmur, rub or gallop.  Lungs: Coarse breath sounds bilaterally. Increased respiratory excursions and effort with abdominal muscle use. RR 34  Abdomen: Soft, non-tender, normal bowel sounds X 4 quadrants, no rebound or guarding, +g-tube   Extremities: Without clubbing, cyanosis. Peripheral pulses are 2+. BLE Edema present  Neurologic: Lethargic, not verbalizing or following any commands.   Skin: Warm and dry. Pale       Hematology:  Lab Results   Component Value Date    WBC 17.2 (H)  01/29/2025    HGB 9.1 (L) 01/29/2025    HCT 30.8 (L) 01/29/2025    .0 01/29/2025       Coags:  Lab Results   Component Value Date    INR 1.06 01/26/2025    PTT 29.6 01/26/2025       Chemistry:  Lab Results   Component Value Date    CREATSERUM 0.72 01/29/2025    BUN 24 (H) 01/29/2025     01/29/2025    K 3.8 01/29/2025     01/29/2025    CO2 28.0 01/29/2025     (H) 01/29/2025    CA 9.1 01/29/2025    ALB 4.1 01/26/2025    ALKPHO 70 01/26/2025    BILT 0.4 01/26/2025    TP 7.4 01/26/2025    AST  01/26/2025      Comment:      Test not reported due to hemolysis.  Test reordered by laboratory.        ALT 15 01/26/2025    MG 2.0 01/28/2025    PHOS 2.1 (L) 01/29/2025       Imaging:  XR CHEST AP PORTABLE  (CPT=71045)    Result Date: 1/29/2025  CONCLUSION:  1. Borderline heart size. 2. Extensive bilateral mixed alveolar and interstitial multifocal airspace opacification has progressed.    Dictated by (CST): Shon Miller MD on 1/29/2025 at 9:06 AM     Finalized by (CST): Shon Miller MD on 1/29/2025 at 9:08 AM           Follow up [facility] Discussion        1/29/25-I met with pt's POA Yesenia Kerr, pt's sister who came in from [place] and multiple friends at bedside. Provided clinical overview and reinforced pt's overall poor prognosis. They understand he is not improving clinically and Yesenia Kerr says they are ready to move forward with comfort care. She says he had the anointing of sick blessing yesterday by .     I provided education on comfort care with transition off bipap. Discussed pt's ongoing respiratory distress symptoms and recommended starting low dose Morphine infusion for continuous comfort with improved symptom control. Discussed comfort meds utilized at EOL to maintain comfort. Recommended stopping g-tube TF's at this point to help with minimizing secretions at EOL as the body is shutting down and she is in a agreement. Discussed no further labs or testing and dc'ing all non-comfort  medications. Recommended involving [facility] for GIP admission if pt has any prolonged survival post-bipap removal. Confirmed DNAR/DNI-comfort care.  Answered questions and provided support to family.    1/28/25-I spoke to pt's POSOFIA Kerr with clinical update. I reinforced his overall poor prognosis with lack of clinical improvement. She tells me she will be coming in tonight to see him and will likely transition him to comfort care tomorrow.  I set up a follow up meeting with her tomorrow morning at 11a. She is waiting to see if his sister is planning to come see him from [place]. I provided education on the process of transition off bipap to comfort care and recommended having him on low dose Morphine infusion for better symptom control with transition. She says if his condition deteriorates further she will proceed with comfort care sooner. Provided emotional support to pt's friend who is coping adequately.     Discussed with Patient's family: yes  Patient's preference about sharing medical information: speak to pt's CAROLYN Kerr  Patient's decision making preferences: speak to pt's CAROLYN Kerr  Code status: DNAR/DNI-comfort care only  Have advanced directives been discussed with patient or healthcare power of : Yes        Healthcare Agent Appointed: Yes  Healthcare Agent's Name: Yesenia Kerr Emrechepe  Healthcare Agent's Phone Number: [phone]               Palliative disposition: Hospice    Comfort care only post-bipap removal    Procedures:  No intubation  No further g-tube feeds      Palliative Care Assessment and Recommendations     Problem List:     Acute hypoxemic respiratory failure  Acute aspiration pneumonia  Acute influenza A infection  Leukocytosis  Sacral wound-POA  Vascular dementia  H/o dysphagia with g-tube  COPD  H/o TIA  HTN  GERD  H/o gout  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  -Add Morphine 2 mg IVP 15 mins prior to bipap removal and start low dose Morphine infusion at 1 mg/hr with titration up for any signs of pain/resp distress for comfort.   -Add Morphine 2mg IVP Q 1 hr prn breakthrough pain/resp distress symptoms.  -Add Ativan 1 mg IVP Q 4 hrs prn agitation  -Add Scopolamine patch and Robinul 0.4 mg IVP Q 3 hrs prn arising secretions  -DC all non-comfort medications, no further labs/testing  -Stop G-tube TF's to minimize secretions at EOL.  -[facility]idential hospice to evaluate for GIP admission     Respiratory distress/dyspnea  -Ongoing issue, O2  -Pt is DNI  -Morphine infusion as above for continuous comfort.     Goals of care counseling  -see above for details  -Confirmed wishes for DNAR/DNI-comfort care with plan to remove bipap today.  -[facility]idential hospice to evaluate for GIP admission  -Dispo:  hospital death expected/hospice transition  -Provided emotional support to pt's friends/sister who are coping adequately.     Advance care planning  -Pt's HCPOA is friend Yesenia Larsen #[phone].  -Previously completed POLST paperwork in EPIC     Palliative Performance Scale 30%     Emotion support provided to patient/family today: Yes      A total of 35 mins were spent on this consult, which included all of the following: direct face to face contact, history taking, physical examination, and >50% was spent counseling and coordinating care.    Discussed today's visit with message to Dr. Emmanuel, Dr. Glez, [facility] hospice RN and Julia HUSAIN    I am out of the office tomorrow. Hospice will be taking over the care. Please contact my partners for any arising needs in my absence.      Christine Turk, ANP-BC, ACHPN [id number]  1/29/2025  11:41 AM  Palliative Care Services          [1]   Allergies  Allergen Reactions    Risperidone WHEEZING    Codeine UNKNOWN    Ferric  Carboxymaltose UNKNOWN

## 2025-01-29 NOTE — PLAN OF CARE
Problem: CARDIOVASCULAR - ADULT  Goal: Maintains optimal cardiac output and hemodynamic stability  Description: INTERVENTIONS:  - Monitor vital signs, rhythm, and trends  - Monitor for bleeding, hypotension and signs of decreased cardiac output  - Evaluate effectiveness of vasoactive medications to optimize hemodynamic stability  - Monitor arterial and/or venous puncture sites for bleeding and/or hematoma  - Assess quality of pulses, skin color and temperature  - Assess for signs of decreased coronary artery perfusion - ex. Angina  - Evaluate fluid balance, assess for edema, trend weights  Outcome: Progressing  Goal: Absence of cardiac arrhythmias or at baseline  Description: INTERVENTIONS:  - Continuous cardiac monitoring, monitor vital signs, obtain 12 lead EKG if indicated  - Evaluate effectiveness of antiarrhythmic and heart rate control medications as ordered  - Initiate emergency measures for life threatening arrhythmias  - Monitor electrolytes and administer replacement therapy as ordered  Outcome: Progressing     Problem: RESPIRATORY - ADULT  Goal: Achieves optimal ventilation and oxygenation  Description: INTERVENTIONS:  - Assess for changes in respiratory status  - Assess for changes in mentation and behavior  - Position to facilitate oxygenation and minimize respiratory effort  - Oxygen supplementation based on oxygen saturation or ABGs  - Provide Smoking Cessation handout, if applicable  - Encourage broncho-pulmonary hygiene including cough, deep breathe, Incentive Spirometry  - Assess the need for suctioning and perform as needed  - Assess and instruct to report SOB or any respiratory difficulty  - Respiratory Therapy support as indicated  - Manage/alleviate anxiety  - Monitor for signs/symptoms of CO2 retention  Outcome: Progressing     Problem: GASTROINTESTINAL - ADULT  Goal: Maintains adequate nutritional intake (undernourished)  Description: INTERVENTIONS:  - Monitor percentage of each meal  consumed  - Identify factors contributing to decreased intake, treat as appropriate  - Assist with meals as needed  - Monitor I&O, WT and lab values  - Obtain nutritional consult as needed  - Optimize oral hygiene and moisture  - Encourage food from home; allow for food preferences  - Enhance eating environment  Outcome: Progressing     Problem: GENITOURINARY - ADULT  Goal: Absence of urinary retention  Description: INTERVENTIONS:  - Assess patient’s ability to void and empty bladder  - Monitor intake/output and perform bladder scan as needed  - Follow urinary retention protocol/standard of care  - Consider collaborating with pharmacy to review patient's medication profile  - Implement strategies to promote bladder emptying  Outcome: Progressing     Problem: SKIN/TISSUE INTEGRITY - ADULT  Goal: Incision(s), wounds(s) or drain site(s) healing without S/S of infection  Description: INTERVENTIONS:  - Assess and document risk factors for pressure ulcer development  - Assess and document skin integrity  - Assess and document dressing/incision, wound bed, drain sites and surrounding tissue  - Implement wound care per orders  - Initiate isolation precautions as appropriate  - Initiate Pressure Ulcer prevention bundle as indicated  Outcome: Progressing     Problem: NEUROLOGICAL - ADULT  Goal: Achieves stable or improved neurological status  Description: INTERVENTIONS  - Assess for and report changes in neurological status  - Initiate measures to prevent increased intracranial pressure  - Maintain blood pressure and fluid volume within ordered parameters to optimize cerebral perfusion and minimize risk of hemorrhage  - Monitor temperature, glucose, and sodium. Initiate appropriate interventions as ordered  Outcome: Progressing  Goal: Absence of seizures  Description: INTERVENTIONS  - Monitor for seizure activity  - Administer anti-seizure medications as ordered  - Monitor neurological status  Outcome: Progressing      Problem: Impaired Swallowing  Goal: Minimize aspiration risk  Description: Interventions:  - Patient should be alert and upright for all feedings (90 degrees preferred)  - Offer food and liquids at a slow rate  - No straws  - Encourage small bites of food and small sips of liquid  - Offer pills one at a time, crush or deliver with applesauce as needed  - Discontinue feeding and notify MD (or speech pathologist) if coughing or persistent throat clearing or wet/gurgly vocal quality is noted  Outcome: Progressing     Problem: SAFETY ADULT - FALL  Goal: Free from fall injury  Description: INTERVENTIONS:  - Assess pt frequently for physical needs  - Identify cognitive and physical deficits and behaviors that affect risk of falls.  - [place] fall precautions as indicated by assessment.  - Educate pt/family on patient safety including physical limitations  - Instruct pt to call for assistance with activity based on assessment  - Modify environment to reduce risk of injury  - Provide assistive devices as appropriate  - Consider OT/PT consult to assist with strengthening/mobility  - Encourage toileting schedule  Outcome: Progressing     Problem: DISCHARGE PLANNING  Goal: Discharge to home or other facility with appropriate resources  Description: INTERVENTIONS:  - Identify barriers to discharge w/pt and caregiver  - Include patient/family/discharge partner in discharge planning  - Arrange for needed discharge resources and transportation as appropriate  - Identify discharge learning needs (meds, wound care, etc)  - Arrange for interpreters to assist at discharge as needed  - Consider post-discharge preferences of patient/family/discharge partner  - Complete POLST form as appropriate  - Assess patient's ability to be responsible for managing their own health  - Refer to Case Management Department for coordinating discharge planning if the patient needs post-hospital services based on physician/LIP order or complex needs  related to functional status, cognitive ability or social support system  Outcome: Progressing

## 2025-01-29 NOTE — CM/SW NOTE
[facility] received referral from [facility]. [facility] will follow up with family to schedule informational hospice meeting.      SANJUANITA Johnson  [facility]  [phone]

## 2025-01-29 NOTE — SPIRITUAL CARE NOTE
Spiritual Care Visit Note    Patient Name: Carlo Ng Date of Spiritual Care Visit: 25   : 1955 Primary Dx: Acute respiratory failure with hypoxia (HCC)       Referred By: Referral From: Nurse    Spiritual Care Taxonomy:    Intended Effects: Convey a calming presence    Methods: Accompany someone in their spiritual/Anabaptism practice outside your uriel tradition;Assist with spiritual/Anabaptism practices;Encourage sharing of feelings;Offer emotional support;Offer spiritual/Anabaptism support    Interventions: Acknowledge current situation;Acknowledge response to difficult experience;Active listening;Ask guided questions;Ask guided questions about the nature and presence of God;Ask questions to bring forth feelings;Facilitate communication;Silent prayer    Visit Type/Summary:     - Spiritual Care: Responded to a request via the on call phone Consulted with RN prior to visit. Offered empathic listening and emotional support. Provided resources related to grief and grieving. Participated in care conference meeting. Patient and family expressed appreciation for  visit. Provided information regarding how to contact Spiritual Care and left a Spiritual Care information card.  remains available as needed for follow up.    Spiritual Care support can be requested via an Epic consult. For urgent/immediate needs, please contact the On Call  at: [place]: ext [phone]     Yesenia Steel

## 2025-01-29 NOTE — PROGRESS NOTES
Patient transitioned to comfort care per POA. Time of death: 1225. Family and POA at bedside. [facility] notified, case #[id number], patient not a candidate for donation. [facility] notified of death, patient is not a 's case per [facility] (badge #[id number]).

## 2025-01-31 NOTE — DISCHARGE SUMMARY
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 He was found in the morning with vomit on him and low O2 sat in 80s with rhonchi. He was sent to ED. CXR shows right perihilar and basilar patchy infiltrates. WBC 24.9, hypotensive. He is receiving IVF bolus in ED and will be admitted to ICU.      Hospital Course:     The patient was admitted to ICU with pulmonary/critical care on consult. He was continued on IV abx for aspiration pneumonia. He was also subsequently found to have influenza A. He initially had signs of septic show with lactic acidosis and hypotension requiring pressor support. He was maintained on continuous bipap as POA confirmed DNAR/selective treatment. The patient did not show signs of improvement after a few days and remained unresponsive on bipap. POA made the decision to transition to comfort care with meeting planned with hospice. The patient  on .      Mitchell Chavis MD  2025

## 2025-02-05 NOTE — TELEPHONE ENCOUNTER
Death certificate completed and signed by Hospitalist MD Dr. JASON Chavis. Submitted via fax to [phone]. Confirmation fax received. [phone] called and notified.

## 2025-02-11 NOTE — PROGRESS NOTES
Physician Clarification    Additional information related to the patient's condition    Cachexia due to or associated with vascular dementia and malnutrition    This note is part of the patient's medical record.

## 2025-02-11 NOTE — PROGRESS NOTES
Physician Clarification    Additional information related to the patient's condition    Patient also with left heel DTPl POA    This note is part of the patient's medical record.

## (undated) NOTE — IP AVS SNAPSHOT
Patient Demographics     Address  [address]    [facility] [address] Phone  [phone] (Home)  [phone] (Mobile) E-mail Address  [email or web address]      Patient Contacts     Name Relation Home Work Mobile    Yesenia Larsen Power of    [phone]      Allergies as of 4/22/2024  Review status set to Review Complete on 3/31/2024       Noted Reaction Type Reactions    Risperidone 04/02/2024    WHEEZING      Code Status Information     Code Status    Full Code        Patient Instructions       Keep all follow up appointments and continue current medications.      Follow-up Information     Tesfaye Perez Follow up in 1 month(s).    Specialty: NEUROLOGY  Why: “[facility] to Call Dr Tesfaye Perez to schedule Neuro/Stroke appt prior to discharge from [facility]”  Contact information:  [address] [id number] [phone]             Pcp, Unknown Follow up in 1 week(s).                      Your Home Meds List      TAKE these medications       Instructions Authorizing Provider Morning Afternoon Evening As Needed   acetaminophen 325 MG Tabs  Commonly known as: Tylenol      Take 2 tablets (650 mg total) by mouth every 6 (six) hours as needed for Pain.          albuterol 108 (90 Base) MCG/ACT Aers  Commonly known as: Ventolin HFA      Inhale 2 puffs into the lungs every 4 (four) hours as needed for Wheezing.          amLODIPine 10 MG Tabs  Commonly known as: Norvasc  Next dose due: Tomorrow      Take 1 tablet (10 mg total) by mouth daily.   Asrar Ahmed         aspirin 81 MG Chew  Next dose due: Tomorrow       Chew 1 tablet (81 mg total) by mouth daily.   Asrar Ahmed         atorvastatin 80 MG Tabs  Commonly known as: Lipitor  Next dose due: This evening       Take 1 tablet (80 mg total) by mouth nightly.   Asrar Ahmed         carvedilol 12.5 MG Tabs  Commonly known as: Coreg  Next dose due: With dinner       Take 1 tablet (12.5 mg total) by mouth 2  (two) times daily with meals.          clopidogrel 75 MG Tabs  Commonly known as: Plavix  Next dose due: Tomorrow       Take 1 tablet (75 mg total) by mouth daily.          divalproex  MG Csdr  Commonly known as: Depakote Sprinkle  Next dose due: Before bedtime       Take 2 capsules (250 mg total) by mouth in the morning and 2 capsules (250 mg total) before bedtime.   Asrar Ahmed         escitalopram 5 MG Tabs  Commonly known as: Lexapro  Next dose due: This evening       Take 1 tablet (5 mg total) by mouth nightly.   Asrar Ahmed         finasteride 5 MG Tabs  Commonly known as: Proscar  Next dose due: Tomorrow       Take 1 tablet (5 mg total) by mouth daily.   Asrar Ahmed         fluticasone-salmeterol 250-50 MCG/ACT Aepb  Commonly known as: Advair Diskus  Next dose due: This evening       Inhale 1 puff into the lungs 2 (two) times daily.          melatonin 1 MG Tabs  Next dose due: Tonight       Asrar Ahmed         memantine 5 MG Tabs  Commonly known as: Namenda  Next dose due: This evening       Take 1 tablet (5 mg total) by mouth 2 (two) times daily.   Asrar Ahmed         OLANZapine 5 MG Tabs  Commonly known as: ZyPREXA  Next dose due: This evening       Take 1 tablet (5 mg total) by mouth nightly.   Asrar Ahmed         Polyethylene Glycol 3350 17 g Pack  Commonly known as: MIRALAX      Take 17 g by mouth daily as needed.   Asrar Ahmed         QUEtiapine 25 MG Tabs  Commonly known as: SEROquel  Next dose due: If needed      Take 1 tablet (25 mg total) by mouth 3 (three) times daily as needed (Agitation).   Asrar Ahmed         Sennosides 17.2 MG Tabs  Next dose due: At bedtime       Take 1 tablet (17.2 mg total) by mouth at bedtime.   Asrar Ahmed         tamsulosin 0.4 MG Caps  Commonly known as: Flomax  Next dose due: Tomorrow       Take 1 capsule (0.4 mg total) by mouth daily.          thiamine 100 MG Tabs  Commonly known as: Vitamin B1  Next dose due: Before bedtime       Take 2 tablets (200 mg total) by  mouth in the morning and 2 tablets (200 mg total) before bedtime.   Yobany Huggins                  [phone] - MAR ACTION REPORT  (last 48 hrs)    ** SITE UNKNOWN **     Order ID Medication Name Action Time Action Reason Comments    [id number] OLANZapine (ZyPREXA) tab 5 mg 04/20/24 2019 Given      [id number] OLANZapine (ZyPREXA) tab 5 mg 04/21/24 2005 Given      [id number] QUEtiapine (SEROquel) tab 25 mg 04/20/24 2019 Given      [id number] QUEtiapine (SEROquel) tab 25 mg 04/21/24 2005 Given      [id number] amLODIPine (Norvasc) tab 10 mg 04/21/24 0958 Given      [id number] amLODIPine (Norvasc) tab 10 mg 04/22/24 0838 Given      [id number] aspirin chewable tab 81 mg 04/21/24 0958 Given      [id number] aspirin chewable tab 81 mg 04/22/24 0838 Given      [id number] atorvastatin (Lipitor) tab 80 mg 04/20/24 2018 Given      [id number] atorvastatin (Lipitor) tab 80 mg 04/21/24 2005 Given      [id number] calcium carbonate (Tums) chewable tab 1,000 mg 04/20/24 1509 Given      [id number] calcium carbonate (Tums) chewable tab 1,000 mg 04/20/24 2019 Given      [id number] calcium carbonate (Tums) chewable tab 1,000 mg 04/21/24 0958 Given      [id number] calcium carbonate (Tums) chewable tab 1,000 mg 04/21/24 2005 Given      [id number] calcium carbonate (Tums) chewable tab 1,000 mg 04/22/24 0838 Given      [id number] carvedilol (Coreg) tab 12.5 mg 04/20/24 1859 Given      [id number] carvedilol (Coreg) tab 12.5 mg 04/21/24 0958 Given      [id number] carvedilol (Coreg) tab 12.5 mg 04/21/24 1738 Given      [id number] carvedilol (Coreg) tab 12.5 mg 04/22/24 0838 Given      [id number] divalproex DR (Depakote Sprinkle) sprinkle cap 250 mg 04/21/24 0958 Given      [id number] divalproex DR (Depakote Sprinkle) sprinkle cap 250 mg 04/21/24 2004 Given      [id number] divalproex DR (Depakote Sprinkle) sprinkle cap 250 mg 04/22/24 0838 Given      [id number] docusate sodium (Colace) cap 100 mg 04/20/24 2018 Given      [id number] docusate sodium (Colace) cap 100 mg  04/21/24 0958 Given      [id number] docusate sodium (Colace) cap 100 mg 04/21/24 2005 Given      [id number] docusate sodium (Colace) cap 100 mg 04/22/24 0839 Given      [id number] escitalopram (Lexapro) tab 5 mg 04/20/24 2018 Given      [id number] escitalopram (Lexapro) tab 5 mg 04/21/24 2005 Given      [id number] finasteride (Proscar) tab 5 mg 04/21/24 0958 Given      [id number] finasteride (Proscar) tab 5 mg 04/22/24 0838 Given      [id number] fluticasone furoate-vilanterol (Breo Ellipta) 200-25 MCG/ACT inhaler 1 puff 04/21/24 1617 Given      [id number] fluticasone furoate-vilanterol (Breo Ellipta) 200-25 MCG/ACT inhaler 1 puff 04/22/24 0839 Given      [id number] glycerin-hypromellose- (Artificial Tears) 0.2-0.2-1 % ophthalmic solution 1 drop 04/20/24 1510 Given      [id number] glycerin-hypromellose- (Artificial Tears) 0.2-0.2-1 % ophthalmic solution 1 drop 04/21/24 1001 Given      [id number] glycerin-hypromellose- (Artificial Tears) 0.2-0.2-1 % ophthalmic solution 1 drop 04/21/24 1617 Given      [id number] glycerin-hypromellose- (Artificial Tears) 0.2-0.2-1 % ophthalmic solution 1 drop 04/21/24 2100 Given      [id number] glycerin-hypromellose- (Artificial Tears) 0.2-0.2-1 % ophthalmic solution 1 drop 04/22/24 0840 Given      [id number] melatonin cap/tab 5 mg 04/20/24 2018 Given      [id number] melatonin cap/tab 5 mg 04/21/24 2005 Given      [id number] memantine (Namenda) tab 5 mg 04/20/24 2019 Given      [id number] memantine (Namenda) tab 5 mg 04/21/24 0958 Given      [id number] memantine (Namenda) tab 5 mg 04/21/24 2005 Given      [id number] memantine (Namenda) tab 5 mg 04/22/24 0838 Given      [id number] polyethylene glycol (PEG 3350) (Miralax) 17 g oral packet 17 g 04/21/24 0959 Given      [id number] polyethylene glycol (PEG 3350) (Miralax) 17 g oral packet 17 g 04/22/24 0839 Given      [id number] sennosides (Senokot) tab 17.2 mg 04/20/24 2100 Given      [id number] sennosides (Senokot) tab 17.2 mg  04/21/24 2005 Given      [id number] tamsulosin (Flomax) cap 0.8 mg 04/21/24 0958 Given      [id number] tamsulosin (Flomax) cap 0.8 mg 04/22/24 0838 Given      [id number] thiamine (Vitamin B1) tab 200 mg 04/20/24 2019 Given      [id number] thiamine (Vitamin B1) tab 200 mg 04/21/24 0958 Given      [id number] thiamine (Vitamin B1) tab 200 mg 04/21/24 2005 Given      [id number] thiamine (Vitamin B1) tab 200 mg 04/22/24 0839 Given            RIGHT LOWER ABDOMEN     Order ID Medication Name Action Time Action Reason Comments    [id number] enoxaparin (Lovenox) 40 MG/0.4ML SUBQ injection 40 mg 04/21/24 0959 Given      [id number] enoxaparin (Lovenox) 40 MG/0.4ML SUBQ injection 40 mg 04/22/24 0839 Given              Recent Vital Signs    Flowsheet Row Most Recent Value   /70 Filed at 04/22/2024 0836   Pulse 63 Filed at 04/22/2024 0836   Resp 16 Filed at 04/22/2024 0836   Temp 97.5 °F (36.4 °C) Filed at 04/22/2024 0836   SpO2 100 % Filed at 04/22/2024 0836      Patient's Most Recent Weight    Flowsheet Row Most Recent Value   Patient Weight 74.9 kg (165 lb 3.2 oz)      Lab Results Last 24 Hours    No matching results found     Microbiology Results (All)     Procedure Component Value Units Date/Time    Urine Culture, Routine [[id number]] Collected: 04/08/24 2139    Order Status: Completed Lab Status: Final result Updated: 04/10/24 0638    Specimen: Urine, clean catch      Urine Culture >100,000 cfu/ml Multiple species present- probable contamination.    $$$$Respiratory Flu Expanded Panel + Covid-19$$$$ [[id number]]  (Normal) Collected: 04/02/24 1144    Order Status: Completed Lab Status: Final result Updated: 04/02/24 1235    Specimen: Other from Nasopharyngeal swab      SARS-CoV-2 PCR: Not Detected     Adenovirus PCR: Not Detected     Coronavirus 229E PCR: Not Detected     Coronavirus Hku1 PCR: Not Detected     Coronavirus Nl63 PCR: Not Detected     Coronavirus Oc43 PCR: Not Detected     Metapneumovirus PCR: Not Detected      Rhinovirus/Entero PCR: Not Detected     Influenza A PCR: Not Detected     Influenza B PCR: Not Detected     Parainfluenza 1 PCR: Not Detected     Parainfluenza 2 PCR Not Detected     Parainfluenza 3 PCR Not Detected     Parainfluenza 4 PCR Not Detected     Resp Syncytial Virus PCR Not Detected     Bordetella Pertussis PCR Not Detected     Bordetella Parapertussis PCR Not Detected     Chlamydia pneumonia PCR: Not Detected     Mycoplasma pneumonia PCR: Not Detected    Narrative:      This test is intended for the simultaneous qualitative detection and differentiation of nucleic acids from multiple viral and bacterial respiratory organisms, including nucleic acid from Severe Acute Respiratory Syndrome Coronavirus 2 (SARS-CoV-2) in nasopharyngeal swab from individuals suspected of respiratory viral infection consistent with COVID-19 by their healthcare provider.    Test performed using the [employer] Respiratory Panel 2.1 (RP2.1) assay on the [employer] 2.0 System, [employer], [employer] [address] [place] [address].    This test is being used under the Food and Drug Administration's Emergency Use Authorization.    The authorized Fact Sheet for Healthcare Providers for this assay is available upon request from the laboratory.    SARS and MERS coronaviruses are not tested on this assay.    SARS-CoV-2/Flu A and B/RSV by PCR (GeneXpert) [[id number]]  (Normal) Collected: 03/31/24 1142    Order Status: Completed Lab Status: Final result Updated: 03/31/24 1226    Specimen: Other from Nares      SARS-CoV-2 (COVID-19) - (GeneXpert) Not Detected     Influenza A by PCR Negative     Influenza B by PCR Negative     RSV by PCR Negative    Narrative:      This test is intended for the qualitative detection and differentiation of SARS-CoV-2, influenza A, influenza B, and respiratory syncytial virus (RSV) viral RNA in nasopharyngeal or nares swabs from individuals suspected of respiratory viral infection consistent with COVID-19 by their  healthcare provider. Signs and symptoms of respiratory viral infection due to SARS-CoV-2, influenza, and RSV can be similar.    Test performed using the Xpert Xpress SARS-CoV-2/FLU/RSV (real time RT-PCR)  assay on the GeneXpert instrument, [place], [place] [address].   This test is being used under the Food and Drug Administration's Emergency Use Authorization.    The authorized Fact Sheet for Healthcare Providers for this assay is available upon request from the laboratory.      Pending Labs     Order Current Status    Miscellaneous Testing In process    Miscellaneous Testing In process    Miscellaneous Testing In process    Miscellaneous Testing In process    Miscellaneous Testing In process    CJD 14-3-3 Protein Tau Total, Cerebrospinal Fluid Edited         H&P - H&P Note      H&P signed by Yobany Huggins DO at 3/31/2024 12:22 PM  Version 1 of 1    Author: Yobany Huggins DO Service: — Author Type: Physician    Filed: 3/31/2024 12:22 PM Date of Service: 3/31/2024 11:54 AM Status: Signed    : Yobany Huggins DO (Physician)         [facility] H[facility]P       CC: left sided facial droop, weakness, agitation     PCP: No primary care provider on file.    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  Patient arrives via EMS from his independent living facility.  It appears that the patient had a visiting nurse and noted him to be sitting at the edge of the bed, confused.  EMS arrived and they found the patient to have left-sided weakness and left-sided facial droop.  The patient was not answering questions appropriately and was altered.  He was last known well sometime yesterday. Normally gets his care at [place]. Has had admissions for altered mental status as well.      Review of Systems  Comprehensive ROS reviewed and negative except for what's stated above.     PMH  Metastatic small cell cancer with parotid involvment  Asthma  Dementia  Gout  Hypertension  Hx of Seizure  Hyperlipidemia    PSH  Unavailable at this time     ALL:  No major drug allergies noted    Home Medications:  Unclear medication list  Reviewed care everywhere records but unclear what is updated list  No list in ER via EMS    Soc Hx  Lives in independent living facility  Former smoker  Lives alone,   Patient was a     Fam Hx  Unavailable at this time     OBJECTIVE:  BP (!) 175/89   Pulse 79   Temp 98.1 °F (36.7 °C) (Oral)   Resp 22   Ht 5' 8\" (1.727 m)   Wt 177 lb 0.5 oz (80.3 kg)   SpO2 93%   BMI 26.92 kg/m²   General: Awake, dishevled, in restraints  Heart: RRR  Lungs: Clear  Extremities: both upper and lower in restraints  Neuro: does not follow commands but is awake, spontaneously moving all extremities. Speech seems to be dysarthric but not giving much in terms of speech, sensation intact in face and extremities.     Diagnostic Data:    CBC/Chem  Recent Labs   Lab 03/31/24  1059   WBC 11.1*   HGB 17.5   MCV 88.8   .0       Recent Labs   Lab 03/31/24  1058      K 3.4*      CO2 24.0   BUN 11   CREATSERUM 1.15   GLU 86   CA 9.8     Radiology: XR CHEST AP PORTABLE  (CPT=71045)    Result Date: 3/31/2024  CONCLUSION: Linear opacities in both lung bases suggesting atelectasis.  Otherwise no acute cardiopulmonary abnormality.   Dictated by (CST): Jhoan Nunn MD on 3/31/2024 at 11:36 AM     Finalized by (CST): Jhoan Nunn MD on 3/31/2024 at 11:37 AM          CT STROKE CTA BRAIN/CTA NECK (W IV)(CPT=70496/36579)    Result Date: 3/31/2024  CONCLUSION:  1. CTA head: No hemodynamically significant stenosis, occlusion, or gross aneurysm in the anterior or posterior circulation. 2. CTA neck: No hemodynamically significant stenosis, occlusion, or dissection of the carotids or  vertebrals. 3.  Advanced chronic appearing paranasal sinus inflammation.  Dictated by (CST): Jhoan Nunn MD on 3/31/2024 at 11:18 AM     Finalized by (CST): Jhoan Nunn MD on 3/31/2024 at 11:22 AM          CT STROKE BRAIN (NO IV)(CPT=70450)    Result Date: 3/31/2024  CONCLUSION:  1.  No acute intracranial process. 2.  There are moderate microvascular white matter ischemic changes, likely related to long-standing hypertension and/or diabetes.  Interval development of multiple lacunar infarcts most notably in the right caudate head and right basal ganglia, new since  2012 imaging. 3.  Atherosclerosis in the anterior and posterior circulations.  Exam discussed with Dr. Lynn on 3/31/24 at 11:17 a.m.  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     Altered mental status   Possible delirium and/or progression of dementia   Agitation   -unclear, ddx includes metabolic encephalopathy, infection, stroke, post ictal state? And more.   -neurology consulted  -initial CT imaging stable  -may need MRI and stroke work up   -monitor for fever or infection   -will need anti-psychotics PRN for agitation, will order IV haldol 1mg [id number] for now and schedule seroquel 25mg HS tonight.     Hx of Hypertension  -unclear which meds  -renal function stable  -can add oral meds once cleared from stroke standpoint     Possible left sided facial droop and weakness  -neurology consulted and stroke work up if needed based on their evaluation   -anti-platelets if recommended by neuro     Hx of seizure? Noted in outside records, unclear if on anti-epileptics     Gout  -likely on  allopurinol but unclear    Fluids: low rate of fluids   Diet: NPO until cleared from speech/swallow  DVT prophylaxis: lovenox daily   Code status: unclear, not on record    Yobany Huggins DO  [facility]         Electronically signed by Yobany Huggins DO on 3/31/2024 12:22 PM              Consults - MD Consult Notes      Consults signed by Lauren Munoz MD at 4/14/2024  8:30 PM     Author: Lauren Munoz MD Service: Urology Author Type: Physician    Filed: 4/14/2024  8:30 PM Date of Service: 4/13/2024  6:01 PM Status: Signed    : Lauren Munoz MD (Physician)     Consult Orders    1. Consult to Urology [[id number]] ordered by Raheem Johnson MD at 04/12/24 1101             [facility] ADULT HISTORY AND PHYSICAL  **Delayed entry      IDENTIFYING DATA  Patient is Carlo roland 68 year old male. MRN is [id number]    Admitting physician: Willow Alfred DO  Primary care physician: No primary care provider on file.    CHIEF COMPLAINT  Chief Complaint   Patient presents with    Stroke             HISTORY OF PRESENT ILLNESS  68 year old male presents with altered mental status, weakness, and agitation.  Extensive workup.  He has been retaining urine, prompting urologic consultation.  Patient is unable to provide history.  He has been on straight cath protocol due to concerns that he would pull out a you during a period of agitation.  He is on max dose flomax and finasteride has been started.  He is having Bms.  It seems he has voided spontaneously to some extent but relying on intermittent straight cath to empty majority of urine output.    PAST UROLOGICAL HISTORY BY ORGAN SYSTEM  See HPI    PAST MEDICAL HISTORY  Past Medical History:    Abnormal CT scan    Acute gout involving toe of left foot    Altered mental status    Anemia    Asthma in adult (HCC)    Cancer of parotid gland (HCC)    Formatting of this note might be different from the original.   Resection 2019    Catatonia     Cognitive communication deficit    Cystic disease of liver    Dementia (HCC)    Difficult airway    Dyslipidemia    Elevated white blood cell count, unspecified    Encephalopathy    Essential hypertension    Gastro-esophageal reflux disease with esophagitis, without bleeding    Gout, chronic    Gross hematuria    Hypercholesterolemia    Hyperlipidemia    Leukocytosis    Liver cyst    Metabolic encephalopathy    Neuroendocrine carcinoma, high grade (HCC)    Other abnormalities of gait and mobility    Other psoriasis    Other seizures (HCC)    Other specified disorders of kidney and ureter    Parotid neoplasm    Formatting of this note might be different from the original.   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   Keppra    Seizure disorder (HCC)    Toxic metabolic encephalopathy    Transient global amnesia    Formatting of this note might be different from the original.   Brief -Plavix    Unspecified dementia, unspecified severity, without behavioral disturbance, psychotic disturbance, mood disturbance, and anxiety (HCC)    Vascular dementia (HCC)    Vascular dementia, unspecified severity, without behavioral disturbance, psychotic disturbance, mood disturbance, and anxiety (HCC)    Weakness    Weakness of left upper extremity             PAST SURGICAL HISTORY  History reviewed. No pertinent surgical history.    PAST FAMILY HISTORY  No family history on file.    PAST SOCIAL HISTORY  Social History     Socioeconomic History    Marital status: Single     Spouse name: Not on file    Number of  children: Not on file    Years of education: Not on file    Highest education level: Not on file   Occupational History    Not on file   Tobacco Use    Smoking status: Former     Current packs/day: 0.00     Average packs/day: 1 pack/day for 25.0 years (25.0 ttl pk-yrs)     Types: Cigarettes     Start date: 1965     Quit date: 1990     Years since quittin.2    Smokeless tobacco: Not on file    Tobacco comments:      Cigarettes 1 25 Quit:   Substance and Sexual Activity    Alcohol use: Not on file    Drug use: Not on file    Sexual activity: Not on file   Other Topics Concern    Not on file   Social History Narrative    Not on file     Social Determinants of Health     Financial Resource Strain: Low Risk  (2021)    [facility]    Overall Financial Resource Strain (CARDIA)     Difficulty of Paying Living Expenses: Not hard at all   Food Insecurity: Unknown (3/31/2024)    Food Insecurity     Food Insecurity: Patient unable to answer   Transportation Needs: Unknown (3/31/2024)    Transportation Needs     Lack of Transportation: Patient unable to answer   Physical Activity: Not on file   Stress: Not on file   Social Connections: Unknown (3/13/2021)    Received from [facility]    Social Connections     Conversations with friends/family/neighbors per week: Not on file   Housing Stability: Unknown (3/31/2024)    Housing Stability     Housing Instability: Patient unable to answer     Housing Instability Emergency: Not on file       MEDICATIONS  No current outpatient medications on file.    ALLERGIES  Allergies   Allergen Reactions    Risperidone WHEEZING          REVIEW OF SYSTEMS  Unable to complete     PHYSICAL EXAMINATIONS    Vital Signs:   Vitals:    24 0448 24 1001 24 1200 24 1429   BP: 140/74 127/82 120/73 123/71   BP Location: Left arm Left arm  Left arm   Pulse: 66 77 73 70   Resp: 20 18  18   Temp: 97.4 °F (36.3 °C) 97.2  °F (36.2 °C)  97.2 °F (36.2 °C)   TempSrc: Oral Oral  Oral   SpO2: 95% 95%  95%   Weight:       Height:           Constitutional: NAD  Neuro: Moving extremities  HEENT: Neck supple  Chest: Normal respiratory effort  CV: Normal radial pulse  Abdomen: Soft without tenderness, guarding. No suprapubic tenderness or fullness  No CVA tenderness bilaterally  Extremities: No edema appreciated.  Genital Exam: Penis without lesions, no scrotal masses      REVIEW OF LABORATORY, PATHOLOGY, AND RADIOLOGY DATA    CBC w/DIF:   Lab Results   Component Value Date    WBC 11.7 (H) 04/12/2024    RBC 5.20 04/12/2024    HGB 15.0 04/12/2024    HCT 46.4 04/12/2024    MCV 89.2 04/12/2024    [facility] 28.8 04/12/2024    MCHC 32.3 04/12/2024    RDW 12.7 04/12/2024    .0 04/12/2024       CMP:  No results found for: \"CR\", \"EGFR\"    PSA:  No components found for: \"GPSAD\"    UA:  No components found for: \"GUCOL\", \"GUCLR\", \"GUSG\", \"GUPH\", \"GUPRO\", \"GUGLUC\", \"GUKET\", \"GUBLD\", \"GUBILI\", \"GUNITR\", \"GULEU\", \"GUURO\", \"GUWBC\", \"GURBC\", \"GUSQEP\", \"GUNSEP\", \"GUBACT\", \"GUAMOR\", \"GUHC\"    Urine culture:  Multiple species/contaminant    Imaging:  XR ABDOMEN (1 VIEW) (CPT=74018)    Result Date: 4/9/2024  PROCEDURE: XR ABDOMEN (1 VIEW) (CPT=74018)  COMPARISON: None.  INDICATIONS: Mild abdomen distention and persistent hiccups.  TECHNIQUE:   Single view.   FINDINGS:  BOWEL GAS PATTERN: There is bowel gas throughout the abdomen, including the rectum.  No dilated gas-filled loops of bowel are seen to suggest obstruction.  A large amount of stool is seen throughout the colon. SOFT TISSUES: Normal.  No masses or organomegaly.  CALCIFICATIONS: None significant. BONES: There is moderate dextroscoliosis to the visualized thoracolumbar spine with moderate multilevel spondylosis seen.  Enthesopathy is seen throughout the visualized aspect of the pelvis bilaterally. OTHER: Negative.  No abnormal gaseous collections.          CONCLUSION:  1. Nonobstructive bowel gas  pattern. 2. Severe colonic stool burden.  Correlate clinically for a history of constipation. 3. Lesser incidental findings as above.    Dictated by (CST): Stevan Martinez MD on 4/09/2024 at 5:31 PM     Finalized by (CST): Stevan Martinez MD on 4/09/2024 at 5:33 PM          XR CHEST AP PORTABLE  (CPT=71045)    Result Date: 4/9/2024  PROCEDURE: XR CHEST AP PORTABLE  (CPT=71045) TIME: 12:20.   COMPARISON: [facility], XR CHEST AP PORTABLE (CPT=71045), 4/02/2024, 11:08 AM.  [facility], XR CHEST AP PORTABLE (CPT=71045), 3/31/2024, 11:27 AM.  [facility], X CHEST PORTABLE, 9/10/2012, 12:22 PM.  INDICATIONS: Cough and chronic hiccups.  TECHNIQUE:   Single view.   FINDINGS:  CARDIAC/VASC: The cardiomediastinal silhouette is unchanged in size. MEDIAST/MICHELLE:   No visible mass or adenopathy. LUNGS/PLEURA: No focal opacity, pleural effusion, or pneumothorax.  There is no evidence of pulmonary edema. BONES: Multilevel degenerative changes of the thoracic spine. OTHER: Negative.          CONCLUSION:   No focal opacity, pleural effusion, or pneumothorax.   Dictated by (CST): Keith Partida MD on 4/09/2024 at 2:34 PM     Finalized by (CST): Keith Partida MD on 4/09/2024 at 2:35 PM          XR LUMBAR PUNCTURE LARGE VOL, INCLD IMG (CPT=62329)    Result Date: 4/4/2024  PROCEDURE: XR LUMBAR PUNCTURE LARGE VOL, INCLD IMG (CPT=62329)  COMPARISON: None.  INDICATIONS: Altered mental status.  Suspect normal pressure hydrocephalus.  Severe vascular dementia with agitation.  TECHNIQUE: Risks and benefits were explained to the patient's guardian and informed consent was obtained.  .  Conscious sedation was administered by the clinical service prior to the procedure.  Patient was prepped and draped in usual sterile  following administration 1% lidocaine for local anesthesia, lumbar puncture was performed at the L2 level with a 20 gauge spinal needle.  Low opening pressure approximately 12  cm H2O.  Closing pressure was less than 12 cm H2O.  Approximately 16 cc of cerebral spinal fluid withdrawn into 4 separate sterile vials.  Blood tinged spinal fluid contaminated the last specimen.  Procedure was terminated due to bloody spinal fluid aspirate near the end of the exam and lack of free-flowing spinal fluid  FLUOROSCOPY IMAGES OBTAINED:  1 FLUOROSCOPY TIME:  1.9 minutes RADIATION DOSE (Dose Area Product):  1701.7-uGy*m^2   FINDINGS:  LEVEL: L2 level paramedian approach  NEEDLE: 20 gauge.  FLUID OBTAINED: 16 cc.  First 3 vials were clear.  Blood tinged pinkish spinal fluid left specimen. COMPLICATIONS: None. Estimated blood loss:  None          CONCLUSION:  1. Fluoroscopic guided lumbar puncture at the L2 level. 2. Approximately 16 cc of spinal fluid withdrawn.  The 1st 3 vials were clear.  Slight blood tinged pancreas spinal fluid contaminated the 4th specimen. 3. Low CSF pressure.  Opening pressure 12 cm H2O.  Closing pressure less than 12 cm H2O.     Dictated by (CST): Shon Miller MD on 2024 at 1:49 PM     Finalized by (CST): Shon Miller MD on 2024 at 1:55 PM          CARD ECHO 2D DOPPLER (CPT=93306)    Result Date: 4/3/2024  Transthoracic Echocardiogram Name:Carlo Ng Date: 2024 :  1955 Ht:  (68in)  BP: 172 / 88 MRN:  [id number]    Age:  68years    Wt:  (180lb) HR: 92bpm Loc:  [facility]       Gndr: M          BSA: 1.95m^2 Sonographer: Bill Ordering:    Neisha Barrett Consulting:  Raheem Johnson ---------------------------------------------------------------------------- History/Indications:   Cerebrovascular accident.  Risk factors: Hypertension. Altered Mental Status. ---------------------------------------------------------------------------- Procedure information:  A transthoracic complete 2D study was performed. Additional evaluation included M-mode, complete spectral Doppler, and color Doppler.  Patient status:  Inpatient.  Location:  Bedside.    The  previous study was not available, so comparison was made to the report of 08/21/2022.    This was a routine study. Transthoracic echocardiography for diagnosis and ventricular function evaluation. Image quality was adequate. The study was technically limited due to poor acoustic window availability, poor patient compliance, restricted patient mobility, agitation, and patient sitting up. ECG rhythm:   Normal sinus ---------------------------------------------------------------------------- Conclusions: 1. Left ventricle: The cavity size was normal. Wall thickness was mildly    increased. Systolic function was normal. The estimated ejection fraction    was 60-65%, by visual assessment. No diagnostic evidence for regional    wall motion abnormalities. Unable to assess LV diastolic function. 2. Left atrium: The left atrial volume was normal. Impressions:  This study is compared with previous dated 08/21/2022: * ---------------------------------------------------------------------------- * Findings: Left ventricle:  The cavity size was normal. Wall thickness was mildly increased. Systolic function was normal. The estimated ejection fraction was 60-65%, by visual assessment. No diagnostic evidence for regional wall motion abnormalities. Unable to assess LV diastolic function. Left atrium:  Not well visualized. The left atrial volume was normal. Right ventricle:  Not well visualized. The cavity size was normal. Systolic function was normal. Right atrium:  Not well visualized. The atrium was normal in size. Mitral valve:  The valve was structurally normal. Leaflet separation was normal.  Doppler:  Transvalvular velocity was within the normal range. There was no evidence for stenosis. There was no significant regurgitation.    The valve area by pressure half-time was 4.68cm^2. The valve area index by pressure half-time was 2.39cm^2/m^2. Aortic valve:  The valve was structurally normal. The valve was probably trileaflet. Cusp  separation was normal.  Doppler:  Transvalvular velocity was within the normal range. There was no evidence for stenosis. There was no significant regurgitation. Tricuspid valve:  The valve is structurally normal. Leaflet separation was normal.  Doppler:  Transvalvular velocity was within the normal range. There was no evidence for stenosis. There was trace regurgitation. Pulmonic valve:   The valve is structurally normal. Cusp separation was normal.  Doppler:  Transvalvular velocity was within the normal range. There was no evidence for stenosis. There was no significant regurgitation. Aorta: Aortic root: The aortic root was normal. Pulmonary arteries: Systolic pressure could not be accurately estimated. Systemic veins:  Poorly visualized. ---------------------------------------------------------------------------- Measurements  Left ventricle                  Value          Ref  IVS thickness, ED, PLAX     (H) 1.1   cm       0.6 - 1.0  LV ID, ED, PLAX                 4.4   cm       4.2 - 5.8  LV ID, ES, PLAX                 3.0   cm       2.5 - 4.0  LV PW thickness, ED, PLAX   (H) 1.1   cm       0.6 - 1.0  IVS/LV PW ratio, ED, PLAX       1.00           ---------  LV PW/LV ID ratio, ED, PLAX     0.25           ---------  LV ejection fraction            60    %        52 - 72  LVOT                            Value          Ref  LVOT ID                         2.2   cm       ---------  Aortic root                     Value          Ref  Aortic root ID                  3.7   cm       2.6 - 4.1  Left atrium                     Value          Ref  LA volume, ES, 1-p A2C          30    ml       18 - 58  Mitral valve                    Value          Ref  Mitral E-wave peak velocity     0.43  m/sec    ---------  Mitral A-wave peak velocity     0.69  m/sec    ---------  Mitral deceleration time        162   ms       ---------  Mitral pressure half-time       47    ms       ---------  Mitral E/A ratio, peak          0.6             ---------  Mitral valve area, PHT, DP      4.68  cm^2     ---------  Mitral valve area/bsa, PHT,     2.39  cm^2/m^2 ---------  DP  Right ventricle                 Value          Ref  TAPSE, 2D                       2.01  cm       >=1.70  TAPSE, MM                       2.01  cm       >=1.70  RV s', lateral                  14.9  cm/sec   >=9.5 Legend: (L)  and  (H)  christi values outside specified reference range. ---------------------------------------------------------------------------- Prepared and electronically signed by Ramirez Forrester 04/03/2024 11:39     MRI BRAIN WO ACUTE (3) SEQUENCE (CPT=70551)    Result Date: 4/2/2024  PROCEDURE: MRI BRAIN WO ACUTE (3) SEQUENCE(CPT=70551)  COMPARISON: [facility], CT STROKE BRAIN (NO IV) (CPT=70450), 3/31/2024, 10:58 AM.  [facility], CT STROKE CTA BRAIN/CTA NECK (WIV) (CPT=70496/81475), 3/31/2024, 10:58 AM.  INDICATIONS: Stroke alert.  Left-sided weakness.  evaluate for nph; comment on  cc angle  TECHNIQUE: Fast brain stroke protocol MRI.  No IV gadolinium.  FINDINGS:  CEREBRUM: Multiple foci pathologic signal with regions of confluence in the cerebral white matter bilaterally most pronounced in the frontal and parietal lobes.  Chronic bilateral thalamic, internal capsule, external capsule and basal ganglionic lacunar infarcts. CEREBELLUM: No edema, hemorrhage, mass, acute infarction, or inappropriate atrophy.  BRAINSTEM: Patchy pathologic signal in the saroj. CSF SPACES: Guevara index ((largest diameter of the anterior horns / largest brain diameter (measured on CT image 22 series 17-03/31/2024) equals 5.2 / 13.2= 0.39.  Corpus callosalangle measured on coronal CT approximately 86 degrees.  Prominence of ventricles and sulci with ventricular prominence slightly greater than the degree of sulcal prominence. SKULL: No mass or other significant visible lesion.  SINUSES: Opacified maxillary, ethmoid and hypoplastic frontal sinuses.   Partial opacification of sphenoid sinuses. ORBITS: Limited views are unremarkable.  OTHER:               Flow is present in the anterior and posterior circulation vessels.No restricted diffusion.          CONCLUSION:  1. No acute infarct or hemorrhage. 2. Advanced changes of chronic small vessel disease in both cerebral hemispheres. 3. Moderate changes of chronic small vessel disease in the saroj. 4. Ventricular prominence greater than the degree of cortical sulcal prominence.  Elevated Guevara index, and diminished corpus callosal angle are suggestive, but not specific for normal pressure hydrocephalus.  Findings could also be seen with central atrophy greater than degree of cortical atrophy.    Dictated by (CST): Romeo Cortes MD on 4/02/2024 at 4:16 PM     Finalized by (CST): Romeo Cortes MD on 4/02/2024 at 4:35 PM          XR CHEST AP PORTABLE  (CPT=71045)    Result Date: 4/2/2024  PROCEDURE: XR CHEST AP PORTABLE  (CPT=71045) TIME: 1115 hours.   COMPARISON: [facility], XR CHEST AP PORTABLE (CPT=71045), 3/31/2024, 11:27 AM.  [facility], X CHEST PORTABLE, 9/10/2012, 12:22 PM.  INDICATIONS: Possible aspiration.  TECHNIQUE:   Single view.   FINDINGS:  CARDIAC/VASC: Normal.  No cardiac silhouette abnormality or cardiomegaly.  Unremarkable pulmonary vasculature.  MEDIAST/MICHELLE:   No visible mass or adenopathy. LUNGS/PLEURA: Normal.  No significant pulmonary parenchymal abnormalities.  No effusion or pleural thickening. BONES: No fracture or visible bony lesion. OTHER: Negative.          CONCLUSION: No radiographic evidence of acute cardiopulmonary abnormality.  No evidence of aspiration.  No new focal consolidation.    Dictated by (CST): Ulysses Galindo MD on 4/02/2024 at 11:45 AM     Finalized by (CST): Ulysses Galindo MD on 4/02/2024 at 11:45 AM          XR CHEST AP PORTABLE  (CPT=71045)    Result Date: 3/31/2024  PROCEDURE: XR CHEST AP PORTABLE  (CPT=71045) TIME: 11:28 a.m.    COMPARISON: [facility], X CHEST PORTABLE, 9/10/2012, 12:22 PM.  INDICATIONS: Weakness and left sided facial droop.  TECHNIQUE:   Single view.   FINDINGS:  CARDIAC/VASC: No cardiac silhouette abnormality or cardiomegaly.  Unremarkable pulmonary vasculature.  MEDIAST/MICHELLE:   No visible mass or adenopathy. LUNGS/PLEURA: Linear bibasilar pulmonary opacities.  No effusion or pneumothorax. BONES: Scattered mild degenerative endplate changes in the visualized thoracolumbar spine. OTHER: Negative.          CONCLUSION: Linear opacities in both lung bases suggesting atelectasis.  Otherwise no acute cardiopulmonary abnormality.   Dictated by (CST): Jhoan Nunn MD on 3/31/2024 at 11:36 AM     Finalized by (CST): Jhoan Nunn MD on 3/31/2024 at 11:37 AM          CT STROKE CTA BRAIN/CTA NECK (W IV)(CPT=70496/29136)    Result Date: 3/31/2024  PROCEDURE: CT STROKE CTA BRAIN/CTA NECK (WIV) (CPT=70496/71757)  COMPARISON: [facility], CT BRAIN HEAD WO CONTRAST, 9/11/2012, 8:52 PM.  INDICATIONS: eval for stroke, Left sided weakness.  TECHNIQUE: CT images of the neck and brain were obtained with non-ionic intravenous contrast material. Multi-planar reformatted/3-D images were created to optimize visualization of vascular anatomy.  Automated exposure control for dose reduction was used. Evaluation of internal carotid stenosis is based on NASCET Criteria.  An independent workstation was not used to post process imaging.  FINDINGS:  CAROTID ARTERIES: RIGHT COMMON CAROTID: No hemodynamically significant stenosis or dissection.  RIGHT INTERNAL CAROTID: Mild calcific atherosclerosis and atherosclerotic narrowing within the cavernous segment.  No hemodynamically significant stenosis or dissection.   LEFT COMMON CAROTID: No hemodynamically significant stenosis or dissection.  LEFT INTERNAL CAROTID: Mild calcific atherosclerosis and atherosclerotic narrowing within the cavernous segment.  No hemodynamically  significant stenosis or dissection.   VERTEBRAL ARTERIES: RIGHT: No hemodynamically significant stenosis or dissection.  LEFT: No hemodynamically significant stenosis or dissection.   BASILAR ARTERY: No hemodynamically significant stenosis or dissection.     AORTIC ARCH (Limited): Left-sided 4 vessel aortic arch with independent origin of the left vertebral from the arch.  There is atherosclerosis without evidence of aneurysm or acute aortic injury. MEDIASTINUM/APICES (Limited): Visualized lung apices are clear.  Visualized upper mediastinum is unremarkable.  OTHER: The visualized soft tissues of the neck are also unremarkable.   INTRACRANIAL ARTERIES: ANTERIOR CEREBRALS:  No significant stenosis.  No visible aneurysm or vascular malformation.  MIDDLE CEREBRALS: No significant stenosis.  No visible aneurysm or vascular malformation. POSTERIOR CEREBRALS: No significant stenosis.  No visible aneurysm or vascular malformation.  OTHER:  There is near complete opacification of the visualized paranasal sinuses with including the frontoethmoid recesses, ethmoid sinuses, and maxillary sinuses as well as the right maxillary sinus.  Mastoid air cells are clear.         CONCLUSION:  1. CTA head: No hemodynamically significant stenosis, occlusion, or gross aneurysm in the anterior or posterior circulation. 2. CTA neck: No hemodynamically significant stenosis, occlusion, or dissection of the carotids or vertebrals. 3.  Advanced chronic appearing paranasal sinus inflammation.  Dictated by (CST): Jhoan Nunn MD on 3/31/2024 at 11:18 AM     Finalized by (CST): Jhoan Nunn MD on 3/31/2024 at 11:22 AM          CT STROKE BRAIN (NO IV)(CPT=70450)    Result Date: 3/31/2024  PROCEDURE: CT STROKE BRAIN (NO IV) (CPT=70450)  COMPARISON: [facility], CT BRAIN HEAD WO CONTRAST, 9/11/2012, 8:52 PM.  INDICATIONS: stroke, Left sided weakness  TECHNIQUE:   CT images were obtained without contrast material.  Automated exposure  control for dose reduction was used.  Dose information is transmitted to the ACR (American College of Radiology) NRDR (National Radiology Data Registry) which includes the Dose Index Registry.  FINDINGS:  CSF SPACES: There is ex vacuo dilatation of the lateral and third ventricles. The remaining ventricles, cisterns, and sulci are appropriate for age.  No hydrocephalus, subarachnoid hemorrhage, or mass.  No midline shift.  CEREBRUM: No edema, hemorrhage, mass, acute infarction, or significant atrophy.  WHITE MATTER: Moderate chronic white matter ischemic changes.  Lacunar infarcts are present within the right basal ganglia and right caudate head. CEREBELLUM: No edema, hemorrhage, mass, acute infarction, or significant atrophy.  BRAINSTEM: No edema, hemorrhage, mass, acute infarction, or significant atrophy.  CALVARIUM: No apparent fracture, mass, or other significant visible lesion.  SINUSES: Near complete opacification of the frontal ethmoid recesses, ethmoid sinuses, sphenoid ethmoidal recesses, right sphenoid sinus, and both maxillary sinuses.  Mastoid air cells are clear. ORBITS: Rightward conjugate gaze.   OTHER: There is calcific atherosclerosis in the cavernous segments of the internal carotid arteries and vertebral arteries.         CONCLUSION:  1.  No acute intracranial process. 2.  There are moderate microvascular white matter ischemic changes, likely related to long-standing hypertension and/or diabetes.  Interval development of multiple lacunar infarcts most notably in the right caudate head and right basal ganglia, new since  2012 imaging. 3.  Atherosclerosis in the anterior and posterior circulations.  Exam discussed with Dr. Lynn on 3/31/24 at 11:17 a.m.  Dictated by (CST): Jhoan Nunn MD on 3/31/2024 at 11:14 AM     Finalized by (CST): Jhoan Nunn MD on 3/31/2024 at 11:18 AM            ASSESSMENT AND PLAN   68 year old male admitted with altered mental status.  Not to be retaining urine.  Agree  with continuing straight cath protocol as patient is at high risk to traumatically pull his you at times of agitation.  Continue BPH meds - tamsulosin and finasteride.  He has been treated for suspected UTI, although urine culture possibly contaminant.  Anticholinergic properties of olanzapine could be contributing to retention as well.      Thank you for this consult.    Lauren Munoz MD  [employer]   O: [phone]            Electronically signed by Lauren uMnoz MD on 4/14/2024  8:30 PM              Discharge Summary - D/C Summary      Discharge Summary signed by Alli Hernandez MD at 4/22/2024 12:47 PM  Version 1 of 1    Author: Alli Hernandez MD Service: Hospitalist Author Type: Physician    Filed: 4/22/2024 12:47 PM Date of Service: 4/22/2024  9:27 AM Status: Signed    : Alli Hernandez MD (Physician)       General Medicine Discharge Summary     Patient ID:  Carlo Ng  68 year old  12/13/1955    Admit date: 3/31/2024    Discharge date and time: 4/22/24    Attending Physician: Alli Hernandez MD     Consults: IP CONSULT TO HOSPITALIST  IP CONSULT TO NEUROLOGY  IP CONSULT TO SOCIAL WORK  IP CONSULT TO SOCIAL WORK  IP CONSULT TO PSYCHIATRY  IP CONSULT TO UROLOGY    Primary Care Physician: No primary care provider on file.     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  Patient arrives via EMS from his independent living facility.  It appears that the patient had a visiting nurse and noted him to be sitting at the edge of the bed, confused.  EMS arrived and they found the patient to have left-sided weakness and left-sided facial droop.  The patient was not answering questions appropriately and was altered.  He was last known well sometime yesterday. Normally gets his care at [place]. Has had admissions for altered mental status as well.     Hospital Course:   Mr. 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     Altered mental status   Metabolic encephalopathy  Delirium in setting of likely vascular dementia  -IMPROVED  -unclear, ddx included metabolic encephalopathy, infection, stroke, post ictal state on NPH, frontotemporal dementia?  -has been admitted 3 other times (last in 2/2023, at [facility]) with similar presentation, extensive work up   -neurology and psychiatry consulted  -MRI brain without acute infarct advanced small vessel disease, poss enlargement of ventricles  -[facility] called on 4/2 in am, Psyc consulted  -concern for NPH, s/p lumbar puncture on 4/4, had physical therapy preceding and post LP, opening pressure was normal (12) minute and cognitive state or functional state did not improve  -Discussed with neurology, and neurosurgery, unlikely that this is NPH given lack of improvement post LP, also atypical presentation, neurosurgery recommending outpatient follow-up if  suspicion is still high for NPH  -LP studies pending given CJD testing, encephalitis panel negative  -psychiatry managing psychiatric and behavorial meds   -has been off restraints for several days, sitter Dc'd on 4/15     Hypernatremia / DAWN  Urinary retention  Poss UTI  - urinary retention, bladder scan with 1600, straight Q6  - hold on you given high risk for pulling out  - continue flomax, bowel regimen, mobilize, minimizing contributing meds  - UA with sediment, IV ceftriaxone ordered, culture with poss contaminant, but has been straight cathed several times, will continue ab's to complete 7 day course (especially with leucocytosis, and suprapubic pain, now improved)  - ceftraixone (started on 4/9) -> transitioned to oral keflex   - flomax 0.8mg daily  - had multiple BM on 4/10-11, improved mobility, offending medications (thorazine stopped, haldol minimized).  - continued retention, so urology was consulted. Now improved   - started on finasteride however this may not start working immediately      Constipation  - KUB with large stool burden  - bowel regimen ordered--> several BM on 4/10-11     Pink eye / right eye radiation   - hs of right parotid radiation, has had history of recurrent eye infections since that time  - antibiotic drop started on 4/2 completed 4/9  - will continue artificial tears TID  - no changes in visual acuity, no pain  - improved     Wheezing- resolved  - poss secondary to risperidone? Add to allergy list  - steroids, nebs, avoid IV benadryl given severe agitation  - CXR negative   - RVP neg     Leucocytosis   - poss secondary to IV steroids  - no fevers  - will continue to monitor  - steroid stopped  - improved      Hx of Hypertension  -started on amlodipine  -coreg noted on home meds, resumed   - BP improved     Possible left sided facial droop and weakness  -neurology consulted and stroke work up if needed based on their evaluation   -anti-platelets if recommended by neuro    -discussed with neurology. Low suspicion for acute CVA. MRI  negative for stroke      Hx of seizure? Noted in outside records,  - not on AED's     Hs of Small cell lung cancer   - parotid involvement, has had radiation therapy   - follows at [facility]      Gout  -he's  was on colchicine (stopped as OP), no signs of gout    Operative Procedures:      Imaging: No results found.    Disposition: MINNIE    Activity: as tolerated   Diet: general   Wound Care: no needs  Code Status: Full Code  O2: no needs    Home Medication Changes: as below   All discharge medications have been reconciled with current medication list.     Med list     Medication List        START taking these medications      amLODIPine 10 MG Tabs  Commonly known as: Norvasc     aspirin 81 MG Chew     atorvastatin 80 MG Tabs  Commonly known as: Lipitor     divalproex  MG Csdr  Commonly known as: Depakote Sprinkle     escitalopram 5 MG Tabs  Commonly known as: Lexapro     finasteride 5 MG Tabs  Commonly known as: Proscar     melatonin 1 MG Tabs     memantine 5 MG Tabs  Commonly known as: Namenda     OLANZapine 5 MG Tabs  Commonly known as: ZyPREXA     Polyethylene Glycol 3350 17 g Pack  Commonly known as: MIRALAX     QUEtiapine 25 MG Tabs  Commonly known as: SEROquel     Sennosides 17.2 MG Tabs     thiamine 100 MG Tabs  Commonly known as: Vitamin B1            CONTINUE taking these medications      acetaminophen 325 MG Tabs  Commonly known as: Tylenol     albuterol 108 (90 Base) MCG/ACT Aers  Commonly known as: Ventolin HFA     carvedilol 12.5 MG Tabs  Commonly known as: Coreg     clopidogrel 75 MG Tabs  Commonly known as: Plavix     fluticasone-salmeterol 250-50 MCG/ACT Aepb  Commonly known as: Advair Diskus     tamsulosin 0.4 MG Caps  Commonly known as: Flomax              FU   Follow-up Information       [facility] Follow up.    Specialty: NEUROLOGY  Why: Neuro/Stroke follow up  Contact information:  [address] [facility]  [address] [place] [address]  [id number]  [phone]               Pcp, Unknown Follow up in 1 week(s).                             DC instructions:      Other Discharge Instructions:         Keep all follow up appointments and continue current medications.         Patient had opportunity to ask questions and state understand and agree with therapeutic plan as outlined    Thank You,    Alli Hernandez M.D.  [facility]      Electronically signed by Alli Hernandez MD on 4/22/2024 12:47 PM              Physical Therapy Notes (last 72 hours)      Physical Therapy Note signed by Apoorva Cuadra PT at 4/21/2024  3:02 PM  Version 1 of 1    Author: Apoorva Cuadra PT Service: Rehab Author Type: Physical Therapist    Filed: 4/21/2024  3:02 PM Date of Service: 4/21/2024 11:45 AM Status: Signed    : Apoorva Cuadra PT (Physical Therapist)       PHYSICAL THERAPY TREATMENT NOTE - INPATIENT     Room Number: 561/561-A       Presenting Problem: AMS, agitation       Problem List  Principal Problem:    NPH (normal pressure hydrocephalus) (HCC)  Active Problems:    Severe vascular dementia with agitation (HCC)    Delirium due to another medical condition    Episodic mood disorder (HCC)    TIA (transient ischemic attack)      PHYSICAL THERAPY ASSESSMENT   Patient demonstrates fair progress this session, goals  remain in progress.    Patient continues to function below baseline with bed mobility, transfers, and gait.  Contributing factors to remaining limitations include decreased functional strength, decreased endurance/aerobic capacity, and cognitive deficits (disorientation, impaired safety awareness, poor insight into his deficits).  Next session anticipate patient to progress bed mobility, transfers, and gait.  Physical Therapy will continue to follow patient for duration of hospitalization.    Patient continues to benefit from continued skilled PT services: to promote return to prior level of function and safety with continuous  assistance and gradual rehabilitative therapy .    PLAN  PT Treatment Plan: Bed mobility;Body mechanics;Patient education;Gait training;Transfer training;Balance training;Strengthening  Frequency (Obs): 3-5x/week    SUBJECTIVE  Pt agreeable to therapy, following commands appropriately, pleasant    OBJECTIVE  Precautions: Bed/chair alarm    WEIGHT BEARING RESTRICTION                PAIN ASSESSMENT   Ratin  Location: denies at this time  Management Techniques: Activity promotion    BALANCE  Static Sitting: Good  Dynamic Sitting: Fair  Static Standing: Poor +  Dynamic Standing: Poor +    ACTIVITY TOLERANCE                          O2 WALK       AM-PAC '6-Clicks' INPATIENT SHORT FORM - BASIC MOBILITY  How much difficulty does the patient currently have...  Patient Difficulty: Turning over in bed (including adjusting bedclothes, sheets and blankets)?: A Little   Patient Difficulty: Sitting down on and standing up from a chair with arms (e.g., wheelchair, bedside commode, etc.): A Little   Patient Difficulty: Moving from lying on back to sitting on the side of the bed?: A Little   How much help from another person does the patient currently need...   Help from Another: Moving to and from a bed to a chair (including a wheelchair)?: A Little   Help from Another: Need to walk in hospital room?: A Little   Help from Another: Climbing 3-5 steps with a railing?: A Lot     AM-PAC Score:  Raw Score: 17   Approx Degree of Impairment: 50.57%   Standardized Score (AM-PAC Scale): 42.13   CMS Modifier (G-Code): CK    FUNCTIONAL ABILITY STATUS  Functional Mobility/Gait Assessment  Gait Assistance: Minimum assistance  Distance (ft): 60  Assistive Device: Rolling walker  Pattern: Shuffle  Rolling: stand-by assist  Supine to Sit: minimal assist  Sit to Supine: minimal assist  Sit to Stand: minimal assist    Additional information:   Patient ok to see per rn.   Pt recd in supine, easily awoken, educated in goals for session, importance  of consistent mobility. Pt agreeable to physical therapy. Second person present as pt intermittently becomes combative.     Pt educated in pacing, energy conservation, safe use of rw, upright posture.  Gait training deficits include shuffling, cueing for safety, pacing.   Pt tolerated ambulation well.   Pt returned to supine at rn request due to pending bladder scan and str cath. Rn aware of session and assist needed, recommendations for activity.     The patient's Approx Degree of Impairment: 50.57% has been calculated based on documentation in the [facility] '6 clicks' Inpatient Daily Activity Short Form.  Research supports that patients with this level of impairment may benefit from rehab.  Final disposition will be made by interdisciplinary medical team.      Patient End of Session: In bed;Needs met;Call light within reach;RN aware of session/findings;All patient questions and concerns addressed;Alarm set    CURRENT GOALS   Goals to be met by: 4/10/24, updated 24  Patient Goal Patient's self-stated goal is: did not state   Goal #1 Patient is able to demonstrate supine - sit EOB @ level: supervision      Goal #1   Current Status Min A   Goal #2 Patient is able to demonstrate transfers Sit to/from Stand at assistance level: supervision with none      Goal #2  Current Status Min A with rolling walker    Goal #3 Patient is able to ambulate 100 feet with assist device: none at assistance level: supervision   Goal #3   Current Status Min A 60' with rolling walker      Gait Trainin minutes  Therapeutic Activity: 13 minutes               Occupational Therapy Notes (last 72 hours)  Notes from 2024 12:50 PM through 2024 12:50 PM   No notes of this type exist for this encounter.     Video Swallow Study Notes    No notes of this type exist for this encounter.        SLP Note - SLP Notes      SLP Note signed by Abby Lin, SLP at 2024 11:45 AM  Version 2 of 2    Author: Abby Lin, SLP  Service: Rehab Author Type: SPEECH-LANGUAGE PATHOLOGIST    Filed: 4/18/2024 11:45 AM Date of Service: 4/18/2024 10:53 AM Status: Addendum    : Abby Lin SLP (SPEECH-LANGUAGE PATHOLOGIST)    Related Notes: Original Note by Abby Lin SLP (SPEECH-LANGUAGE PATHOLOGIST) filed at 4/18/2024 10:53 AM       SPEECH DAILY NOTE - INPATIENT    ASSESSMENT & PLAN   ASSESSMENT      PPE REQUIRED. THIS THERAPIST WORE GLOVES FOR DURATION OF TX SESSION. HANDS WASHED UPON ENTRANCE/EXIT.     SLP f/u for ongoing meal assessment per recommendations of upgraded diet of general solids and thin liquids per swallow therapy on 4/16/24. Chart reviewed and collaborated with RN.  RN reports pt tolerates diet and medication well with no overt clinical signs of aspiration. Pt seen at bedside while sitting upright in the bedside chair.  Pt denies any pain. Pt is alert and agreeable to participate in swallowing therapy.  Pt denies any swallowing challenges and is thanking the therapist for upgrading his diet back to regular solids and thin liquids.     Pt was seen sitting at 90 degrees in bedside chair. Pt afebrile, tolerating room air with oxygen status 99% prior to the start of oral trials. The pt reports preferred method of education is verbal explanation.  SLP verbally reviewed aspiration precautions, recommended diet, and safe swallowing compensatory strategies with the patient. Patient acknowledged understanding and demonstrated swallowing precautions with self feeding po trails at a slow rate, taking controlled amounts, and no straw.  Good tolerance on general solids and thin liquids via open cup with adequate oral acceptance and no bilabial spillage.  Oropharyngeal transit phases functional.  Coordinated mastication on hard solids was complete with minimal oral stasis.  The pt completed a lingual sweep and multiple swallow to clear.  Functional hyolaryngeal elevation/excursion to palpation during the swallow with no overt  clinical signs/symptoms of aspiration (e.g., immediate/delayed throat clear, immediate/delayed cough, wet vocal quality, increased O2 effort) observed across all trials.  Significant improvement in swallowing status from initial BSSE. Oxygen status remained >98% throughout the entire session. Oral/buccal cavities clear of residue at the end of the session. Recommend to continue current diet of general solids and thin liquids via open cup.  Continue no straw precaution.     PLAN: SLP to sign off case secondary to pt tolerating least restrictive diet and demonstrating independent use of swallowing precautions.  Swallowing precautions and diet recommendations remain written on white board in the pt's room.  RN alerted with results and recommendations.      CXR 4/09/24:  CONCLUSION:    No focal opacity, pleural effusion, or pneumothorax.      Diet Recommendations - Solids: Regular  Diet Recommendations - Liquids: Thin Liquids    Compensatory Strategies Recommended: No straws  Aspiration Precautions: Upright position;Slow rate;Small bites and sips;No straw  Medication Administration Recommendations: One pill at a time    Patient Experiencing Pain: No  Treatment Plan  Treatment Plan/Recommendations: Aspiration precautions  Interdisciplinary Communication: Discussed with RN    GOALS  Goal #1 Patient will participate in ongoing clinical swallow evaluation as indicated/appropriate.     GOAL MET 4/01     Goal #2 The patient will tolerate puree consistency and thin liquids without overt signs or symptoms of aspiration with 100 % accuracy over 2-3 session(s).   GOAL MET 4/16     Goal #3 The patient/family/caregiver will demonstrate understanding and implementation of aspiration precautions and swallow strategies independently over 3 session(s).    GOAL MET 4/18   Goal #4 The patient will tolerate trial upgrade of minced and moist/BS/solid  consistency and thin liquids without overt signs or symptoms of aspiration with 100 %  accuracy over 2-3 session(s).    GOAL MET 4/18   Goal #5 The patient will utilize compensatory strategies as outlined by  BSSE (clinical evaluation) including Slow rate, Small bites, Small sips, Alternate liquids/solids, No straws, Upright 90 degrees, Eliminate distractions, Feed patient with 1:1 feed assistance 100 % of the time across 2 sessions.     The pt self fed po trials with following swallowing precautions. No straws used during the session.  GOAL MET 4/18   FOLLOW UP  Follow Up Needed (Documentation Required): No    Number of Visits to Meet Established Goals: 0    Session: 3    If you have any questions, please contact     Abby Lin MS/CCC-SLP  Speech Language Pathologist  Critical access hospital  EXT. [id number]      Electronically signed by Abby Lin SLP on 4/18/2024 11:45 AM     SLP Note signed by Abby Lin SLP at 4/18/2024 10:53 AM  Version 1 of 2    Author: Riley, Abby, SLP Service: Rehab Author Type: SPEECH-LANGUAGE PATHOLOGIST    Filed: 4/18/2024 10:53 AM Date of Service: 4/18/2024 10:53 AM Status: Signed    : Abby Lin SLP (SPEECH-LANGUAGE PATHOLOGIST)    Related Notes: Addendum by Abby Lin SLP (SPEECH-LANGUAGE PATHOLOGIST) filed at 4/18/2024 11:45 AM       SPEECH DAILY NOTE - INPATIENT    ASSESSMENT & PLAN   ASSESSMENT      Proper PPE worn. Hands sanitized upon entrance/exit Pt room.         Pt alert, afebrile and on room air. Pt seen for swallow analysis per BSE recommendations (after consulting with RN). Pt agreed to participate. Pt's preferred method of learning verbal. Pt upright in bed; observed with current diet of pureed/thin liquids for monitoring diet tolerance. Additionally, Pt seen with solid trials for candidacy of diet upgrade. Swallowing precautions/strategies discussed; mild cues needed for execution. Functional bite reflex/mastication on solid trials. Lingual skills functional for bolus control pureed and solids. No  significant/mild oral retention. Pharyngeal response appeared to trigger within 1-2 sec per hyolaryngeal elevation to completion (functional rise/strength per palpation). No overt CSA on pureed, solid nor thin liquids (no coughing, no throat clearing, clear vocal quality and no SOB). Diet upgraded to SOLID/thin liquids/no straw/pills WHOLE in pureed. Collaborated with RN regarding Pt's swallowing plan of care. Per RN, Pt with good tolerance of current diet. No report of difficulty taking meds. No new CXR completed. Sp02 ~96% during this session. Call light within Pt's reach upon SLP discharge from room.      CXR 4/09/24:  CONCLUSION:    No focal opacity, pleural effusion, or pneumothorax.     PLAN:    To f/u x1-2 sessions to ensure safe intake of NEW UPGRADED diet and reinforce swallowing/aspiration precautions. Family education as available.       Diet Recommendations - Solids: Regular  Diet Recommendations - Liquids: Thin Liquids    Compensatory Strategies Recommended: No straws  Aspiration Precautions: Upright position;Slow rate;Small bites and sips;No straw  Medication Administration Recommendations: One pill at a time    Patient Experiencing Pain: No  Treatment Plan  Treatment Plan/Recommendations: Aspiration precautions  Interdisciplinary Communication: Discussed with RN    GOALS  Goal #1 Patient will participate in ongoing clinical swallow evaluation as indicated/appropriate.     GOAL MET 4/01     Goal #2 The patient will tolerate puree consistency and thin liquids without overt signs or symptoms of aspiration with 100 % accuracy over 2-3 session(s).    No CSA on current diet.       GOAL MET 4/16     Goal #3 The patient/family/caregiver will demonstrate understanding and implementation of aspiration precautions and swallow strategies independently over 3 session(s).    Swallowing precautions posted in room.     In Progress   Goal #4 The patient will tolerate trial upgrade of minced and moist/BS/solid   consistency and thin liquids without overt signs or symptoms of aspiration with 100 % accuracy over 2-3 session(s).    Diet upgraded to SOLID consistency 4/16/24.    In Progress   Goal #5 The patient will utilize compensatory strategies as outlined by  BSSE (clinical evaluation) including Slow rate, Small bites, Small sips, Alternate liquids/solids, No straws, Upright 90 degrees, Eliminate distractions, Feed patient with 1:1 feed assistance 100 % of the time across 2 sessions.    Pt self fed oral intake. Mild cues for controlled amts.      In Progress   FOLLOW UP  Follow Up Needed (Documentation Required): No  SLP Follow-up Date: 04/17/24  Number of Visits to Meet Established Goals: 0    Session: 2 on pureed diet    If you have any questions, please contact         Electronically signed by Abby Lin, SLP on 4/18/2024 10:53 AM           Immunizations     Name Date      Covid-19 Pfizer 11/15/21     Covid-19 Pfizer 02/12/21     Covid-19 Pfizer 01/22/21       Multidisciplinary Problems     Active Goals        Problem: Patient/Family Goals    Goal Priority Disciplines Outcome Interventions   Patient/Family Long Term Goal     Interdisciplinary Progressing    Description: Patient's Long Term Goal: discharge    Interventions:  - Monitor vital signs  - Monitor neurological status  - Ambulate as tolerated  - See additional Care Plan goals for specific interventions   Patient/Family Short Term Goal     Interdisciplinary Progressing    Description: Patient's Short Term Goal: feel better    Interventions:   - Verbalize needs and wants  - Antianxiety medications as needed  - See additional Care Plan goals for specific interventions